# Patient Record
Sex: FEMALE | Race: WHITE | Employment: OTHER
[De-identification: names, ages, dates, MRNs, and addresses within clinical notes are randomized per-mention and may not be internally consistent; named-entity substitution may affect disease eponyms.]

---

## 2017-01-16 ENCOUNTER — OFFICE VISIT (OUTPATIENT)
Dept: FAMILY MEDICINE CLINIC | Facility: CLINIC | Age: 72
End: 2017-01-16

## 2017-01-16 VITALS
WEIGHT: 140 LBS | SYSTOLIC BLOOD PRESSURE: 102 MMHG | HEART RATE: 60 BPM | DIASTOLIC BLOOD PRESSURE: 68 MMHG | BODY MASS INDEX: 24.8 KG/M2 | HEIGHT: 63 IN | RESPIRATION RATE: 20 BRPM

## 2017-01-16 DIAGNOSIS — E78.2 MIXED HYPERLIPIDEMIA: Primary | ICD-10-CM

## 2017-01-16 DIAGNOSIS — E55.9 VITAMIN D DEFICIENCY: ICD-10-CM

## 2017-01-16 DIAGNOSIS — D64.9 ANEMIA, UNSPECIFIED TYPE: ICD-10-CM

## 2017-01-16 DIAGNOSIS — K21.9 GASTROESOPHAGEAL REFLUX DISEASE, ESOPHAGITIS PRESENCE NOT SPECIFIED: ICD-10-CM

## 2017-01-16 DIAGNOSIS — M10.9 GOUT, UNSPECIFIED CAUSE, UNSPECIFIED CHRONICITY, UNSPECIFIED SITE: ICD-10-CM

## 2017-01-16 PROCEDURE — 99214 OFFICE O/P EST MOD 30 MIN: CPT | Performed by: FAMILY MEDICINE

## 2017-01-16 RX ORDER — FAMOTIDINE 20 MG/1
20 TABLET, FILM COATED ORAL 2 TIMES DAILY
Qty: 60 TABLET | Refills: 3 | Status: SHIPPED | OUTPATIENT
Start: 2017-01-16 | End: 2017-06-23 | Stop reason: SDUPTHER

## 2017-01-16 ASSESSMENT — ENCOUNTER SYMPTOMS
ABDOMINAL PAIN: 0
BLOOD IN STOOL: 0
CHEST TIGHTNESS: 0
SHORTNESS OF BREATH: 0

## 2017-01-30 DIAGNOSIS — Z12.39 BREAST CANCER SCREENING: Primary | ICD-10-CM

## 2017-02-06 ENCOUNTER — HOSPITAL ENCOUNTER (OUTPATIENT)
Dept: WOMENS IMAGING | Age: 72
Discharge: HOME | End: 2017-02-06
Payer: MEDICARE

## 2017-02-06 DIAGNOSIS — Z12.39 BREAST CANCER SCREENING: ICD-10-CM

## 2017-02-06 PROCEDURE — G0202 SCR MAMMO BI INCL CAD: HCPCS | Performed by: RADIOLOGY

## 2017-02-06 PROCEDURE — 77063 BREAST TOMOSYNTHESIS BI: CPT | Performed by: RADIOLOGY

## 2017-02-06 PROCEDURE — 7025F PT INFOSYS ALARM 4 NXT MAMMO: CPT | Performed by: RADIOLOGY

## 2017-02-06 PROCEDURE — G0202 SCR MAMMO BI INCL CAD: HCPCS

## 2017-04-26 ENCOUNTER — HOSPITAL ENCOUNTER (OUTPATIENT)
Age: 72
Discharge: HOME | End: 2017-04-26
Payer: MEDICARE

## 2017-04-26 DIAGNOSIS — E55.9 VITAMIN D DEFICIENCY: ICD-10-CM

## 2017-04-26 DIAGNOSIS — D64.9 ANEMIA, UNSPECIFIED TYPE: ICD-10-CM

## 2017-04-26 DIAGNOSIS — M10.9 GOUT, UNSPECIFIED CAUSE, UNSPECIFIED CHRONICITY, UNSPECIFIED SITE: ICD-10-CM

## 2017-04-26 DIAGNOSIS — E78.2 MIXED HYPERLIPIDEMIA: ICD-10-CM

## 2017-04-26 LAB
ABSOLUTE EOS #: 0.3 K/UL (ref 0–0.4)
ABSOLUTE LYMPH #: 1.4 K/UL (ref 1–4.8)
ABSOLUTE MONO #: 0.6 K/UL (ref 0.1–1.3)
ALT SERPL-CCNC: 18 U/L (ref 5–33)
ANION GAP SERPL CALCULATED.3IONS-SCNC: 10 MMOL/L (ref 9–17)
AST SERPL-CCNC: 24 U/L
BASOPHILS # BLD: 1 %
BASOPHILS ABSOLUTE: 0.1 K/UL (ref 0–0.2)
BUN BLDV-MCNC: 22 MG/DL (ref 8–23)
BUN/CREAT BLD: NORMAL (ref 9–20)
CALCIUM SERPL-MCNC: 9.7 MG/DL (ref 8.6–10.4)
CHLORIDE BLD-SCNC: 103 MMOL/L (ref 98–107)
CHOLESTEROL/HDL RATIO: 3.3
CHOLESTEROL: 155 MG/DL
CO2: 28 MMOL/L (ref 20–31)
CREAT SERPL-MCNC: 0.82 MG/DL (ref 0.5–0.9)
DIFFERENTIAL TYPE: ABNORMAL
EOSINOPHILS RELATIVE PERCENT: 8 %
GFR AFRICAN AMERICAN: >60 ML/MIN
GFR NON-AFRICAN AMERICAN: >60 ML/MIN
GFR SERPL CREATININE-BSD FRML MDRD: NORMAL ML/MIN/{1.73_M2}
GFR SERPL CREATININE-BSD FRML MDRD: NORMAL ML/MIN/{1.73_M2}
GLUCOSE BLD-MCNC: 89 MG/DL (ref 70–99)
HCT VFR BLD CALC: 37.9 % (ref 36–46)
HDLC SERPL-MCNC: 47 MG/DL
HEMOGLOBIN: 12.1 G/DL (ref 12–16)
LDL CHOLESTEROL: 88 MG/DL (ref 0–130)
LYMPHOCYTES # BLD: 32 %
MCH RBC QN AUTO: 28.6 PG (ref 26–34)
MCHC RBC AUTO-ENTMCNC: 31.8 G/DL (ref 31–37)
MCV RBC AUTO: 89.9 FL (ref 80–100)
MONOCYTES # BLD: 14 %
PDW BLD-RTO: 15.4 % (ref 11.5–14.9)
PLATELET # BLD: 200 K/UL (ref 150–450)
PLATELET ESTIMATE: ABNORMAL
PMV BLD AUTO: 8.7 FL (ref 6–12)
POTASSIUM SERPL-SCNC: 4.3 MMOL/L (ref 3.7–5.3)
RBC # BLD: 4.22 M/UL (ref 4–5.2)
RBC # BLD: ABNORMAL 10*6/UL
SEG NEUTROPHILS: 45 %
SEGMENTED NEUTROPHILS ABSOLUTE COUNT: 2 K/UL (ref 1.3–9.1)
SODIUM BLD-SCNC: 141 MMOL/L (ref 135–144)
TRIGL SERPL-MCNC: 98 MG/DL
URIC ACID: 3.4 MG/DL (ref 2.4–5.7)
VITAMIN D 25-HYDROXY: 26.6 NG/ML (ref 30–100)
VLDLC SERPL CALC-MCNC: NORMAL MG/DL (ref 1–30)
WBC # BLD: 4.4 K/UL (ref 3.5–11)
WBC # BLD: ABNORMAL 10*3/UL

## 2017-04-26 PROCEDURE — 80061 LIPID PANEL: CPT

## 2017-04-26 PROCEDURE — 85025 COMPLETE CBC W/AUTO DIFF WBC: CPT

## 2017-04-26 PROCEDURE — 36415 COLL VENOUS BLD VENIPUNCTURE: CPT

## 2017-04-26 PROCEDURE — 82306 VITAMIN D 25 HYDROXY: CPT

## 2017-04-26 PROCEDURE — 84550 ASSAY OF BLOOD/URIC ACID: CPT

## 2017-04-26 PROCEDURE — 80048 BASIC METABOLIC PNL TOTAL CA: CPT

## 2017-04-26 PROCEDURE — 84460 ALANINE AMINO (ALT) (SGPT): CPT

## 2017-04-26 PROCEDURE — 84450 TRANSFERASE (AST) (SGOT): CPT

## 2017-04-27 RX ORDER — ERGOCALCIFEROL 1.25 MG/1
50000 CAPSULE ORAL WEEKLY
Qty: 4 CAPSULE | Refills: 5 | Status: SHIPPED | OUTPATIENT
Start: 2017-04-27 | End: 2018-01-15 | Stop reason: SDUPTHER

## 2017-06-13 ENCOUNTER — OFFICE VISIT (OUTPATIENT)
Dept: FAMILY MEDICINE CLINIC | Age: 72
End: 2017-06-13
Payer: MEDICARE

## 2017-06-13 VITALS
BODY MASS INDEX: 24.62 KG/M2 | RESPIRATION RATE: 16 BRPM | SYSTOLIC BLOOD PRESSURE: 110 MMHG | WEIGHT: 139 LBS | DIASTOLIC BLOOD PRESSURE: 62 MMHG | HEART RATE: 60 BPM

## 2017-06-13 DIAGNOSIS — M10.9 GOUT, UNSPECIFIED CAUSE, UNSPECIFIED CHRONICITY, UNSPECIFIED SITE: ICD-10-CM

## 2017-06-13 DIAGNOSIS — E55.9 VITAMIN D DEFICIENCY: ICD-10-CM

## 2017-06-13 DIAGNOSIS — E78.2 MIXED HYPERLIPIDEMIA: Primary | ICD-10-CM

## 2017-06-13 DIAGNOSIS — K21.9 GASTROESOPHAGEAL REFLUX DISEASE, ESOPHAGITIS PRESENCE NOT SPECIFIED: ICD-10-CM

## 2017-06-13 PROCEDURE — 99214 OFFICE O/P EST MOD 30 MIN: CPT | Performed by: FAMILY MEDICINE

## 2017-06-13 ASSESSMENT — ENCOUNTER SYMPTOMS
CHEST TIGHTNESS: 0
SHORTNESS OF BREATH: 0
ABDOMINAL PAIN: 0
BLOOD IN STOOL: 0

## 2017-06-13 ASSESSMENT — PATIENT HEALTH QUESTIONNAIRE - PHQ9
SUM OF ALL RESPONSES TO PHQ9 QUESTIONS 1 & 2: 0
SUM OF ALL RESPONSES TO PHQ QUESTIONS 1-9: 0
1. LITTLE INTEREST OR PLEASURE IN DOING THINGS: 0
2. FEELING DOWN, DEPRESSED OR HOPELESS: 0

## 2017-10-12 ENCOUNTER — IMMUNIZATION (OUTPATIENT)
Dept: FAMILY MEDICINE CLINIC | Age: 72
End: 2017-10-12
Payer: MEDICARE

## 2017-10-12 VITALS — TEMPERATURE: 97.8 F | BODY MASS INDEX: 25.01 KG/M2 | WEIGHT: 141.2 LBS

## 2017-10-12 DIAGNOSIS — Z23 FLU VACCINE NEED: Primary | ICD-10-CM

## 2017-10-12 PROCEDURE — G0008 ADMIN INFLUENZA VIRUS VAC: HCPCS | Performed by: FAMILY MEDICINE

## 2017-10-12 PROCEDURE — 90686 IIV4 VACC NO PRSV 0.5 ML IM: CPT | Performed by: FAMILY MEDICINE

## 2017-11-20 ENCOUNTER — OFFICE VISIT (OUTPATIENT)
Dept: PODIATRY | Age: 72
End: 2017-11-20
Payer: MEDICARE

## 2017-11-20 VITALS
SYSTOLIC BLOOD PRESSURE: 113 MMHG | HEIGHT: 63 IN | BODY MASS INDEX: 24.8 KG/M2 | HEART RATE: 87 BPM | WEIGHT: 140 LBS | DIASTOLIC BLOOD PRESSURE: 69 MMHG

## 2017-11-20 DIAGNOSIS — M25.571 SINUS TARSI SYNDROME, RIGHT: ICD-10-CM

## 2017-11-20 DIAGNOSIS — M76.829 POSTERIOR TIBIAL TENDON DYSFUNCTION: ICD-10-CM

## 2017-11-20 DIAGNOSIS — M21.071 ACQUIRED VALGUS DEFORMITY OF RIGHT ANKLE: Primary | ICD-10-CM

## 2017-11-20 PROCEDURE — 99213 OFFICE O/P EST LOW 20 MIN: CPT | Performed by: PODIATRIST

## 2017-11-20 RX ORDER — IBUPROFEN AND FAMOTIDINE 26.6; 8 MG/1; MG/1
1 TABLET, FILM COATED ORAL 3 TIMES DAILY PRN
Qty: 27 TABLET | Refills: 0 | COMMUNITY
Start: 2017-11-20 | End: 2017-12-12

## 2017-11-20 RX ORDER — IBUPROFEN AND FAMOTIDINE 26.6; 8 MG/1; MG/1
1 TABLET, FILM COATED ORAL 3 TIMES DAILY PRN
Qty: 90 TABLET | Refills: 1 | Status: SHIPPED | OUTPATIENT
Start: 2017-11-20 | End: 2017-12-12

## 2017-11-20 ASSESSMENT — ENCOUNTER SYMPTOMS
DIARRHEA: 0
COLOR CHANGE: 0
NAUSEA: 0
VOMITING: 0
CONSTIPATION: 0

## 2017-11-20 NOTE — PROGRESS NOTES
WITH FOOD AS NEEDED FOR GOUT FLARE UP UNTIL ATTACK SUBSIDES 6/19/17  Yes Cal Johnson MD   vitamin D (ERGOCALCIFEROL) 99374 UNITS CAPS capsule Take 1 capsule by mouth once a week 4/27/17  Yes Cal Johnson MD   Calcium Carbonate-Vitamin D (OYSTER SHELL CALCIUM/D) 500-200 MG-UNIT TABS TAKE ONE TABLET BY MOUTH TWICE A DAY 3/1/16  Yes Naomi Melgar CNP   aspirin 81 MG tablet Take 81 mg by mouth daily   Yes Historical Provider, MD   Aspirin-Acetaminophen-Caffeine (EXCEDRIN MIGRAINE PO) Take 2 tablets by mouth as needed. Yes Historical Provider, MD   estradiol (ESTRACE VAGINAL) 0.1 MG/GM vaginal cream Place 1 g vaginally once a week for 30 days. 5/31/12 6/30/12  Arianna Hernandez MD       Review of Systems   Constitutional: Negative for chills, diaphoresis, fatigue, fever and unexpected weight change. Cardiovascular: Negative for leg swelling. Gastrointestinal: Negative for constipation, diarrhea, nausea and vomiting. Musculoskeletal: Negative for arthralgias, gait problem and joint swelling. Skin: Negative for color change, pallor, rash and wound. Neurological: Negative for weakness and numbness. Lower Extremity Physical Examination:     Vitals:   Vitals:    11/20/17 1503   BP: 113/69   Pulse: 87     General: AAO x 3 in NAD. Integument:   Warm, dry, supple, Bilateral.  Open lesion absent, Bilateral.      Vascular: DP and PT pulses palpable 2/4, Bilateral.  CFT <3 seconds, Bilateral.  Hair growth present to the level of the digits, Bilateral.  Edema absent, Bilateral.  Varicosities present, Bilateral. Erythema absent, Bilateral    Neurological: Sensation intact to light touch to level of digits, Bilateral.      Musculoskeletal: No Pain to palpation sinus tarsi right foot. Muscle strength is +5/5 to all four muscle groups of the right lower extremity and +5/5 to all four muscle groups of the left lower extremity.  There are no areas of subluxation, dislocation, or laxity noted to either lower extremity. Range of motion to the right ankle is painful, but no grinding is noted. Range of motion to the left ankle is free of pain or grinding. Range of motion to the right subtalar joint is free of pain or grinding. Range of motion to the left subtalar joint is free of pain or grinding. Weightbearing evaluation does reveal extreme rearfoot eversion, medial prominence of the talar head, loss of the medial longitudinal arch height, ankle valgus and too many toes sign bilaterally. This deformity is reducible bilaterally. The digits of the right foot are contracted. The digits of the left foot are contracted. There is no prominence noted to the first metatarsal head of the right foot. There is no prominence noted to the first metatarsal head of the left foot. There is pain with palpation to the right posterior tibial tendon posterior to the medial malleolus. There is no pain with palpation to this tendon as it traverses distally and no pain at it's insertion on the navicular. There is mild pain with palpation to the sinus tarsi of the right foot. Asessment: Patient is a 67 y.o. female with:   1. Acquired valgus deformity of right ankle    2. Sinus tarsi syndrome, right    3. Posterior tibial tendon dysfunction        Plan: Patient examined and evaluated. Current condition and treatment options discussed in detail. Verbal and written instructions given to patient. Contact office with any questions/problems/concerns. Continue AFO Bilateral,  Alleve as needed. Discussed Duexis, topical  Recommended Move Free or OsteoBiFlex  Will save injection for when pain is severe, She declined injection today. We spent time discussing all option, discussed exercise. No orders of the defined types were placed in this encounter. No orders of the defined types were placed in this encounter.        RTC as needed   11/20/2017

## 2017-12-11 RX ORDER — PRAVASTATIN SODIUM 20 MG
TABLET ORAL
Qty: 30 TABLET | Refills: 3 | Status: SHIPPED | OUTPATIENT
Start: 2017-12-11 | End: 2018-03-13 | Stop reason: CLARIF

## 2017-12-12 ENCOUNTER — OFFICE VISIT (OUTPATIENT)
Dept: FAMILY MEDICINE CLINIC | Age: 72
End: 2017-12-12
Payer: MEDICARE

## 2017-12-12 VITALS
HEART RATE: 62 BPM | SYSTOLIC BLOOD PRESSURE: 108 MMHG | RESPIRATION RATE: 14 BRPM | WEIGHT: 140 LBS | BODY MASS INDEX: 24.8 KG/M2 | DIASTOLIC BLOOD PRESSURE: 70 MMHG

## 2017-12-12 DIAGNOSIS — E55.9 VITAMIN D DEFICIENCY: ICD-10-CM

## 2017-12-12 DIAGNOSIS — M10.9 GOUT, UNSPECIFIED CAUSE, UNSPECIFIED CHRONICITY, UNSPECIFIED SITE: ICD-10-CM

## 2017-12-12 DIAGNOSIS — E78.2 MIXED HYPERLIPIDEMIA: Primary | ICD-10-CM

## 2017-12-12 PROCEDURE — 99214 OFFICE O/P EST MOD 30 MIN: CPT | Performed by: FAMILY MEDICINE

## 2017-12-12 ASSESSMENT — ENCOUNTER SYMPTOMS
BLOOD IN STOOL: 0
CHEST TIGHTNESS: 0
ABDOMINAL PAIN: 0
SHORTNESS OF BREATH: 0

## 2017-12-12 NOTE — PROGRESS NOTES
Subjective:      Patient ID: Yusra Daniel is a 67 y.o. female. Visit Information    Have you changed or started any medications since your last visit including any over-the-counter medicines, vitamins, or herbal medicines? no   Are you having any side effects from any of your medications? -  no  Have you stopped taking any of your medications? Is so, why? -  no    Have you seen any other physician or provider since your last visit? No  Have you had any other diagnostic tests since your last visit? No  Have you been seen in the emergency room and/or had an admission to a hospital since we last saw you? No  Have you had your routine dental cleaning in the past 6 months? no    Have you activated your [employer] account? If not, what are your barriers? No     Patient Care Team:  Primitivo Martin MD as PCP - General (Family Medicine)    Medical History Review  Past Medical, Family, and Social History reviewed and does contribute to the patient presenting condition    Health Maintenance   Topic Date Due    DTaP/Tdap/Td vaccine (1 - Tdap) 03/20/1963    Breast cancer screen  02/06/2019    Lipid screen  04/26/2022    Colon cancer screen colonoscopy  12/22/2024    Zostavax vaccine  Addressed    DEXA (modify frequency per FRAX score)  Completed    Flu vaccine  Completed    Pneumococcal low/med risk  Completed     HPI  Patient is a 69-year-old white female who presents for hyperlipidemia, gout, vitamin D deficiency. Patient states that she is taking and tolerating her medication. She denies any chest pain, abdominal pain, shortness of breath, fever or chills. She states she has a good appetite and remains active. Review of Systems   Constitutional: Negative for chills and fever. HENT: Negative for congestion. Respiratory: Negative for chest tightness and shortness of breath. Cardiovascular: Negative for chest pain. Gastrointestinal: Negative for abdominal pain and blood in stool.    Genitourinary: Negative

## 2018-01-19 RX ORDER — FAMOTIDINE 20 MG/1
TABLET, FILM COATED ORAL
Qty: 60 TABLET | Refills: 1 | Status: SHIPPED | OUTPATIENT
Start: 2018-01-19 | End: 2018-03-17 | Stop reason: SDUPTHER

## 2018-01-25 RX ORDER — LORATADINE 10 MG/1
TABLET ORAL
Qty: 90 TABLET | Refills: 1 | Status: SHIPPED | OUTPATIENT
Start: 2018-01-25 | End: 2018-07-24 | Stop reason: SDUPTHER

## 2018-01-30 ENCOUNTER — TELEPHONE (OUTPATIENT)
Dept: FAMILY MEDICINE CLINIC | Age: 73
End: 2018-01-30

## 2018-01-30 RX ORDER — BENZONATATE 100 MG/1
100 CAPSULE ORAL 3 TIMES DAILY PRN
Qty: 30 CAPSULE | Refills: 0 | Status: SHIPPED | OUTPATIENT
Start: 2018-01-30 | End: 2018-02-09

## 2018-01-30 RX ORDER — AMOXICILLIN 875 MG/1
875 TABLET, COATED ORAL 2 TIMES DAILY
Qty: 20 TABLET | Refills: 0 | Status: SHIPPED | OUTPATIENT
Start: 2018-01-30 | End: 2018-02-09

## 2018-01-30 NOTE — TELEPHONE ENCOUNTER
Sore throat at night from sinus dripping, dry cough, blowing yellow and headache, can you send something to [facility] for her?

## 2018-02-07 ENCOUNTER — HOSPITAL ENCOUNTER (OUTPATIENT)
Dept: WOMENS IMAGING | Age: 73
Discharge: HOME | End: 2018-02-09
Payer: MEDICARE

## 2018-02-07 DIAGNOSIS — Z13.9 ENCOUNTER FOR SCREENING: ICD-10-CM

## 2018-02-07 PROCEDURE — 77067 SCR MAMMO BI INCL CAD: CPT

## 2018-02-07 PROCEDURE — 77063 BREAST TOMOSYNTHESIS BI: CPT

## 2018-02-19 RX ORDER — ALLOPURINOL 300 MG/1
TABLET ORAL
Qty: 30 TABLET | Refills: 2 | Status: SHIPPED | OUTPATIENT
Start: 2018-02-19 | End: 2018-05-22 | Stop reason: SDUPTHER

## 2018-03-07 ENCOUNTER — HOSPITAL ENCOUNTER (OUTPATIENT)
Age: 73
Discharge: HOME | End: 2018-03-07
Payer: MEDICARE

## 2018-03-07 DIAGNOSIS — E55.9 VITAMIN D DEFICIENCY: ICD-10-CM

## 2018-03-07 DIAGNOSIS — M10.9 GOUT, UNSPECIFIED CAUSE, UNSPECIFIED CHRONICITY, UNSPECIFIED SITE: ICD-10-CM

## 2018-03-07 DIAGNOSIS — E78.2 MIXED HYPERLIPIDEMIA: ICD-10-CM

## 2018-03-07 LAB
ALT SERPL-CCNC: 16 U/L (ref 5–33)
ANION GAP SERPL CALCULATED.3IONS-SCNC: 11 MMOL/L (ref 9–17)
AST SERPL-CCNC: 24 U/L
BUN BLDV-MCNC: 22 MG/DL (ref 8–23)
BUN/CREAT BLD: NORMAL (ref 9–20)
CALCIUM SERPL-MCNC: 9.6 MG/DL (ref 8.6–10.4)
CHLORIDE BLD-SCNC: 101 MMOL/L (ref 98–107)
CHOLESTEROL/HDL RATIO: 3.1
CHOLESTEROL: 166 MG/DL
CO2: 27 MMOL/L (ref 20–31)
CREAT SERPL-MCNC: 0.85 MG/DL (ref 0.5–0.9)
GFR AFRICAN AMERICAN: >60 ML/MIN
GFR NON-AFRICAN AMERICAN: >60 ML/MIN
GFR SERPL CREATININE-BSD FRML MDRD: NORMAL ML/MIN/{1.73_M2}
GFR SERPL CREATININE-BSD FRML MDRD: NORMAL ML/MIN/{1.73_M2}
GLUCOSE BLD-MCNC: 79 MG/DL (ref 70–99)
HDLC SERPL-MCNC: 53 MG/DL
LDL CHOLESTEROL: 93 MG/DL (ref 0–130)
MAGNESIUM: 1.9 MG/DL (ref 1.6–2.6)
POTASSIUM SERPL-SCNC: 3.9 MMOL/L (ref 3.7–5.3)
SODIUM BLD-SCNC: 139 MMOL/L (ref 135–144)
TRIGL SERPL-MCNC: 101 MG/DL
URIC ACID: 4 MG/DL (ref 2.4–5.7)
VITAMIN D 25-HYDROXY: 31.2 NG/ML (ref 30–100)
VLDLC SERPL CALC-MCNC: NORMAL MG/DL (ref 1–30)

## 2018-03-07 PROCEDURE — 84550 ASSAY OF BLOOD/URIC ACID: CPT

## 2018-03-07 PROCEDURE — 84450 TRANSFERASE (AST) (SGOT): CPT

## 2018-03-07 PROCEDURE — 36415 COLL VENOUS BLD VENIPUNCTURE: CPT

## 2018-03-07 PROCEDURE — 83735 ASSAY OF MAGNESIUM: CPT

## 2018-03-07 PROCEDURE — 84460 ALANINE AMINO (ALT) (SGPT): CPT

## 2018-03-07 PROCEDURE — 80048 BASIC METABOLIC PNL TOTAL CA: CPT

## 2018-03-07 PROCEDURE — 80061 LIPID PANEL: CPT

## 2018-03-07 PROCEDURE — 82306 VITAMIN D 25 HYDROXY: CPT

## 2018-03-13 ENCOUNTER — TELEPHONE (OUTPATIENT)
Dept: FAMILY MEDICINE CLINIC | Age: 73
End: 2018-03-13

## 2018-03-13 RX ORDER — SIMVASTATIN 20 MG
20 TABLET ORAL DAILY
Qty: 30 TABLET | Refills: 3 | Status: SHIPPED | OUTPATIENT
Start: 2018-03-13 | End: 2018-07-13 | Stop reason: SDUPTHER

## 2018-03-13 NOTE — TELEPHONE ENCOUNTER
Patient called to say that her PRAVACHOL IS NO LONGER COVERED ON HER INSURANCE BUT ZOCOR OR LOVASTATIN WOULD BE. Her pharmacy is [email or web address] on [address]. Please advise.       Next Visit Date:  Future Appointments  Date Time Provider Anabel Emerson   6/12/2018 1:30 PM Vivi Monroe MD [address] [facility] SETiT Maintenance   Topic Date Due    DTaP/Tdap/Td vaccine (1 - Tdap) 03/20/1963    Shingles Vaccine (1 of 2 - 2 Dose Series) 03/20/1994    Breast cancer screen  02/07/2020    Lipid screen  03/07/2023    Colon cancer screen colonoscopy  12/22/2024    DEXA (modify frequency per FRAX score)  Completed    Flu vaccine  Completed    Pneumococcal low/med risk  Completed       No results found for: LABA1C          ( goal A1C is < 7)   No results found for: LABMICR  LDL Cholesterol (mg/dL)   Date Value   03/07/2018 93       (goal LDL is <100)   AST (U/L)   Date Value   03/07/2018 24     ALT (U/L)   Date Value   03/07/2018 16     BUN (mg/dL)   Date Value   03/07/2018 22     BP Readings from Last 3 Encounters:   12/12/17 108/70   11/20/17 113/69   06/13/17 110/62          (goal 120/80)    All Future Testing planned in [facility]  Lab Frequency Next Occurrence               Patient Active Problem List:     Allergic rhinitis     Osteoarthritis     Osteoporosis     GERD (gastroesophageal reflux disease)     Headache     Hyperlipidemia     Gout     Vitamin D deficiency     Migraine     Chronic headache     Anemia     Lymphadenopathy

## 2018-03-19 RX ORDER — FAMOTIDINE 20 MG/1
TABLET, FILM COATED ORAL
Qty: 60 TABLET | Refills: 2 | Status: SHIPPED | OUTPATIENT
Start: 2018-03-19 | End: 2018-06-23 | Stop reason: SDUPTHER

## 2018-03-28 ENCOUNTER — OFFICE VISIT (OUTPATIENT)
Dept: ORTHOPEDIC SURGERY | Age: 73
End: 2018-03-28
Payer: MEDICARE

## 2018-03-28 VITALS — HEIGHT: 63 IN | RESPIRATION RATE: 18 BRPM | BODY MASS INDEX: 24.8 KG/M2 | HEART RATE: 69 BPM | WEIGHT: 140 LBS

## 2018-03-28 DIAGNOSIS — M25.562 CHRONIC PAIN OF BOTH KNEES: Primary | ICD-10-CM

## 2018-03-28 DIAGNOSIS — M25.561 CHRONIC PAIN OF BOTH KNEES: Primary | ICD-10-CM

## 2018-03-28 DIAGNOSIS — G89.29 CHRONIC PAIN OF BOTH KNEES: Primary | ICD-10-CM

## 2018-03-28 PROCEDURE — 99203 OFFICE O/P NEW LOW 30 MIN: CPT | Performed by: ORTHOPAEDIC SURGERY

## 2018-03-28 NOTE — PROGRESS NOTES
Loretta Mcneill M.D.            [address] [facility],[address], [address] [facility]             Dept Phone: [phone]             Dept Fax:  [phone]  Reuben Gar returns today haven't seen her for about 6 years. She is status post trauma that ORIF of tibial plateau fracture of her left knee. She does has been followed by me in the past for this. Her right knees beginning to bother her as well. She's Rene not on anti-inflammatories. She's not had any injections recently. She's at the point where her activities are starting to become pretty restricted    Examination of the patient's left knee notes a pretty significant varus. She has a large scar laterally. Her motion is 5-110 degrees. Crepitus noted especially medially. She has a moderate varus deformity. Ligamentously however she is grossly intact    Examination right knee also notes a varus knee about a 5° flexion contracture. Flexion about 130 crepitus and grinding throughout ligaments a grossly intact. She has no pain or rotation of either of her hips. X-rays taken today reviewed by me shows she has approaching bone-on-bone disease medial compartment of her right knee. The left knee is however has bone-on-bone disease. She has a lateral tibial plateau plate with significant tricompartmental disease. Impression  Posttraumatic tricompartmental disease/DJD left knee  Significant DJD right knee    Plan  Patient is aware that she is likely to require arthroplasty in future but she like to put off. We will put in for prior authorization for Monovisc. I told her given the severity of her arthritis I can't guarantee that she'll get that significant of relief. We'll see her back here once this is authorized. Review of Systems   Constitutional: Negative. HENT: Negative. Respiratory: Negative. Cardiovascular: Negative. Musculoskeletal: Negative. Neurological: Negative. Past Medical History:   Diagnosis Date    Allergic rhinitis     Closed tibia fracture     GERD (gastroesophageal reflux disease)     Gout     Headache(784.0)     h/o migraines    Hyperlipidemia     Osteoarthritis     DJD Lt knee    Osteoporosis     Posterior tibial tendon dysfunction     right, wears braces on both legs    Vitamin D deficient rickets      Past Surgical History:   Procedure Laterality Date    CARPAL TUNNEL RELEASE      bilateral    HYSTERECTOMY  4/12    KNEE ARTHROSCOPY Left 2002?  TIBIA FRACTURE SURGERY Left      Family History   Problem Relation Age of Onset    Arthritis Other     Thyroid Disease Other     COPD Other     Stroke Father            Orders Placed This Encounter   Procedures    XR Knee Bilateral Standing     Standing Status:   Future     Number of Occurrences:   1     Standing Expiration Date:   3/28/2019       1. Chronic pain of both knees        Cole Kelly MD    Please note that this chart was generated using voice recognition Dragon dictation software. Although every effort was made to ensure the accuracy of this automated transcription, some errors in transcription may have occurred.

## 2018-03-29 ENCOUNTER — TELEPHONE (OUTPATIENT)
Dept: ORTHOPEDIC SURGERY | Age: 73
End: 2018-03-29

## 2018-03-29 PROBLEM — M17.0 DEGENERATIVE ARTHRITIS OF KNEE, BILATERAL: Status: ACTIVE | Noted: 2018-03-29

## 2018-04-02 RX ORDER — ALENDRONATE SODIUM 70 MG/1
TABLET ORAL
Qty: 4 TABLET | Refills: 3 | Status: SHIPPED | OUTPATIENT
Start: 2018-04-02 | End: 2018-09-23 | Stop reason: SDUPTHER

## 2018-04-04 ENCOUNTER — OFFICE VISIT (OUTPATIENT)
Dept: ORTHOPEDIC SURGERY | Age: 73
End: 2018-04-04
Payer: MEDICARE

## 2018-04-04 DIAGNOSIS — M17.0 PRIMARY OSTEOARTHRITIS OF BOTH KNEES: Primary | ICD-10-CM

## 2018-04-04 PROCEDURE — 99213 OFFICE O/P EST LOW 20 MIN: CPT | Performed by: ORTHOPAEDIC SURGERY

## 2018-04-04 PROCEDURE — 20610 DRAIN/INJ JOINT/BURSA W/O US: CPT | Performed by: ORTHOPAEDIC SURGERY

## 2018-04-04 RX ORDER — LIDOCAINE HYDROCHLORIDE 10 MG/ML
2 INJECTION, SOLUTION EPIDURAL; INFILTRATION; INTRACAUDAL; PERINEURAL ONCE
Status: COMPLETED | OUTPATIENT
Start: 2018-04-04 | End: 2018-04-04

## 2018-04-04 RX ADMIN — LIDOCAINE HYDROCHLORIDE 2 ML: 10 INJECTION, SOLUTION EPIDURAL; INFILTRATION; INTRACAUDAL; PERINEURAL at 11:55

## 2018-04-09 RX ORDER — VERAPAMIL HYDROCHLORIDE 240 MG/1
TABLET, FILM COATED, EXTENDED RELEASE ORAL
Qty: 30 TABLET | Refills: 2 | Status: SHIPPED | OUTPATIENT
Start: 2018-04-09 | End: 2018-07-11 | Stop reason: SDUPTHER

## 2018-04-09 RX ORDER — FLUTICASONE PROPIONATE 50 MCG
SPRAY, SUSPENSION (ML) NASAL
Qty: 1 BOTTLE | Refills: 2 | Status: SHIPPED | OUTPATIENT
Start: 2018-04-09 | End: 2018-10-23 | Stop reason: SDUPTHER

## 2018-04-16 RX ORDER — NAPROXEN 250 MG/1
TABLET ORAL
Qty: 60 TABLET | Refills: 0 | Status: ON HOLD | OUTPATIENT
Start: 2018-04-16 | End: 2018-11-07 | Stop reason: HOSPADM

## 2018-05-14 RX ORDER — ERGOCALCIFEROL 1.25 MG/1
CAPSULE ORAL
Qty: 2 CAPSULE | Refills: 1 | Status: SHIPPED | OUTPATIENT
Start: 2018-05-14 | End: 2018-09-12 | Stop reason: SDUPTHER

## 2018-05-22 RX ORDER — ALLOPURINOL 300 MG/1
TABLET ORAL
Qty: 30 TABLET | Refills: 5 | Status: SHIPPED | OUTPATIENT
Start: 2018-05-22 | End: 2018-11-23 | Stop reason: SDUPTHER

## 2018-06-12 ENCOUNTER — OFFICE VISIT (OUTPATIENT)
Dept: FAMILY MEDICINE CLINIC | Age: 73
End: 2018-06-12
Payer: MEDICARE

## 2018-06-12 VITALS
WEIGHT: 138 LBS | BODY MASS INDEX: 24.45 KG/M2 | DIASTOLIC BLOOD PRESSURE: 70 MMHG | SYSTOLIC BLOOD PRESSURE: 102 MMHG | RESPIRATION RATE: 16 BRPM | HEART RATE: 68 BPM

## 2018-06-12 DIAGNOSIS — E55.9 VITAMIN D DEFICIENCY: ICD-10-CM

## 2018-06-12 DIAGNOSIS — M10.9 GOUT, UNSPECIFIED CAUSE, UNSPECIFIED CHRONICITY, UNSPECIFIED SITE: ICD-10-CM

## 2018-06-12 DIAGNOSIS — L23.7 ALLERGIC CONTACT DERMATITIS DUE TO PLANTS, EXCEPT FOOD: ICD-10-CM

## 2018-06-12 DIAGNOSIS — E78.2 MIXED HYPERLIPIDEMIA: Primary | ICD-10-CM

## 2018-06-12 PROCEDURE — 99214 OFFICE O/P EST MOD 30 MIN: CPT | Performed by: FAMILY MEDICINE

## 2018-06-12 RX ORDER — CLOBETASOL PROPIONATE 0.5 MG/G
CREAM TOPICAL
Qty: 15 G | Refills: 0 | Status: SHIPPED | OUTPATIENT
Start: 2018-06-12 | End: 2018-10-23 | Stop reason: ALTCHOICE

## 2018-06-12 RX ORDER — HYDROXYZINE HYDROCHLORIDE 25 MG/1
25 TABLET, FILM COATED ORAL 3 TIMES DAILY PRN
Qty: 20 TABLET | Refills: 0 | Status: SHIPPED | OUTPATIENT
Start: 2018-06-12 | End: 2018-06-22

## 2018-06-12 ASSESSMENT — ENCOUNTER SYMPTOMS
CHEST TIGHTNESS: 0
ABDOMINAL PAIN: 0
SHORTNESS OF BREATH: 0
BLOOD IN STOOL: 0

## 2018-06-25 RX ORDER — FAMOTIDINE 20 MG/1
TABLET, FILM COATED ORAL
Qty: 60 TABLET | Refills: 1 | Status: SHIPPED | OUTPATIENT
Start: 2018-06-25 | End: 2018-08-25 | Stop reason: SDUPTHER

## 2018-07-13 RX ORDER — SIMVASTATIN 20 MG
TABLET ORAL
Qty: 30 TABLET | Refills: 2 | Status: SHIPPED | OUTPATIENT
Start: 2018-07-13 | End: 2018-10-16 | Stop reason: SDUPTHER

## 2018-07-24 RX ORDER — LORATADINE 10 MG/1
TABLET ORAL
Qty: 90 TABLET | Refills: 0 | Status: SHIPPED | OUTPATIENT
Start: 2018-07-24 | End: 2018-10-26 | Stop reason: SDUPTHER

## 2018-07-30 ENCOUNTER — OFFICE VISIT (OUTPATIENT)
Dept: FAMILY MEDICINE CLINIC | Age: 73
End: 2018-07-30
Payer: MEDICARE

## 2018-07-30 VITALS
DIASTOLIC BLOOD PRESSURE: 70 MMHG | TEMPERATURE: 97.3 F | BODY MASS INDEX: 24.62 KG/M2 | RESPIRATION RATE: 14 BRPM | SYSTOLIC BLOOD PRESSURE: 100 MMHG | HEART RATE: 60 BPM | WEIGHT: 139 LBS

## 2018-07-30 DIAGNOSIS — R05.3 PERSISTENT COUGH: ICD-10-CM

## 2018-07-30 DIAGNOSIS — J20.9 ACUTE BRONCHITIS, UNSPECIFIED ORGANISM: Primary | ICD-10-CM

## 2018-07-30 PROCEDURE — 99214 OFFICE O/P EST MOD 30 MIN: CPT | Performed by: NURSE PRACTITIONER

## 2018-07-30 RX ORDER — AZITHROMYCIN 250 MG/1
TABLET, FILM COATED ORAL
Qty: 1 PACKET | Refills: 0 | Status: SHIPPED | OUTPATIENT
Start: 2018-07-30 | End: 2018-08-09

## 2018-07-30 RX ORDER — BENZONATATE 100 MG/1
100 CAPSULE ORAL 3 TIMES DAILY PRN
Qty: 21 CAPSULE | Refills: 0 | Status: SHIPPED | OUTPATIENT
Start: 2018-07-30 | End: 2018-08-03

## 2018-07-30 ASSESSMENT — PATIENT HEALTH QUESTIONNAIRE - PHQ9
2. FEELING DOWN, DEPRESSED OR HOPELESS: 0
1. LITTLE INTEREST OR PLEASURE IN DOING THINGS: 0
SUM OF ALL RESPONSES TO PHQ9 QUESTIONS 1 & 2: 0
SUM OF ALL RESPONSES TO PHQ QUESTIONS 1-9: 0

## 2018-07-30 ASSESSMENT — ENCOUNTER SYMPTOMS
SHORTNESS OF BREATH: 0
COUGH: 1
NAUSEA: 0
SINUS PRESSURE: 1
ABDOMINAL PAIN: 0
RHINORRHEA: 1
SORE THROAT: 1

## 2018-07-30 NOTE — PROGRESS NOTES
Subjective:      Patient ID: James Arroyo is a 75 y.o. female. Visit Information    Have you changed or started any medications since your last visit including any over-the-counter medicines, vitamins, or herbal medicines? no   Are you having any side effects from any of your medications? -  no  Have you stopped taking any of your medications? Is so, why? -  no    Have you seen any other physician or provider since your last visit? No  Have you had any other diagnostic tests since your last visit? No  Have you been seen in the emergency room and/or had an admission to a hospital since we last saw you? No  Have you had your routine dental cleaning in the past 6 months? no    Have you activated your [employer] account? If not, what are your barriers? No:      Patient Care Team:  Fe Garcia MD as PCP - General (Family Medicine)    Medical History Review  Past Medical, Family, and Social History reviewed and does not contribute to the patient presenting condition    Health Maintenance   Topic Date Due    DTaP/Tdap/Td vaccine (1 - Tdap) 03/20/1963    Shingles Vaccine (1 of 2 - 2 Dose Series) 03/20/1994    Flu vaccine (1) 09/01/2018    Breast cancer screen  02/07/2020    Lipid screen  03/07/2023    Colon cancer screen colonoscopy  12/22/2024    DEXA (modify frequency per FRAX score)  Completed    Pneumococcal low/med risk  Completed     HPI     75year old female presents with chills sore throat nasal congestion/pressure post nasal drainage cough for 4 days. She says cough is nonproductive. She coughs so hard that she couldn't sleep last night. She took robitussin with mild relief. She denies fever sob body ache malaise or nv abd pain. States her  is sick with pneumonia. Pt is a remote smoker and quit it years ago. Review of Systems   Constitutional: Positive for chills. Negative for fever. HENT: Positive for congestion, postnasal drip, rhinorrhea, sinus pressure and sore throat.

## 2018-08-03 ENCOUNTER — TELEPHONE (OUTPATIENT)
Dept: FAMILY MEDICINE CLINIC | Age: 73
End: 2018-08-03

## 2018-08-03 RX ORDER — GUAIFENESIN AND DEXTROMETHORPHAN HYDROBROMIDE 600; 30 MG/1; MG/1
TABLET, EXTENDED RELEASE ORAL
Qty: 28 TABLET | Refills: 1 | Status: SHIPPED | OUTPATIENT
Start: 2018-08-03 | End: 2019-10-23 | Stop reason: ALTCHOICE

## 2018-08-03 RX ORDER — BENZONATATE 100 MG/1
CAPSULE ORAL
Qty: 30 CAPSULE | Refills: 1 | Status: SHIPPED | OUTPATIENT
Start: 2018-08-03 | End: 2019-06-13

## 2018-08-27 RX ORDER — FAMOTIDINE 20 MG/1
TABLET, FILM COATED ORAL
Qty: 60 TABLET | Refills: 2 | Status: SHIPPED | OUTPATIENT
Start: 2018-08-27 | End: 2018-11-30 | Stop reason: SDUPTHER

## 2018-09-05 ENCOUNTER — OFFICE VISIT (OUTPATIENT)
Dept: FAMILY MEDICINE CLINIC | Age: 73
End: 2018-09-05
Payer: MEDICARE

## 2018-09-05 VITALS
RESPIRATION RATE: 14 BRPM | DIASTOLIC BLOOD PRESSURE: 60 MMHG | WEIGHT: 138 LBS | BODY MASS INDEX: 24.45 KG/M2 | HEART RATE: 64 BPM | SYSTOLIC BLOOD PRESSURE: 118 MMHG | TEMPERATURE: 97.1 F

## 2018-09-05 DIAGNOSIS — H11.31 SUBCONJUNCTIVAL HEMORRHAGE OF RIGHT EYE: ICD-10-CM

## 2018-09-05 DIAGNOSIS — H10.9 CONJUNCTIVITIS OF RIGHT EYE, UNSPECIFIED CONJUNCTIVITIS TYPE: Primary | ICD-10-CM

## 2018-09-05 PROCEDURE — 99213 OFFICE O/P EST LOW 20 MIN: CPT | Performed by: NURSE PRACTITIONER

## 2018-09-05 RX ORDER — MOXIFLOXACIN 5 MG/ML
1 SOLUTION/ DROPS OPHTHALMIC 3 TIMES DAILY
Qty: 3 ML | Refills: 0 | Status: SHIPPED | OUTPATIENT
Start: 2018-09-05 | End: 2018-09-12

## 2018-09-05 ASSESSMENT — ENCOUNTER SYMPTOMS
EYE DISCHARGE: 1
COUGH: 1
RHINORRHEA: 0
EYE REDNESS: 1

## 2018-09-05 NOTE — PROGRESS NOTES
Subjective:      Patient ID: Christelle Curry is a 75 y.o. female. Visit Information    Have you changed or started any medications since your last visit including any over-the-counter medicines, vitamins, or herbal medicines? no   Are you having any side effects from any of your medications? -  no  Have you stopped taking any of your medications? Is so, why? -  no    Have you seen any other physician or provider since your last visit? No  Have you had any other diagnostic tests since your last visit? No  Have you been seen in the emergency room and/or had an admission to a hospital since we last saw you? No  Have you had your routine dental cleaning in the past 6 months? no    Have you activated your [employer] account? If not, what are your barriers? No:      Patient Care Team:  Jonas Marte MD as PCP - General (Family Medicine)    Medical History Review  Past Medical, Family, and Social History reviewed and does not contribute to the patient presenting condition    Health Maintenance   Topic Date Due    DTaP/Tdap/Td vaccine (1 - Tdap) 03/20/1963    Shingles Vaccine (1 of 2 - 2 Dose Series) 03/20/1994    Flu vaccine (1) 09/01/2018    Breast cancer screen  02/07/2020    Lipid screen  03/07/2023    Colon cancer screen colonoscopy  12/22/2024    DEXA (modify frequency per FRAX score)  Completed    Pneumococcal low/med risk  Completed     Conjunctivitis    The current episode started 2 days ago. The onset was sudden. The problem has been gradually worsening. The problem is moderate. Associated symptoms include cough, eye discharge and eye redness. Pertinent negatives include no headaches and no rhinorrhea. Review of Systems   HENT: Negative for postnasal drip and rhinorrhea. Eyes: Positive for discharge and redness. Respiratory: Positive for cough. Cardiovascular: Negative for chest pain. Neurological: Negative for dizziness and headaches.        Objective:   Physical Exam   Constitutional:

## 2018-09-13 RX ORDER — ERGOCALCIFEROL 1.25 MG/1
CAPSULE ORAL
Qty: 2 CAPSULE | Refills: 0 | Status: SHIPPED | OUTPATIENT
Start: 2018-09-13 | End: 2018-10-12 | Stop reason: SDUPTHER

## 2018-09-24 RX ORDER — ALENDRONATE SODIUM 70 MG/1
TABLET ORAL
Qty: 4 TABLET | Refills: 2 | Status: SHIPPED | OUTPATIENT
Start: 2018-09-24 | End: 2019-01-25

## 2018-10-03 ENCOUNTER — OFFICE VISIT (OUTPATIENT)
Dept: ORTHOPEDIC SURGERY | Age: 73
End: 2018-10-03
Payer: MEDICARE

## 2018-10-03 VITALS — HEART RATE: 64 BPM | HEIGHT: 63 IN | BODY MASS INDEX: 24.8 KG/M2 | OXYGEN SATURATION: 99 % | WEIGHT: 140 LBS

## 2018-10-03 DIAGNOSIS — M17.0 PRIMARY OSTEOARTHRITIS OF BOTH KNEES: Primary | ICD-10-CM

## 2018-10-03 PROCEDURE — 99213 OFFICE O/P EST LOW 20 MIN: CPT | Performed by: ORTHOPAEDIC SURGERY

## 2018-10-03 ASSESSMENT — KOOS JR
BENDING TO THE FLOOR TO PICK UP OBJECT: 2
HOW SEVERE IS YOUR KNEE STIFFNESS AFTER FIRST WAKING IN MORNING: 2
TWISING OR PIVOTING ON KNEE: 2
STRAIGHTENING KNEE FULLY: 2
RISING FROM SITTING: 2
GOING UP OR DOWN STAIRS: 2
STANDING UPRIGHT: 1

## 2018-10-03 ASSESSMENT — PROMIS GLOBAL HEALTH SCALE
SUM OF RESPONSES TO QUESTIONS 2, 4, 5, & 10: 17
IN THE PAST 7 DAYS, HOW OFTEN HAVE YOU BEEN BOTHERED BY EMOTIONAL PROBLEMS, SUCH AS FEELING ANXIOUS, DEPRESSED, OR IRRITABLE [ON A SCALE FROM 1 (NEVER) TO 5 (ALWAYS)]?: 2
SUM OF RESPONSES TO QUESTIONS 3, 6, 7, & 8: 16
WHO IS THE PERSON COMPLETING THE PROMIS V1.1 SURVEY?: 0
IN GENERAL, HOW WOULD YOU RATE YOUR SATISFACTION WITH YOUR SOCIAL ACTIVITIES AND RELATIONSHIPS [ON A SCALE OF 1 (POOR) TO 5 (EXCELLENT)]?: 5
IN THE PAST 7 DAYS, HOW WOULD YOU RATE YOUR FATIGUE ON AVERAGE [ON A SCALE FROM 1 (NONE) TO 5 (VERY SEVERE)]?: 4
TO WHAT EXTENT ARE YOU ABLE TO CARRY OUT YOUR EVERYDAY PHYSICAL ACTIVITIES SUCH AS WALKING, CLIMBING STAIRS, CARRYING GROCERIES, OR MOVING A CHAIR [ON A SCALE OF 1 (NOT AT ALL) TO 5 (COMPLETELY)]?: 4
IN GENERAL, WOULD YOU SAY YOUR QUALITY OF LIFE IS...[ON A SCALE OF 1 (POOR) TO 5 (EXCELLENT)]: 5
IN GENERAL, HOW WOULD YOU RATE YOUR PHYSICAL HEALTH [ON A SCALE OF 1 (POOR) TO 5 (EXCELLENT)]?: 5
IN THE PAST 7 DAYS, HOW WOULD YOU RATE YOUR PAIN ON AVERAGE [ON A SCALE FROM 0 (NO PAIN) TO 10 (WORST IMAGINABLE PAIN)]?: 3
IN GENERAL, WOULD YOU SAY YOUR HEALTH IS...[ON A SCALE OF 1 (POOR) TO 5 (EXCELLENT)]: 4
IN GENERAL, PLEASE RATE HOW WELL YOU CARRY OUT YOUR USUAL SOCIAL ACTIVITIES (INCLUDES ACTIVITIES AT HOME, AT WORK, AND IN YOUR COMMUNITY, AND RESPONSIBILITIES AS A PARENT, CHILD, SPOUSE, EMPLOYEE, FRIEND, ETC) [ON A SCALE OF 1 (POOR) TO 5 (EXCELLENT)]?: 5
HOW IS THE PROMIS V1.1 BEING ADMINISTERED?: 0
IN GENERAL, HOW WOULD YOU RATE YOUR MENTAL HEALTH, INCLUDING YOUR MOOD AND YOUR ABILITY TO THINK [ON A SCALE OF 1 (POOR) TO 5 (EXCELLENT)]?: 5

## 2018-10-12 RX ORDER — ERGOCALCIFEROL 1.25 MG/1
CAPSULE ORAL
Qty: 2 CAPSULE | Refills: 3 | Status: SHIPPED | OUTPATIENT
Start: 2018-10-12 | End: 2019-04-08 | Stop reason: SDUPTHER

## 2018-10-16 RX ORDER — SIMVASTATIN 20 MG
TABLET ORAL
Qty: 30 TABLET | Refills: 1 | Status: SHIPPED | OUTPATIENT
Start: 2018-10-16 | End: 2018-12-26 | Stop reason: SDUPTHER

## 2018-10-23 ENCOUNTER — HOSPITAL ENCOUNTER (OUTPATIENT)
Dept: PREADMISSION TESTING | Age: 73
Discharge: HOME | End: 2018-10-27
Payer: MEDICARE

## 2018-10-23 VITALS
TEMPERATURE: 97.7 F | SYSTOLIC BLOOD PRESSURE: 128 MMHG | RESPIRATION RATE: 12 BRPM | OXYGEN SATURATION: 100 % | HEART RATE: 67 BPM | HEIGHT: 63 IN | BODY MASS INDEX: 24.8 KG/M2 | DIASTOLIC BLOOD PRESSURE: 68 MMHG | WEIGHT: 140 LBS

## 2018-10-23 LAB
ABSOLUTE EOS #: 0.22 K/UL (ref 0–0.4)
ABSOLUTE IMMATURE GRANULOCYTE: ABNORMAL K/UL (ref 0–0.3)
ABSOLUTE LYMPH #: 2.01 K/UL (ref 1–4.8)
ABSOLUTE MONO #: 0.26 K/UL (ref 0.1–1.3)
ANION GAP SERPL CALCULATED.3IONS-SCNC: 10 MMOL/L (ref 9–17)
BASOPHILS # BLD: 0 % (ref 0–2)
BASOPHILS ABSOLUTE: 0 K/UL (ref 0–0.2)
BILIRUBIN URINE: NEGATIVE
BUN BLDV-MCNC: 15 MG/DL (ref 8–23)
BUN/CREAT BLD: NORMAL (ref 9–20)
CALCIUM SERPL-MCNC: 9.6 MG/DL (ref 8.6–10.4)
CHLORIDE BLD-SCNC: 101 MMOL/L (ref 98–107)
CO2: 27 MMOL/L (ref 20–31)
COLOR: YELLOW
COMMENT UA: NORMAL
CREAT SERPL-MCNC: 0.77 MG/DL (ref 0.5–0.9)
DIFFERENTIAL TYPE: ABNORMAL
EKG ATRIAL RATE: 65 BPM
EKG P AXIS: 61 DEGREES
EKG P-R INTERVAL: 162 MS
EKG Q-T INTERVAL: 430 MS
EKG QRS DURATION: 82 MS
EKG QTC CALCULATION (BAZETT): 447 MS
EKG R AXIS: -29 DEGREES
EKG T AXIS: 26 DEGREES
EKG VENTRICULAR RATE: 65 BPM
EOSINOPHILS RELATIVE PERCENT: 5 % (ref 0–4)
GFR AFRICAN AMERICAN: >60 ML/MIN
GFR NON-AFRICAN AMERICAN: >60 ML/MIN
GFR SERPL CREATININE-BSD FRML MDRD: NORMAL ML/MIN/{1.73_M2}
GFR SERPL CREATININE-BSD FRML MDRD: NORMAL ML/MIN/{1.73_M2}
GLUCOSE BLD-MCNC: 88 MG/DL (ref 70–99)
GLUCOSE URINE: NEGATIVE
HCT VFR BLD CALC: 36.9 % (ref 36–46)
HEMOGLOBIN: 12.1 G/DL (ref 12–16)
IMMATURE GRANULOCYTES: ABNORMAL %
KETONES, URINE: NEGATIVE
LEUKOCYTE ESTERASE, URINE: NEGATIVE
LYMPHOCYTES # BLD: 47 % (ref 24–44)
MCH RBC QN AUTO: 29.9 PG (ref 26–34)
MCHC RBC AUTO-ENTMCNC: 32.9 G/DL (ref 31–37)
MCV RBC AUTO: 90.8 FL (ref 80–100)
MONOCYTES # BLD: 6 % (ref 1–7)
MORPHOLOGY: ABNORMAL
NITRITE, URINE: NEGATIVE
NRBC AUTOMATED: ABNORMAL PER 100 WBC
PDW BLD-RTO: 15.2 % (ref 11.5–14.9)
PH UA: 5 (ref 5–8)
PLATELET # BLD: 218 K/UL (ref 150–450)
PLATELET ESTIMATE: ABNORMAL
PMV BLD AUTO: 8.7 FL (ref 6–12)
POTASSIUM SERPL-SCNC: 4.1 MMOL/L (ref 3.7–5.3)
PROTEIN UA: NEGATIVE
RBC # BLD: 4.07 M/UL (ref 4–5.2)
RBC # BLD: ABNORMAL 10*6/UL
SEG NEUTROPHILS: 42 % (ref 36–66)
SEGMENTED NEUTROPHILS ABSOLUTE COUNT: 1.81 K/UL (ref 1.3–9.1)
SODIUM BLD-SCNC: 138 MMOL/L (ref 135–144)
SPECIFIC GRAVITY UA: 1.01 (ref 1–1.03)
TURBIDITY: CLEAR
URINE HGB: NEGATIVE
UROBILINOGEN, URINE: NORMAL
WBC # BLD: 4.3 K/UL (ref 3.5–11)
WBC # BLD: ABNORMAL 10*3/UL

## 2018-10-23 PROCEDURE — 80048 BASIC METABOLIC PNL TOTAL CA: CPT

## 2018-10-23 PROCEDURE — 93005 ELECTROCARDIOGRAM TRACING: CPT

## 2018-10-23 PROCEDURE — 36415 COLL VENOUS BLD VENIPUNCTURE: CPT

## 2018-10-23 PROCEDURE — 81003 URINALYSIS AUTO W/O SCOPE: CPT

## 2018-10-23 PROCEDURE — 85025 COMPLETE CBC W/AUTO DIFF WBC: CPT

## 2018-10-23 PROCEDURE — 87641 MR-STAPH DNA AMP PROBE: CPT

## 2018-10-23 NOTE — H&P
Types: Cigarettes     Quit date: 2/14/1987    Smokeless tobacco: Never Used    Alcohol use No    Drug use: No    Sexual activity: Not Currently     Other Topics Concern    None     Social History Narrative    None        REVIEW OF SYSTEMS      No Known Allergies    Current Outpatient Prescriptions on File Prior to Encounter   Medication Sig Dispense Refill    simvastatin (ZOCOR) 20 MG tablet TAKE ONE TABLET BY MOUTH DAILY 30 tablet 1    vitamin D (ERGOCALCIFEROL) 17048 units CAPS capsule TAKE ONE CAPSULE BY MOUTH EVERY 2 WEEKS 2 capsule 3    alendronate (FOSAMAX) 70 MG tablet TAKE 1 TABLET BY MOUTH ONCE WEEKLY BEFORE BREAKFAST, ON AN EMPTY STOMACH: REMAIN UPRIGHT FOR 30 MINUTES:TAKE WITH 8 OUNCES OF WATER 4 tablet 2    famotidine (PEPCID) 20 MG tablet TAKE ONE TABLET BY MOUTH TWICE A DAY 60 tablet 2    Dextromethorphan-Guaifenesin (MUCINEX DM)  MG TB12 Take 1-2 tablets every 12 hours as needed for cough 28 tablet 1    benzonatate (TESSALON PERLES) 100 MG capsule Take 1-2 capsules 3 times daily as needed for cough 30 capsule 1    loratadine (CLARITIN) 10 MG tablet TAKE ONE TABLET BY MOUTH DAILY AS NEEDED 90 tablet 0    verapamil (CALAN SR) 240 MG extended release tablet TAKE ONE TABLET BY MOUTH ONCE NIGHTLY FOR MIGRAINE HEADACHE 30 tablet 3    Probiotic Product (PROBIOTIC-10 ULTIMATE PO) Take 1 capsule by mouth daily       Glucosamine-Chondroitin (GLUCOSAMINE CHONDR COMPLEX PO) Take by mouth      allopurinol (ZYLOPRIM) 300 MG tablet TAKE ONE TABLET BY MOUTH DAILY 30 tablet 5    naproxen (NAPROSYN) 250 MG tablet TAKE ONE TABLET BY MOUTH THREE TIMES A DAY WITH FOOD AS NEEDED FOR GOUT FLARE UP UNTIL ATTACK SUBSIDES 60 tablet 0    fluticasone (FLONASE) 50 MCG/ACT nasal spray 2 sprays into each nostril once daily 1 Bottle 2    Misc Natural Products (OSTEO BI-FLEX JOINT SHIELD PO) Take by mouth      Aspirin-Acetaminophen-Caffeine (EXCEDRIN MIGRAINE PO) Take 2 tablets by mouth as needed. No current facility-administered medications on file prior to encounter. General health:  Fairly good. No fever or chills. Skin:  No itching, redness or rash. HEENT:  No headache, epistaxis or sore throat. Neck:  No pain, stiffness or masses. Cardiovascular/Respiratory system:  No chest pain, palpitation or shortness of breath. Gastrointestinal tract: No abdominal pain, nausea, vomiting, diarrhea or constipation. Genitourinary:  No burning on micturition. No hesitancy, urgency, frequency or discoloration of urine. Locomotor:  See HPI. Neuropsychiatric:  No referable complaints. GENERAL PHYSICAL EXAM:     Vitals: /68   Pulse 67   Temp 97.7 °F (36.5 °C) (Oral)   Resp 12   Ht 5' 3\" (1.6 m)   Wt 140 lb (63.5 kg)   LMP 01/01/2004   SpO2 100%   BMI 24.80 kg/m²  Body mass index is 24.8 kg/m². GENERAL APPEARANCE:   Kvng Renteria is 75 y.o.,  female, not obese, nourished, conscious, alert. Does not appear to be distress or pain at this time. SKIN:  Warm, dry, no cyanosis or jaundice. HEAD:  Normocephalic, atraumatic, no swelling or tenderness. EYES:  Pupils equal, reactive to light. EARS:  No discharge, no marked hearing loss. NOSE:  No rhinorrhea, epistaxis or septal deformity. THROAT:  Not congested. No ulceration bleeding or discharge. NECK:  No stiffness, trachea central.  No palpable masses or L.N.                 CHEST:  Symmetrical and equal on expansion. HEART:  RRR S1 > S2. No audible murmurs or gallops. LUNGS:  Equal on expansion, normal breath sounds. No adventitious sounds. ABDOMEN: Soft on palpation. No localized tenderness. No guarding or rigidity.  No palpable

## 2018-10-24 ENCOUNTER — ANESTHESIA EVENT (OUTPATIENT)
Dept: OPERATING ROOM | Age: 73
DRG: 470 | End: 2018-10-24
Payer: MEDICARE

## 2018-10-24 LAB
MRSA, DNA, NASAL: NORMAL
SPECIMEN DESCRIPTION: NORMAL

## 2018-10-24 RX ORDER — SODIUM CHLORIDE, SODIUM LACTATE, POTASSIUM CHLORIDE, CALCIUM CHLORIDE 600; 310; 30; 20 MG/100ML; MG/100ML; MG/100ML; MG/100ML
INJECTION, SOLUTION INTRAVENOUS CONTINUOUS
Status: CANCELLED | OUTPATIENT
Start: 2018-10-24

## 2018-10-24 RX ORDER — SODIUM CHLORIDE 0.9 % (FLUSH) 0.9 %
10 SYRINGE (ML) INJECTION PRN
Status: CANCELLED | OUTPATIENT
Start: 2018-10-24

## 2018-10-24 RX ORDER — LIDOCAINE HYDROCHLORIDE 10 MG/ML
1 INJECTION, SOLUTION EPIDURAL; INFILTRATION; INTRACAUDAL; PERINEURAL
Status: CANCELLED | OUTPATIENT
Start: 2018-10-24 | End: 2018-10-24

## 2018-10-24 RX ORDER — SODIUM CHLORIDE 0.9 % (FLUSH) 0.9 %
10 SYRINGE (ML) INJECTION EVERY 12 HOURS SCHEDULED
Status: CANCELLED | OUTPATIENT
Start: 2018-10-24

## 2018-10-26 ENCOUNTER — OFFICE VISIT (OUTPATIENT)
Dept: FAMILY MEDICINE CLINIC | Age: 73
End: 2018-10-26
Payer: MEDICARE

## 2018-10-26 VITALS
WEIGHT: 139.4 LBS | TEMPERATURE: 98.3 F | RESPIRATION RATE: 14 BRPM | BODY MASS INDEX: 24.69 KG/M2 | DIASTOLIC BLOOD PRESSURE: 60 MMHG | SYSTOLIC BLOOD PRESSURE: 110 MMHG | HEART RATE: 60 BPM

## 2018-10-26 DIAGNOSIS — M10.9 GOUT, UNSPECIFIED CAUSE, UNSPECIFIED CHRONICITY, UNSPECIFIED SITE: ICD-10-CM

## 2018-10-26 DIAGNOSIS — M81.0 AGE-RELATED OSTEOPOROSIS WITHOUT CURRENT PATHOLOGICAL FRACTURE: ICD-10-CM

## 2018-10-26 DIAGNOSIS — K21.9 GASTROESOPHAGEAL REFLUX DISEASE, ESOPHAGITIS PRESENCE NOT SPECIFIED: ICD-10-CM

## 2018-10-26 DIAGNOSIS — E78.2 MIXED HYPERLIPIDEMIA: Primary | ICD-10-CM

## 2018-10-26 DIAGNOSIS — E55.9 VITAMIN D DEFICIENCY: ICD-10-CM

## 2018-10-26 PROCEDURE — 99214 OFFICE O/P EST MOD 30 MIN: CPT | Performed by: FAMILY MEDICINE

## 2018-10-26 RX ORDER — LORATADINE 10 MG/1
TABLET ORAL
Qty: 90 TABLET | Refills: 0 | Status: SHIPPED | OUTPATIENT
Start: 2018-10-26 | End: 2019-01-22 | Stop reason: SDUPTHER

## 2018-10-26 ASSESSMENT — ENCOUNTER SYMPTOMS
BLOOD IN STOOL: 0
SHORTNESS OF BREATH: 0
CHEST TIGHTNESS: 0
ABDOMINAL PAIN: 0

## 2018-10-26 NOTE — PROGRESS NOTES
Subjective:      Patient ID: Joyce Hicks is a 75 y.o. female. Chronic Disease Visit Information    BP Readings from Last 3 Encounters:   10/23/18 128/68   09/05/18 118/60   07/30/18 100/70          LDL Cholesterol (mg/dL)   Date Value   03/07/2018 93     HDL (mg/dL)   Date Value   03/07/2018 53     BUN (mg/dL)   Date Value   10/23/2018 15     CREATININE (mg/dL)   Date Value   10/23/2018 0.77     Glucose (mg/dL)   Date Value   10/23/2018 88   04/09/2012 84            Have you changed or started any medications since your last visit including any over-the-counter medicines, vitamins, or herbal medicines? no   Are you having any side effects from any of your medications? -  no  Have you stopped taking any of your medications? Is so, why? -  no    Have you seen any other physician or provider since your last visit? No  Have you had any other diagnostic tests since your last visit? Yes - Records Obtained  Have you been seen in the emergency room and/or had an admission to a hospital since we last saw you? No  Have you had your annual diabetic retinal (eye) exam? No  Have you had your routine dental cleaning in the past 6 months? no    Have you activated your [employer] account? If not, what are your barriers?  No: declined     Patient Care Team:  Sally Bhatt MD as PCP - General (Family Medicine)         Medical History Review  Past Medical, Family, and Social History reviewed and does contribute to the patient presenting condition    Health Maintenance   Topic Date Due    DTaP/Tdap/Td vaccine (1 - Tdap) 03/20/1963    Shingles Vaccine (1 of 2 - 2 Dose Series) 03/20/1994    Flu vaccine (1) 09/01/2018    Breast cancer screen  02/07/2020    Lipid screen  03/07/2023    Colon cancer screen colonoscopy  12/22/2024    DEXA (modify frequency per FRAX score)  Completed    Pneumococcal low/med risk  Completed     HPI  Patient is a 50-year-old white female who presents for hyperlipidemia, gout, GERD, vitamin D Moderate depression, 15-19 = Moderately severe depression, 20-27 = Severe depression    Orders Placed This Encounter   Procedures    DEXA Bone Density 2 Sites     Standing Status:   Future     Standing Expiration Date:   10/26/2019    ALT    AST     Standing Status:   Future     Standing Expiration Date:   10/26/2019    Lipid Panel     Standing Status:   Future     Standing Expiration Date:   10/26/2019     Order Specific Question:   Is Patient Fasting?/# of Hours     Answer:    Fast 8-10 hours    Uric Acid     Standing Status:   Future     Standing Expiration Date:   10/26/2019    Vitamin D 25 Hydroxy     Standing Status:   Future     Standing Expiration Date:   10/26/2019         Plan for left TKA on 11/6/18  Continue routine medication  Follow-up in 4-5 months

## 2018-10-29 ENCOUNTER — HOSPITAL ENCOUNTER (OUTPATIENT)
Age: 73
Discharge: HOME | End: 2018-10-29
Payer: MEDICARE

## 2018-10-29 DIAGNOSIS — M10.9 GOUT, UNSPECIFIED CAUSE, UNSPECIFIED CHRONICITY, UNSPECIFIED SITE: ICD-10-CM

## 2018-10-29 DIAGNOSIS — E78.2 MIXED HYPERLIPIDEMIA: ICD-10-CM

## 2018-10-29 DIAGNOSIS — E55.9 VITAMIN D DEFICIENCY: ICD-10-CM

## 2018-10-29 LAB
AST SERPL-CCNC: 24 U/L
CHOLESTEROL/HDL RATIO: 2.8
CHOLESTEROL: 135 MG/DL
HDLC SERPL-MCNC: 48 MG/DL
LDL CHOLESTEROL: 64 MG/DL (ref 0–130)
TRIGL SERPL-MCNC: 115 MG/DL
URIC ACID: 3.7 MG/DL (ref 2.4–5.7)
VITAMIN D 25-HYDROXY: 31.2 NG/ML (ref 30–100)
VLDLC SERPL CALC-MCNC: NORMAL MG/DL (ref 1–30)

## 2018-10-29 PROCEDURE — 82306 VITAMIN D 25 HYDROXY: CPT

## 2018-10-29 PROCEDURE — 84450 TRANSFERASE (AST) (SGOT): CPT

## 2018-10-29 PROCEDURE — 80061 LIPID PANEL: CPT

## 2018-10-29 PROCEDURE — 84550 ASSAY OF BLOOD/URIC ACID: CPT

## 2018-10-29 PROCEDURE — 36415 COLL VENOUS BLD VENIPUNCTURE: CPT

## 2018-10-31 ENCOUNTER — HOSPITAL ENCOUNTER (OUTPATIENT)
Dept: WOMENS IMAGING | Age: 73
Discharge: HOME | End: 2018-11-02
Payer: MEDICARE

## 2018-10-31 DIAGNOSIS — M81.0 AGE-RELATED OSTEOPOROSIS WITHOUT CURRENT PATHOLOGICAL FRACTURE: ICD-10-CM

## 2018-10-31 PROCEDURE — 77080 DXA BONE DENSITY AXIAL: CPT

## 2018-11-06 ENCOUNTER — APPOINTMENT (OUTPATIENT)
Dept: GENERAL RADIOLOGY | Age: 73
DRG: 470 | End: 2018-11-06
Attending: ORTHOPAEDIC SURGERY
Payer: MEDICARE

## 2018-11-06 ENCOUNTER — ANESTHESIA (OUTPATIENT)
Dept: OPERATING ROOM | Age: 73
DRG: 470 | End: 2018-11-06
Payer: MEDICARE

## 2018-11-06 ENCOUNTER — HOSPITAL ENCOUNTER (INPATIENT)
Age: 73
LOS: 1 days | Discharge: HOME HEALTH | DRG: 470 | End: 2018-11-07
Attending: ORTHOPAEDIC SURGERY | Admitting: ORTHOPAEDIC SURGERY
Payer: MEDICARE

## 2018-11-06 VITALS
SYSTOLIC BLOOD PRESSURE: 91 MMHG | OXYGEN SATURATION: 98 % | TEMPERATURE: 98.1 F | DIASTOLIC BLOOD PRESSURE: 55 MMHG | RESPIRATION RATE: 19 BRPM

## 2018-11-06 DIAGNOSIS — M17.12 PRIMARY OSTEOARTHRITIS OF LEFT KNEE: Primary | ICD-10-CM

## 2018-11-06 PROCEDURE — G8979 MOBILITY GOAL STATUS: HCPCS

## 2018-11-06 PROCEDURE — 3700000000 HC ANESTHESIA ATTENDED CARE: Performed by: ORTHOPAEDIC SURGERY

## 2018-11-06 PROCEDURE — 2500000003 HC RX 250 WO HCPCS: Performed by: ANESTHESIOLOGY

## 2018-11-06 PROCEDURE — 2709999900 HC NON-CHARGEABLE SUPPLY: Performed by: ORTHOPAEDIC SURGERY

## 2018-11-06 PROCEDURE — 6360000002 HC RX W HCPCS: Performed by: ORTHOPAEDIC SURGERY

## 2018-11-06 PROCEDURE — G8988 SELF CARE GOAL STATUS: HCPCS

## 2018-11-06 PROCEDURE — 97161 PT EVAL LOW COMPLEX 20 MIN: CPT

## 2018-11-06 PROCEDURE — 2580000003 HC RX 258: Performed by: ANESTHESIOLOGY

## 2018-11-06 PROCEDURE — 64448 NJX AA&/STRD FEM NRV NFS IMG: CPT | Performed by: ANESTHESIOLOGY

## 2018-11-06 PROCEDURE — 97530 THERAPEUTIC ACTIVITIES: CPT

## 2018-11-06 PROCEDURE — 3700000001 HC ADD 15 MINUTES (ANESTHESIA): Performed by: ORTHOPAEDIC SURGERY

## 2018-11-06 PROCEDURE — 73560 X-RAY EXAM OF KNEE 1 OR 2: CPT

## 2018-11-06 PROCEDURE — G0378 HOSPITAL OBSERVATION PER HR: HCPCS

## 2018-11-06 PROCEDURE — 6360000002 HC RX W HCPCS: Performed by: ANESTHESIOLOGY

## 2018-11-06 PROCEDURE — G8978 MOBILITY CURRENT STATUS: HCPCS

## 2018-11-06 PROCEDURE — 2500000003 HC RX 250 WO HCPCS: Performed by: ORTHOPAEDIC SURGERY

## 2018-11-06 PROCEDURE — 27447 TOTAL KNEE ARTHROPLASTY: CPT | Performed by: ORTHOPAEDIC SURGERY

## 2018-11-06 PROCEDURE — 7100000000 HC PACU RECOVERY - FIRST 15 MIN: Performed by: ORTHOPAEDIC SURGERY

## 2018-11-06 PROCEDURE — 6370000000 HC RX 637 (ALT 250 FOR IP): Performed by: ORTHOPAEDIC SURGERY

## 2018-11-06 PROCEDURE — 2720000010 HC SURG SUPPLY STERILE: Performed by: ORTHOPAEDIC SURGERY

## 2018-11-06 PROCEDURE — 7100000001 HC PACU RECOVERY - ADDTL 15 MIN: Performed by: ORTHOPAEDIC SURGERY

## 2018-11-06 PROCEDURE — 97165 OT EVAL LOW COMPLEX 30 MIN: CPT

## 2018-11-06 PROCEDURE — 2580000003 HC RX 258: Performed by: ORTHOPAEDIC SURGERY

## 2018-11-06 PROCEDURE — C1776 JOINT DEVICE (IMPLANTABLE): HCPCS | Performed by: ORTHOPAEDIC SURGERY

## 2018-11-06 PROCEDURE — 3600000014 HC SURGERY LEVEL 4 ADDTL 15MIN: Performed by: ORTHOPAEDIC SURGERY

## 2018-11-06 PROCEDURE — C1713 ANCHOR/SCREW BN/BN,TIS/BN: HCPCS | Performed by: ORTHOPAEDIC SURGERY

## 2018-11-06 PROCEDURE — 3600000004 HC SURGERY LEVEL 4 BASE: Performed by: ORTHOPAEDIC SURGERY

## 2018-11-06 PROCEDURE — 94664 DEMO&/EVAL PT USE INHALER: CPT

## 2018-11-06 PROCEDURE — G8987 SELF CARE CURRENT STATUS: HCPCS

## 2018-11-06 DEVICE — COMPONENT PAT DIA34MM THK7.8MM THN KNEE POLY 3 PEG SER A: Type: IMPLANTABLE DEVICE | Site: PATELLA | Status: FUNCTIONAL

## 2018-11-06 DEVICE — IMPLANTABLE DEVICE: Type: IMPLANTABLE DEVICE | Site: KNEE | Status: FUNCTIONAL

## 2018-11-06 DEVICE — VNGD ANT STAB BRG 13X71: Type: IMPLANTABLE DEVICE | Site: KNEE | Status: FUNCTIONAL

## 2018-11-06 DEVICE — DISCONTINUED USE 416978 CEMENT PALACOS R SING DOSE 40GR: Type: IMPLANTABLE DEVICE | Site: KNEE | Status: FUNCTIONAL

## 2018-11-06 DEVICE — TRAY TIB L71MM KNEE CO CHROM FIN MOD INTLOK VANGUARD: Type: IMPLANTABLE DEVICE | Site: KNEE | Status: FUNCTIONAL

## 2018-11-06 RX ORDER — FAMOTIDINE 20 MG/1
20 TABLET, FILM COATED ORAL 2 TIMES DAILY
Status: DISCONTINUED | OUTPATIENT
Start: 2018-11-06 | End: 2018-11-06

## 2018-11-06 RX ORDER — OXYCODONE HYDROCHLORIDE 5 MG/1
5 TABLET ORAL EVERY 4 HOURS PRN
Status: DISCONTINUED | OUTPATIENT
Start: 2018-11-06 | End: 2018-11-07 | Stop reason: HOSPADM

## 2018-11-06 RX ORDER — SIMVASTATIN 20 MG
20 TABLET ORAL NIGHTLY
Status: DISCONTINUED | OUTPATIENT
Start: 2018-11-06 | End: 2018-11-06

## 2018-11-06 RX ORDER — ONDANSETRON 2 MG/ML
4 INJECTION INTRAMUSCULAR; INTRAVENOUS EVERY 6 HOURS PRN
Status: DISCONTINUED | OUTPATIENT
Start: 2018-11-06 | End: 2018-11-07 | Stop reason: HOSPADM

## 2018-11-06 RX ORDER — SCOLOPAMINE TRANSDERMAL SYSTEM 1 MG/1
1 PATCH, EXTENDED RELEASE TRANSDERMAL ONCE
Status: DISCONTINUED | OUTPATIENT
Start: 2018-11-06 | End: 2018-11-07 | Stop reason: HOSPADM

## 2018-11-06 RX ORDER — VERAPAMIL HYDROCHLORIDE 240 MG/1
240 TABLET, FILM COATED, EXTENDED RELEASE ORAL NIGHTLY
Status: DISCONTINUED | OUTPATIENT
Start: 2018-11-07 | End: 2018-11-07 | Stop reason: HOSPADM

## 2018-11-06 RX ORDER — SIMVASTATIN 20 MG
20 TABLET ORAL NIGHTLY
Status: DISCONTINUED | OUTPATIENT
Start: 2018-11-07 | End: 2018-11-07 | Stop reason: HOSPADM

## 2018-11-06 RX ORDER — LIDOCAINE HYDROCHLORIDE 10 MG/ML
1 INJECTION, SOLUTION EPIDURAL; INFILTRATION; INTRACAUDAL; PERINEURAL
Status: DISCONTINUED | OUTPATIENT
Start: 2018-11-06 | End: 2018-11-06 | Stop reason: HOSPADM

## 2018-11-06 RX ORDER — ONDANSETRON 2 MG/ML
4 INJECTION INTRAMUSCULAR; INTRAVENOUS
Status: DISCONTINUED | OUTPATIENT
Start: 2018-11-06 | End: 2018-11-06 | Stop reason: HOSPADM

## 2018-11-06 RX ORDER — FENTANYL CITRATE 50 UG/ML
INJECTION, SOLUTION INTRAMUSCULAR; INTRAVENOUS PRN
Status: DISCONTINUED | OUTPATIENT
Start: 2018-11-06 | End: 2018-11-06 | Stop reason: SDUPTHER

## 2018-11-06 RX ORDER — SODIUM CHLORIDE 0.9 % (FLUSH) 0.9 %
10 SYRINGE (ML) INJECTION PRN
Status: DISCONTINUED | OUTPATIENT
Start: 2018-11-06 | End: 2018-11-07 | Stop reason: HOSPADM

## 2018-11-06 RX ORDER — MEPERIDINE HYDROCHLORIDE 50 MG/ML
12.5 INJECTION INTRAMUSCULAR; INTRAVENOUS; SUBCUTANEOUS EVERY 5 MIN PRN
Status: DISCONTINUED | OUTPATIENT
Start: 2018-11-06 | End: 2018-11-06 | Stop reason: HOSPADM

## 2018-11-06 RX ORDER — LORATADINE 10 MG/1
10 TABLET ORAL DAILY PRN
Status: DISCONTINUED | OUTPATIENT
Start: 2018-11-06 | End: 2018-11-07 | Stop reason: HOSPADM

## 2018-11-06 RX ORDER — ALLOPURINOL 300 MG/1
300 TABLET ORAL DAILY
Status: DISCONTINUED | OUTPATIENT
Start: 2018-11-06 | End: 2018-11-06

## 2018-11-06 RX ORDER — MORPHINE SULFATE 2 MG/ML
2 INJECTION, SOLUTION INTRAMUSCULAR; INTRAVENOUS EVERY 5 MIN PRN
Status: DISCONTINUED | OUTPATIENT
Start: 2018-11-06 | End: 2018-11-06 | Stop reason: HOSPADM

## 2018-11-06 RX ORDER — LIDOCAINE HYDROCHLORIDE 10 MG/ML
INJECTION, SOLUTION EPIDURAL; INFILTRATION; INTRACAUDAL; PERINEURAL PRN
Status: DISCONTINUED | OUTPATIENT
Start: 2018-11-06 | End: 2018-11-06 | Stop reason: SDUPTHER

## 2018-11-06 RX ORDER — PROPOFOL 10 MG/ML
INJECTION, EMULSION INTRAVENOUS PRN
Status: DISCONTINUED | OUTPATIENT
Start: 2018-11-06 | End: 2018-11-06 | Stop reason: SDUPTHER

## 2018-11-06 RX ORDER — SODIUM CHLORIDE, SODIUM LACTATE, POTASSIUM CHLORIDE, CALCIUM CHLORIDE 600; 310; 30; 20 MG/100ML; MG/100ML; MG/100ML; MG/100ML
INJECTION, SOLUTION INTRAVENOUS CONTINUOUS
Status: DISCONTINUED | OUTPATIENT
Start: 2018-11-06 | End: 2018-11-07 | Stop reason: SDUPTHER

## 2018-11-06 RX ORDER — FAMOTIDINE 20 MG/1
20 TABLET, FILM COATED ORAL 2 TIMES DAILY
Status: DISCONTINUED | OUTPATIENT
Start: 2018-11-07 | End: 2018-11-07 | Stop reason: HOSPADM

## 2018-11-06 RX ORDER — TRANEXAMIC ACID 100 MG/ML
INJECTION, SOLUTION INTRAVENOUS PRN
Status: DISCONTINUED | OUTPATIENT
Start: 2018-11-06 | End: 2018-11-06 | Stop reason: SDUPTHER

## 2018-11-06 RX ORDER — LABETALOL HYDROCHLORIDE 5 MG/ML
5 INJECTION, SOLUTION INTRAVENOUS EVERY 10 MIN PRN
Status: DISCONTINUED | OUTPATIENT
Start: 2018-11-06 | End: 2018-11-06 | Stop reason: HOSPADM

## 2018-11-06 RX ORDER — ACETAMINOPHEN 500 MG
1000 TABLET ORAL ONCE
Status: COMPLETED | OUTPATIENT
Start: 2018-11-06 | End: 2018-11-06

## 2018-11-06 RX ORDER — MIDAZOLAM HYDROCHLORIDE 1 MG/ML
INJECTION INTRAMUSCULAR; INTRAVENOUS PRN
Status: DISCONTINUED | OUTPATIENT
Start: 2018-11-06 | End: 2018-11-06 | Stop reason: SDUPTHER

## 2018-11-06 RX ORDER — ONDANSETRON 2 MG/ML
INJECTION INTRAMUSCULAR; INTRAVENOUS PRN
Status: DISCONTINUED | OUTPATIENT
Start: 2018-11-06 | End: 2018-11-06 | Stop reason: SDUPTHER

## 2018-11-06 RX ORDER — SODIUM CHLORIDE, SODIUM LACTATE, POTASSIUM CHLORIDE, CALCIUM CHLORIDE 600; 310; 30; 20 MG/100ML; MG/100ML; MG/100ML; MG/100ML
INJECTION, SOLUTION INTRAVENOUS CONTINUOUS
Status: DISCONTINUED | OUTPATIENT
Start: 2018-11-06 | End: 2018-11-07 | Stop reason: HOSPADM

## 2018-11-06 RX ORDER — DIPHENHYDRAMINE HYDROCHLORIDE 50 MG/ML
12.5 INJECTION INTRAMUSCULAR; INTRAVENOUS
Status: DISCONTINUED | OUTPATIENT
Start: 2018-11-06 | End: 2018-11-06 | Stop reason: HOSPADM

## 2018-11-06 RX ORDER — SODIUM CHLORIDE 0.9 % (FLUSH) 0.9 %
10 SYRINGE (ML) INJECTION PRN
Status: DISCONTINUED | OUTPATIENT
Start: 2018-11-06 | End: 2018-11-06 | Stop reason: HOSPADM

## 2018-11-06 RX ORDER — VERAPAMIL HYDROCHLORIDE 240 MG/1
240 TABLET, FILM COATED, EXTENDED RELEASE ORAL NIGHTLY
Status: DISCONTINUED | OUTPATIENT
Start: 2018-11-06 | End: 2018-11-06

## 2018-11-06 RX ORDER — DOCUSATE SODIUM 100 MG/1
100 CAPSULE, LIQUID FILLED ORAL 2 TIMES DAILY
Status: DISCONTINUED | OUTPATIENT
Start: 2018-11-06 | End: 2018-11-07 | Stop reason: HOSPADM

## 2018-11-06 RX ORDER — ACETAMINOPHEN 500 MG
1000 TABLET ORAL EVERY 8 HOURS SCHEDULED
Status: DISCONTINUED | OUTPATIENT
Start: 2018-11-06 | End: 2018-11-07 | Stop reason: HOSPADM

## 2018-11-06 RX ORDER — DEXAMETHASONE SODIUM PHOSPHATE 10 MG/ML
10 INJECTION INTRAMUSCULAR; INTRAVENOUS ONCE
Status: COMPLETED | OUTPATIENT
Start: 2018-11-06 | End: 2018-11-06

## 2018-11-06 RX ORDER — SODIUM CHLORIDE 0.9 % (FLUSH) 0.9 %
10 SYRINGE (ML) INJECTION EVERY 12 HOURS SCHEDULED
Status: DISCONTINUED | OUTPATIENT
Start: 2018-11-06 | End: 2018-11-07 | Stop reason: HOSPADM

## 2018-11-06 RX ORDER — KETOROLAC TROMETHAMINE 30 MG/ML
15 INJECTION, SOLUTION INTRAMUSCULAR; INTRAVENOUS EVERY 8 HOURS SCHEDULED
Status: DISCONTINUED | OUTPATIENT
Start: 2018-11-06 | End: 2018-11-07 | Stop reason: HOSPADM

## 2018-11-06 RX ORDER — SODIUM CHLORIDE 0.9 % (FLUSH) 0.9 %
10 SYRINGE (ML) INJECTION EVERY 12 HOURS SCHEDULED
Status: DISCONTINUED | OUTPATIENT
Start: 2018-11-06 | End: 2018-11-06 | Stop reason: HOSPADM

## 2018-11-06 RX ORDER — CETIRIZINE HYDROCHLORIDE 10 MG/1
10 TABLET ORAL DAILY
Status: DISCONTINUED | OUTPATIENT
Start: 2018-11-06 | End: 2018-11-06 | Stop reason: CLARIF

## 2018-11-06 RX ORDER — SODIUM CHLORIDE, SODIUM LACTATE, POTASSIUM CHLORIDE, CALCIUM CHLORIDE 600; 310; 30; 20 MG/100ML; MG/100ML; MG/100ML; MG/100ML
INJECTION, SOLUTION INTRAVENOUS CONTINUOUS PRN
Status: DISCONTINUED | OUTPATIENT
Start: 2018-11-06 | End: 2018-11-06 | Stop reason: SDUPTHER

## 2018-11-06 RX ORDER — OXYCODONE HYDROCHLORIDE 5 MG/1
10 TABLET ORAL EVERY 4 HOURS PRN
Status: DISCONTINUED | OUTPATIENT
Start: 2018-11-06 | End: 2018-11-07 | Stop reason: HOSPADM

## 2018-11-06 RX ORDER — CALCIUM CHLORIDE 100 MG/ML
INJECTION INTRAVENOUS; INTRAVENTRICULAR PRN
Status: DISCONTINUED | OUTPATIENT
Start: 2018-11-06 | End: 2018-11-06 | Stop reason: HOSPADM

## 2018-11-06 RX ORDER — GABAPENTIN 600 MG/1
600 TABLET ORAL ONCE
Status: COMPLETED | OUTPATIENT
Start: 2018-11-06 | End: 2018-11-06

## 2018-11-06 RX ORDER — ALLOPURINOL 300 MG/1
300 TABLET ORAL DAILY
Status: DISCONTINUED | OUTPATIENT
Start: 2018-11-07 | End: 2018-11-07 | Stop reason: HOSPADM

## 2018-11-06 RX ORDER — DEXAMETHASONE SODIUM PHOSPHATE 4 MG/ML
INJECTION, SOLUTION INTRA-ARTICULAR; INTRALESIONAL; INTRAMUSCULAR; INTRAVENOUS; SOFT TISSUE PRN
Status: DISCONTINUED | OUTPATIENT
Start: 2018-11-06 | End: 2018-11-06 | Stop reason: SDUPTHER

## 2018-11-06 RX ADMIN — SODIUM CHLORIDE, POTASSIUM CHLORIDE, SODIUM LACTATE AND CALCIUM CHLORIDE: 600; 310; 30; 20 INJECTION, SOLUTION INTRAVENOUS at 18:26

## 2018-11-06 RX ADMIN — LIDOCAINE HYDROCHLORIDE 5 ML: 10 INJECTION, SOLUTION EPIDURAL; INFILTRATION; INTRACAUDAL; PERINEURAL at 10:18

## 2018-11-06 RX ADMIN — FENTANYL CITRATE 100 MCG: 50 INJECTION, SOLUTION INTRAMUSCULAR; INTRAVENOUS at 10:18

## 2018-11-06 RX ADMIN — DOCUSATE SODIUM 100 MG: 100 CAPSULE, LIQUID FILLED ORAL at 21:37

## 2018-11-06 RX ADMIN — ACETAMINOPHEN 1000 MG: 500 TABLET, FILM COATED ORAL at 15:27

## 2018-11-06 RX ADMIN — KETOROLAC TROMETHAMINE 15 MG: 30 INJECTION, SOLUTION INTRAMUSCULAR; INTRAVENOUS at 21:37

## 2018-11-06 RX ADMIN — Medication 2 G: at 10:13

## 2018-11-06 RX ADMIN — SODIUM CHLORIDE, POTASSIUM CHLORIDE, SODIUM LACTATE AND CALCIUM CHLORIDE: 600; 310; 30; 20 INJECTION, SOLUTION INTRAVENOUS at 09:28

## 2018-11-06 RX ADMIN — PROPOFOL 150 MG: 10 INJECTION, EMULSION INTRAVENOUS at 10:18

## 2018-11-06 RX ADMIN — ACETAMINOPHEN 1000 MG: 500 TABLET, FILM COATED ORAL at 21:36

## 2018-11-06 RX ADMIN — KETOROLAC TROMETHAMINE 15 MG: 30 INJECTION, SOLUTION INTRAMUSCULAR; INTRAVENOUS at 15:28

## 2018-11-06 RX ADMIN — PHENYLEPHRINE HYDROCHLORIDE 100 MCG: 10 INJECTION INTRAVENOUS at 11:28

## 2018-11-06 RX ADMIN — ONDANSETRON 4 MG: 2 INJECTION INTRAMUSCULAR; INTRAVENOUS at 11:32

## 2018-11-06 RX ADMIN — DEXAMETHASONE SODIUM PHOSPHATE 10 MG: 10 INJECTION INTRAMUSCULAR; INTRAVENOUS at 09:28

## 2018-11-06 RX ADMIN — TRANEXAMIC ACID 1000 MG: 100 INJECTION, SOLUTION INTRAVENOUS at 10:41

## 2018-11-06 RX ADMIN — Medication 2 G: at 18:26

## 2018-11-06 RX ADMIN — DOCUSATE SODIUM 100 MG: 100 CAPSULE, LIQUID FILLED ORAL at 15:28

## 2018-11-06 RX ADMIN — FENTANYL CITRATE 100 MCG: 50 INJECTION, SOLUTION INTRAMUSCULAR; INTRAVENOUS at 11:00

## 2018-11-06 RX ADMIN — SODIUM CHLORIDE, POTASSIUM CHLORIDE, SODIUM LACTATE AND CALCIUM CHLORIDE: 600; 310; 30; 20 INJECTION, SOLUTION INTRAVENOUS at 13:52

## 2018-11-06 RX ADMIN — Medication 750 ML: at 12:23

## 2018-11-06 RX ADMIN — ACETAMINOPHEN 1000 MG: 500 TABLET, FILM COATED ORAL at 09:28

## 2018-11-06 RX ADMIN — MIDAZOLAM 4 MG: 1 INJECTION INTRAMUSCULAR; INTRAVENOUS at 09:35

## 2018-11-06 RX ADMIN — GABAPENTIN 600 MG: 600 TABLET, FILM COATED ORAL at 09:28

## 2018-11-06 RX ADMIN — SODIUM CHLORIDE, POTASSIUM CHLORIDE, SODIUM LACTATE AND CALCIUM CHLORIDE: 600; 310; 30; 20 INJECTION, SOLUTION INTRAVENOUS at 10:16

## 2018-11-06 RX ADMIN — ASPIRIN 325 MG: 325 TABLET, DELAYED RELEASE ORAL at 21:36

## 2018-11-06 RX ADMIN — DEXAMETHASONE SODIUM PHOSPHATE 4 MG: 4 INJECTION, SOLUTION INTRAMUSCULAR; INTRAVENOUS at 11:32

## 2018-11-06 ASSESSMENT — PULMONARY FUNCTION TESTS
PIF_VALUE: 14
PIF_VALUE: 15
PIF_VALUE: 15
PIF_VALUE: 14
PIF_VALUE: 2
PIF_VALUE: 14
PIF_VALUE: 14
PIF_VALUE: 21
PIF_VALUE: 14
PIF_VALUE: 14
PIF_VALUE: 13
PIF_VALUE: 14
PIF_VALUE: 3
PIF_VALUE: 14
PIF_VALUE: 0
PIF_VALUE: 14
PIF_VALUE: 3
PIF_VALUE: 14
PIF_VALUE: 15
PIF_VALUE: 14
PIF_VALUE: 3
PIF_VALUE: 14
PIF_VALUE: 3
PIF_VALUE: 4
PIF_VALUE: 14
PIF_VALUE: 0
PIF_VALUE: 14
PIF_VALUE: 4
PIF_VALUE: 14
PIF_VALUE: 3
PIF_VALUE: 14

## 2018-11-06 ASSESSMENT — PAIN SCALES - GENERAL
PAINLEVEL_OUTOF10: 5
PAINLEVEL_OUTOF10: 5
PAINLEVEL_OUTOF10: 4
PAINLEVEL_OUTOF10: 3
PAINLEVEL_OUTOF10: 3
PAINLEVEL_OUTOF10: 2
PAINLEVEL_OUTOF10: 3
PAINLEVEL_OUTOF10: 5

## 2018-11-06 ASSESSMENT — PAIN DESCRIPTION - ORIENTATION
ORIENTATION: LEFT
ORIENTATION: LEFT

## 2018-11-06 ASSESSMENT — LIFESTYLE VARIABLES: SMOKING_STATUS: 0

## 2018-11-06 ASSESSMENT — PAIN DESCRIPTION - DESCRIPTORS: DESCRIPTORS: ACHING;DISCOMFORT

## 2018-11-06 ASSESSMENT — PAIN DESCRIPTION - PAIN TYPE
TYPE: SURGICAL PAIN
TYPE: SURGICAL PAIN

## 2018-11-06 ASSESSMENT — ENCOUNTER SYMPTOMS
STRIDOR: 0
SHORTNESS OF BREATH: 0

## 2018-11-06 ASSESSMENT — PAIN DESCRIPTION - LOCATION
LOCATION: KNEE
LOCATION: KNEE

## 2018-11-06 ASSESSMENT — PAIN DESCRIPTION - FREQUENCY: FREQUENCY: INTERMITTENT

## 2018-11-06 ASSESSMENT — PAIN - FUNCTIONAL ASSESSMENT: PAIN_FUNCTIONAL_ASSESSMENT: 0-10

## 2018-11-06 NOTE — ANESTHESIA PRE PROCEDURE
ATTACK SUBSIDES 60 tablet 0    Misc Natural Products (OSTEO BI-FLEX JOINT SHIELD PO) Take by mouth      Aspirin-Acetaminophen-Caffeine (EXCEDRIN MIGRAINE PO) Take 2 tablets by mouth as needed. Allergies:  No Known Allergies    Problem List:    Patient Active Problem List   Diagnosis Code    Allergic rhinitis J30.9    Osteoarthritis M19.90    Osteoporosis M81.0    GERD (gastroesophageal reflux disease) K21.9    Headache R51    Hyperlipidemia E78.5    Gout M10.9    Vitamin D deficiency E55.9    Migraine G43.909    Chronic headache R51    Anemia D64.9    Lymphadenopathy R59.1    Degenerative arthritis of knee, bilateral M17.0       Past Medical History:        Diagnosis Date    Allergic rhinitis     Closed tibia fracture     GERD (gastroesophageal reflux disease)     Gout     Headache(784.0)     h/o migraines    Hyperlipidemia     Osteoarthritis     DJD Lt knee    Osteoporosis     Posterior tibial tendon dysfunction     right, wears braces on both legs    Vitamin D deficient rickets     Wears glasses     Wears partial dentures     lower       Past Surgical History:        Procedure Laterality Date    APPENDECTOMY      CARPAL TUNNEL RELEASE      bilateral    COLONOSCOPY      FRACTURE SURGERY      left tibia    HYSTERECTOMY  4/12    KNEE ARTHROSCOPY Left 2002?  TIBIA FRACTURE SURGERY Left     TONSILLECTOMY AND ADENOIDECTOMY         Social History:    Social History   Substance Use Topics    Smoking status: Former Smoker     Packs/day: 0.50     Years: 10.00     Types: Cigarettes     Quit date: 2/14/1987    Smokeless tobacco: Never Used    Alcohol use No                                Counseling given: Not Answered      Vital Signs (Current): There were no vitals filed for this visit.                                            BP Readings from Last 3 Encounters:   10/23/18 128/68   10/26/18 110/60   09/05/18 118/60       NPO Status: BMI:   Wt Readings from Last 3 Encounters:   10/23/18 140 lb (63.5 kg)   10/26/18 139 lb 6.4 oz (63.2 kg)   10/03/18 140 lb (63.5 kg)     There is no height or weight on file to calculate BMI.    CBC:   Lab Results   Component Value Date    WBC 4.3 10/23/2018    RBC 4.07 10/23/2018    RBC 2.91 04/20/2012    HGB 12.1 10/23/2018    HCT 36.9 10/23/2018    MCV 90.8 10/23/2018    RDW 15.2 10/23/2018     10/23/2018     04/20/2012     HB 12.1    CMP:   Lab Results   Component Value Date     10/23/2018    K 4.1 10/23/2018     10/23/2018    CO2 27 10/23/2018    BUN 15 10/23/2018    CREATININE 0.77 10/23/2018    GFRAA >60 10/23/2018    LABGLOM >60 10/23/2018    GLUCOSE 88 10/23/2018    GLUCOSE 84 04/09/2012    PROT 6.8 03/04/2015    CALCIUM 9.6 10/23/2018    BILITOT 0.38 03/04/2015    ALKPHOS 46 03/04/2015    AST 24 10/29/2018    ALT 16 03/07/2018       POC Tests: No results for input(s): POCGLU, POCNA, POCK, POCCL, POCBUN, POCHEMO, POCHCT in the last 72 hours. Coags: No results found for: PROTIME, INR, APTT    HCG (If Applicable): No results found for: PREGTESTUR, PREGSERUM, HCG, HCGQUANT     ABGs: No results found for: PHART, PO2ART, HVN0LND, OLI9FEN, BEART, U0CGOFSX     Type & Screen (If Applicable):  No results found for: LABABO, [address]    Anesthesia Evaluation  Patient summary reviewed no history of anesthetic complications:   Airway: Mallampati: II  TM distance: >3 FB   Neck ROM: full  Mouth opening: > = 3 FB Dental: normal exam         Pulmonary:Negative Pulmonary ROS and normal exam  breath sounds clear to auscultation      (-) pneumonia, COPD, asthma, shortness of breath, recent URI, sleep apnea, rhonchi, wheezes, rales, stridor and not a current smoker          Patient did not smoke on day of surgery.                  Cardiovascular:Negative CV ROS  Exercise tolerance: good (>4 METS),   (+) hyperlipidemia    (-) pacemaker, hypertension, valvular problems/murmurs, past MI, CAD, CABG/stent, dysrhythmias,  angina,  CHF, orthopnea, PND,  SELLERS, murmur, weak pulses,  friction rub, systolic click, carotid bruit,  JVD and peripheral edema    ECG reviewed  Rhythm: regular  Rate: normal           Beta Blocker:  Not on Beta Blocker         Neuro/Psych:   (+) headaches: migraine headaches,    (-) seizures, neuromuscular disease, TIA, CVA, psychiatric history and depression/anxiety            GI/Hepatic/Renal:   (+) GERD: no interval change,      (-) hiatal hernia, PUD, hepatitis, liver disease, no renal disease, bowel prep and no morbid obesity       Endo/Other: Negative Endo/Other ROS   (+) electrolyte abnormalities, no malignancy/cancer. (-) diabetes mellitus, hypothyroidism, hyperthyroidism, blood dyscrasia, no malignancy/cancer               Abdominal:           Vascular: negative vascular ROS. - PVD, DVT and PE. Anesthesia Plan      general and regional     ASA 2       Induction: intravenous. MIPS: Postoperative opioids intended and Prophylactic antiemetics administered. Anesthetic plan and risks discussed with patient.                       Aidan Gill MD   11/6/2018

## 2018-11-06 NOTE — INTERVAL H&P NOTE
HISTORY and [facility] [id number]       NAME:  Keyon Tolliver  MRN: [id number]   YOB: 1945   Date: 11/6/2018   Age: 75 y.o. Gender: female     H&P Update Note    H&P from 10/23/2018 reviewed and updated. Patient examined. INTERVAL HISTORY:     Patient is feeling well today, denies any fever/chills, chest pain, shortness of breath. No interval changes. GENERAL PHYSICAL EXAM:     Vitals: /83   Pulse 83   Temp 97.7 °F (36.5 °C) (Oral)   Resp 16   Ht 5' 3\" (1.6 m)   Wt 140 lb (63.5 kg)   LMP 01/01/2004   SpO2 99%   BMI 24.80 kg/m²  Body mass index is 24.8 kg/m². Patient is alert and oriented, in no distress. Normotensive. Heart rate and rhythm are regular. Lungs clear to auscultation bilaterally. No pedal edema. No interval changes. I concur with the findings.      Alan Avelar PA-C on 11/6/2018 at 9:20 AM

## 2018-11-06 NOTE — H&P (VIEW-ONLY)
hepatosplenomegaly. LYMPHATICS:  No palpable cervical lymphadenopathy. LOCOMOTOR, BACK AND SPINE:  No tenderness or deformities. EXTREMITIES:  Symmetrical, no pretibial edema. Tenderness on palpation of the Lt Knee joint space, with mild limitation in the ROM. Gilmars sign negative. No discoloration or ulcerations. NEUROLOGIC:  The patient is conscious, alert, oriented, No apparent focal sensory or motor deficits. PROVISIONAL DIAGNOSES / SURGERY:      Lt Knee O. A. Lt Total Knee Arthroplasty.     Patient Active Problem List    Diagnosis Date Noted    Degenerative arthritis of knee, bilateral 03/29/2018    Lymphadenopathy 10/12/2015    Chronic headache 03/03/2014    Anemia 03/03/2014    Migraine 06/21/2013    Gout 02/14/2012    Vitamin D deficiency 02/14/2012    Allergic rhinitis     Osteoarthritis     Osteoporosis     GERD (gastroesophageal reflux disease)     Headache     Hyperlipidemia            ANTHONY MERIDA PA-C on 10/23/2018 at 3:22 PM

## 2018-11-06 NOTE — ANESTHESIA POSTPROCEDURE EVALUATION
POST- ANESTHESIA EVALUATION       Pt Name: Lydia Alarcon  MRN: [id number]  YOB: 1945  Date of evaluation: 11/6/2018  Time:  3:05 PM      /73   Pulse 89   Temp 97.9 °F (36.6 °C) (Oral)   Resp 12   Ht 5' 3\" (1.6 m)   Wt 140 lb (63.5 kg)   LMP 01/01/2004   SpO2 97%   BMI 24.80 kg/m²      Consciousness Level  Awake  Cardiopulmonary Status  Stable  Pain Adequately Treated YES  Nausea / Vomiting  NO  Adequate Hydration  YES  Anesthesia Related Complications NONE      Electronically signed by Yesica Moeller MD on 11/6/2018 at 3:05 PM       Department of Anesthesiology  Postprocedure Note    Patient: Lydia Alarcon  MRN: [id number]  YOB: 1945  Date of evaluation: 11/6/2018  Time:  3:05 PM     Procedure Summary     Date:  11/06/18 Room / Location:  [address] [place] [address] Rae Mcmahon 03 / [facility] Rae Mcmahon    Anesthesia Start:  9303 Anesthesia Stop:  1207    Procedure:  KNEE TOTAL ARTHROPLASTY W/REMOVAL HARDWARE PLATE & SCREWS (Left Knee) Diagnosis:  (DJD LEFT KNEE )    Surgeon:  Candi Erazo MD Responsible Provider:  Yesica Moeller MD    Anesthesia Type:  general, regional ASA Status:  2          Anesthesia Type: general, regional    Karie Phase I: Karie Score: 10    Karie Phase II:      Last vitals: Reviewed and per EMR flowsheets.        Anesthesia Post Evaluation

## 2018-11-06 NOTE — PROGRESS NOTES
Doreen Bruner  DVT Prophylaxis and Vaccine Status  Work List  Mandatory for all patients      Patient must be on both Chemical prophylaxis and Mechanical prophylaxis.  If chemical/mechanical prophylaxis is not ordered, the physician must document a reason for not using prophylaxis     Chemical Prophylaxis  Is patient on chemical prophylaxis: Yes  If no chemical prophylaxis Is a order in for No Chemical VTE prophylaxis n/a  If no was the physician notified not applicable      Mechanical Prophylaxis  Is patient on mechanical prophylaxis, intermittent pneumatic compression device: Yes  If no was the physician notified not applicable        Pneumonia Vaccine  Vaccine indicated:  Not indicated  If indicated was the vaccine given: not applicable    Influenza Vaccine (applicable from October through March):  Vaccine indicated: Up to date  If indicated was the vaccine given: not applicable    Patient Education  Education completed on DVT prophylaxis: yes
Home medications sent to pharmacy for verification.
I paged Dr. Alvaro Paniagua at 2:19 pm on 11/6/18. Notified Dr. Alvaro Paniagua at 2:20 pm on 11/6/18. [facility]
Patient arrives to floor from PACU, resting comfortably in bed @ this time. Assessment was completed and charted. Vitals obtained and charted. Patient denies any pain @ this time. Home medications collected and locked in room cabinet. No distress is noted @ this time. Will continue to monitor.
Pt instructed on IS
Rails: Left  Bathroom Shower/Tub: Tub/Shower unit, Shower chair without back, Curtain  Bathroom Toilet: Handicap height  Bathroom Equipment: Grab bars in shower, Shower chair, Hand-held shower  Bathroom Accessibility: Walker accessible  Home Equipment: Rolling walker, Crutches, [employer] Keep, Quad cane, [employer], Grab bars  Receives Help From: Family ( )  ADL Assistance: Independent  Homemaking Assistance: Independent  Homemaking Responsibilities: Yes  Ambulation Assistance: Independent  Transfer Assistance: Independent  Active : Yes  Mode of Transportation: PatientKeeper  Occupation: Retired       Objective          AROM RLE (degrees)  RLE AROM: WNL  AROM LLE (degrees)  LLE AROM : WNL (hip)  L Knee Flexion 0-145: 80  L Knee Extension 0: -5  Strength RLE  Strength RLE: WNL  Comment: grossly 4/5  Strength LLE  Strength LLE: WNL (hip)  L Knee Flexion: 3+/5  L Knee Extension: 3+/5 (slight quad lag at end-range)        Bed mobility  Supine to Sit: Stand by assistance  Sit to Supine: Stand by assistance  Scooting: Stand by assistance  Transfers  Sit to Stand: Contact guard assistance  Stand to sit: Contact guard assistance  Ambulation  Ambulation?: Yes  Ambulation 1  Surface: level tile  Device: Rolling Walker  Assistance: Contact guard assistance  Quality of Gait: small steps with decreased heel strke L   Distance: 15ft  Stairs/Curb  Stairs?: No     Balance  Sitting - Static: Good  Sitting - Dynamic: Good  Standing - Static: Fair;+ (SBA without device)  Standing - Dynamic: Good (with RW)        Assessment   Body structures, Functions, Activity limitations: Decreased functional mobility ; Decreased ROM; Decreased strength  Assessment: Impaired mobility due to L TKA  Decision Making: Low Complexity  History: none  Exam: decreased ROM, strength, gait  Clinical Presentation: stable  Patient Education: use of walker  REQUIRES PT FOLLOW UP: Yes  Activity Tolerance  Activity Tolerance: Patient Tolerated treatment well

## 2018-11-06 NOTE — OP NOTE
Operative dictation  Preoperative diagnosis: Posttraumatic arthritis left knee  Postop diagnosis: Same  Procedure: Hardware removal left knee followed by left total knee arthroplasty utilizing Biomet Vanguard prosthesis size 65 cruciate retaining femur with a 71 tibia and a 13 mm injury stabilized polyethylene and a 34 thin patella  Surgeons: Bill Mast  Assistant: Paco Aragon  Anesthesia: Adductor canal block with general    Patient is a 78 for a patient who had a previous lateral tibial plateau fracture years ago which required open reduction internal fixation. Patient had a lateral tibial plateau plate and screws. She is a gone on over the years to develop progressive arthritis of the knees the point where she has severe bone-on-bone disease throughout most of her knee. Having failed conservative treatments felt that she benefit from total knee arthroplasty. Consent was obtained good comprehension of all risks and benefits    Patient was given 2 g Ancef holding where as well as the adductor canal block. She was then taken operative suite where general anesthesia was a . She returned applied left thigh left lower extremities were prepped and draped in usual fashion. The leg was extended and the tourniquet inflated to 300 mmHg. Straight midline incision centered patella taken down and incorporated the lowest portion of her previous incision from her tibial plateau fracture. Subcu was taken down in a medial parapatellar arthrotomy is carried out. Patient was noted have significantly thickened and boggy synovium which was excised resected. L was placed on that side and calibrated 22 mm thickness. Freehand resection was made with an oscillating saw also the symmetric remaining bone size 34 patellar drill guide give best overall fit a 34 thin patella reproduce the original thickness. This was replaced with a protective patellar plate    Knee was then flexed reveal severe arthrosis globally.   Patient also

## 2018-11-06 NOTE — FLOWSHEET NOTE
independence and safety [facility] self-care and mobility. Plan  Safety Devices  Safety Devices in place: Yes  Type of devices:  All fall risk precautions in place, Call light within reach, Left in bed  Restraints  Initially in place: No     Plan  Times per week: 5-7  Times per day: Twice a day  Current Treatment Recommendations: Functional Mobility Training, Self-Care / ADL, Equipment Evaluation, Education, & procurement, Patient/Caregiver Education & Training, Safety Education & Training, Balance Training    Equipment Recommendations  Equipment Needed:  (TBD)  OT Individual Minutes  Time In: 0529  Time Out: [address] [place] [address]  Minutes: 24    Electronically signed by aPrk Najera on 11/6/18 at 3:38 PM

## 2018-11-07 VITALS
OXYGEN SATURATION: 99 % | SYSTOLIC BLOOD PRESSURE: 93 MMHG | TEMPERATURE: 97.3 F | BODY MASS INDEX: 24.8 KG/M2 | HEIGHT: 63 IN | WEIGHT: 140 LBS | HEART RATE: 58 BPM | DIASTOLIC BLOOD PRESSURE: 58 MMHG | RESPIRATION RATE: 14 BRPM

## 2018-11-07 PROCEDURE — 97535 SELF CARE MNGMENT TRAINING: CPT

## 2018-11-07 PROCEDURE — 0SRD0J9 REPLACEMENT OF LEFT KNEE JOINT WITH SYNTHETIC SUBSTITUTE, CEMENTED, OPEN APPROACH: ICD-10-PCS | Performed by: ORTHOPAEDIC SURGERY

## 2018-11-07 PROCEDURE — 0QPH45Z REMOVAL OF EXTERNAL FIXATION DEVICE FROM LEFT TIBIA, PERCUTANEOUS ENDOSCOPIC APPROACH: ICD-10-PCS | Performed by: ORTHOPAEDIC SURGERY

## 2018-11-07 PROCEDURE — 2580000003 HC RX 258: Performed by: ORTHOPAEDIC SURGERY

## 2018-11-07 PROCEDURE — 3E0T3BZ INTRODUCTION OF ANESTHETIC AGENT INTO PERIPHERAL NERVES AND PLEXI, PERCUTANEOUS APPROACH: ICD-10-PCS | Performed by: ANESTHESIOLOGY

## 2018-11-07 PROCEDURE — 97116 GAIT TRAINING THERAPY: CPT

## 2018-11-07 PROCEDURE — 6370000000 HC RX 637 (ALT 250 FOR IP): Performed by: ORTHOPAEDIC SURGERY

## 2018-11-07 PROCEDURE — 6360000002 HC RX W HCPCS: Performed by: ORTHOPAEDIC SURGERY

## 2018-11-07 PROCEDURE — 99024 POSTOP FOLLOW-UP VISIT: CPT | Performed by: ORTHOPAEDIC SURGERY

## 2018-11-07 PROCEDURE — 97110 THERAPEUTIC EXERCISES: CPT

## 2018-11-07 PROCEDURE — 1200000000 HC SEMI PRIVATE

## 2018-11-07 PROCEDURE — 97530 THERAPEUTIC ACTIVITIES: CPT

## 2018-11-07 RX ORDER — MORPHINE SULFATE 2 MG/ML
2 INJECTION, SOLUTION INTRAMUSCULAR; INTRAVENOUS EVERY 4 HOURS PRN
Status: DISCONTINUED | OUTPATIENT
Start: 2018-11-07 | End: 2018-11-07 | Stop reason: HOSPADM

## 2018-11-07 RX ORDER — OXYCODONE HYDROCHLORIDE 5 MG/1
5 TABLET ORAL EVERY 4 HOURS PRN
Qty: 40 TABLET | Refills: 0 | Status: SHIPPED | OUTPATIENT
Start: 2018-11-07 | End: 2018-11-14

## 2018-11-07 RX ADMIN — DOCUSATE SODIUM 100 MG: 100 CAPSULE, LIQUID FILLED ORAL at 09:01

## 2018-11-07 RX ADMIN — ACETAMINOPHEN 1000 MG: 500 TABLET, FILM COATED ORAL at 05:49

## 2018-11-07 RX ADMIN — OXYCODONE HYDROCHLORIDE 5 MG: 5 TABLET ORAL at 04:01

## 2018-11-07 RX ADMIN — Medication 2 G: at 02:18

## 2018-11-07 RX ADMIN — ALLOPURINOL 300 MG: 300 TABLET ORAL at 09:01

## 2018-11-07 RX ADMIN — Medication 10 ML: at 09:02

## 2018-11-07 RX ADMIN — KETOROLAC TROMETHAMINE 15 MG: 30 INJECTION, SOLUTION INTRAMUSCULAR; INTRAVENOUS at 05:49

## 2018-11-07 RX ADMIN — FAMOTIDINE 20 MG: 20 TABLET, FILM COATED ORAL at 09:01

## 2018-11-07 RX ADMIN — ASPIRIN 325 MG: 325 TABLET, DELAYED RELEASE ORAL at 09:01

## 2018-11-07 ASSESSMENT — PAIN SCALES - GENERAL
PAINLEVEL_OUTOF10: 2
PAINLEVEL_OUTOF10: 4
PAINLEVEL_OUTOF10: 2
PAINLEVEL_OUTOF10: 3
PAINLEVEL_OUTOF10: 4

## 2018-11-07 ASSESSMENT — PAIN DESCRIPTION - PAIN TYPE
TYPE: SURGICAL PAIN
TYPE: SURGICAL PAIN

## 2018-11-07 ASSESSMENT — PAIN DESCRIPTION - DESCRIPTORS: DESCRIPTORS: ACHING;DISCOMFORT

## 2018-11-07 ASSESSMENT — PAIN DESCRIPTION - ORIENTATION
ORIENTATION: LEFT
ORIENTATION: LEFT

## 2018-11-07 ASSESSMENT — PAIN DESCRIPTION - LOCATION
LOCATION: KNEE
LOCATION: KNEE

## 2018-11-07 NOTE — DISCHARGE SUMMARY
Discharge Summary     Patient ID:  Keyon Tolliver  [id number]  7 y.o.  1945    Admit date: 11/6/2018    Discharge date and time: 11/7/2018  3:10 PM     Admitting Physician: Buffy Sheikh MD     Admission Diagnoses: Primary osteoarthritis of left knee [M17.12]  Primary osteoarthritis of left knee [M17.12]    Discharge Diagnoses: post traumatic osteoarthritis left knee    Admission Condition: good    Discharged Condition: good    Indication for Admission: TKA    Hospital Course: no complications    Consults: none    Treatments: surgery and PT    Disposition: home    Patient Instructions:   Discharge Medication List as of 11/7/2018  1:44 PM      START taking these medications    Details   oxyCODONE (ROXICODONE) 5 MG immediate release tablet Take 1 tablet by mouth every 4 hours as needed for Pain for up to 7 days. [place] Velasquez Date: 11/7/18, Disp-40 tablet, R-0Print      aspirin 325 MG EC tablet Take 1 tablet by mouth 2 times daily, Disp-30 tablet, R-0Normal         CONTINUE these medications which have NOT CHANGED    Details   loratadine (CLARITIN) 10 MG tablet TAKE ONE TABLET BY MOUTH DAILY AS NEEDED, Disp-90 tablet, R-0Normal      fluticasone (FLONASE) 50 MCG/ACT nasal spray SPRAY TWO SPRAYS IN EACH NOSTRIL ONCE DAILY, Disp-1 Bottle, R-1Normal      simvastatin (ZOCOR) 20 MG tablet TAKE ONE TABLET BY MOUTH DAILY, Disp-30 tablet, R-1Normal      vitamin D (ERGOCALCIFEROL) 88301 units CAPS capsule TAKE ONE CAPSULE BY MOUTH EVERY 2 WEEKS, Disp-2 capsule, R-3Normal      alendronate (FOSAMAX) 70 MG tablet TAKE 1 TABLET BY MOUTH ONCE WEEKLY BEFORE BREAKFAST, ON AN EMPTY STOMACH: REMAIN UPRIGHT FOR 30 MINUTES:TAKE WITH 8 OUNCES OF WATER, Disp-4 tablet, R-2Normal      famotidine (PEPCID) 20 MG tablet TAKE ONE TABLET BY MOUTH TWICE A DAY, Disp-60 tablet, R-2Normal      Dextromethorphan-Guaifenesin (MUCINEX DM)  MG TB12 Take 1-2 tablets every 12 hours as needed for cough, Disp-28 tablet, R-1Normal      benzonatate

## 2018-11-08 ENCOUNTER — TELEPHONE (OUTPATIENT)
Dept: FAMILY MEDICINE CLINIC | Age: 73
End: 2018-11-08

## 2018-11-12 RX ORDER — VERAPAMIL HYDROCHLORIDE 240 MG/1
TABLET, FILM COATED, EXTENDED RELEASE ORAL
Qty: 30 TABLET | Refills: 2 | Status: SHIPPED | OUTPATIENT
Start: 2018-11-12 | End: 2019-02-19 | Stop reason: SDUPTHER

## 2018-11-15 DIAGNOSIS — Z96.652 HISTORY OF TOTAL KNEE ARTHROPLASTY, LEFT: Primary | ICD-10-CM

## 2018-11-15 RX ORDER — OXYCODONE HYDROCHLORIDE AND ACETAMINOPHEN 5; 325 MG/1; MG/1
1-2 TABLET ORAL EVERY 4 HOURS PRN
Qty: 28 TABLET | Refills: 0 | Status: SHIPPED | OUTPATIENT
Start: 2018-11-15 | End: 2018-11-22

## 2018-11-19 ENCOUNTER — TELEPHONE (OUTPATIENT)
Dept: FAMILY MEDICINE CLINIC | Age: 73
End: 2018-11-19

## 2018-11-21 ENCOUNTER — OFFICE VISIT (OUTPATIENT)
Dept: ORTHOPEDIC SURGERY | Age: 73
End: 2018-11-21

## 2018-11-21 VITALS — WEIGHT: 139.99 LBS | HEIGHT: 63 IN | BODY MASS INDEX: 24.8 KG/M2

## 2018-11-21 DIAGNOSIS — Z96.652 STATUS POST TOTAL LEFT KNEE REPLACEMENT: Primary | ICD-10-CM

## 2018-11-21 PROCEDURE — 99024 POSTOP FOLLOW-UP VISIT: CPT | Performed by: ORTHOPAEDIC SURGERY

## 2018-11-21 RX ORDER — OXYCODONE HYDROCHLORIDE AND ACETAMINOPHEN 5; 325 MG/1; MG/1
1-2 TABLET ORAL EVERY 4 HOURS PRN
Qty: 40 TABLET | Refills: 0 | Status: SHIPPED | OUTPATIENT
Start: 2018-11-21 | End: 2018-12-12

## 2018-11-21 NOTE — PROGRESS NOTES
fluticasone (FLONASE) 50 MCG/ACT nasal spray, SPRAY TWO SPRAYS IN EACH NOSTRIL ONCE DAILY, Disp: 1 Bottle, Rfl: 1    simvastatin (ZOCOR) 20 MG tablet, TAKE ONE TABLET BY MOUTH DAILY, Disp: 30 tablet, Rfl: 1    vitamin D (ERGOCALCIFEROL) 91326 units CAPS capsule, TAKE ONE CAPSULE BY MOUTH EVERY 2 WEEKS, Disp: 2 capsule, Rfl: 3    alendronate (FOSAMAX) 70 MG tablet, TAKE 1 TABLET BY MOUTH ONCE WEEKLY BEFORE BREAKFAST, ON AN EMPTY STOMACH: REMAIN UPRIGHT FOR 30 MINUTES:TAKE WITH 8 OUNCES OF WATER, Disp: 4 tablet, Rfl: 2    famotidine (PEPCID) 20 MG tablet, TAKE ONE TABLET BY MOUTH TWICE A DAY, Disp: 60 tablet, Rfl: 2    Dextromethorphan-Guaifenesin (MUCINEX DM)  MG TB12, Take 1-2 tablets every 12 hours as needed for cough, Disp: 28 tablet, Rfl: 1    benzonatate (TESSALON PERLES) 100 MG capsule, Take 1-2 capsules 3 times daily as needed for cough, Disp: 30 capsule, Rfl: 1    Probiotic Product (PROBIOTIC-10 ULTIMATE PO), Take 1 capsule by mouth daily , Disp: , Rfl:     Glucosamine-Chondroitin (GLUCOSAMINE CHONDR COMPLEX PO), Take by mouth, Disp: , Rfl:     allopurinol (ZYLOPRIM) 300 MG tablet, TAKE ONE TABLET BY MOUTH DAILY, Disp: 30 tablet, Rfl: 5    Misc Natural Products (OSTEO BI-FLEX JOINT SHIELD PO), Take by mouth, Disp: , Rfl:     No Known Allergies    Social History     Social History    Marital status:      Spouse name: N/A    Number of children: N/A    Years of education: N/A     Occupational History    Not on file.      Social History Main Topics    Smoking status: Former Smoker     Packs/day: 0.50     Years: 10.00     Types: Cigarettes     Quit date: 2/14/1987    Smokeless tobacco: Never Used    Alcohol use No    Drug use: No    Sexual activity: Not Currently     Other Topics Concern    Not on file     Social History Narrative    No narrative on file       Past Medical History:   Diagnosis Date    Allergic rhinitis     Closed tibia fracture     GERD (gastroesophageal reflux disease)     Gout     Headache(784.0)     h/o migraines    Hyperlipidemia     Osteoarthritis     DJD Lt knee    Osteoporosis     Posterior tibial tendon dysfunction     right, wears braces on both legs    Vitamin D deficient rickets     Wears glasses     Wears partial dentures     lower     Past Surgical History:   Procedure Laterality Date    APPENDECTOMY      CARPAL TUNNEL RELEASE      bilateral    COLONOSCOPY      FRACTURE SURGERY      left tibia    HYSTERECTOMY  4/12    KNEE ARTHROSCOPY Left 2002?  [place] TOTAL KNEE ARTHROPLASTY Left 11/6/2018    KNEE TOTAL ARTHROPLASTY W/REMOVAL HARDWARE PLATE & SCREWS performed by Heath Ross MD at [address] [place] [address] Nw Left     TONSILLECTOMY AND ADENOIDECTOMY       Family History   Problem Relation Age of Onset    Arthritis Other     Thyroid Disease Other     COPD Other     Stroke Father            Orders Placed This Encounter   Procedures    XR KNEE LEFT (1-2 VIEWS)     Standing Status:   Future     Number of Occurrences:   1     Standing Expiration Date:   11/21/2019   [facility] - [facility]     Referral Priority:   Routine     Referral Type:   Eval and Treat     Referral Reason:   Specialty Services Required     Requested Specialty:   Physical Therapy     Number of Visits Requested:   1       1. Status post total left knee replacement            Heath Ross MD    Please note that this chart was generated using voice recognition Dragon dictation software. Although every effort was made to ensure the accuracy of this automated transcription, some errors in transcription may have occurred.

## 2018-11-29 ENCOUNTER — HOSPITAL ENCOUNTER (OUTPATIENT)
Dept: PHYSICAL THERAPY | Age: 73
Setting detail: THERAPIES SERIES
Discharge: HOME | End: 2018-11-29
Payer: MEDICARE

## 2018-11-29 PROCEDURE — 97016 VASOPNEUMATIC DEVICE THERAPY: CPT

## 2018-11-29 PROCEDURE — G8978 MOBILITY CURRENT STATUS: HCPCS

## 2018-11-29 PROCEDURE — 97110 THERAPEUTIC EXERCISES: CPT

## 2018-11-29 PROCEDURE — G8979 MOBILITY GOAL STATUS: HCPCS

## 2018-11-29 PROCEDURE — 97161 PT EVAL LOW COMPLEX 20 MIN: CPT

## 2018-11-29 NOTE — PROGRESS NOTES
[address] [facility]  [address] [facility]         Phone: [phone]       Fax: [phone]    Physical Therapy Lower Extremity Evaluation    Date:  2018  Patient: Yoselin Andrade  : 1945  MRN: [id number]  Physician: Dr. Ayla Lyon MD  Insurance: Medicare  Medical Diagnosis: M76.364 - Status post total left knee replacement    Rehab Codes: M25.562, M62.81, R26.2  Onset date: L TKA 18  Next 's appt.: 2018    Subjective:   CC: Maico Damon is a 75year old female seen s/p L TKA on 2018. She reports bilateral knee pain beginning in , L>R. Patient will history of L foot drop following ankle surgery in  and history of R posterior tibialis dysfunction. Patient with increased difficulty with knee flexion, prolonged standing, and prolonged ambulation. Currently using SPC as needed. Patient with 3-4 steps to get into her house, reporting little difficulty. Patient received home health physical therapy for past 2 weeks.    HPI: L TKA 2018    PMHx: [] Unremarkable [] Diabetes [] HTN  [] Pacemaker   [] MI/Heart Problems [] Cancer [] Arthritis [] Other:              [x] Refer to full medical chart  In EPIC   Tests: [x] X-Ray: [] MRI:  [] Other:     Medications: [x] Refer to full medical record [] None [] Other:  Allergies:      [] Refer to full medical record [x] None [] Other:    Function:  Hand Dominance  [x] Right  [] Left  Working:  [] Normal Duty  [] Light Duty  [] Off D/T Condition  [x] Retired    [] Not Employed    []  Disability  [] Other:           Return to work:   Job/ADL Description:      Pain:  [x] Yes  [] No Location: L knee Pain Rating: (0-10 scale) 2-6/10  Pain altered Tx:  [] Yes  [x] No  Action:  Symptoms:  [x] Improving [] Worsening [] Same  Better:  [] AM    [] PM    [] Sit    [] Rise/Sit    []Stand    [] Walk    [] Lying    [x] Other: ice  Worse: [] AM    [] PM    [] Sit    [] Rise/Sit    []Stand    [x] Walk    [] Lying    [x] Bend                             [] Valsalva    [] Other:  Sleep: [x] OK    [] Disturbed    Objective:    ROM  ° A/P STRENGTH TESTS (+/-) Left Right Not Tested    Left Right Left Right Ant. Drawer   []   Hip Flex   4 4+ Post. Drawer   []   Ext   3+ 4 Lachmans   []   ER     Valgus Stress   []   IR     Varus Stress   []   ABD   4 4+ Meghans   []   ADD   4 4+ Apleys Comp.   []   Knee Flex 86 A, 92 P  4 5 Apleys Dist.   []   Ext -4 A  4- 5 Hip Scouring   []   Ankle DF   4- 4 WILLIANs   []   PF   4 5 Piriformis   []   INV     Glenns   []   EVER     Talor Tilt   []        Pat-Fem Grind   []       OBSERVATION No Deficit Deficit Not Tested Comments   Posture       Forward Head [x] [] []    Rounded Shoulders [x] [] []    Kyphosis [x] [] []    Lordosis [x] [] []    Lateral Shift [] [] []    Scoliosis [] [] []    Iliac Crest [] [] []    PSIS [] [] []    ASIS [] [] []    Genu Valgus [x] [] []    Genu Varus [x] [] []    Genu Recurvatum [] [] []    Pronation [] [] []    Supination [] [] []    Leg Length Discrp [] [] []    Slumped Sitting [] [] []    Palpation [] [x] [] Mild tenderness around L knee incision, incision intact and clean. Sensation [x] [] []    Edema [] [x] [] Moderate L knee edema   Neurological [] [] []    Patellar Mobility [] [x] []    Patellar Orientation [x] [] []    Gait [] [x] [] Analysis: L foot drop, decreased afrhana, decreased L stance phase and knee flexion/extension.          FUNCTION Normal Difficult Unable   Sitting [x] [] []   Standing [] [x] []   Ambulation [] [x] []   Groom/Dress [] [x] []   Lift/Carry [] [x] []   Stairs [] [x] []   Bending [] [x] []   Squat [] [x] []   Kneel [] [] [x]                                        Johnson Fall Risk Assessment    Risk Factor Scale  Score   History of Falls [] Yes  [x] No 25  0 0   Secondary Diagnosis [] Yes  [x] No 15  0 0   Ambulatory Aid [] Furniture  [x] Crutches/cane/walker  [] None/bedrest/wheelchair/nurse 30  15  0 15   IV/Heparin Aristides Kenny allergies reviewed for use of    Dexamethasone Sodium Phosphate 4mg/ml     with iontophoresis treatments. Pt is not allergic. Frequency:  2-3 x/week for 12 visits        Todays Treatment:  Modalities: vasocompression to L knee on mod pressure and 34 degrees  Precautions:  Exercises:  Exercise Reps/ Time Weight/ Level Comments   Quad sets 10 x 5s  Given as part of HEP   SLR 10x  Given as part of HEP   Hip ADD 20x ball Given as part of HEP   Hip ABD 20x purple Given as part of HEP   SAQ 20x 3# Given as part of HEP   Heel slides 10 x 10s  Given as part of HEP   LAQ 10x 3#          Standing      Marches 20x     Heel raises 20x     3 Way Hip 10x ea     Step ups 10x ea 2\" Step up with L challenging secondary to foot drop   Standing knee flex/ext stretch 3 x 30s ea           Other:    Specific Instructions for next treatment: progress LE strengthening and ROM exercises      Treatment Charges: Mins Units   [x] Evaluation       [x]  Low       []  Moderate       []  High 15 1   []  Modalities     [x]  Ther Exercise 30 2   []  Manual Therapy     []  Ther Activities     []  Aquatics     []  Neuromuscular     [x]  Other: vasocompression 15 1     TOTAL TREATMENT TIME: 60    Time in: 1330   Time Out: 1440    Electronically signed by: Kelsey Kenney PT        Physician Signature:________________________________Date:__________________  By signing above or cosigning this note, I have reviewed this plan of care and certify a need for medically necessary rehabilitation services.      *PLEASE SIGN ABOVE AND FAX BACK ALL PAGES*

## 2018-11-30 RX ORDER — FAMOTIDINE 20 MG/1
TABLET, FILM COATED ORAL
Qty: 60 TABLET | Refills: 1 | Status: SHIPPED | OUTPATIENT
Start: 2018-11-30 | End: 2019-02-01 | Stop reason: SDUPTHER

## 2018-12-03 ENCOUNTER — HOSPITAL ENCOUNTER (OUTPATIENT)
Dept: PHYSICAL THERAPY | Age: 73
Setting detail: THERAPIES SERIES
Discharge: HOME | End: 2018-12-03
Payer: MEDICARE

## 2018-12-03 PROCEDURE — 97016 VASOPNEUMATIC DEVICE THERAPY: CPT

## 2018-12-03 PROCEDURE — 97110 THERAPEUTIC EXERCISES: CPT

## 2018-12-05 ENCOUNTER — HOSPITAL ENCOUNTER (OUTPATIENT)
Dept: PHYSICAL THERAPY | Age: 73
Setting detail: THERAPIES SERIES
Discharge: HOME | End: 2018-12-05
Payer: MEDICARE

## 2018-12-05 PROCEDURE — 97016 VASOPNEUMATIC DEVICE THERAPY: CPT

## 2018-12-05 PROCEDURE — 97110 THERAPEUTIC EXERCISES: CPT

## 2018-12-05 NOTE — PROGRESS NOTES
Treatment time 50 3       Assessment: [x] Progressing toward goals. Tolerated all exercises well today. Added step down and increased reps    [] No change. [] Other:       STG: (to be met in 6 treatments)  1. ? Pain: Patient will decrease L knee pain to 2/10 following prolonged walking to increase ambulation tolerance;.  2. ? ROM: Patient will increase L AROM to 0-110 to normalize gait pattern. 3. Patient will decrease L knee edema to WNL when compared to the R in order to increase knee ROM. 4. Independent with Home Exercise Programs  LTG: (to be met in 12 treatments)  1.   ? Strength: Patient will increase L hip and knee strength to 5/5 in order to climb stairs. 2.   ? Function: Patient will demonstrate increased activity tolerance as evident by decreased score on Optimal from 50% deficit to <10% deficit. 3.   Patient will ambulate x500 ft without assistive device or loss of balance to return to community ambulation without assistance.                    Patient goals: \"better flexing backwards\"    Pt. Education:  [x] Yes  [] No  [] Reviewed Prior HEP/Ed  Method of Education: [x] Verbal  [] Demo  [] Written  Comprehension of Education:  [x] Verbalizes understanding. [x] Demonstrates understanding. [] Needs review. [] Demonstrates/verbalizes HEP/Ed previously given. Plan: [x] Continue per plan of care.    [] Other:      Time In:1300            Time Out: 1350    Electronically signed by:  Mayo Wang PTA

## 2018-12-06 ENCOUNTER — HOSPITAL ENCOUNTER (OUTPATIENT)
Dept: PHYSICAL THERAPY | Age: 73
Setting detail: THERAPIES SERIES
Discharge: HOME | End: 2018-12-06
Payer: MEDICARE

## 2018-12-06 PROCEDURE — 97016 VASOPNEUMATIC DEVICE THERAPY: CPT

## 2018-12-06 PROCEDURE — 97110 THERAPEUTIC EXERCISES: CPT

## 2018-12-06 NOTE — PROGRESS NOTES
[]  Ther Activities       []  Aquatics       []  Neuromuscular       [x]  Other Vasocompression 15 1   Total Treatment time 45 3         Assessment:          [x] Progressing toward goals. Patient tolerated progression of exercises without increased knee pain. Increased repetition on step-ups and 3 way hip. Had patient with R LE on 2\" step during 3 way hip to decrease hip hike that occurs due to foot drop, specifically with extension. She stated that she felt decreased stiffness following today's treatment session. L knee AROM 0-102 degrees. [] No change. [] Other:        STG: (to be met in 6 treatments)  1. ? Pain: Patient will decrease L knee pain to 2/10 following prolonged walking to increase ambulation tolerance;.  2. ? ROM: Patient will increase L AROM to 0-110 to normalize gait pattern. 3. Patient will decrease L knee edema to WNL when compared to the R in order to increase knee ROM. 4. Independent with Home Exercise Programs  LTG: (to be met in 12 treatments)  1.   ? Strength: Patient will increase L hip and knee strength to 5/5 in order to climb stairs. 2.   ? Function: Patient will demonstrate increased activity tolerance as evident by decreased score on Optimal from 50% deficit to <10% deficit. 3.   Patient will ambulate x500 ft without assistive device or loss of balance to return to community ambulation without assistance.        Patient goals: \"better flexing backwards\"     Pt. Education:  [x] Yes  [] No  [] Reviewed Prior HEP/Ed  Method of Education: [x] Verbal  [] Demo  [] Written  Comprehension of Education:  [x] Verbalizes understanding. [x] Demonstrates understanding. [] Needs review. [] Demonstrates/verbalizes HEP/Ed previously given.          Plan:   [x] Continue per plan of care.              [] Other:                          Time In:1325            Time Out: 6098     Electronically signed by:  Lauri Berman, PT

## 2018-12-10 ENCOUNTER — HOSPITAL ENCOUNTER (OUTPATIENT)
Dept: PHYSICAL THERAPY | Age: 73
Setting detail: THERAPIES SERIES
Discharge: HOME | End: 2018-12-10
Payer: MEDICARE

## 2018-12-10 PROCEDURE — 97016 VASOPNEUMATIC DEVICE THERAPY: CPT

## 2018-12-10 PROCEDURE — 97110 THERAPEUTIC EXERCISES: CPT

## 2018-12-10 NOTE — PROGRESS NOTES
Physical Therapy      [address] [facility]             [address] [facility] [address]             Phone: [phone]             Fax: [phone]     Physical Therapy Daily Treatment Note     Date:  12/10/2018  Patient Name: Errol Bass  Physician: Dr. Jennifer Serrano MD              Insurance: Medicare  Medical Diagnosis: Z96.652 - Status post total left knee replacement                     Rehab Codes: M25.562, M62.81, R26.2  Onset date: L TKA 11/6/18               Next Dr's appt.: 12/27/2018  Visit# / total visits: 5/12               Cancels/No Shows: 0/0     Subjective:  Patient reports she is doing well, knee feels \"tight\". Notes a little swelling and is icing at home. Reports compliance with HEP with some difficulty with SLR. Notes her walking is better.     Pain:  [] Yes  [x] No       Location: Left knee    Pain Rating: (0-10 scale) 0/10  Pain altered Tx:  [x] No  [] Yes  Action:  Comments:     Objective:  Modalities: vasocompression to L knee on mod pressure and 34 degrees  Precautions:  Exercises:  Exercise Reps/ Time Weight/ Level Comments   Quad sets 10 x 5s    HEP   SLR 10x2   Cues for quad set to decrease quad lag   Hip ADD 20x ball     Hip ABD 20x purple     SAQ 20x 3#     Heel slides 10 x 10s   WITH ASSIST FOR STRETCH X5 REPS   LAQ 2X10 3#               NuStep 6' L2               Seated HS strech 3x30\"                                     Standing         Marches 20x       Heel raises 20x       3 Way Hip 15x ea  Lime  No ADD; R LE on 2\" step to decrease hip hiking due to foot drop   Step ups 15x ea 8\" Step up with L challenging secondary to foot drop   Standing knee flex stretch 3 x 30s ea       TKE 15x10\" Blue     calf stretch 3x30\"       Step down 10x 6\"     Total Gym 2X10  L10               Patellar mobs 5'       Other:     Specific Instructions for next treatment: progress LE strengthening and

## 2018-12-12 ENCOUNTER — HOSPITAL ENCOUNTER (OUTPATIENT)
Dept: PHYSICAL THERAPY | Age: 73
Setting detail: THERAPIES SERIES
Discharge: HOME | End: 2018-12-12
Payer: MEDICARE

## 2018-12-12 PROCEDURE — 97016 VASOPNEUMATIC DEVICE THERAPY: CPT

## 2018-12-12 PROCEDURE — 97110 THERAPEUTIC EXERCISES: CPT

## 2018-12-13 ENCOUNTER — HOSPITAL ENCOUNTER (OUTPATIENT)
Dept: PHYSICAL THERAPY | Age: 73
Setting detail: THERAPIES SERIES
Discharge: HOME | End: 2018-12-13
Payer: MEDICARE

## 2018-12-13 PROCEDURE — G0283 ELEC STIM OTHER THAN WOUND: HCPCS

## 2018-12-13 PROCEDURE — 97110 THERAPEUTIC EXERCISES: CPT

## 2018-12-13 PROCEDURE — 97016 VASOPNEUMATIC DEVICE THERAPY: CPT

## 2018-12-18 ENCOUNTER — OFFICE VISIT (OUTPATIENT)
Dept: FAMILY MEDICINE CLINIC | Age: 73
End: 2018-12-18
Payer: MEDICARE

## 2018-12-18 VITALS
BODY MASS INDEX: 23.21 KG/M2 | TEMPERATURE: 97.6 F | DIASTOLIC BLOOD PRESSURE: 62 MMHG | HEART RATE: 64 BPM | RESPIRATION RATE: 14 BRPM | SYSTOLIC BLOOD PRESSURE: 110 MMHG | WEIGHT: 131 LBS

## 2018-12-18 DIAGNOSIS — K59.02 CONSTIPATION DUE TO OUTLET DYSFUNCTION: Primary | ICD-10-CM

## 2018-12-18 DIAGNOSIS — R19.7 DIARRHEA, UNSPECIFIED TYPE: ICD-10-CM

## 2018-12-18 PROCEDURE — 99214 OFFICE O/P EST MOD 30 MIN: CPT | Performed by: NURSE PRACTITIONER

## 2018-12-18 RX ORDER — DOCUSATE SODIUM 100 MG/1
100 CAPSULE, LIQUID FILLED ORAL 2 TIMES DAILY
Qty: 60 CAPSULE | Refills: 2 | Status: SHIPPED | OUTPATIENT
Start: 2018-12-18 | End: 2020-11-19 | Stop reason: ALTCHOICE

## 2018-12-18 ASSESSMENT — ENCOUNTER SYMPTOMS
VOMITING: 0
NAUSEA: 0
WHEEZING: 0
SHORTNESS OF BREATH: 0
BLOOD IN STOOL: 0
DIARRHEA: 1
ABDOMINAL PAIN: 0
CONSTIPATION: 1

## 2018-12-19 ENCOUNTER — HOSPITAL ENCOUNTER (OUTPATIENT)
Dept: PHYSICAL THERAPY | Age: 73
Setting detail: THERAPIES SERIES
Discharge: HOME | End: 2018-12-19
Payer: MEDICARE

## 2018-12-19 PROCEDURE — 97016 VASOPNEUMATIC DEVICE THERAPY: CPT

## 2018-12-19 PROCEDURE — 97110 THERAPEUTIC EXERCISES: CPT

## 2018-12-21 ENCOUNTER — HOSPITAL ENCOUNTER (OUTPATIENT)
Dept: PHYSICAL THERAPY | Age: 73
Setting detail: THERAPIES SERIES
Discharge: HOME | End: 2018-12-21
Payer: MEDICARE

## 2018-12-21 PROCEDURE — 97110 THERAPEUTIC EXERCISES: CPT

## 2018-12-21 PROCEDURE — 97016 VASOPNEUMATIC DEVICE THERAPY: CPT

## 2018-12-27 ENCOUNTER — OFFICE VISIT (OUTPATIENT)
Dept: ORTHOPEDIC SURGERY | Age: 73
End: 2018-12-27

## 2018-12-27 DIAGNOSIS — Z96.652 HISTORY OF TOTAL KNEE ARTHROPLASTY, LEFT: ICD-10-CM

## 2018-12-27 PROCEDURE — 99024 POSTOP FOLLOW-UP VISIT: CPT | Performed by: ORTHOPAEDIC SURGERY

## 2018-12-27 RX ORDER — SIMVASTATIN 20 MG
TABLET ORAL
Qty: 30 TABLET | Refills: 0 | Status: SHIPPED | OUTPATIENT
Start: 2018-12-27 | End: 2019-01-22 | Stop reason: SDUPTHER

## 2018-12-27 RX ORDER — OXYCODONE HYDROCHLORIDE AND ACETAMINOPHEN 5; 325 MG/1; MG/1
1-2 TABLET ORAL
Qty: 28 TABLET | Refills: 0 | Status: SHIPPED | OUTPATIENT
Start: 2018-12-27 | End: 2019-01-17

## 2019-01-02 ENCOUNTER — HOSPITAL ENCOUNTER (OUTPATIENT)
Dept: PHYSICAL THERAPY | Age: 74
Setting detail: THERAPIES SERIES
Discharge: HOME | End: 2019-01-02
Payer: MEDICARE

## 2019-01-10 ENCOUNTER — HOSPITAL ENCOUNTER (OUTPATIENT)
Dept: PHYSICAL THERAPY | Age: 74
Setting detail: THERAPIES SERIES
Discharge: HOME | End: 2019-01-10
Payer: MEDICARE

## 2019-01-22 RX ORDER — SIMVASTATIN 20 MG
TABLET ORAL
Qty: 90 TABLET | Refills: 1 | Status: SHIPPED | OUTPATIENT
Start: 2019-01-22 | End: 2019-05-28 | Stop reason: SDUPTHER

## 2019-01-22 RX ORDER — LORATADINE 10 MG/1
TABLET ORAL
Qty: 90 TABLET | Refills: 1 | Status: SHIPPED | OUTPATIENT
Start: 2019-01-22 | End: 2019-10-23 | Stop reason: ALTCHOICE

## 2019-01-25 RX ORDER — ALENDRONATE SODIUM 70 MG/1
TABLET ORAL
Qty: 4 TABLET | Refills: 2 | Status: SHIPPED | OUTPATIENT
Start: 2019-01-25 | End: 2019-05-19 | Stop reason: SDUPTHER

## 2019-02-01 RX ORDER — FAMOTIDINE 20 MG/1
TABLET, FILM COATED ORAL
Qty: 60 TABLET | Refills: 0 | Status: SHIPPED | OUTPATIENT
Start: 2019-02-01 | End: 2019-03-04 | Stop reason: SDUPTHER

## 2019-02-19 RX ORDER — VERAPAMIL HYDROCHLORIDE 240 MG/1
TABLET, FILM COATED, EXTENDED RELEASE ORAL
Qty: 30 TABLET | Refills: 1 | Status: SHIPPED | OUTPATIENT
Start: 2019-02-19 | End: 2019-04-26 | Stop reason: SDUPTHER

## 2019-02-21 ENCOUNTER — OFFICE VISIT (OUTPATIENT)
Dept: FAMILY MEDICINE CLINIC | Age: 74
End: 2019-02-21
Payer: MEDICARE

## 2019-02-21 VITALS
HEART RATE: 60 BPM | RESPIRATION RATE: 13 BRPM | DIASTOLIC BLOOD PRESSURE: 80 MMHG | WEIGHT: 135 LBS | BODY MASS INDEX: 23.92 KG/M2 | SYSTOLIC BLOOD PRESSURE: 110 MMHG

## 2019-02-21 DIAGNOSIS — E55.9 VITAMIN D DEFICIENCY: ICD-10-CM

## 2019-02-21 DIAGNOSIS — K21.9 GASTROESOPHAGEAL REFLUX DISEASE, ESOPHAGITIS PRESENCE NOT SPECIFIED: ICD-10-CM

## 2019-02-21 DIAGNOSIS — E78.2 MIXED HYPERLIPIDEMIA: Primary | ICD-10-CM

## 2019-02-21 DIAGNOSIS — M10.9 GOUT, UNSPECIFIED CAUSE, UNSPECIFIED CHRONICITY, UNSPECIFIED SITE: ICD-10-CM

## 2019-02-21 PROCEDURE — 99214 OFFICE O/P EST MOD 30 MIN: CPT | Performed by: FAMILY MEDICINE

## 2019-02-21 ASSESSMENT — PATIENT HEALTH QUESTIONNAIRE - PHQ9
SUM OF ALL RESPONSES TO PHQ QUESTIONS 1-9: 0
SUM OF ALL RESPONSES TO PHQ9 QUESTIONS 1 & 2: 0
2. FEELING DOWN, DEPRESSED OR HOPELESS: 0
1. LITTLE INTEREST OR PLEASURE IN DOING THINGS: 0
SUM OF ALL RESPONSES TO PHQ QUESTIONS 1-9: 0

## 2019-02-21 ASSESSMENT — ENCOUNTER SYMPTOMS
BLOOD IN STOOL: 0
SHORTNESS OF BREATH: 0
CHEST TIGHTNESS: 0
ABDOMINAL PAIN: 0

## 2019-03-06 ENCOUNTER — HOSPITAL ENCOUNTER (OUTPATIENT)
Age: 74
Discharge: HOME | End: 2019-03-06
Payer: MEDICARE

## 2019-03-06 DIAGNOSIS — E55.9 VITAMIN D DEFICIENCY: ICD-10-CM

## 2019-03-06 DIAGNOSIS — M10.9 GOUT, UNSPECIFIED CAUSE, UNSPECIFIED CHRONICITY, UNSPECIFIED SITE: ICD-10-CM

## 2019-03-06 DIAGNOSIS — E78.2 MIXED HYPERLIPIDEMIA: ICD-10-CM

## 2019-03-06 LAB
ALT SERPL-CCNC: 14 U/L (ref 5–33)
ANION GAP SERPL CALCULATED.3IONS-SCNC: 10 MMOL/L (ref 9–17)
AST SERPL-CCNC: 20 U/L
BUN BLDV-MCNC: 18 MG/DL (ref 8–23)
BUN/CREAT BLD: NORMAL (ref 9–20)
CALCIUM SERPL-MCNC: 9.5 MG/DL (ref 8.6–10.4)
CHLORIDE BLD-SCNC: 103 MMOL/L (ref 98–107)
CHOLESTEROL/HDL RATIO: 2.7
CHOLESTEROL: 133 MG/DL
CO2: 28 MMOL/L (ref 20–31)
CREAT SERPL-MCNC: 0.86 MG/DL (ref 0.5–0.9)
GFR AFRICAN AMERICAN: >60 ML/MIN
GFR NON-AFRICAN AMERICAN: >60 ML/MIN
GFR SERPL CREATININE-BSD FRML MDRD: NORMAL ML/MIN/{1.73_M2}
GFR SERPL CREATININE-BSD FRML MDRD: NORMAL ML/MIN/{1.73_M2}
GLUCOSE BLD-MCNC: 81 MG/DL (ref 70–99)
HDLC SERPL-MCNC: 49 MG/DL
LDL CHOLESTEROL: 63 MG/DL (ref 0–130)
MAGNESIUM: 2 MG/DL (ref 1.6–2.6)
POTASSIUM SERPL-SCNC: 4.1 MMOL/L (ref 3.7–5.3)
SODIUM BLD-SCNC: 141 MMOL/L (ref 135–144)
TRIGL SERPL-MCNC: 105 MG/DL
URIC ACID: 3.2 MG/DL (ref 2.4–5.7)
VITAMIN D 25-HYDROXY: 37.6 NG/ML (ref 30–100)
VLDLC SERPL CALC-MCNC: NORMAL MG/DL (ref 1–30)

## 2019-03-06 PROCEDURE — 84550 ASSAY OF BLOOD/URIC ACID: CPT

## 2019-03-06 PROCEDURE — 84450 TRANSFERASE (AST) (SGOT): CPT

## 2019-03-06 PROCEDURE — 83735 ASSAY OF MAGNESIUM: CPT

## 2019-03-06 PROCEDURE — 84460 ALANINE AMINO (ALT) (SGPT): CPT

## 2019-03-06 PROCEDURE — 36415 COLL VENOUS BLD VENIPUNCTURE: CPT

## 2019-03-06 PROCEDURE — 82306 VITAMIN D 25 HYDROXY: CPT

## 2019-03-06 PROCEDURE — 80061 LIPID PANEL: CPT

## 2019-03-06 PROCEDURE — 80048 BASIC METABOLIC PNL TOTAL CA: CPT

## 2019-03-07 ENCOUNTER — HOSPITAL ENCOUNTER (OUTPATIENT)
Age: 74
Setting detail: OUTPATIENT SURGERY
Discharge: HOME | End: 2019-03-07
Attending: OPHTHALMOLOGY | Admitting: OPHTHALMOLOGY
Payer: MEDICARE

## 2019-03-07 VITALS
WEIGHT: 135 LBS | HEART RATE: 55 BPM | HEIGHT: 63 IN | RESPIRATION RATE: 16 BRPM | TEMPERATURE: 98.4 F | SYSTOLIC BLOOD PRESSURE: 114 MMHG | BODY MASS INDEX: 23.92 KG/M2 | OXYGEN SATURATION: 99 % | DIASTOLIC BLOOD PRESSURE: 69 MMHG

## 2019-03-07 PROBLEM — H25.12 AGE-RELATED NUCLEAR CATARACT, LEFT: Status: RESOLVED | Noted: 2019-03-07 | Resolved: 2019-03-07

## 2019-03-07 PROCEDURE — 2580000003 HC RX 258: Performed by: OPHTHALMOLOGY

## 2019-03-07 PROCEDURE — V2632 POST CHMBR INTRAOCULAR LENS: HCPCS | Performed by: OPHTHALMOLOGY

## 2019-03-07 PROCEDURE — 99152 MOD SED SAME PHYS/QHP 5/>YRS: CPT | Performed by: OPHTHALMOLOGY

## 2019-03-07 PROCEDURE — 2500000003 HC RX 250 WO HCPCS: Performed by: OPHTHALMOLOGY

## 2019-03-07 PROCEDURE — 6370000000 HC RX 637 (ALT 250 FOR IP): Performed by: OPHTHALMOLOGY

## 2019-03-07 PROCEDURE — 99153 MOD SED SAME PHYS/QHP EA: CPT | Performed by: OPHTHALMOLOGY

## 2019-03-07 PROCEDURE — 3600000002 HC SURGERY LEVEL 2 BASE: Performed by: OPHTHALMOLOGY

## 2019-03-07 PROCEDURE — 7100000010 HC PHASE II RECOVERY - FIRST 15 MIN: Performed by: OPHTHALMOLOGY

## 2019-03-07 PROCEDURE — 7100000011 HC PHASE II RECOVERY - ADDTL 15 MIN: Performed by: OPHTHALMOLOGY

## 2019-03-07 PROCEDURE — 2709999900 HC NON-CHARGEABLE SUPPLY: Performed by: OPHTHALMOLOGY

## 2019-03-07 PROCEDURE — 3600000012 HC SURGERY LEVEL 2 ADDTL 15MIN: Performed by: OPHTHALMOLOGY

## 2019-03-07 PROCEDURE — 6360000002 HC RX W HCPCS: Performed by: OPHTHALMOLOGY

## 2019-03-07 DEVICE — LENS INTOCU +20.0 DIOPT L13MM DIA6MM 0DEG HAPTIC ANG A: Type: IMPLANTABLE DEVICE | Site: LENS | Status: FUNCTIONAL

## 2019-03-07 RX ORDER — BALANCED SALT SOLUTION ENRICHED WITH BICARBONATE, DEXTROSE, AND GLUTATHIONE
KIT INTRAOCULAR PRN
Status: DISCONTINUED | OUTPATIENT
Start: 2019-03-07 | End: 2019-03-07 | Stop reason: ALTCHOICE

## 2019-03-07 RX ORDER — BUPIVACAINE HYDROCHLORIDE 5 MG/ML
INJECTION, SOLUTION EPIDURAL; INTRACAUDAL PRN
Status: DISCONTINUED | OUTPATIENT
Start: 2019-03-07 | End: 2019-03-07 | Stop reason: ALTCHOICE

## 2019-03-07 RX ORDER — DIFLUPREDNATE 0.5 MG/ML
EMULSION OPHTHALMIC PRN
Status: DISCONTINUED | OUTPATIENT
Start: 2019-03-07 | End: 2019-03-07 | Stop reason: ALTCHOICE

## 2019-03-07 RX ORDER — BUPIVACAINE HYDROCHLORIDE 5 MG/ML
1 INJECTION, SOLUTION EPIDURAL; INTRACAUDAL SEE ADMIN INSTRUCTIONS
Status: DISCONTINUED | OUTPATIENT
Start: 2019-03-07 | End: 2019-03-07 | Stop reason: HOSPADM

## 2019-03-07 RX ORDER — LORAZEPAM 1 MG/1
1 TABLET ORAL ONCE
Status: COMPLETED | OUTPATIENT
Start: 2019-03-07 | End: 2019-03-07

## 2019-03-07 RX ORDER — SODIUM CHLORIDE, SODIUM LACTATE, POTASSIUM CHLORIDE, CALCIUM CHLORIDE 600; 310; 30; 20 MG/100ML; MG/100ML; MG/100ML; MG/100ML
INJECTION, SOLUTION INTRAVENOUS CONTINUOUS
Status: DISCONTINUED | OUTPATIENT
Start: 2019-03-07 | End: 2019-03-07 | Stop reason: HOSPADM

## 2019-03-07 RX ORDER — LIDOCAINE HYDROCHLORIDE 10 MG/ML
INJECTION, SOLUTION INFILTRATION; PERINEURAL PRN
Status: DISCONTINUED | OUTPATIENT
Start: 2019-03-07 | End: 2019-03-07 | Stop reason: ALTCHOICE

## 2019-03-07 RX ORDER — KETOROLAC TROMETHAMINE 5 MG/ML
1 SOLUTION OPHTHALMIC EVERY 5 MIN PRN
Status: COMPLETED | OUTPATIENT
Start: 2019-03-07 | End: 2019-03-07

## 2019-03-07 RX ORDER — CYCLOPENTOLATE HYDROCHLORIDE 10 MG/ML
1 SOLUTION/ DROPS OPHTHALMIC EVERY 5 MIN PRN
Status: COMPLETED | OUTPATIENT
Start: 2019-03-07 | End: 2019-03-07

## 2019-03-07 RX ORDER — NEOMYCIN SULFATE, POLYMYXIN B SULFATE, AND DEXAMETHASONE 3.5; 10000; 1 MG/G; [USP'U]/G; MG/G
OINTMENT OPHTHALMIC PRN
Status: DISCONTINUED | OUTPATIENT
Start: 2019-03-07 | End: 2019-03-07 | Stop reason: ALTCHOICE

## 2019-03-07 RX ORDER — TOBRAMYCIN 3 MG/ML
SOLUTION/ DROPS OPHTHALMIC PRN
Status: DISCONTINUED | OUTPATIENT
Start: 2019-03-07 | End: 2019-03-07 | Stop reason: ALTCHOICE

## 2019-03-07 RX ORDER — PHENYLEPHRINE HCL 2.5 %
1 DROPS OPHTHALMIC (EYE) EVERY 5 MIN PRN
Status: COMPLETED | OUTPATIENT
Start: 2019-03-07 | End: 2019-03-07

## 2019-03-07 RX ORDER — TIMOLOL MALEATE 5 MG/ML
SOLUTION/ DROPS OPHTHALMIC PRN
Status: DISCONTINUED | OUTPATIENT
Start: 2019-03-07 | End: 2019-03-07 | Stop reason: ALTCHOICE

## 2019-03-07 RX ORDER — TOBRAMYCIN 3 MG/ML
1 SOLUTION/ DROPS OPHTHALMIC EVERY 5 MIN PRN
Status: DISCONTINUED | OUTPATIENT
Start: 2019-03-07 | End: 2019-03-07 | Stop reason: HOSPADM

## 2019-03-07 RX ORDER — MIDAZOLAM HYDROCHLORIDE 1 MG/ML
INJECTION INTRAMUSCULAR; INTRAVENOUS PRN
Status: DISCONTINUED | OUTPATIENT
Start: 2019-03-07 | End: 2019-03-07 | Stop reason: ALTCHOICE

## 2019-03-07 RX ADMIN — BUPIVACAINE HYDROCHLORIDE 5 MG: 5 INJECTION, SOLUTION EPIDURAL; INTRACAUDAL; PERINEURAL at 09:00

## 2019-03-07 RX ADMIN — SODIUM CHLORIDE, POTASSIUM CHLORIDE, SODIUM LACTATE AND CALCIUM CHLORIDE: 600; 310; 30; 20 INJECTION, SOLUTION INTRAVENOUS at 09:18

## 2019-03-07 RX ADMIN — KETOROLAC TROMETHAMINE 1 DROP: 5 SOLUTION/ DROPS OPHTHALMIC at 09:17

## 2019-03-07 RX ADMIN — TOBRAMYCIN 1 DROP: 3 SOLUTION OPHTHALMIC at 09:06

## 2019-03-07 RX ADMIN — CYCLOPENTOLATE HYDROCHLORIDE 1 DROP: 10 SOLUTION/ DROPS OPHTHALMIC at 09:17

## 2019-03-07 RX ADMIN — KETOROLAC TROMETHAMINE 1 DROP: 5 SOLUTION/ DROPS OPHTHALMIC at 09:06

## 2019-03-07 RX ADMIN — TOBRAMYCIN 1 DROP: 3 SOLUTION OPHTHALMIC at 09:18

## 2019-03-07 RX ADMIN — PHENYLEPHRINE HYDROCHLORIDE 1 DROP: 25 SOLUTION/ DROPS OPHTHALMIC at 09:17

## 2019-03-07 RX ADMIN — CYCLOPENTOLATE HYDROCHLORIDE 1 DROP: 10 SOLUTION/ DROPS OPHTHALMIC at 09:06

## 2019-03-07 RX ADMIN — LORAZEPAM 1 MG: 1 TABLET ORAL at 08:59

## 2019-03-07 RX ADMIN — PHENYLEPHRINE HYDROCHLORIDE 1 DROP: 25 SOLUTION/ DROPS OPHTHALMIC at 09:06

## 2019-03-07 RX ADMIN — BUPIVACAINE HYDROCHLORIDE 5 MG: 5 INJECTION, SOLUTION EPIDURAL; INTRACAUDAL; PERINEURAL at 09:17

## 2019-03-07 ASSESSMENT — PAIN SCALES - GENERAL
PAINLEVEL_OUTOF10: 0

## 2019-03-07 ASSESSMENT — PAIN - FUNCTIONAL ASSESSMENT: PAIN_FUNCTIONAL_ASSESSMENT: 0-10

## 2019-03-14 RX ORDER — FLUTICASONE PROPIONATE 50 MCG
SPRAY, SUSPENSION (ML) NASAL
Qty: 1 BOTTLE | Refills: 1 | Status: SHIPPED | OUTPATIENT
Start: 2019-03-14 | End: 2019-06-10 | Stop reason: SDUPTHER

## 2019-03-27 ENCOUNTER — OFFICE VISIT (OUTPATIENT)
Dept: ORTHOPEDIC SURGERY | Age: 74
End: 2019-03-27
Payer: MEDICARE

## 2019-03-27 DIAGNOSIS — Z96.652 HISTORY OF TOTAL KNEE ARTHROPLASTY, LEFT: Primary | ICD-10-CM

## 2019-03-27 PROCEDURE — 99213 OFFICE O/P EST LOW 20 MIN: CPT | Performed by: ORTHOPAEDIC SURGERY

## 2019-04-04 ENCOUNTER — HOSPITAL ENCOUNTER (OUTPATIENT)
Age: 74
Setting detail: OUTPATIENT SURGERY
Discharge: HOME | End: 2019-04-04
Attending: OPHTHALMOLOGY | Admitting: OPHTHALMOLOGY
Payer: MEDICARE

## 2019-04-04 VITALS
DIASTOLIC BLOOD PRESSURE: 63 MMHG | OXYGEN SATURATION: 98 % | WEIGHT: 135 LBS | RESPIRATION RATE: 12 BRPM | HEART RATE: 58 BPM | HEIGHT: 63 IN | SYSTOLIC BLOOD PRESSURE: 113 MMHG | BODY MASS INDEX: 23.92 KG/M2 | TEMPERATURE: 97.7 F

## 2019-04-04 PROBLEM — H25.11 AGE-RELATED NUCLEAR CATARACT, RIGHT: Status: RESOLVED | Noted: 2019-04-04 | Resolved: 2019-04-04

## 2019-04-04 PROCEDURE — 2500000003 HC RX 250 WO HCPCS: Performed by: OPHTHALMOLOGY

## 2019-04-04 PROCEDURE — 99153 MOD SED SAME PHYS/QHP EA: CPT | Performed by: OPHTHALMOLOGY

## 2019-04-04 PROCEDURE — 2709999900 HC NON-CHARGEABLE SUPPLY: Performed by: OPHTHALMOLOGY

## 2019-04-04 PROCEDURE — V2632 POST CHMBR INTRAOCULAR LENS: HCPCS | Performed by: OPHTHALMOLOGY

## 2019-04-04 PROCEDURE — 3600000012 HC SURGERY LEVEL 2 ADDTL 15MIN: Performed by: OPHTHALMOLOGY

## 2019-04-04 PROCEDURE — 7100000010 HC PHASE II RECOVERY - FIRST 15 MIN: Performed by: OPHTHALMOLOGY

## 2019-04-04 PROCEDURE — 99152 MOD SED SAME PHYS/QHP 5/>YRS: CPT | Performed by: OPHTHALMOLOGY

## 2019-04-04 PROCEDURE — 6360000002 HC RX W HCPCS: Performed by: OPHTHALMOLOGY

## 2019-04-04 PROCEDURE — 6370000000 HC RX 637 (ALT 250 FOR IP): Performed by: OPHTHALMOLOGY

## 2019-04-04 PROCEDURE — 3600000002 HC SURGERY LEVEL 2 BASE: Performed by: OPHTHALMOLOGY

## 2019-04-04 PROCEDURE — 2580000003 HC RX 258: Performed by: OPHTHALMOLOGY

## 2019-04-04 PROCEDURE — 7100000011 HC PHASE II RECOVERY - ADDTL 15 MIN: Performed by: OPHTHALMOLOGY

## 2019-04-04 DEVICE — LENS IOL SN60WF 20.5D: Type: IMPLANTABLE DEVICE | Site: EYE | Status: FUNCTIONAL

## 2019-04-04 RX ORDER — CYCLOPENTOLATE HYDROCHLORIDE 10 MG/ML
1 SOLUTION/ DROPS OPHTHALMIC EVERY 5 MIN PRN
Status: COMPLETED | OUTPATIENT
Start: 2019-04-04 | End: 2019-04-04

## 2019-04-04 RX ORDER — TOBRAMYCIN 3 MG/ML
1 SOLUTION/ DROPS OPHTHALMIC EVERY 5 MIN PRN
Status: DISCONTINUED | OUTPATIENT
Start: 2019-04-04 | End: 2019-04-04 | Stop reason: HOSPADM

## 2019-04-04 RX ORDER — NEOMYCIN SULFATE, POLYMYXIN B SULFATE, AND DEXAMETHASONE 3.5; 10000; 1 MG/G; [USP'U]/G; MG/G
OINTMENT OPHTHALMIC PRN
Status: DISCONTINUED | OUTPATIENT
Start: 2019-04-04 | End: 2019-04-04 | Stop reason: ALTCHOICE

## 2019-04-04 RX ORDER — MIDAZOLAM HYDROCHLORIDE 1 MG/ML
INJECTION INTRAMUSCULAR; INTRAVENOUS PRN
Status: DISCONTINUED | OUTPATIENT
Start: 2019-04-04 | End: 2019-04-04 | Stop reason: ALTCHOICE

## 2019-04-04 RX ORDER — TOBRAMYCIN 3 MG/ML
SOLUTION/ DROPS OPHTHALMIC PRN
Status: DISCONTINUED | OUTPATIENT
Start: 2019-04-04 | End: 2019-04-04 | Stop reason: ALTCHOICE

## 2019-04-04 RX ORDER — BUPIVACAINE HYDROCHLORIDE 5 MG/ML
INJECTION, SOLUTION EPIDURAL; INTRACAUDAL PRN
Status: DISCONTINUED | OUTPATIENT
Start: 2019-04-04 | End: 2019-04-04 | Stop reason: ALTCHOICE

## 2019-04-04 RX ORDER — TIMOLOL MALEATE 5 MG/ML
SOLUTION/ DROPS OPHTHALMIC PRN
Status: DISCONTINUED | OUTPATIENT
Start: 2019-04-04 | End: 2019-04-04 | Stop reason: ALTCHOICE

## 2019-04-04 RX ORDER — BALANCED SALT SOLUTION ENRICHED WITH BICARBONATE, DEXTROSE, AND GLUTATHIONE
KIT INTRAOCULAR PRN
Status: DISCONTINUED | OUTPATIENT
Start: 2019-04-04 | End: 2019-04-04 | Stop reason: ALTCHOICE

## 2019-04-04 RX ORDER — LIDOCAINE HYDROCHLORIDE 10 MG/ML
INJECTION, SOLUTION INFILTRATION; PERINEURAL PRN
Status: DISCONTINUED | OUTPATIENT
Start: 2019-04-04 | End: 2019-04-04 | Stop reason: ALTCHOICE

## 2019-04-04 RX ORDER — KETOROLAC TROMETHAMINE 5 MG/ML
1 SOLUTION OPHTHALMIC EVERY 5 MIN PRN
Status: COMPLETED | OUTPATIENT
Start: 2019-04-04 | End: 2019-04-04

## 2019-04-04 RX ORDER — PHENYLEPHRINE HCL 2.5 %
1 DROPS OPHTHALMIC (EYE) EVERY 5 MIN PRN
Status: COMPLETED | OUTPATIENT
Start: 2019-04-04 | End: 2019-04-04

## 2019-04-04 RX ORDER — SODIUM CHLORIDE, SODIUM LACTATE, POTASSIUM CHLORIDE, CALCIUM CHLORIDE 600; 310; 30; 20 MG/100ML; MG/100ML; MG/100ML; MG/100ML
INJECTION, SOLUTION INTRAVENOUS CONTINUOUS
Status: DISCONTINUED | OUTPATIENT
Start: 2019-04-04 | End: 2019-04-04 | Stop reason: HOSPADM

## 2019-04-04 RX ORDER — BUPIVACAINE HYDROCHLORIDE 5 MG/ML
1 INJECTION, SOLUTION EPIDURAL; INTRACAUDAL SEE ADMIN INSTRUCTIONS
Status: DISCONTINUED | OUTPATIENT
Start: 2019-04-04 | End: 2019-04-04 | Stop reason: HOSPADM

## 2019-04-04 RX ORDER — LORAZEPAM 1 MG/1
1 TABLET ORAL ONCE
Status: COMPLETED | OUTPATIENT
Start: 2019-04-04 | End: 2019-04-04

## 2019-04-04 RX ADMIN — KETOROLAC TROMETHAMINE 1 DROP: 5 SOLUTION/ DROPS OPHTHALMIC at 09:32

## 2019-04-04 RX ADMIN — TOBRAMYCIN 1 DROP: 3 SOLUTION/ DROPS OPHTHALMIC at 09:42

## 2019-04-04 RX ADMIN — SODIUM CHLORIDE, POTASSIUM CHLORIDE, SODIUM LACTATE AND CALCIUM CHLORIDE: 600; 310; 30; 20 INJECTION, SOLUTION INTRAVENOUS at 09:42

## 2019-04-04 RX ADMIN — KETOROLAC TROMETHAMINE 1 DROP: 5 SOLUTION/ DROPS OPHTHALMIC at 09:43

## 2019-04-04 RX ADMIN — BUPIVACAINE HYDROCHLORIDE 5 MG: 5 INJECTION, SOLUTION EPIDURAL; INTRACAUDAL; PERINEURAL at 09:42

## 2019-04-04 RX ADMIN — LORAZEPAM 1 MG: 1 TABLET ORAL at 09:30

## 2019-04-04 RX ADMIN — BUPIVACAINE HYDROCHLORIDE 5 MG: 5 INJECTION, SOLUTION EPIDURAL; INTRACAUDAL; PERINEURAL at 09:31

## 2019-04-04 RX ADMIN — CYCLOPENTOLATE HYDROCHLORIDE 1 DROP: 10 SOLUTION/ DROPS OPHTHALMIC at 09:43

## 2019-04-04 RX ADMIN — CYCLOPENTOLATE HYDROCHLORIDE 1 DROP: 10 SOLUTION/ DROPS OPHTHALMIC at 09:32

## 2019-04-04 RX ADMIN — PHENYLEPHRINE HYDROCHLORIDE 1 DROP: 25 SOLUTION/ DROPS OPHTHALMIC at 09:42

## 2019-04-04 RX ADMIN — PHENYLEPHRINE HYDROCHLORIDE 1 DROP: 25 SOLUTION/ DROPS OPHTHALMIC at 09:32

## 2019-04-04 RX ADMIN — TOBRAMYCIN 1 DROP: 3 SOLUTION/ DROPS OPHTHALMIC at 09:32

## 2019-04-04 ASSESSMENT — PAIN - FUNCTIONAL ASSESSMENT: PAIN_FUNCTIONAL_ASSESSMENT: 0-10

## 2019-04-04 ASSESSMENT — PAIN SCALES - GENERAL
PAINLEVEL_OUTOF10: 0

## 2019-04-04 NOTE — OP NOTE
Flako Seal [id number] 75 y.o. OPERATIVE NOTE    Preoperative Diagnosis: Cataract the right eye    Postoperative Diagnosis: Cataract the right eye     Procedure: Phacoemulsification with intraocular lens implantation, the right eye    Surgeon: Chuy Reid MD    Anesthesia: Topical  With monitored sedation      Complications: none    Specimens: none    Indications for procedure: The patient is a 75y.o. year old with decreased vision, glare and halos around lights, and trouble with activities of daily living. Examination revealed a visually significant cataract in the the right eye. Risks, benefits, and alternatives to surgery were discussed with the patient and the patient elected to proceed with phacoemulsification with lens implantation. Details of the procedure: Following informed consent, the patient was taken to the operating room and placed in the supine position. The eye was prepped and draped in the usual sterile fashion using aseptic technique for cataract surgery and a lid speculum was placed between the lids. Several drops of topical .5% Marcaine were place on the eye. A crescent blade was use to make a 2 mm tunnel at the limbus into clear cornea and a 2.75 mm keratome blade was used to enter the eye at the 11 o'clock postion. A side port incision was made in the    2 o'clock position. A small amount of 1% non-preserved lidocaine was placed in the eye. The eye was filled with viscoelastic and a continuous tear capsulorhexis was performed. The lens was hydrodissected and hydrodilineated with balanced salt solution. Phacoemulsification was performed in a divide and conquer technique. Irrigation/aspiration was used to remove all cortical material from the capsular bag. The eye was filled with viscoelastic and a foldable posterior chamber intraocular lens was injected into the capsular bag and rotated into position model SN60WF 20.5 diopter power.   Irrigation/aspiration was used to remove all excess viscoelastic. The eye was pressurized and the wounds were checked for leaks and none were found. The patient had antibiotic, steroid, timoptic and antibiotic/steroid ointment placed in the eye. The lid speculum was removed. The eye was covered with a shield. The patient went to the PACU in excellent condition, having tolerated the procedure extremely well and will follow up with me tomorrow for postop day 1 care. Post-op instructions were discussed with patient and family.      Paulo Ramos MD

## 2019-04-04 NOTE — H&P
Rachana Jones is a 75y.o. year old who presents for elective outpatient ophthalmic surgery with Joey Rodriguez. The patient complains of decreased vision, glare and halos around lights, and trouble with vision for activities of daily living. Past Medical History:   Diagnosis Date    Allergic rhinitis     Closed tibia fracture     GERD (gastroesophageal reflux disease)     Gout     Headache(784.0)     h/o migraines    Hyperlipidemia     Osteoarthritis     DJD Lt knee    Osteoporosis     Posterior tibial tendon dysfunction     right, wears braces on both legs    Vitamin D deficient rickets     Wears glasses     Wears partial dentures     lower       Past Surgical History:   Procedure Laterality Date    APPENDECTOMY      CARPAL TUNNEL RELEASE      bilateral    CATARACT REMOVAL WITH IMPLANT Left 03/07/2019    [facility]    COLONOSCOPY      EYE SURGERY      FRACTURE SURGERY      left tibia    HYSTERECTOMY  4/12    INTRACAPSULAR CATARACT EXTRACTION Left 3/7/2019    EYE CATARACT EMULSIFICATION IOL IMPLANT - TOPICAL performed by Yaa Alex MD at [place] [address] Left 11/06/2018    hardware removal and then TKA    KNEE ARTHROSCOPY Left 2002?     NV TOTAL KNEE ARTHROPLASTY Left 11/6/2018    KNEE TOTAL ARTHROPLASTY W/REMOVAL HARDWARE PLATE & SCREWS performed by Surinder Feliciano MD at [address] [place] [address] Nw Left     TONSILLECTOMY AND ADENOIDECTOMY      WISDOM TOOTH EXTRACTION           Current Facility-Administered Medications:     lactated ringers infusion, , Intravenous, Continuous, Yaa Alex MD, Last Rate: 50 mL/hr at 04/04/19 0942    tobramycin (TOBREX) 0.3 % ophthalmic solution 1 drop, 1 drop, Right Eye, Q5 Min PRN, Yaa Alex MD, 1 drop at 04/04/19 0942    bupivacaine (PF) (MARCAINE) 0.5 % injection 5 mg, 1 mL, Ophthalmic, See Admin Instructions, Yaa Alex MD, 5 mg at 04/04/19 0942      No Known Allergies      PHYSICAL EXAMINATION    Vitals:    04/04/19 0913   BP: 120/85   Pulse: 56   Resp: 18   Temp: 97.7 °F (36.5 °C)   SpO2: 100%       Gen: NAD  HEENT: Visually significant cataract   Pulm: CTA Bilaterally  Heart: RRR, no murmurs  Abd: soft, non-tender  Ext: no edema  Neuro: no focal deficits    Assessment:   1. Visually significant cataract   2. See preoperative ophthalmology notes    Plan:   1. Risks, benefits, alternatives to surgery discussed with the patient and family. 2. All questions were answered to their satisfaction. 3. Ok to proceed with surgery as planned.     Kassie Rodriguez

## 2019-04-08 RX ORDER — ERGOCALCIFEROL 1.25 MG/1
CAPSULE ORAL
Qty: 2 CAPSULE | Refills: 2 | Status: SHIPPED | OUTPATIENT
Start: 2019-04-08 | End: 2019-08-13 | Stop reason: SDUPTHER

## 2019-04-26 RX ORDER — VERAPAMIL HYDROCHLORIDE 240 MG/1
TABLET, FILM COATED, EXTENDED RELEASE ORAL
Qty: 30 TABLET | Refills: 0 | Status: SHIPPED | OUTPATIENT
Start: 2019-04-26 | End: 2019-05-28 | Stop reason: SDUPTHER

## 2019-05-03 RX ORDER — NAPROXEN 250 MG/1
TABLET ORAL
Qty: 60 TABLET | Refills: 0 | Status: SHIPPED | OUTPATIENT
Start: 2019-05-03 | End: 2019-11-25 | Stop reason: SDUPTHER

## 2019-05-03 RX ORDER — FAMOTIDINE 20 MG/1
TABLET, FILM COATED ORAL
Qty: 60 TABLET | Refills: 0 | Status: SHIPPED | OUTPATIENT
Start: 2019-05-03 | End: 2019-06-04 | Stop reason: SDUPTHER

## 2019-05-20 RX ORDER — ALENDRONATE SODIUM 70 MG/1
TABLET ORAL
Qty: 4 TABLET | Refills: 3 | Status: SHIPPED | OUTPATIENT
Start: 2019-05-20 | End: 2019-10-21 | Stop reason: SDUPTHER

## 2019-05-21 RX ORDER — ALLOPURINOL 300 MG/1
TABLET ORAL
Qty: 90 TABLET | Refills: 0 | Status: SHIPPED | OUTPATIENT
Start: 2019-05-21 | End: 2019-08-19 | Stop reason: SDUPTHER

## 2019-05-28 RX ORDER — VERAPAMIL HYDROCHLORIDE 240 MG/1
TABLET, FILM COATED, EXTENDED RELEASE ORAL
Qty: 90 TABLET | Refills: 1 | Status: SHIPPED | OUTPATIENT
Start: 2019-05-28 | End: 2019-12-06 | Stop reason: SDUPTHER

## 2019-05-28 RX ORDER — SIMVASTATIN 20 MG
TABLET ORAL
Qty: 90 TABLET | Refills: 1 | Status: SHIPPED | OUTPATIENT
Start: 2019-05-28 | End: 2019-12-06

## 2019-06-04 RX ORDER — FAMOTIDINE 20 MG/1
TABLET, FILM COATED ORAL
Qty: 60 TABLET | Refills: 3 | Status: SHIPPED | OUTPATIENT
Start: 2019-06-04 | End: 2019-10-05 | Stop reason: SDUPTHER

## 2019-06-10 RX ORDER — FLUTICASONE PROPIONATE 50 MCG
SPRAY, SUSPENSION (ML) NASAL
Qty: 1 BOTTLE | Refills: 1 | Status: SHIPPED | OUTPATIENT
Start: 2019-06-10 | End: 2019-09-19 | Stop reason: SDUPTHER

## 2019-06-13 ENCOUNTER — TELEPHONE (OUTPATIENT)
Dept: FAMILY MEDICINE CLINIC | Age: 74
End: 2019-06-13

## 2019-06-13 RX ORDER — AZITHROMYCIN 250 MG/1
TABLET, FILM COATED ORAL
Qty: 6 TABLET | Refills: 0 | Status: SHIPPED | OUTPATIENT
Start: 2019-06-13 | End: 2019-10-23 | Stop reason: ALTCHOICE

## 2019-06-13 RX ORDER — BENZONATATE 100 MG/1
CAPSULE ORAL
Qty: 30 CAPSULE | Refills: 1 | Status: SHIPPED | OUTPATIENT
Start: 2019-06-13 | End: 2019-10-23 | Stop reason: ALTCHOICE

## 2019-07-23 ENCOUNTER — OFFICE VISIT (OUTPATIENT)
Dept: FAMILY MEDICINE CLINIC | Age: 74
End: 2019-07-23
Payer: MEDICARE

## 2019-07-23 VITALS
SYSTOLIC BLOOD PRESSURE: 126 MMHG | RESPIRATION RATE: 16 BRPM | HEART RATE: 60 BPM | BODY MASS INDEX: 24.09 KG/M2 | TEMPERATURE: 97.5 F | WEIGHT: 136 LBS | DIASTOLIC BLOOD PRESSURE: 62 MMHG

## 2019-07-23 DIAGNOSIS — J30.2 SEASONAL ALLERGIC RHINITIS, UNSPECIFIED TRIGGER: Primary | ICD-10-CM

## 2019-07-23 PROCEDURE — 99213 OFFICE O/P EST LOW 20 MIN: CPT | Performed by: NURSE PRACTITIONER

## 2019-07-23 RX ORDER — CETIRIZINE HYDROCHLORIDE 10 MG/1
10 TABLET ORAL DAILY
Qty: 60 TABLET | Refills: 0 | Status: SHIPPED | OUTPATIENT
Start: 2019-07-23 | End: 2019-09-07 | Stop reason: SDUPTHER

## 2019-07-23 ASSESSMENT — ENCOUNTER SYMPTOMS
COUGH: 1
SORE THROAT: 0
NAUSEA: 0
ABDOMINAL PAIN: 0
RHINORRHEA: 1
SHORTNESS OF BREATH: 0
SINUS PRESSURE: 0

## 2019-07-23 NOTE — PROGRESS NOTES
female presents with nasal congestion rhinorrhea sneezing post nasal drainage cough on and off a month. Cough is productive at time with yellowish sputum. States she has to clear her throat constantly. She denies fever chills sob body ache malaise or nv abd pain. Was treated with zpak in June and got better but symptoms came back. Review of Systems   Constitutional: Negative for chills and fever. HENT: Positive for congestion, postnasal drip, rhinorrhea and sneezing. Negative for sinus pressure and sore throat. Respiratory: Positive for cough. Negative for shortness of breath. Cardiovascular: Negative for chest pain. Gastrointestinal: Negative for abdominal pain and nausea. Objective:   Physical Exam   Constitutional: She appears well-developed and well-nourished. No distress. HENT:   Head: Normocephalic. Right Ear: External ear normal.   Left Ear: External ear normal.   Nose: Nose normal.   Mouth/Throat: Oropharynx is clear and moist.   Eyes: Conjunctivae and EOM are normal. No scleral icterus. Neck: Neck supple. No thyromegaly present. Cardiovascular: Normal rate, regular rhythm and normal heart sounds. Pulmonary/Chest: Effort normal and breath sounds normal. No respiratory distress. Lymphadenopathy:     She has no cervical adenopathy. Nursing note and vitals reviewed. Assessment:      1.  Seasonal allergic rhinitis, unspecified trigger            Plan:       BP Readings from Last 3 Encounters:   07/23/19 126/62   04/04/19 113/63   03/07/19 114/69     /62 (Site: Left Upper Arm, Position: Sitting, Cuff Size: Medium Adult)   Pulse 60   Temp 97.5 °F (36.4 °C) (Oral)   Resp 16   Wt 136 lb (61.7 kg)   LMP 01/01/2004   BMI 24.09 kg/m²   Lab Results   Component Value Date    WBC 4.3 10/23/2018    HGB 12.1 10/23/2018    HCT 36.9 10/23/2018     10/23/2018    CHOL 133 03/06/2019    TRIG 105 03/06/2019    HDL 49 03/06/2019    ALT 14 03/06/2019    AST 20 03/06/2019  03/06/2019    K 4.1 03/06/2019     03/06/2019    CREATININE 0.86 03/06/2019    BUN 18 03/06/2019    CO2 28 03/06/2019     Lab Results   Component Value Date    CALCIUM 9.5 03/06/2019     Lab Results   Component Value Date    LDLCHOLESTEROL 63 03/06/2019         1. Seasonal allergic rhinitis, unspecified trigger  - d/c claritin . Start zyrtec   - cetirizine (ZYRTEC) 10 MG tablet; Take 1 tablet by mouth daily  Dispense: 60 tablet; Refill: 0  - cont flonase at home   - cont Robituss DM at home   - increase fluid and rest       Requested Prescriptions     Signed Prescriptions Disp Refills    cetirizine (ZYRTEC) 10 MG tablet 60 tablet 0     Sig: Take 1 tablet by mouth daily       There are no discontinued medications. Kisha received counseling on the following healthy behaviors: nutrition and exercise  Reviewed prior labs and health maintenance  Continue current medications, diet and exercise. Discussed use, benefit, and side effects of prescribed medications. Barriers to medication compliance addressed. Patient given educational materials - see patient instructions  Was a self-tracking handout given in paper form or via [employer]? Yes    Requested Prescriptions      No prescriptions requested or ordered in this encounter       All patient questions answered. Patient voiced understanding. Quality Measures    There is no height or weight on file to calculate BMI. Normal. Weight control planned discussed medically supervised diet with primary care physician and Healthy diet and regular exercise. . Blood pressure is normal. Treatment plan consists of Dietary Sodium Restriction, Increased Physical Activity and No treatment change needed. Fall Risk 7/30/2018 6/13/2017 4/14/2016 12/22/2014   2 or more falls in past year? no no no no   Fall with injury in past year? no no no no     The patient does not have a history of falls. I did , complete a risk assessment for falls.  A plan of care for

## 2019-09-07 DIAGNOSIS — J30.2 SEASONAL ALLERGIC RHINITIS, UNSPECIFIED TRIGGER: ICD-10-CM

## 2019-09-09 RX ORDER — CETIRIZINE HYDROCHLORIDE 10 MG/1
TABLET ORAL
Qty: 90 TABLET | Refills: 1 | Status: SHIPPED | OUTPATIENT
Start: 2019-09-09 | End: 2019-11-26 | Stop reason: ALTCHOICE

## 2019-09-19 RX ORDER — FLUTICASONE PROPIONATE 50 MCG
SPRAY, SUSPENSION (ML) NASAL
Qty: 1 BOTTLE | Refills: 1 | Status: SHIPPED | OUTPATIENT
Start: 2019-09-19 | End: 2019-12-09 | Stop reason: SDUPTHER

## 2019-10-07 RX ORDER — FAMOTIDINE 20 MG/1
TABLET, FILM COATED ORAL
Qty: 60 TABLET | Refills: 3 | Status: SHIPPED | OUTPATIENT
Start: 2019-10-07 | End: 2020-02-19

## 2019-10-10 ENCOUNTER — HOSPITAL ENCOUNTER (OUTPATIENT)
Dept: WOMENS IMAGING | Age: 74
Discharge: HOME | End: 2019-10-12
Payer: MEDICARE

## 2019-10-10 DIAGNOSIS — Z12.31 BREAST CANCER SCREENING BY MAMMOGRAM: ICD-10-CM

## 2019-10-10 PROCEDURE — 77063 BREAST TOMOSYNTHESIS BI: CPT

## 2019-10-21 RX ORDER — ALENDRONATE SODIUM 70 MG/1
TABLET ORAL
Qty: 4 TABLET | Refills: 3 | Status: SHIPPED | OUTPATIENT
Start: 2019-10-21 | End: 2020-04-23

## 2019-10-23 ENCOUNTER — OFFICE VISIT (OUTPATIENT)
Dept: FAMILY MEDICINE CLINIC | Age: 74
End: 2019-10-23
Payer: MEDICARE

## 2019-10-23 VITALS
RESPIRATION RATE: 16 BRPM | DIASTOLIC BLOOD PRESSURE: 60 MMHG | TEMPERATURE: 98.2 F | BODY MASS INDEX: 25.05 KG/M2 | HEART RATE: 56 BPM | WEIGHT: 141.4 LBS | SYSTOLIC BLOOD PRESSURE: 106 MMHG

## 2019-10-23 DIAGNOSIS — Z23 NEEDS FLU SHOT: ICD-10-CM

## 2019-10-23 DIAGNOSIS — E55.9 VITAMIN D DEFICIENCY: ICD-10-CM

## 2019-10-23 DIAGNOSIS — E78.2 MIXED HYPERLIPIDEMIA: ICD-10-CM

## 2019-10-23 DIAGNOSIS — M10.9 GOUT, UNSPECIFIED CAUSE, UNSPECIFIED CHRONICITY, UNSPECIFIED SITE: ICD-10-CM

## 2019-10-23 DIAGNOSIS — I10 ESSENTIAL HYPERTENSION: Primary | ICD-10-CM

## 2019-10-23 PROCEDURE — 99214 OFFICE O/P EST MOD 30 MIN: CPT | Performed by: NURSE PRACTITIONER

## 2019-10-23 PROCEDURE — 90653 IIV ADJUVANT VACCINE IM: CPT | Performed by: NURSE PRACTITIONER

## 2019-10-23 PROCEDURE — G0008 ADMIN INFLUENZA VIRUS VAC: HCPCS | Performed by: NURSE PRACTITIONER

## 2019-10-23 RX ORDER — ALLOPURINOL 100 MG/1
TABLET ORAL
Qty: 90 TABLET | Refills: 1 | Status: SHIPPED | OUTPATIENT
Start: 2019-10-23 | End: 2020-06-04 | Stop reason: SDUPTHER

## 2019-10-23 ASSESSMENT — ENCOUNTER SYMPTOMS
NAUSEA: 0
WHEEZING: 0
ABDOMINAL PAIN: 0
VOMITING: 0
SHORTNESS OF BREATH: 0

## 2019-11-05 DIAGNOSIS — Z96.652 HISTORY OF TOTAL KNEE ARTHROPLASTY, LEFT: Primary | ICD-10-CM

## 2019-11-06 ENCOUNTER — OFFICE VISIT (OUTPATIENT)
Dept: ORTHOPEDIC SURGERY | Age: 74
End: 2019-11-06
Payer: MEDICARE

## 2019-11-06 DIAGNOSIS — Z96.652 HISTORY OF TOTAL KNEE ARTHROPLASTY, LEFT: Primary | ICD-10-CM

## 2019-11-06 PROCEDURE — 99213 OFFICE O/P EST LOW 20 MIN: CPT | Performed by: ORTHOPAEDIC SURGERY

## 2019-11-25 RX ORDER — NAPROXEN 250 MG/1
TABLET ORAL
Qty: 60 TABLET | Refills: 0 | Status: ON HOLD | OUTPATIENT
Start: 2019-11-25 | End: 2021-01-07 | Stop reason: HOSPADM

## 2019-11-26 ENCOUNTER — ANESTHESIA EVENT (OUTPATIENT)
Dept: OPERATING ROOM | Age: 74
End: 2019-11-26
Payer: MEDICARE

## 2019-11-26 ENCOUNTER — HOSPITAL ENCOUNTER (OUTPATIENT)
Dept: PREADMISSION TESTING | Age: 74
Discharge: HOME | End: 2019-11-30
Payer: MEDICARE

## 2019-11-26 VITALS
TEMPERATURE: 98 F | WEIGHT: 135 LBS | RESPIRATION RATE: 16 BRPM | HEART RATE: 66 BPM | DIASTOLIC BLOOD PRESSURE: 61 MMHG | HEIGHT: 63 IN | BODY MASS INDEX: 23.92 KG/M2 | SYSTOLIC BLOOD PRESSURE: 107 MMHG | OXYGEN SATURATION: 99 %

## 2019-11-26 LAB
-: ABNORMAL
ABSOLUTE BANDS #: 0.04 K/UL (ref 0–1)
ABSOLUTE EOS #: 0.27 K/UL (ref 0–0.4)
ABSOLUTE IMMATURE GRANULOCYTE: ABNORMAL K/UL (ref 0–0.3)
ABSOLUTE LYMPH #: 1.37 K/UL (ref 1–4.8)
ABSOLUTE MONO #: 0.68 K/UL (ref 0.1–1.3)
AMORPHOUS: ABNORMAL
ANION GAP SERPL CALCULATED.3IONS-SCNC: 11 MMOL/L (ref 9–17)
BACTERIA: ABNORMAL
BANDS: 1 % (ref 0–10)
BASOPHILS # BLD: 0 % (ref 0–2)
BASOPHILS ABSOLUTE: 0 K/UL (ref 0–0.2)
BILIRUBIN URINE: NEGATIVE
BUN BLDV-MCNC: 18 MG/DL (ref 8–23)
BUN/CREAT BLD: ABNORMAL (ref 9–20)
CALCIUM SERPL-MCNC: 9.7 MG/DL (ref 8.6–10.4)
CASTS UA: ABNORMAL /LPF
CHLORIDE BLD-SCNC: 103 MMOL/L (ref 98–107)
CO2: 26 MMOL/L (ref 20–31)
COLOR: YELLOW
COMMENT UA: ABNORMAL
CREAT SERPL-MCNC: 0.93 MG/DL (ref 0.5–0.9)
CRYSTALS, UA: ABNORMAL /HPF
DIFFERENTIAL TYPE: ABNORMAL
EOSINOPHILS RELATIVE PERCENT: 7 % (ref 0–4)
EPITHELIAL CELLS UA: ABNORMAL /HPF
GFR AFRICAN AMERICAN: >60 ML/MIN
GFR NON-AFRICAN AMERICAN: 59 ML/MIN
GFR SERPL CREATININE-BSD FRML MDRD: ABNORMAL ML/MIN/{1.73_M2}
GFR SERPL CREATININE-BSD FRML MDRD: ABNORMAL ML/MIN/{1.73_M2}
GLUCOSE BLD-MCNC: 80 MG/DL (ref 70–99)
GLUCOSE URINE: NEGATIVE
HCT VFR BLD CALC: 36.3 % (ref 36–46)
HEMOGLOBIN: 12.1 G/DL (ref 12–16)
IMMATURE GRANULOCYTES: ABNORMAL %
KETONES, URINE: NEGATIVE
LEUKOCYTE ESTERASE, URINE: ABNORMAL
LYMPHOCYTES # BLD: 36 % (ref 24–44)
MCH RBC QN AUTO: 29.6 PG (ref 26–34)
MCHC RBC AUTO-ENTMCNC: 33.3 G/DL (ref 31–37)
MCV RBC AUTO: 88.9 FL (ref 80–100)
MONOCYTES # BLD: 18 % (ref 1–7)
MORPHOLOGY: NORMAL
MRSA, DNA, NASAL: NORMAL
MUCUS: ABNORMAL
NITRITE, URINE: NEGATIVE
NRBC AUTOMATED: ABNORMAL PER 100 WBC
OTHER OBSERVATIONS UA: ABNORMAL
PDW BLD-RTO: 14.6 % (ref 11.5–14.9)
PH UA: 6 (ref 5–8)
PLATELET # BLD: 214 K/UL (ref 150–450)
PLATELET ESTIMATE: ABNORMAL
PMV BLD AUTO: 8.4 FL (ref 6–12)
POTASSIUM SERPL-SCNC: 4.5 MMOL/L (ref 3.7–5.3)
PROTEIN UA: NEGATIVE
RBC # BLD: 4.09 M/UL (ref 4–5.2)
RBC # BLD: ABNORMAL 10*6/UL
RBC UA: ABNORMAL /HPF
RENAL EPITHELIAL, UA: ABNORMAL /HPF
SEG NEUTROPHILS: 38 % (ref 36–66)
SEGMENTED NEUTROPHILS ABSOLUTE COUNT: 1.44 K/UL (ref 1.3–9.1)
SODIUM BLD-SCNC: 140 MMOL/L (ref 135–144)
SPECIFIC GRAVITY UA: 1.01 (ref 1–1.03)
SPECIMEN DESCRIPTION: NORMAL
TRICHOMONAS: ABNORMAL
TURBIDITY: CLEAR
URINE HGB: NEGATIVE
UROBILINOGEN, URINE: NORMAL
WBC # BLD: 3.8 K/UL (ref 3.5–11)
WBC # BLD: ABNORMAL 10*3/UL
WBC UA: ABNORMAL /HPF
YEAST: ABNORMAL

## 2019-11-26 PROCEDURE — 93005 ELECTROCARDIOGRAM TRACING: CPT | Performed by: ANESTHESIOLOGY

## 2019-11-26 PROCEDURE — 87086 URINE CULTURE/COLONY COUNT: CPT

## 2019-11-26 PROCEDURE — 80048 BASIC METABOLIC PNL TOTAL CA: CPT

## 2019-11-26 PROCEDURE — 36415 COLL VENOUS BLD VENIPUNCTURE: CPT

## 2019-11-26 PROCEDURE — 85025 COMPLETE CBC W/AUTO DIFF WBC: CPT

## 2019-11-26 PROCEDURE — 87641 MR-STAPH DNA AMP PROBE: CPT

## 2019-11-26 PROCEDURE — 81001 URINALYSIS AUTO W/SCOPE: CPT

## 2019-11-26 RX ORDER — LORATADINE 10 MG/1
10 CAPSULE, LIQUID FILLED ORAL DAILY
COMMUNITY
End: 2020-01-02

## 2019-11-27 LAB
CULTURE: NORMAL
EKG ATRIAL RATE: 59 BPM
EKG P AXIS: 54 DEGREES
EKG P-R INTERVAL: 166 MS
EKG Q-T INTERVAL: 442 MS
EKG QRS DURATION: 78 MS
EKG QTC CALCULATION (BAZETT): 437 MS
EKG R AXIS: -24 DEGREES
EKG T AXIS: 19 DEGREES
EKG VENTRICULAR RATE: 59 BPM
Lab: NORMAL
SPECIMEN DESCRIPTION: NORMAL

## 2019-11-27 PROCEDURE — 93010 ELECTROCARDIOGRAM REPORT: CPT | Performed by: INTERNAL MEDICINE

## 2019-11-27 RX ORDER — SODIUM CHLORIDE, SODIUM LACTATE, POTASSIUM CHLORIDE, CALCIUM CHLORIDE 600; 310; 30; 20 MG/100ML; MG/100ML; MG/100ML; MG/100ML
INJECTION, SOLUTION INTRAVENOUS CONTINUOUS
Status: CANCELLED | OUTPATIENT
Start: 2019-11-27

## 2019-11-27 RX ORDER — SODIUM CHLORIDE 0.9 % (FLUSH) 0.9 %
10 SYRINGE (ML) INJECTION PRN
Status: CANCELLED | OUTPATIENT
Start: 2019-11-27

## 2019-11-27 RX ORDER — SODIUM CHLORIDE 0.9 % (FLUSH) 0.9 %
10 SYRINGE (ML) INJECTION EVERY 12 HOURS SCHEDULED
Status: CANCELLED | OUTPATIENT
Start: 2019-11-27

## 2019-11-27 RX ORDER — LIDOCAINE HYDROCHLORIDE 10 MG/ML
1 INJECTION, SOLUTION EPIDURAL; INFILTRATION; INTRACAUDAL; PERINEURAL
Status: CANCELLED | OUTPATIENT
Start: 2019-11-27 | End: 2019-11-27

## 2019-12-06 RX ORDER — SIMVASTATIN 20 MG
TABLET ORAL
Qty: 90 TABLET | Refills: 0 | Status: SHIPPED | OUTPATIENT
Start: 2019-12-06 | End: 2020-03-20

## 2019-12-06 RX ORDER — VERAPAMIL HYDROCHLORIDE 240 MG/1
TABLET, FILM COATED, EXTENDED RELEASE ORAL
Qty: 90 TABLET | Refills: 0 | Status: SHIPPED | OUTPATIENT
Start: 2019-12-06 | End: 2020-03-11

## 2019-12-09 RX ORDER — FLUTICASONE PROPIONATE 50 MCG
SPRAY, SUSPENSION (ML) NASAL
Qty: 1 BOTTLE | Refills: 2 | Status: SHIPPED | OUTPATIENT
Start: 2019-12-09 | End: 2020-05-01

## 2019-12-10 ENCOUNTER — HOSPITAL ENCOUNTER (OUTPATIENT)
Age: 74
Setting detail: OBSERVATION
Discharge: HOME HEALTH | End: 2019-12-11
Attending: ORTHOPAEDIC SURGERY | Admitting: ORTHOPAEDIC SURGERY
Payer: MEDICARE

## 2019-12-10 ENCOUNTER — ANESTHESIA (OUTPATIENT)
Dept: OPERATING ROOM | Age: 74
End: 2019-12-10
Payer: MEDICARE

## 2019-12-10 ENCOUNTER — APPOINTMENT (OUTPATIENT)
Dept: GENERAL RADIOLOGY | Age: 74
End: 2019-12-10
Attending: ORTHOPAEDIC SURGERY
Payer: MEDICARE

## 2019-12-10 VITALS — DIASTOLIC BLOOD PRESSURE: 55 MMHG | TEMPERATURE: 95.9 F | SYSTOLIC BLOOD PRESSURE: 96 MMHG | OXYGEN SATURATION: 97 %

## 2019-12-10 DIAGNOSIS — M17.11 PRIMARY OSTEOARTHRITIS OF RIGHT KNEE: Primary | ICD-10-CM

## 2019-12-10 PROCEDURE — C9290 INJ, BUPIVACAINE LIPOSOME: HCPCS | Performed by: ANESTHESIOLOGY

## 2019-12-10 PROCEDURE — 6370000000 HC RX 637 (ALT 250 FOR IP): Performed by: ORTHOPAEDIC SURGERY

## 2019-12-10 PROCEDURE — C1776 JOINT DEVICE (IMPLANTABLE): HCPCS | Performed by: ORTHOPAEDIC SURGERY

## 2019-12-10 PROCEDURE — 2500000003 HC RX 250 WO HCPCS: Performed by: ANESTHESIOLOGY

## 2019-12-10 PROCEDURE — 7100000001 HC PACU RECOVERY - ADDTL 15 MIN: Performed by: ORTHOPAEDIC SURGERY

## 2019-12-10 PROCEDURE — 3700000001 HC ADD 15 MINUTES (ANESTHESIA): Performed by: ORTHOPAEDIC SURGERY

## 2019-12-10 PROCEDURE — 73560 X-RAY EXAM OF KNEE 1 OR 2: CPT

## 2019-12-10 PROCEDURE — 64447 NJX AA&/STRD FEMORAL NRV IMG: CPT | Performed by: ANESTHESIOLOGY

## 2019-12-10 PROCEDURE — 2580000003 HC RX 258: Performed by: ORTHOPAEDIC SURGERY

## 2019-12-10 PROCEDURE — 27447 TOTAL KNEE ARTHROPLASTY: CPT | Performed by: ORTHOPAEDIC SURGERY

## 2019-12-10 PROCEDURE — G0378 HOSPITAL OBSERVATION PER HR: HCPCS

## 2019-12-10 PROCEDURE — 6360000002 HC RX W HCPCS

## 2019-12-10 PROCEDURE — 6360000002 HC RX W HCPCS: Performed by: NURSE ANESTHETIST, CERTIFIED REGISTERED

## 2019-12-10 PROCEDURE — 27447 TOTAL KNEE ARTHROPLASTY: CPT | Performed by: PHYSICIAN ASSISTANT

## 2019-12-10 PROCEDURE — C1713 ANCHOR/SCREW BN/BN,TIS/BN: HCPCS | Performed by: ORTHOPAEDIC SURGERY

## 2019-12-10 PROCEDURE — 6360000002 HC RX W HCPCS: Performed by: ORTHOPAEDIC SURGERY

## 2019-12-10 PROCEDURE — 3600000013 HC SURGERY LEVEL 3 ADDTL 15MIN: Performed by: ORTHOPAEDIC SURGERY

## 2019-12-10 PROCEDURE — 1200000000 HC SEMI PRIVATE

## 2019-12-10 PROCEDURE — 6360000002 HC RX W HCPCS: Performed by: ANESTHESIOLOGY

## 2019-12-10 PROCEDURE — 2709999900 HC NON-CHARGEABLE SUPPLY: Performed by: ORTHOPAEDIC SURGERY

## 2019-12-10 PROCEDURE — 2500000003 HC RX 250 WO HCPCS: Performed by: ORTHOPAEDIC SURGERY

## 2019-12-10 PROCEDURE — 7100000000 HC PACU RECOVERY - FIRST 15 MIN: Performed by: ORTHOPAEDIC SURGERY

## 2019-12-10 PROCEDURE — 3700000000 HC ANESTHESIA ATTENDED CARE: Performed by: ORTHOPAEDIC SURGERY

## 2019-12-10 PROCEDURE — 2500000003 HC RX 250 WO HCPCS

## 2019-12-10 PROCEDURE — C9290 INJ, BUPIVACAINE LIPOSOME: HCPCS

## 2019-12-10 PROCEDURE — 2720000010 HC SURG SUPPLY STERILE: Performed by: ORTHOPAEDIC SURGERY

## 2019-12-10 PROCEDURE — 3600000003 HC SURGERY LEVEL 3 BASE: Performed by: ORTHOPAEDIC SURGERY

## 2019-12-10 PROCEDURE — 2580000003 HC RX 258: Performed by: ANESTHESIOLOGY

## 2019-12-10 PROCEDURE — 2580000003 HC RX 258

## 2019-12-10 DEVICE — COMPONENT PAT DIA31MM THK6.2MM THN KNEE TI ALLY S STL: Type: IMPLANTABLE DEVICE | Site: KNEE | Status: FUNCTIONAL

## 2019-12-10 DEVICE — CEMENT BNE 40GM HI VISC RADPQ FOR REV SURG: Type: IMPLANTABLE DEVICE | Site: KNEE | Status: FUNCTIONAL

## 2019-12-10 DEVICE — TRAY TIB L71MM KNEE CO CHROM FIN MOD INTLOK VANGUARD: Type: IMPLANTABLE DEVICE | Site: KNEE | Status: FUNCTIONAL

## 2019-12-10 DEVICE — VNGD ANT STAB BRG 13X71: Type: IMPLANTABLE DEVICE | Site: KNEE | Status: FUNCTIONAL

## 2019-12-10 DEVICE — IMPLANTABLE DEVICE: Type: IMPLANTABLE DEVICE | Site: KNEE | Status: FUNCTIONAL

## 2019-12-10 RX ORDER — SENNA AND DOCUSATE SODIUM 50; 8.6 MG/1; MG/1
1 TABLET, FILM COATED ORAL 2 TIMES DAILY
Status: DISCONTINUED | OUTPATIENT
Start: 2019-12-10 | End: 2019-12-11 | Stop reason: HOSPADM

## 2019-12-10 RX ORDER — FAMOTIDINE 20 MG/1
20 TABLET, FILM COATED ORAL 2 TIMES DAILY
Status: DISCONTINUED | OUTPATIENT
Start: 2019-12-10 | End: 2019-12-11 | Stop reason: HOSPADM

## 2019-12-10 RX ORDER — SODIUM CHLORIDE, SODIUM LACTATE, POTASSIUM CHLORIDE, CALCIUM CHLORIDE 600; 310; 30; 20 MG/100ML; MG/100ML; MG/100ML; MG/100ML
INJECTION, SOLUTION INTRAVENOUS CONTINUOUS
Status: DISCONTINUED | OUTPATIENT
Start: 2019-12-10 | End: 2019-12-11 | Stop reason: HOSPADM

## 2019-12-10 RX ORDER — FLUTICASONE PROPIONATE 50 MCG
2 SPRAY, SUSPENSION (ML) NASAL DAILY
Status: DISCONTINUED | OUTPATIENT
Start: 2019-12-11 | End: 2019-12-11 | Stop reason: HOSPADM

## 2019-12-10 RX ORDER — ACETAMINOPHEN 500 MG
1000 TABLET ORAL ONCE
Status: COMPLETED | OUTPATIENT
Start: 2019-12-10 | End: 2019-12-10

## 2019-12-10 RX ORDER — ONDANSETRON 2 MG/ML
4 INJECTION INTRAMUSCULAR; INTRAVENOUS EVERY 6 HOURS PRN
Status: DISCONTINUED | OUTPATIENT
Start: 2019-12-10 | End: 2019-12-11

## 2019-12-10 RX ORDER — SODIUM CHLORIDE 0.9 % (FLUSH) 0.9 %
10 SYRINGE (ML) INJECTION PRN
Status: DISCONTINUED | OUTPATIENT
Start: 2019-12-10 | End: 2019-12-11 | Stop reason: HOSPADM

## 2019-12-10 RX ORDER — FENTANYL CITRATE 50 UG/ML
INJECTION, SOLUTION INTRAMUSCULAR; INTRAVENOUS PRN
Status: DISCONTINUED | OUTPATIENT
Start: 2019-12-10 | End: 2019-12-10 | Stop reason: SDUPTHER

## 2019-12-10 RX ORDER — GLYCOPYRROLATE 1 MG/5 ML
SYRINGE (ML) INTRAVENOUS PRN
Status: DISCONTINUED | OUTPATIENT
Start: 2019-12-10 | End: 2019-12-10 | Stop reason: SDUPTHER

## 2019-12-10 RX ORDER — SODIUM CHLORIDE, SODIUM LACTATE, POTASSIUM CHLORIDE, CALCIUM CHLORIDE 600; 310; 30; 20 MG/100ML; MG/100ML; MG/100ML; MG/100ML
INJECTION, SOLUTION INTRAVENOUS CONTINUOUS
Status: DISCONTINUED | OUTPATIENT
Start: 2019-12-10 | End: 2019-12-10

## 2019-12-10 RX ORDER — NALOXONE HYDROCHLORIDE 0.4 MG/ML
0.4 INJECTION, SOLUTION INTRAMUSCULAR; INTRAVENOUS; SUBCUTANEOUS PRN
Status: DISCONTINUED | OUTPATIENT
Start: 2019-12-10 | End: 2019-12-11 | Stop reason: HOSPADM

## 2019-12-10 RX ORDER — OXYCODONE HYDROCHLORIDE 10 MG/1
10 TABLET ORAL EVERY 4 HOURS PRN
Status: DISCONTINUED | OUTPATIENT
Start: 2019-12-10 | End: 2019-12-11 | Stop reason: HOSPADM

## 2019-12-10 RX ORDER — GABAPENTIN 600 MG/1
600 TABLET ORAL ONCE
Status: COMPLETED | OUTPATIENT
Start: 2019-12-10 | End: 2019-12-10

## 2019-12-10 RX ORDER — SODIUM CHLORIDE 0.9 % (FLUSH) 0.9 %
10 SYRINGE (ML) INJECTION PRN
Status: DISCONTINUED | OUTPATIENT
Start: 2019-12-10 | End: 2019-12-10 | Stop reason: HOSPADM

## 2019-12-10 RX ORDER — LIDOCAINE HYDROCHLORIDE 10 MG/ML
1 INJECTION, SOLUTION EPIDURAL; INFILTRATION; INTRACAUDAL; PERINEURAL
Status: DISCONTINUED | OUTPATIENT
Start: 2019-12-10 | End: 2019-12-10 | Stop reason: HOSPADM

## 2019-12-10 RX ORDER — TRANEXAMIC ACID 100 MG/ML
INJECTION, SOLUTION INTRAVENOUS PRN
Status: DISCONTINUED | OUTPATIENT
Start: 2019-12-10 | End: 2019-12-10 | Stop reason: SDUPTHER

## 2019-12-10 RX ORDER — OXYCODONE HYDROCHLORIDE 5 MG/1
5 TABLET ORAL EVERY 4 HOURS PRN
Status: DISCONTINUED | OUTPATIENT
Start: 2019-12-10 | End: 2019-12-11 | Stop reason: HOSPADM

## 2019-12-10 RX ORDER — DOCUSATE SODIUM 100 MG/1
100 CAPSULE, LIQUID FILLED ORAL 2 TIMES DAILY
Status: DISCONTINUED | OUTPATIENT
Start: 2019-12-10 | End: 2019-12-11 | Stop reason: HOSPADM

## 2019-12-10 RX ORDER — PROPOFOL 10 MG/ML
INJECTION, EMULSION INTRAVENOUS CONTINUOUS PRN
Status: DISCONTINUED | OUTPATIENT
Start: 2019-12-10 | End: 2019-12-10 | Stop reason: SDUPTHER

## 2019-12-10 RX ORDER — ALLOPURINOL 300 MG/1
300 TABLET ORAL DAILY
Status: DISCONTINUED | OUTPATIENT
Start: 2019-12-11 | End: 2019-12-11 | Stop reason: HOSPADM

## 2019-12-10 RX ORDER — SODIUM CHLORIDE 0.9 % (FLUSH) 0.9 %
10 SYRINGE (ML) INJECTION EVERY 12 HOURS SCHEDULED
Status: DISCONTINUED | OUTPATIENT
Start: 2019-12-10 | End: 2019-12-10 | Stop reason: HOSPADM

## 2019-12-10 RX ORDER — SODIUM CHLORIDE 0.9 % (FLUSH) 0.9 %
10 SYRINGE (ML) INJECTION EVERY 12 HOURS SCHEDULED
Status: DISCONTINUED | OUTPATIENT
Start: 2019-12-10 | End: 2019-12-11 | Stop reason: HOSPADM

## 2019-12-10 RX ORDER — OXYCODONE HYDROCHLORIDE AND ACETAMINOPHEN 5; 325 MG/1; MG/1
1-2 TABLET ORAL EVERY 4 HOURS PRN
Qty: 40 TABLET | Refills: 0 | Status: SHIPPED | OUTPATIENT
Start: 2019-12-10 | End: 2019-12-17

## 2019-12-10 RX ORDER — ONDANSETRON 2 MG/ML
4 INJECTION INTRAMUSCULAR; INTRAVENOUS EVERY 6 HOURS PRN
Status: DISCONTINUED | OUTPATIENT
Start: 2019-12-10 | End: 2019-12-11 | Stop reason: HOSPADM

## 2019-12-10 RX ORDER — ASPIRIN 81 MG/1
81 TABLET ORAL 2 TIMES DAILY
Status: DISCONTINUED | OUTPATIENT
Start: 2019-12-11 | End: 2019-12-11 | Stop reason: HOSPADM

## 2019-12-10 RX ORDER — SIMVASTATIN 20 MG
20 TABLET ORAL DAILY
Status: DISCONTINUED | OUTPATIENT
Start: 2019-12-11 | End: 2019-12-11 | Stop reason: HOSPADM

## 2019-12-10 RX ORDER — MIDAZOLAM HYDROCHLORIDE 1 MG/ML
INJECTION INTRAMUSCULAR; INTRAVENOUS PRN
Status: DISCONTINUED | OUTPATIENT
Start: 2019-12-10 | End: 2019-12-10 | Stop reason: SDUPTHER

## 2019-12-10 RX ORDER — SODIUM CHLORIDE, SODIUM LACTATE, POTASSIUM CHLORIDE, CALCIUM CHLORIDE 600; 310; 30; 20 MG/100ML; MG/100ML; MG/100ML; MG/100ML
INJECTION, SOLUTION INTRAVENOUS CONTINUOUS PRN
Status: DISCONTINUED | OUTPATIENT
Start: 2019-12-10 | End: 2019-12-10 | Stop reason: SDUPTHER

## 2019-12-10 RX ORDER — CALCIUM CHLORIDE 100 MG/ML
INJECTION INTRAVENOUS; INTRAVENTRICULAR PRN
Status: DISCONTINUED | OUTPATIENT
Start: 2019-12-10 | End: 2019-12-10 | Stop reason: ALTCHOICE

## 2019-12-10 RX ORDER — ONDANSETRON 2 MG/ML
INJECTION INTRAMUSCULAR; INTRAVENOUS PRN
Status: DISCONTINUED | OUTPATIENT
Start: 2019-12-10 | End: 2019-12-10 | Stop reason: SDUPTHER

## 2019-12-10 RX ORDER — CETIRIZINE HYDROCHLORIDE 10 MG/1
5 TABLET ORAL DAILY
Status: DISCONTINUED | OUTPATIENT
Start: 2019-12-11 | End: 2019-12-11 | Stop reason: HOSPADM

## 2019-12-10 RX ORDER — DEXAMETHASONE SODIUM PHOSPHATE 4 MG/ML
INJECTION, SOLUTION INTRA-ARTICULAR; INTRALESIONAL; INTRAMUSCULAR; INTRAVENOUS; SOFT TISSUE PRN
Status: DISCONTINUED | OUTPATIENT
Start: 2019-12-10 | End: 2019-12-10 | Stop reason: SDUPTHER

## 2019-12-10 RX ORDER — DEXAMETHASONE SODIUM PHOSPHATE 10 MG/ML
10 INJECTION, SOLUTION INTRAMUSCULAR; INTRAVENOUS ONCE
Status: COMPLETED | OUTPATIENT
Start: 2019-12-10 | End: 2019-12-10

## 2019-12-10 RX ORDER — SCOLOPAMINE TRANSDERMAL SYSTEM 1 MG/1
1 PATCH, EXTENDED RELEASE TRANSDERMAL ONCE
Status: DISCONTINUED | OUTPATIENT
Start: 2019-12-10 | End: 2019-12-10

## 2019-12-10 RX ORDER — CEFAZOLIN SODIUM 1 G/3ML
INJECTION, POWDER, FOR SOLUTION INTRAMUSCULAR; INTRAVENOUS PRN
Status: DISCONTINUED | OUTPATIENT
Start: 2019-12-10 | End: 2019-12-10 | Stop reason: SDUPTHER

## 2019-12-10 RX ORDER — BUPIVACAINE HYDROCHLORIDE 5 MG/ML
INJECTION, SOLUTION EPIDURAL; INTRACAUDAL
Status: COMPLETED | OUTPATIENT
Start: 2019-12-10 | End: 2019-12-10

## 2019-12-10 RX ORDER — PROPOFOL 10 MG/ML
INJECTION, EMULSION INTRAVENOUS PRN
Status: DISCONTINUED | OUTPATIENT
Start: 2019-12-10 | End: 2019-12-10 | Stop reason: SDUPTHER

## 2019-12-10 RX ORDER — ACETAMINOPHEN 325 MG/1
650 TABLET ORAL EVERY 6 HOURS
Status: DISCONTINUED | OUTPATIENT
Start: 2019-12-10 | End: 2019-12-11 | Stop reason: HOSPADM

## 2019-12-10 RX ADMIN — ACETAMINOPHEN 650 MG: 325 TABLET, FILM COATED ORAL at 17:28

## 2019-12-10 RX ADMIN — MIDAZOLAM 1 MG: 1 INJECTION INTRAMUSCULAR; INTRAVENOUS at 12:06

## 2019-12-10 RX ADMIN — SENNOSIDES AND DOCUSATE SODIUM 1 TABLET: 8.6; 5 TABLET ORAL at 20:42

## 2019-12-10 RX ADMIN — BUPIVACAINE 10 ML: 13.3 INJECTION, SUSPENSION, LIPOSOMAL INFILTRATION at 12:13

## 2019-12-10 RX ADMIN — DOCUSATE SODIUM 100 MG: 100 CAPSULE, LIQUID FILLED ORAL at 20:43

## 2019-12-10 RX ADMIN — FENTANYL CITRATE 25 MCG: 50 INJECTION, SOLUTION INTRAMUSCULAR; INTRAVENOUS at 15:05

## 2019-12-10 RX ADMIN — PROPOFOL 75 MCG/KG/MIN: 10 INJECTION, EMULSION INTRAVENOUS at 13:57

## 2019-12-10 RX ADMIN — PROPOFOL 30 MG: 10 INJECTION, EMULSION INTRAVENOUS at 13:57

## 2019-12-10 RX ADMIN — ONDANSETRON 4 MG: 2 INJECTION INTRAMUSCULAR; INTRAVENOUS at 15:04

## 2019-12-10 RX ADMIN — BUPIVACAINE: 13.3 INJECTION, SUSPENSION, LIPOSOMAL INFILTRATION at 12:15

## 2019-12-10 RX ADMIN — PROPOFOL 20 MG: 10 INJECTION, EMULSION INTRAVENOUS at 15:05

## 2019-12-10 RX ADMIN — CEFAZOLIN 2000 MG: 1 INJECTION, POWDER, FOR SOLUTION INTRAMUSCULAR; INTRAVENOUS at 13:57

## 2019-12-10 RX ADMIN — SODIUM CHLORIDE, POTASSIUM CHLORIDE, SODIUM LACTATE AND CALCIUM CHLORIDE: 600; 310; 30; 20 INJECTION, SOLUTION INTRAVENOUS at 17:28

## 2019-12-10 RX ADMIN — TRANEXAMIC ACID 1000 MG: 100 INJECTION, SOLUTION INTRAVENOUS at 13:57

## 2019-12-10 RX ADMIN — TRANEXAMIC ACID 1000 MG: 100 INJECTION, SOLUTION INTRAVENOUS at 14:51

## 2019-12-10 RX ADMIN — MIDAZOLAM 1 MG: 1 INJECTION INTRAMUSCULAR; INTRAVENOUS at 13:49

## 2019-12-10 RX ADMIN — SODIUM CHLORIDE, POTASSIUM CHLORIDE, SODIUM LACTATE AND CALCIUM CHLORIDE: 600; 310; 30; 20 INJECTION, SOLUTION INTRAVENOUS at 11:14

## 2019-12-10 RX ADMIN — Medication 0.2 MG: at 14:12

## 2019-12-10 RX ADMIN — DEXAMETHASONE SODIUM PHOSPHATE 10 MG: 10 INJECTION, SOLUTION INTRAMUSCULAR; INTRAVENOUS at 11:17

## 2019-12-10 RX ADMIN — Medication 2 G: at 23:01

## 2019-12-10 RX ADMIN — SODIUM CHLORIDE, POTASSIUM CHLORIDE, SODIUM LACTATE AND CALCIUM CHLORIDE: 600; 310; 30; 20 INJECTION, SOLUTION INTRAVENOUS at 13:43

## 2019-12-10 RX ADMIN — SODIUM CHLORIDE, POTASSIUM CHLORIDE, SODIUM LACTATE AND CALCIUM CHLORIDE: 600; 310; 30; 20 INJECTION, SOLUTION INTRAVENOUS at 14:42

## 2019-12-10 RX ADMIN — BUPIVACAINE HYDROCHLORIDE 15 ML: 5 INJECTION, SOLUTION EPIDURAL; INTRACAUDAL at 12:13

## 2019-12-10 RX ADMIN — GABAPENTIN 600 MG: 600 TABLET, FILM COATED ORAL at 11:17

## 2019-12-10 RX ADMIN — ACETAMINOPHEN 650 MG: 325 TABLET, FILM COATED ORAL at 23:00

## 2019-12-10 RX ADMIN — DEXAMETHASONE SODIUM PHOSPHATE 4 MG: 4 INJECTION, SOLUTION INTRA-ARTICULAR; INTRALESIONAL; INTRAMUSCULAR; INTRAVENOUS; SOFT TISSUE at 14:13

## 2019-12-10 RX ADMIN — FENTANYL CITRATE 25 MCG: 50 INJECTION, SOLUTION INTRAMUSCULAR; INTRAVENOUS at 15:14

## 2019-12-10 RX ADMIN — FENTANYL CITRATE 50 MCG: 50 INJECTION, SOLUTION INTRAMUSCULAR; INTRAVENOUS at 12:06

## 2019-12-10 RX ADMIN — PHENYLEPHRINE HYDROCHLORIDE 25 MCG/MIN: 10 INJECTION INTRAVENOUS at 13:57

## 2019-12-10 RX ADMIN — ACETAMINOPHEN 1000 MG: 500 TABLET, FILM COATED ORAL at 11:16

## 2019-12-10 ASSESSMENT — PAIN SCALES - GENERAL
PAINLEVEL_OUTOF10: 0

## 2019-12-10 ASSESSMENT — PULMONARY FUNCTION TESTS
PIF_VALUE: 1
PIF_VALUE: 1
PIF_VALUE: 0
PIF_VALUE: 1
PIF_VALUE: 0
PIF_VALUE: 0
PIF_VALUE: 1

## 2019-12-10 ASSESSMENT — PAIN - FUNCTIONAL ASSESSMENT: PAIN_FUNCTIONAL_ASSESSMENT: 0-10

## 2019-12-10 ASSESSMENT — ENCOUNTER SYMPTOMS: STRIDOR: 0

## 2019-12-11 VITALS
SYSTOLIC BLOOD PRESSURE: 96 MMHG | DIASTOLIC BLOOD PRESSURE: 60 MMHG | RESPIRATION RATE: 17 BRPM | TEMPERATURE: 98 F | HEIGHT: 63 IN | WEIGHT: 135 LBS | HEART RATE: 65 BPM | OXYGEN SATURATION: 97 % | BODY MASS INDEX: 23.92 KG/M2

## 2019-12-11 PROCEDURE — 6370000000 HC RX 637 (ALT 250 FOR IP): Performed by: ORTHOPAEDIC SURGERY

## 2019-12-11 PROCEDURE — G0378 HOSPITAL OBSERVATION PER HR: HCPCS

## 2019-12-11 PROCEDURE — 99024 POSTOP FOLLOW-UP VISIT: CPT | Performed by: ORTHOPAEDIC SURGERY

## 2019-12-11 PROCEDURE — 99223 1ST HOSP IP/OBS HIGH 75: CPT | Performed by: INTERNAL MEDICINE

## 2019-12-11 PROCEDURE — 97530 THERAPEUTIC ACTIVITIES: CPT

## 2019-12-11 PROCEDURE — 97165 OT EVAL LOW COMPLEX 30 MIN: CPT

## 2019-12-11 PROCEDURE — 6370000000 HC RX 637 (ALT 250 FOR IP): Performed by: NURSE PRACTITIONER

## 2019-12-11 PROCEDURE — 97161 PT EVAL LOW COMPLEX 20 MIN: CPT

## 2019-12-11 PROCEDURE — 6360000002 HC RX W HCPCS: Performed by: ORTHOPAEDIC SURGERY

## 2019-12-11 PROCEDURE — 2580000003 HC RX 258: Performed by: ORTHOPAEDIC SURGERY

## 2019-12-11 RX ADMIN — Medication 10 ML: at 11:19

## 2019-12-11 RX ADMIN — CETIRIZINE HYDROCHLORIDE 5 MG: 10 TABLET, FILM COATED ORAL at 08:58

## 2019-12-11 RX ADMIN — ACETAMINOPHEN 650 MG: 325 TABLET, FILM COATED ORAL at 05:35

## 2019-12-11 RX ADMIN — ALLOPURINOL 300 MG: 300 TABLET ORAL at 08:58

## 2019-12-11 RX ADMIN — FLUTICASONE PROPIONATE 2 SPRAY: 50 SPRAY, METERED NASAL at 11:18

## 2019-12-11 RX ADMIN — ASPIRIN 81 MG: 81 TABLET, COATED ORAL at 08:59

## 2019-12-11 RX ADMIN — SENNOSIDES AND DOCUSATE SODIUM 1 TABLET: 8.6; 5 TABLET ORAL at 08:59

## 2019-12-11 RX ADMIN — ACETAMINOPHEN 650 MG: 325 TABLET, FILM COATED ORAL at 11:18

## 2019-12-11 RX ADMIN — DOCUSATE SODIUM 100 MG: 100 CAPSULE, LIQUID FILLED ORAL at 08:59

## 2019-12-11 RX ADMIN — Medication 2 G: at 05:35

## 2019-12-11 RX ADMIN — SIMVASTATIN 20 MG: 20 TABLET, FILM COATED ORAL at 08:59

## 2019-12-11 ASSESSMENT — PAIN DESCRIPTION - ORIENTATION: ORIENTATION: RIGHT

## 2019-12-11 ASSESSMENT — PAIN SCALES - GENERAL
PAINLEVEL_OUTOF10: 1
PAINLEVEL_OUTOF10: 1
PAINLEVEL_OUTOF10: 0
PAINLEVEL_OUTOF10: 1

## 2019-12-11 ASSESSMENT — PAIN DESCRIPTION - LOCATION: LOCATION: KNEE

## 2019-12-12 ENCOUNTER — TELEPHONE (OUTPATIENT)
Dept: FAMILY MEDICINE CLINIC | Age: 74
End: 2019-12-12

## 2019-12-13 RX ORDER — ERGOCALCIFEROL 1.25 MG/1
CAPSULE ORAL
Qty: 2 CAPSULE | Refills: 1 | Status: SHIPPED | OUTPATIENT
Start: 2019-12-13 | End: 2020-04-07

## 2019-12-20 DIAGNOSIS — Z96.651 STATUS POST TOTAL RIGHT KNEE REPLACEMENT: Primary | ICD-10-CM

## 2019-12-23 ENCOUNTER — OFFICE VISIT (OUTPATIENT)
Dept: ORTHOPEDIC SURGERY | Age: 74
End: 2019-12-23

## 2019-12-23 DIAGNOSIS — I82.411 DVT OF DEEP FEMORAL VEIN, RIGHT (HCC): ICD-10-CM

## 2019-12-23 DIAGNOSIS — Z96.651 STATUS POST TOTAL RIGHT KNEE REPLACEMENT: Primary | ICD-10-CM

## 2019-12-23 PROCEDURE — 99024 POSTOP FOLLOW-UP VISIT: CPT | Performed by: PHYSICIAN ASSISTANT

## 2019-12-23 RX ORDER — OXYCODONE HYDROCHLORIDE AND ACETAMINOPHEN 5; 325 MG/1; MG/1
1-2 TABLET ORAL EVERY 4 HOURS PRN
Qty: 40 TABLET | Refills: 0 | Status: SHIPPED | OUTPATIENT
Start: 2019-12-23 | End: 2020-06-01 | Stop reason: ALTCHOICE

## 2019-12-23 ASSESSMENT — ENCOUNTER SYMPTOMS
EYES NEGATIVE: 1
RHINORRHEA: 0
COUGH: 0
VOMITING: 0
COLOR CHANGE: 0
NAUSEA: 0

## 2019-12-24 ENCOUNTER — HOSPITAL ENCOUNTER (OUTPATIENT)
Dept: VASCULAR LAB | Age: 74
Discharge: HOME | End: 2019-12-24
Payer: MEDICARE

## 2019-12-24 DIAGNOSIS — I82.411 DVT OF DEEP FEMORAL VEIN, RIGHT (HCC): ICD-10-CM

## 2019-12-24 DIAGNOSIS — Z96.651 STATUS POST TOTAL RIGHT KNEE REPLACEMENT: ICD-10-CM

## 2019-12-24 PROCEDURE — 93971 EXTREMITY STUDY: CPT

## 2019-12-31 ENCOUNTER — TELEPHONE (OUTPATIENT)
Dept: ORTHOPEDIC SURGERY | Age: 74
End: 2019-12-31

## 2020-01-02 ENCOUNTER — OFFICE VISIT (OUTPATIENT)
Dept: FAMILY MEDICINE CLINIC | Age: 75
End: 2020-01-02
Payer: MEDICARE

## 2020-01-02 VITALS
TEMPERATURE: 97 F | SYSTOLIC BLOOD PRESSURE: 110 MMHG | RESPIRATION RATE: 14 BRPM | BODY MASS INDEX: 22.14 KG/M2 | DIASTOLIC BLOOD PRESSURE: 62 MMHG | WEIGHT: 125 LBS | HEART RATE: 64 BPM

## 2020-01-02 PROCEDURE — 99214 OFFICE O/P EST MOD 30 MIN: CPT | Performed by: NURSE PRACTITIONER

## 2020-01-02 RX ORDER — CETIRIZINE HYDROCHLORIDE 10 MG/1
TABLET ORAL
COMMUNITY
Start: 2019-12-13 | End: 2020-03-24

## 2020-01-02 RX ORDER — ASPIRIN 81 MG/1
81 TABLET ORAL DAILY
COMMUNITY

## 2020-01-02 ASSESSMENT — ENCOUNTER SYMPTOMS
DIARRHEA: 1
WHEEZING: 0
NAUSEA: 0
SHORTNESS OF BREATH: 0
VOMITING: 0
ABDOMINAL PAIN: 1

## 2020-01-08 ENCOUNTER — HOSPITAL ENCOUNTER (OUTPATIENT)
Age: 75
Discharge: HOME | End: 2020-01-08
Payer: MEDICARE

## 2020-01-08 LAB
ABSOLUTE EOS #: 0.4 K/UL (ref 0–0.4)
ABSOLUTE IMMATURE GRANULOCYTE: ABNORMAL K/UL (ref 0–0.3)
ABSOLUTE LYMPH #: 1.4 K/UL (ref 1–4.8)
ABSOLUTE MONO #: 0.7 K/UL (ref 0.1–1.3)
ANION GAP SERPL CALCULATED.3IONS-SCNC: 14 MMOL/L (ref 9–17)
BASOPHILS # BLD: 1 % (ref 0–2)
BASOPHILS ABSOLUTE: 0.1 K/UL (ref 0–0.2)
BUN BLDV-MCNC: 10 MG/DL (ref 8–23)
BUN/CREAT BLD: NORMAL (ref 9–20)
CALCIUM SERPL-MCNC: 9.3 MG/DL (ref 8.6–10.4)
CHLORIDE BLD-SCNC: 98 MMOL/L (ref 98–107)
CO2: 25 MMOL/L (ref 20–31)
CREAT SERPL-MCNC: 0.65 MG/DL (ref 0.5–0.9)
DIFFERENTIAL TYPE: ABNORMAL
EOSINOPHILS RELATIVE PERCENT: 7 % (ref 0–4)
GFR AFRICAN AMERICAN: >60 ML/MIN
GFR NON-AFRICAN AMERICAN: >60 ML/MIN
GFR SERPL CREATININE-BSD FRML MDRD: NORMAL ML/MIN/{1.73_M2}
GFR SERPL CREATININE-BSD FRML MDRD: NORMAL ML/MIN/{1.73_M2}
GLUCOSE BLD-MCNC: 99 MG/DL (ref 70–99)
HCT VFR BLD CALC: 34.9 % (ref 36–46)
HEMOGLOBIN: 11.3 G/DL (ref 12–16)
IMMATURE GRANULOCYTES: ABNORMAL %
LYMPHOCYTES # BLD: 27 % (ref 24–44)
MCH RBC QN AUTO: 28.9 PG (ref 26–34)
MCHC RBC AUTO-ENTMCNC: 32.3 G/DL (ref 31–37)
MCV RBC AUTO: 89.3 FL (ref 80–100)
MONOCYTES # BLD: 14 % (ref 1–7)
NRBC AUTOMATED: ABNORMAL PER 100 WBC
PDW BLD-RTO: 14.4 % (ref 11.5–14.9)
PLATELET # BLD: 322 K/UL (ref 150–450)
PLATELET ESTIMATE: ABNORMAL
PMV BLD AUTO: 7.7 FL (ref 6–12)
POTASSIUM SERPL-SCNC: 3.7 MMOL/L (ref 3.7–5.3)
RBC # BLD: 3.91 M/UL (ref 4–5.2)
RBC # BLD: ABNORMAL 10*6/UL
SEG NEUTROPHILS: 51 % (ref 36–66)
SEGMENTED NEUTROPHILS ABSOLUTE COUNT: 2.6 K/UL (ref 1.3–9.1)
SODIUM BLD-SCNC: 137 MMOL/L (ref 135–144)
TSH SERPL DL<=0.05 MIU/L-ACNC: 1.19 MIU/L (ref 0.3–5)
WBC # BLD: 5.1 K/UL (ref 3.5–11)
WBC # BLD: ABNORMAL 10*3/UL

## 2020-01-08 PROCEDURE — 80048 BASIC METABOLIC PNL TOTAL CA: CPT

## 2020-01-08 PROCEDURE — 84443 ASSAY THYROID STIM HORMONE: CPT

## 2020-01-08 PROCEDURE — 36415 COLL VENOUS BLD VENIPUNCTURE: CPT

## 2020-01-08 PROCEDURE — 85025 COMPLETE CBC W/AUTO DIFF WBC: CPT

## 2020-01-16 ENCOUNTER — HOSPITAL ENCOUNTER (OUTPATIENT)
Dept: PHYSICAL THERAPY | Age: 75
Setting detail: THERAPIES SERIES
Discharge: HOME | End: 2020-01-16
Payer: MEDICARE

## 2020-01-16 PROCEDURE — 97110 THERAPEUTIC EXERCISES: CPT

## 2020-01-16 PROCEDURE — 97161 PT EVAL LOW COMPLEX 20 MIN: CPT

## 2020-01-16 ASSESSMENT — PAIN SCALES - GENERAL: PAINLEVEL_OUTOF10: 3

## 2020-01-16 ASSESSMENT — PAIN DESCRIPTION - LOCATION: LOCATION: KNEE

## 2020-01-16 ASSESSMENT — PAIN DESCRIPTION - FREQUENCY: FREQUENCY: INTERMITTENT

## 2020-01-16 ASSESSMENT — PAIN DESCRIPTION - ORIENTATION: ORIENTATION: RIGHT

## 2020-01-16 NOTE — PROGRESS NOTES
Physical Therapy  Initial Assessment  Date: 2020  Patient Name: Jamse Simental  MRN: [id number]  : 1945     Treatment Diagnosis: stiffness R kneeM25. 661 weakness R knee M62.561difficulty walking R26.2 NEC    Restrictions  Restrictions/Precautions  Implants present? : Metal implants  Position Activity Restriction  Other position/activity restrictions: BTKR    Subjective   General  Chart Reviewed: Yes  Patient assessed for rehabilitation services?: Yes  Additional Pertinent Hx: RTKR 12/10/2019  Family / Caregiver Present: No  Referring Practitioner: Dr Merlinda Gross  Referral Date : 20  Diagnosis: S/P RTKR N14.721  Follows Commands: Within Functional Limits  PT Visit Information  Onset Date: 12/10/19  PT Insurance Information: [employer]  Total # of Visits Approved: 12  Total # of Visits to Date: 1  Subjective  Subjective: pain and stiffness R knee  Pain Screening  Patient Currently in Pain: Yes  Pain Assessment  Pain Assessment: 0-10  Pain Level: 3  Pain Location: Knee  Pain Orientation: Right  Pain Frequency: Intermittent  Vital Signs  Patient Currently in Pain: Yes    Vision/Hearing  Vision  Vision: Impaired(wear glasses)  Hearing  Hearing: Within functional limits    Orientation  Orientation  Overall Orientation Status: Within Normal Limits    Social/Functional History  Social/Functional History  Lives With: Spouse  Type of Home: House  Home Layout: One level  Home Access: Stairs to enter with rails  Entrance Stairs - Number of Steps: 3  Entrance Stairs - Rails: Left  ADL Assistance: Independent  Homemaking Assistance: Independent  Ambulation Assistance: Independent  Transfer Assistance: Independent  Active : Yes  Mode of Transportation: Car  Occupation: Retired    Objective  AROM RLE (degrees)  RLE AROM: Exceptions  R Knee Flexion 0-145: 15-95  R Knee Extension 0: 15  AROM LLE (degrees)  LLE AROM : Exceptions  L Knee Flexion 0-145: 0-123  L Knee Extension 0: 0  Strength RLE  Strength RLE: WNL  R Knee Flexion: 4-/5  R Knee Extension: 4-/5  Strength LLE  Strength LLE: WNL  Comment: except L ankle DF 2-/5  Bed mobility  Rolling to Left: Independent  Rolling to Right: Independent  Supine to Sit: Independent  Sit to Supine: Independent  Transfers  Sit to Stand: Independent  Stand to sit: Independent  Bed to Chair: Independent  Stand Pivot Transfers: Independent  Ambulation  Ambulation?: Yes  Ambulation 1  Surface: level tile  Device: No Device  Assistance: Independent  Quality of Gait: drop foot L  Stairs/Curb  Stairs?: No     Exercises  Exercise 1: slant board stretch 3x30\"  Exercise 2: step up 6\" F/L 10x  Exercise 3: lunge stretch R knee 3x30\" on 6\" step  Exercise 4: Lime Tband BLE 4 way hip 10x  Exercise 5: R TKE Lime Tband 3x10  Exercise 6: airdyne bike hole5 5'  Exercise 7: B tandem stance 3x30\"  Exercise 8: B SLS 3x10\"  Exercise 9: seated  R knee flex stretch 5x30\"  Exercise 10: seated  R knee ext stretch 5x30\"    Assessment   Conditions Requiring Skilled Therapeutic Intervention  Body structures, Functions, Activity limitations: Decreased functional mobility ; Decreased ADL status; Decreased ROM; Decreased strength; Increased pain  Assessment: R knee stiffness limiting function  Treatment Diagnosis: stiffness R kneeM25. 661 weakness R knee M62.561difficulty walking R26.2 NEC  Prognosis: Good  Decision Making: Low Complexity  History: RTKR 12/10/19  Exam: limited ROM and strength R knee  Clinical Presentation: LEFS 60  Barriers to Learning: none  REQUIRES PT FOLLOW UP: Yes  Treatment Initiated : therapeutic ex R knee  Discharge Recommendations: Home independently  Activity Tolerance  Activity Tolerance: Patient Tolerated treatment well     Plan   Plan  Times per week: 2x/week  Plan weeks: 6 weeks  Specific instructions for Next Treatment: progress with ex as tolerated  Current Treatment Recommendations: Strengthening, ROM, Home Exercise Program  Plan Comment: instructed in HEP    OutComes Score  LEFS Total Score: 60 (01/16/20 1343)    Goals  Short term goals  Time Frame for Short term goals: 6 visits  Short term goal 1: increase AROM R knee 0-110  Short term goal 2: decrease pain R knee 0-1/10   Short term goal 3: increase strength R knee 5/5  Short term goal 4: indep with HEP  Long term goals  Time Frame for Long term goals : 12 visits  Long term goal 1: improve LEFS from 60 to 59  Patient Goals   Patient goals : more ROM R knee     Treatment Charges: Minutes Units   []  Ultrasound     []  Electrical-Stim     []  Iontophoresis     []  Traction     []  Massage       [x]  Eval 20 1   []  Gait     [x]  Ther Exercise 30  2    []  Manual Therapy       []  Ther Activities       []  Aquatics     []  Vasopneumatic Device     []  Neuro Re-Ed       []  Other       Total Treatment Time: 50 3        Therapy Time   Individual Concurrent Group Co-treatment   Time In 1343         Time Out 1433         Minutes 50         Timed Code Treatment Minutes: 30 Minutes    Patient Goals:more ROM R knee    Comments/Assessment:    Rehab Potential:  [x] Good  [] Fair  [] Poor   Suggested Professional Referral:  [x] No  [] Yes:  Barriers to Goal Achievement:  [x] No  [] Yes:  Domestic Concerns:  [x] No  [] Yes:    Treatment Plan:  [x] Therapeutic Exercise    [] Modalities:  [] Therapeutic Activity    [] Ultrasound  [] Electrical Stimulation  [] Gait Training     [] Massage       [] Lumbar/Cervical Traction  [] Neuromuscular Re-education [] Cold/hot pack [] Other:  [x] Instruction in HEP              [] Work Conditioning                                  [] Manual Therapy                     [] Aquatic Therapy     [] Vasocompression  [] Iontophoresis: 4 mg/mL                      Dexamethasone Sodium      Phosphate 40-80mAmin (Allergies Reviewed)     Frequency:         2  X/wk x        6  wk's      [x] Plans/Goals, Risk/Benefits discussed with pt/family  Comprehension of Education [x] yes  [] Needs Review  Pt/Family Education:

## 2020-01-20 ENCOUNTER — OFFICE VISIT (OUTPATIENT)
Dept: ORTHOPEDIC SURGERY | Age: 75
End: 2020-01-20

## 2020-01-20 PROCEDURE — 99024 POSTOP FOLLOW-UP VISIT: CPT | Performed by: ORTHOPAEDIC SURGERY

## 2020-01-20 NOTE — PROGRESS NOTES
Lois Cho M.D.            [address] [facility] [facility], [facility] [address]           Dept Phone: [phone]           Dept Fax:  [address]          Dept Phone: [phone]           Dept Fax:  [phone]      Chief Compliant:  Chief Complaint   Patient presents with    Knee Pain     Right         History of Present Illness:  Patient returns today status post right TKA on 12/10/19. She notes that she has some residual pain over the pes region on the right otherwise doing very well. She notes physical therapy is going well. She will be finishing up PT this week and will continue home exercises. Review of Systems   Constitutional: Negative for fever, chills, sweats, recent injury, recent illness  Neurological: Negative for Headaches, numbness, or weakness. Integumentary: Negative for rash, itching, ecchymosis, or wounds. Musculoskeletal: Positive for Knee Pain (Right )       Physical Exam:  Constitutional: Patient is oriented to person, place, and time. Patient appears well-developed and well nourished. Musculoskeletal:  2-125 degrees of motion on the right knee. Good stability. Some inflammation to the pes area. Neurological: Patient is alert and oriented to person, place, and time. Normal strenght. No sensory deficit. Skin: Skin is warm and dry. Incision site is healing well. Nursing note and vitals reviewed. Labs and Imaging:     None taken today. No orders of the defined types were placed in this encounter. Assessment and Plan:  1. Status post total right knee replacement          This is a 75 y.o. female who presents to the clinic today status post right TKA. She is doing well and will continue home exercises next week. Advised to continue to work on her motion and follow up in 2 months.

## 2020-01-22 ENCOUNTER — HOSPITAL ENCOUNTER (OUTPATIENT)
Dept: PHYSICAL THERAPY | Age: 75
Setting detail: THERAPIES SERIES
Discharge: HOME | End: 2020-01-22
Payer: MEDICARE

## 2020-01-22 PROCEDURE — 97110 THERAPEUTIC EXERCISES: CPT

## 2020-01-22 ASSESSMENT — PAIN DESCRIPTION - ORIENTATION: ORIENTATION: RIGHT

## 2020-01-22 ASSESSMENT — PAIN DESCRIPTION - FREQUENCY: FREQUENCY: CONTINUOUS

## 2020-01-22 ASSESSMENT — PAIN DESCRIPTION - DESCRIPTORS: DESCRIPTORS: CONSTANT

## 2020-01-22 ASSESSMENT — PAIN SCALES - GENERAL: PAINLEVEL_OUTOF10: 3

## 2020-01-22 ASSESSMENT — PAIN DESCRIPTION - LOCATION: LOCATION: KNEE

## 2020-01-22 NOTE — PROGRESS NOTES
Physical Therapy  Daily Treatment Note  Date: 2020  Patient Name: Sara Arriaza  MRN: [id number]     :   1945    Subjective:      PT Visit Information  Onset Date: 12/10/19  [facility]  Total # of Visits Approved: 12  Total # of Visits to Date: 2  Subjective  Subjective: complains of pain on medial aspect R knee today  Pain Screening  Patient Currently in Pain: Yes  Pain Assessment  Pain Assessment: 0-10  Pain Level: 3  Pain Location: Knee  Pain Orientation: Right  Pain Descriptors: Constant  Pain Frequency: Continuous  Vital Signs  Patient Currently in Pain: Yes       Treatment Activities:   Exercises  Exercise 1: slant board stretch 3x30\"  Exercise 2: step up 6\" F/L 10x  Exercise 3: lunge stretch R knee 3x30\" on 6\" step  Exercise 4: Orange Tband BLE 4 way hip 10x  Exercise 5: R TKE Orange Tband 3x10  Exercise 6: airdyne bike hole5 10'  Exercise 7: B tandem stance 3x30\"  Exercise 8: B SLS 3x10\"  Exercise 9: supine  R knee flex stretch 10x30\"  Exercise 10: seated  R knee ext stretch 10x30\"  Exercise 11: leg press BLE 25# 3x10  Exercise 12: leg curls BLE 25# 3x10  Exercise 13: leg ext BLE 5# 3x10     Assessment:   Conditions Requiring Skilled Therapeutic Intervention  Assessment: progress with ex  REQUIRES PT FOLLOW UP: Yes  Discharge Recommendations: Home independently     Plan:    Plan  Times per week: 2x/week  Current Treatment Recommendations: Strengthening, ROM, Home Exercise Program  Plan Comment: to continue PT per POC  Timed Code Treatment Minutes: 45 Minutes   Treatment Charges: Minutes Units   []  Ultrasound     []  Electrical-Stim     []  Iontophoresis     []  Traction     []  Massage       []  Eval     []  Gait     [x]  Ther Exercise 45  3    []  Manual Therapy       []  Ther Activities       []  Aquatics     []  Vasopneumatic Device     []  Neuro Re-Ed       []  Other       Total Treatment Time: 45 3        Therapy Time   Individual Concurrent Group

## 2020-01-24 ENCOUNTER — HOSPITAL ENCOUNTER (OUTPATIENT)
Dept: PHYSICAL THERAPY | Age: 75
Setting detail: THERAPIES SERIES
Discharge: HOME | End: 2020-01-24
Payer: MEDICARE

## 2020-01-24 PROCEDURE — 97110 THERAPEUTIC EXERCISES: CPT

## 2020-01-24 ASSESSMENT — PAIN DESCRIPTION - LOCATION: LOCATION: KNEE

## 2020-01-24 ASSESSMENT — PAIN DESCRIPTION - DESCRIPTORS: DESCRIPTORS: CONSTANT

## 2020-01-24 ASSESSMENT — PAIN DESCRIPTION - FREQUENCY: FREQUENCY: CONTINUOUS

## 2020-01-24 ASSESSMENT — PAIN DESCRIPTION - ORIENTATION: ORIENTATION: RIGHT

## 2020-01-24 ASSESSMENT — PAIN SCALES - GENERAL: PAINLEVEL_OUTOF10: 3

## 2020-01-28 NOTE — PROGRESS NOTES
Physical Therapy  Daily Treatment Note  Date: 2020  Patient Name: Rosebud Fleischer  MRN: [id number]     :   1945    Subjective:      PT Visit Information  Onset Date: 12/10/19  [facility]  Total # of Visits Approved: 12  Total # of Visits to Date: 3  Subjective  Subjective: complains of pain on medial aspect R knee today  Pain Screening  Patient Currently in Pain: Yes  Pain Assessment  Pain Assessment: 0-10  Pain Level: 3  Pain Location: Knee  Pain Orientation: Right  Pain Descriptors: Constant  Pain Frequency: Continuous  Vital Signs  Patient Currently in Pain: Yes       Treatment Activities:   Exercises  Exercise 1: slant board stretch 3x30\"  Exercise 2: step up 6\" F/L 10x  Exercise 3: lunge stretch R knee 3x30\" on 6\" step  Exercise 4: Orange Tband BLE 4 way hip 10x  Exercise 5: R TKE Orange Tband 3x10  Exercise 6: airdyne bike hole5 5'  Exercise 7: B tandem stance 3x30\"  Exercise 8: B SLS 3x10\"  Exercise 9: supine  R knee flex stretch 10x30\"  Exercise 10: seated  R knee ext stretch 10x30\"  Exercise 11: leg press BLE 25# 3x10  Exercise 12: leg curls BLE 25# 3x10  Exercise 13: leg ext BLE 5# 3x10    Assessment:   Conditions Requiring Skilled Therapeutic Intervention  Assessment: PROM R knee 0-110   REQUIRES PT FOLLOW UP: Yes  Discharge Recommendations: Home independently      Plan:    Plan  Times per week: requested 1x/week  Current Treatment Recommendations: Strengthening, ROM, Home Exercise Program  Plan Comment: to continue PT per POC  Timed Code Treatment Minutes: 35 Minutes   Treatment Charges: Minutes Units   []  Ultrasound     []  Electrical-Stim     []  Iontophoresis     []  Traction     []  Massage       []  Eval     []  Gait     [x]  Ther Exercise 35  2    []  Manual Therapy       []  Ther Activities       []  Aquatics     []  Vasopneumatic Device     []  Neuro Re-Ed       []  Other       Total Treatment Time: 35 2        Therapy Time   Individual Concurrent Group
Therapy             Phosphate 40-80 mAmin  [] Aquatic Therapy                                            [] Other:  [] Vasocompression                 If you have any questions or concerns regarding this patient's care, please contact us.    Thank you for your referral.      Electronically signed by: Lupe Juarez PT

## 2020-01-30 ENCOUNTER — OFFICE VISIT (OUTPATIENT)
Dept: FAMILY MEDICINE CLINIC | Age: 75
End: 2020-01-30
Payer: MEDICARE

## 2020-01-30 VITALS
DIASTOLIC BLOOD PRESSURE: 70 MMHG | WEIGHT: 128 LBS | SYSTOLIC BLOOD PRESSURE: 110 MMHG | RESPIRATION RATE: 16 BRPM | TEMPERATURE: 97.1 F | BODY MASS INDEX: 22.67 KG/M2 | HEART RATE: 72 BPM

## 2020-01-30 LAB
INFLUENZA A ANTIBODY: NEGATIVE
INFLUENZA B ANTIBODY: NEGATIVE

## 2020-01-30 PROCEDURE — 87804 INFLUENZA ASSAY W/OPTIC: CPT | Performed by: NURSE PRACTITIONER

## 2020-01-30 PROCEDURE — 99213 OFFICE O/P EST LOW 20 MIN: CPT | Performed by: NURSE PRACTITIONER

## 2020-01-30 RX ORDER — BENZONATATE 100 MG/1
100 CAPSULE ORAL 3 TIMES DAILY PRN
Qty: 30 CAPSULE | Refills: 0 | Status: SHIPPED | OUTPATIENT
Start: 2020-01-30 | End: 2020-02-06

## 2020-01-30 ASSESSMENT — ENCOUNTER SYMPTOMS
SINUS PRESSURE: 0
COUGH: 1
SHORTNESS OF BREATH: 0
SORE THROAT: 1
NAUSEA: 0
RHINORRHEA: 1
ABDOMINAL PAIN: 0

## 2020-01-30 ASSESSMENT — PATIENT HEALTH QUESTIONNAIRE - PHQ9
1. LITTLE INTEREST OR PLEASURE IN DOING THINGS: 0
SUM OF ALL RESPONSES TO PHQ QUESTIONS 1-9: 0
SUM OF ALL RESPONSES TO PHQ9 QUESTIONS 1 & 2: 0
2. FEELING DOWN, DEPRESSED OR HOPELESS: 0
SUM OF ALL RESPONSES TO PHQ QUESTIONS 1-9: 0

## 2020-01-31 ENCOUNTER — APPOINTMENT (OUTPATIENT)
Dept: PHYSICAL THERAPY | Age: 75
End: 2020-01-31
Payer: MEDICARE

## 2020-02-14 NOTE — PROGRESS NOTES
Subjective:      Patient ID: Any Weir is a 75 y.o. female. Visit Information    Have you changed or started any medications since your last visit including any over-the-counter medicines, vitamins, or herbal medicines? no   Are you having any side effects from any of your medications? -  no  Have you stopped taking any of your medications? Is so, why? -  no    Have you seen any other physician or provider since your last visit? Yes - Records Obtained  Have you had any other diagnostic tests since your last visit? Yes - Records Obtained  Have you been seen in the emergency room and/or had an admission to a hospital since we last saw you? Yes - Records Obtained  Have you had your routine dental cleaning in the past 6 months? no    Have you activated your [employer] account? If not, what are your barriers? No:      Patient Care Team:  Gonzalo Leyva MD as PCP - General (Family Medicine)  Gonzalo Leyva MD as PCP - [facility] Provider    Medical History Review  Past Medical, Family, and Social History reviewed and does not contribute to the patient presenting condition    Health Maintenance   Topic Date Due    DTaP/Tdap/Td vaccine (1 - Tdap) 03/20/1955    Shingles Vaccine (1 of 2) 03/20/1994    Annual Wellness Visit (AWV)  05/29/2019    Lipid screen  03/06/2020    Colon cancer screen colonoscopy  12/22/2024    DEXA (modify frequency per FRAX score)  Completed    Flu vaccine  Completed    Pneumococcal 65+ years Vaccine  Completed     HPI    75year old female presents with chills sweats headache malaise abd pain diarrhea for 6 days. States her symptoms are improving but she still sweats, has chills and headache intermittent abd cramps and poor appetite. Her diarrhea has resolved. States she has lost 10 ibs since 12/10. Denies nausea vomiting or blood in stool. She just had right knee surgery 3 weeks ago. Review of Systems   Constitutional: Positive for fatigue and unexpected weight change. SUBJECTIVE: Patient is aphasic and cannot contribute to current history. Patient appeared less agitated and responded appropriately to questions and commands. He attempted to communicate with hand gestures, but they were not clear.       PAST MEDICAL & SURGICAL HISTORY:  CVA (cerebral vascular accident)  No significant past surgical history    FAMILY HISTORY:  No pertinent family history in first degree relatives    SOCIAL HISTORY:   T/E/D:   Occupation:   Lives with:     MEDICATIONS  (STANDING):  aspirin Suppository 300 milliGRAM(s) Rectal daily  atorvastatin 80 milliGRAM(s) Oral at bedtime  clopidogrel Tablet 75 milliGRAM(s) Oral daily  cyanocobalamin 1000 MICROGram(s) Oral daily  enoxaparin Injectable 40 milliGRAM(s) SubCutaneous daily  ergocalciferol 82644 Unit(s) Oral every week  multivitamin/minerals 1 Tablet(s) Oral daily  Nephro-deysi 1 Tablet(s) Oral daily  sodium chloride 0.45%. 1000 milliLiter(s) (70 mL/Hr) IV Continuous <Continuous>    ALLERGIES/INTOLERANCES:  Allergies  No Known Allergies    Intolerances    VITALS & EXAMINATION:  Vital Signs Last 24 Hrs  T(C): 36.7 (14 Feb 2020 04:02), Max: 36.9 (13 Feb 2020 13:02)  T(F): 98 (14 Feb 2020 04:02), Max: 98.4 (13 Feb 2020 13:02)  HR: 58 (14 Feb 2020 04:02) (55 - 59)  BP: 138/86 (14 Feb 2020 04:02) (121/70 - 138/86)  BP(mean): --  RR: 18 (14 Feb 2020 04:02) (16 - 18)  SpO2: 98% (14 Feb 2020 04:02) (96% - 98%)      General: Male, appears stated age, in no apparent distress including pain    Neurological (>12):   MS: Awake, alert. Unable to assess orientation. Follows commands appropriately  Language: no verbal output  CNs: EOMI, spontaneously tracks around the room. no nystagmus. Left CN VII palsy - asymmetric smile. Unable to assess hearing. Symmetric palate elevation in midline. Gag reflex deferred. Head turning intact b/l. Tongue midline, normal movements, no atrophy.  Motor: Normal muscle bulk & tone. No noticeable tremor or seizure. No pronator drift. No focal deficits. 5/5 strength in all extremities.   Sensation: Exam limited. Does not withdraw to noxious stimuli in extremities and in face.   Reflexes: Patient deferred  Coordination: intact rapid-alt movements. No dysmetria to FTN  Gait: Unable to assess.     Imaging:    MR Head w/wo IV Cont (02.11.20 @ 15:07)  IMPRESSION:  BRAIN MRI:  Acute right posterior frontal infarct in the region of the right precentral gyrus. Subacute infarct in the right frontoparietal and posterior temporal region. Minimal petechial hemorrhagic transformation and gyral enhancement is noted in both regions.  Multifocal chronic bilateral MCA territory infarcts. Chronic hemorrhagic products in the regionof the left basal ganglia.    BRAIN MRA:  Persistent mild narrowing of the cavernous and supraclinoid left ICA. Persistent lack of flow related signal within the left M1 segment, with poor visualization of the more distal left MCA branches.  Redemonstration of multifocal mild to moderate stenoses of the right M1 segment. Normal flow-related enhancement of the more distal right MCA.    NECK MRA:  No flow-limiting stenosis or evidence for arterial dissection. SUBJECTIVE: Patient is aphasic and cannot contribute to current history. Patient appeared less agitated and responded appropriately to questions and commands. He attempted to communicate with hand gestures, but they were not clear.       PAST MEDICAL & SURGICAL HISTORY:  CVA (cerebral vascular accident)  No significant past surgical history    FAMILY HISTORY:  No pertinent family history in first degree relatives    SOCIAL HISTORY:   T/E/D:   Occupation:   Lives with:     MEDICATIONS  (STANDING):  aspirin Suppository 300 milliGRAM(s) Rectal daily  atorvastatin 80 milliGRAM(s) Oral at bedtime  clopidogrel Tablet 75 milliGRAM(s) Oral daily  cyanocobalamin 1000 MICROGram(s) Oral daily  enoxaparin Injectable 40 milliGRAM(s) SubCutaneous daily  ergocalciferol 37564 Unit(s) Oral every week  multivitamin/minerals 1 Tablet(s) Oral daily  Nephro-deysi 1 Tablet(s) Oral daily  sodium chloride 0.45%. 1000 milliLiter(s) (70 mL/Hr) IV Continuous <Continuous>    ALLERGIES/INTOLERANCES:  Allergies  No Known Allergies    Intolerances    VITALS & EXAMINATION:  Vital Signs Last 24 Hrs  T(C): 36.7 (14 Feb 2020 04:02), Max: 36.9 (13 Feb 2020 13:02)  T(F): 98 (14 Feb 2020 04:02), Max: 98.4 (13 Feb 2020 13:02)  HR: 58 (14 Feb 2020 04:02) (55 - 59)  BP: 138/86 (14 Feb 2020 04:02) (121/70 - 138/86)  BP(mean): --  RR: 18 (14 Feb 2020 04:02) (16 - 18)  SpO2: 98% (14 Feb 2020 04:02) (96% - 98%)      General: Male, appears stated age, in no apparent distress including pain    Neurological (>12):   MS: Awake, alert. Unable to assess orientation. Follows commands appropriately  Language: no verbal output  CNs: EOMI, spontaneously tracks around the room. no nystagmus. Unable to smile, open mouth or lift eyebrows w/o assistance with his hands. Unable to assess hearing. Head turning intact b/l.   Motor: Normal muscle bulk & tone. No noticeable tremor or seizure. No pronator drift. No focal deficits. 5/5 strength in all extremities.   Sensation: Exam limited. Does not withdraw to noxious stimuli in extremities and in face.   Reflexes: Patient deferred  Coordination: intact rapid-alt movements. No dysmetria to FTN  Gait: Unable to assess.     Labs                          14.1   11.87 )-----------( 351      ( 14 Feb 2020 06:23 )             41.9       02-14    137  |  100  |  10  ----------------------------<  79  3.7   |  20<L>  |  0.70    Ca    9.1      14 Feb 2020 06:23    TPro  6.6  /  Alb  3.8  /  TBili  1.3<H>  /  DBili  x   /  AST  20  /  ALT  28  /  AlkPhos  126<H>  02-14      LIVER FUNCTIONS - ( 14 Feb 2020 06:23 )  Alb: 3.8 g/dL / Pro: 6.6 g/dL / ALK PHOS: 126 U/L / ALT: 28 U/L / AST: 20 U/L / GGT: x               Imaging:  < from: Transesophageal Echocardiogram w/o TTE (02.13.20 @ 15:15) >  Conclusions:  Normal left ventricular systolic function. No segmental  wall motion abnormalities.  Agitated saline contrast injection demonstrates no evidence  of a patent foramen ovale.    < end of copied text >      MR Head w/wo IV Cont (02.11.20 @ 15:07)  IMPRESSION:  BRAIN MRI:  Acute right posterior frontal infarct in the region of the right precentral gyrus. Subacute infarct in the right frontoparietal and posterior temporal region. Minimal petechial hemorrhagic transformation and gyral enhancement is noted in both regions.  Multifocal chronic bilateral MCA territory infarcts. Chronic hemorrhagic products in the regionof the left basal ganglia.    BRAIN MRA:  Persistent mild narrowing of the cavernous and supraclinoid left ICA. Persistent lack of flow related signal within the left M1 segment, with poor visualization of the more distal left MCA branches.  Redemonstration of multifocal mild to moderate stenoses of the right M1 segment. Normal flow-related enhancement of the more distal right MCA.    NECK MRA:  No flow-limiting stenosis or evidence for arterial dissection.

## 2020-02-19 RX ORDER — FAMOTIDINE 20 MG/1
TABLET, FILM COATED ORAL
Qty: 60 TABLET | Refills: 2 | Status: SHIPPED | OUTPATIENT
Start: 2020-02-19 | End: 2020-02-24

## 2020-02-24 RX ORDER — FAMOTIDINE 20 MG/1
TABLET, FILM COATED ORAL
Qty: 60 TABLET | Refills: 2 | Status: SHIPPED | OUTPATIENT
Start: 2020-02-24 | End: 2020-05-26

## 2020-03-11 RX ORDER — VERAPAMIL HYDROCHLORIDE 240 MG/1
TABLET, FILM COATED, EXTENDED RELEASE ORAL
Qty: 90 TABLET | Refills: 0 | Status: SHIPPED | OUTPATIENT
Start: 2020-03-11 | End: 2020-06-08

## 2020-03-20 RX ORDER — SIMVASTATIN 20 MG
TABLET ORAL
Qty: 90 TABLET | Refills: 0 | Status: SHIPPED | OUTPATIENT
Start: 2020-03-20 | End: 2020-06-18

## 2020-04-07 RX ORDER — ERGOCALCIFEROL 1.25 MG/1
CAPSULE ORAL
Qty: 2 CAPSULE | Refills: 0 | Status: SHIPPED | OUTPATIENT
Start: 2020-04-07 | End: 2020-05-05

## 2020-04-23 RX ORDER — ALENDRONATE SODIUM 70 MG/1
TABLET ORAL
Qty: 4 TABLET | Refills: 2 | Status: SHIPPED | OUTPATIENT
Start: 2020-04-23 | End: 2020-09-02

## 2020-05-26 RX ORDER — FAMOTIDINE 20 MG/1
TABLET, FILM COATED ORAL
Qty: 180 TABLET | Refills: 1 | Status: SHIPPED | OUTPATIENT
Start: 2020-05-26 | End: 2020-11-20

## 2020-06-01 ENCOUNTER — OFFICE VISIT (OUTPATIENT)
Dept: FAMILY MEDICINE CLINIC | Age: 75
End: 2020-06-01
Payer: MEDICARE

## 2020-06-01 VITALS
HEIGHT: 64 IN | SYSTOLIC BLOOD PRESSURE: 108 MMHG | OXYGEN SATURATION: 98 % | WEIGHT: 126.8 LBS | BODY MASS INDEX: 21.65 KG/M2 | HEART RATE: 62 BPM | TEMPERATURE: 98.5 F | DIASTOLIC BLOOD PRESSURE: 68 MMHG

## 2020-06-01 PROCEDURE — 99214 OFFICE O/P EST MOD 30 MIN: CPT | Performed by: NURSE PRACTITIONER

## 2020-06-01 SDOH — ECONOMIC STABILITY: FOOD INSECURITY: WITHIN THE PAST 12 MONTHS, THE FOOD YOU BOUGHT JUST DIDN'T LAST AND YOU DIDN'T HAVE MONEY TO GET MORE.: NEVER TRUE

## 2020-06-01 SDOH — ECONOMIC STABILITY: FOOD INSECURITY: WITHIN THE PAST 12 MONTHS, YOU WORRIED THAT YOUR FOOD WOULD RUN OUT BEFORE YOU GOT MONEY TO BUY MORE.: NEVER TRUE

## 2020-06-01 SDOH — ECONOMIC STABILITY: INCOME INSECURITY: HOW HARD IS IT FOR YOU TO PAY FOR THE VERY BASICS LIKE FOOD, HOUSING, MEDICAL CARE, AND HEATING?: NOT HARD AT ALL

## 2020-06-01 ASSESSMENT — ENCOUNTER SYMPTOMS
NAUSEA: 0
VOMITING: 0
SHORTNESS OF BREATH: 0
WHEEZING: 0
ABDOMINAL PAIN: 0

## 2020-06-01 NOTE — PROGRESS NOTES
Constitutional: Negative for chills and fever. Eyes: Negative for visual disturbance. Respiratory: Negative for shortness of breath and wheezing. Cardiovascular: Negative for chest pain and leg swelling. Gastrointestinal: Negative for abdominal pain, nausea and vomiting. Neurological: Negative for dizziness, weakness and numbness. Psychiatric/Behavioral: Negative for agitation and behavioral problems. Objective:   Physical Exam  Vitals signs and nursing note reviewed. Constitutional:       General: She is not in acute distress. Appearance: Normal appearance. HENT:      Nose: Nose normal. No congestion. Eyes:      General: No scleral icterus. Conjunctiva/sclera: Conjunctivae normal.   Neck:      Musculoskeletal: Normal range of motion and neck supple. Cardiovascular:      Rate and Rhythm: Normal rate and regular rhythm. Pulses: Normal pulses. Heart sounds: Normal heart sounds. Pulmonary:      Effort: Pulmonary effort is normal. No respiratory distress. Breath sounds: Normal breath sounds. Abdominal:      Palpations: Abdomen is soft. Tenderness: There is no abdominal tenderness. Musculoskeletal: Normal range of motion. General: No tenderness. Skin:     General: Skin is warm and dry. Neurological:      Mental Status: She is alert and oriented to person, place, and time. Cranial Nerves: No cranial nerve deficit. Psychiatric:         Mood and Affect: Mood normal.         Behavior: Behavior normal.         Assessment:      1. Essential hypertension    2. Mixed hyperlipidemia    3. Vitamin D deficiency    4.  Gout, unspecified cause, unspecified chronicity, unspecified site            Plan:      BP Readings from Last 3 Encounters:   06/01/20 108/68   01/30/20 110/70   01/02/20 110/62     /68 (Site: Left Upper Arm, Position: Sitting, Cuff Size: Medium Adult)   Pulse 62   Temp 98.5 °F (36.9 °C) (Oral)   Ht 5' 3.6\" (1.615 m)   Wt 126 lb 12.8 oz (57.5 kg)   LMP 01/01/2004   SpO2 98%   BMI 22.04 kg/m²   Lab Results   Component Value Date    WBC 5.1 01/08/2020    HGB 11.3 (L) 01/08/2020    HCT 34.9 (L) 01/08/2020     01/08/2020    CHOL 133 03/06/2019    TRIG 105 03/06/2019    HDL 49 03/06/2019    ALT 14 03/06/2019    AST 20 03/06/2019     01/08/2020    K 3.7 01/08/2020    CL 98 01/08/2020    CREATININE 0.65 01/08/2020    BUN 10 01/08/2020    CO2 25 01/08/2020    TSH 1.19 01/08/2020     Lab Results   Component Value Date    CALCIUM 9.3 01/08/2020     Lab Results   Component Value Date    LDLCHOLESTEROL 63 03/06/2019         1. Essential hypertension  - stable. Cont current bp therapy   - Basic Metabolic Panel; Future    2. Mixed hyperlipidemia  - stable. Cont statin therapy   - Lipid Panel; Future  - ALT; Future    3. Vitamin D deficiency  - cont weekly supplements   - Vitamin D 25 Hydroxy; Future    4. Gout, unspecified cause, unspecified chronicity, unspecified site  - stable. Cont uric acid agent. - Uric Acid; Future        Requested Prescriptions      No prescriptions requested or ordered in this encounter       Medications Discontinued During This Encounter   Medication Reason    oxyCODONE-acetaminophen (PERCOCET) 5-325 MG per tablet Therapy completed     Discussed use, benefit, and side effects of prescribed medications. Barriers to medication compliance addressed. All patient questions answered. Pt voiced understanding. Return in about 28 weeks (around 12/14/2020) for HTN, HLD, vit d deficiency.

## 2020-06-04 RX ORDER — ALLOPURINOL 100 MG/1
TABLET ORAL
Qty: 90 TABLET | Refills: 1 | Status: SHIPPED | OUTPATIENT
Start: 2020-06-04 | End: 2020-11-20

## 2020-06-08 RX ORDER — VERAPAMIL HYDROCHLORIDE 240 MG/1
TABLET, FILM COATED, EXTENDED RELEASE ORAL
Qty: 90 TABLET | Refills: 1 | Status: SHIPPED | OUTPATIENT
Start: 2020-06-08 | End: 2020-06-12

## 2020-06-08 RX ORDER — ALLOPURINOL 100 MG/1
TABLET ORAL
Qty: 90 TABLET | Refills: 0 | OUTPATIENT
Start: 2020-06-08

## 2020-06-12 RX ORDER — VERAPAMIL HYDROCHLORIDE 240 MG/1
TABLET, FILM COATED, EXTENDED RELEASE ORAL
Qty: 90 TABLET | Refills: 1 | Status: SHIPPED | OUTPATIENT
Start: 2020-06-12 | End: 2021-03-01

## 2020-06-18 RX ORDER — SIMVASTATIN 20 MG
TABLET ORAL
Qty: 90 TABLET | Refills: 0 | Status: SHIPPED | OUTPATIENT
Start: 2020-06-18 | End: 2020-09-18

## 2020-06-24 ENCOUNTER — OFFICE VISIT (OUTPATIENT)
Dept: ORTHOPEDIC SURGERY | Age: 75
End: 2020-06-24
Payer: MEDICARE

## 2020-06-24 PROCEDURE — 99213 OFFICE O/P EST LOW 20 MIN: CPT | Performed by: ORTHOPAEDIC SURGERY

## 2020-06-24 NOTE — PROGRESS NOTES
67 y.o. female who presents to the clinic today for follow up right TKA.  Patient is doing well over the interval    Follow up as needed    Past History:    Current Outpatient Medications:     simvastatin (ZOCOR) 20 MG tablet, TAKE ONE TABLET BY MOUTH DAILY, Disp: 90 tablet, Rfl: 0    verapamil (CALAN SR) 240 MG extended release tablet, TAKE ONE TABLET BY MOUTH ONCE NIGHTLY FOR MIGRAINE HEADACHE, Disp: 90 tablet, Rfl: 1    allopurinol (ZYLOPRIM) 100 MG tablet, TAKE ONE TABLET BY MOUTH DAILY, Disp: 90 tablet, Rfl: 1    famotidine (PEPCID) 20 MG tablet, TAKE ONE TABLET BY MOUTH TWICE A DAY, Disp: 180 tablet, Rfl: 1    vitamin D (ERGOCALCIFEROL) 1.25 MG (71738 UT) CAPS capsule, TAKE ONE CAPSULE BY MOUTH EVERY 2 WEEKS, Disp: 6 capsule, Rfl: 0    fluticasone (FLONASE) 50 MCG/ACT nasal spray, SPRAY TWO SPRAYS IN EACH NOSTRIL ONCE DAILY, Disp: 2 Bottle, Rfl: 1    alendronate (FOSAMAX) 70 MG tablet, TAKE 1 TABLET BY MOUTH ONCE WEEKLY BEFORE BREAKFAST, ON AN EMPTY STOMACH: REMAIN UPRIGHT FOR 30 MINUTES:TAKE WITH 8 OUNCES OF WATER, Disp: 4 tablet, Rfl: 2    cetirizine (ZYRTEC) 10 MG tablet, TAKE ONE TABLET BY MOUTH DAILY, Disp: 90 tablet, Rfl: 1    aspirin 81 MG tablet, Take 81 mg by mouth daily, Disp: , Rfl:     naproxen (NAPROSYN) 250 MG tablet, TAKE ONE TABLET BY MOUTH THREE TIMES A DAY WITH FOOD AS NEEDED FOR GOUT FLARE UP UNTIL ATTACK SUBSIDES, Disp: 60 tablet, Rfl: 0    Aspirin-Acetaminophen-Caffeine (EXCEDRIN MIGRAINE PO), Take by mouth as needed , Disp: , Rfl:     docusate sodium (COLACE) 100 MG capsule, Take 1 capsule by mouth 2 times daily, Disp: 60 capsule, Rfl: 2  No Known Allergies  Social History     Socioeconomic History    Marital status:      Spouse name: Not on file    Number of children: Not on file    Years of education: Not on file    Highest education level: Not on file   Occupational History    Not on file   Social Needs    Financial resource strain: Not hard at all   [place] insecurity     Worry: Never true     Inability: Never true    Transportation needs     Medical: Not on file     Non-medical: Not on file   Tobacco Use    Smoking status: Former Smoker     Packs/day: 0.50     Years: 10.00     Pack years: 5.00     Types: Cigarettes     Last attempt to quit: 1987     Years since quittin.3    Smokeless tobacco: Never Used   Substance and Sexual Activity    Alcohol use: No     Alcohol/week: 0.0 standard drinks    Drug use: No    Sexual activity: Not Currently   Lifestyle    Physical activity     Days per week: Not on file     Minutes per session: Not on file    Stress: Not on file   Relationships    Social connections     Talks on phone: Not on file     Gets together: Not on file     Attends [employer] service: Not on file     Active member of club or organization: Not on file     Attends meetings of clubs or organizations: Not on file     Relationship status: Not on file    Intimate partner violence     Fear of current or ex partner: Not on file     Emotionally abused: Not on file     Physically abused: Not on file     Forced sexual activity: Not on file   Other Topics Concern    Not on file   Social History Narrative    Not on file     Past Medical History:   Diagnosis Date    Allergic rhinitis     Closed tibia fracture     GERD (gastroesophageal reflux disease)     Gout     Headache(784.0)     h/o migraines    Hyperlipidemia     Osteoarthritis     DJD Lt knee    Osteoporosis     Posterior tibial tendon dysfunction     right, wears braces on both legs    Vitamin D deficient rickets     Wears glasses     Wears partial dentures     lower     Past Surgical History:   Procedure Laterality Date    APPENDECTOMY      CARPAL TUNNEL RELEASE      bilateral    CATARACT REMOVAL WITH IMPLANT Left 2019    [facility]    CATARACT REMOVAL WITH IMPLANT Right 2019    [facility]    COLONOSCOPY      EYE SURGERY      FRACTURE

## 2020-09-01 ENCOUNTER — HOSPITAL ENCOUNTER (OUTPATIENT)
Age: 75
Discharge: HOME | End: 2020-09-01
Payer: MEDICARE

## 2020-09-01 LAB
ALT SERPL-CCNC: 16 U/L (ref 5–33)
ANION GAP SERPL CALCULATED.3IONS-SCNC: 7 MMOL/L (ref 9–17)
BUN BLDV-MCNC: 17 MG/DL (ref 8–23)
BUN/CREAT BLD: ABNORMAL (ref 9–20)
CALCIUM SERPL-MCNC: 9.2 MG/DL (ref 8.6–10.4)
CHLORIDE BLD-SCNC: 106 MMOL/L (ref 98–107)
CHOLESTEROL/HDL RATIO: 2.7
CHOLESTEROL: 138 MG/DL
CO2: 27 MMOL/L (ref 20–31)
CREAT SERPL-MCNC: 0.73 MG/DL (ref 0.5–0.9)
GFR AFRICAN AMERICAN: >60 ML/MIN
GFR NON-AFRICAN AMERICAN: >60 ML/MIN
GFR SERPL CREATININE-BSD FRML MDRD: ABNORMAL ML/MIN/{1.73_M2}
GFR SERPL CREATININE-BSD FRML MDRD: ABNORMAL ML/MIN/{1.73_M2}
GLUCOSE BLD-MCNC: 86 MG/DL (ref 70–99)
HDLC SERPL-MCNC: 52 MG/DL
LDL CHOLESTEROL: 68 MG/DL (ref 0–130)
POTASSIUM SERPL-SCNC: 4.2 MMOL/L (ref 3.7–5.3)
SODIUM BLD-SCNC: 140 MMOL/L (ref 135–144)
TRIGL SERPL-MCNC: 88 MG/DL
URIC ACID: 5 MG/DL (ref 2.4–5.7)
VITAMIN D 25-HYDROXY: 27.6 NG/ML (ref 30–100)
VLDLC SERPL CALC-MCNC: NORMAL MG/DL (ref 1–30)

## 2020-09-01 PROCEDURE — 36415 COLL VENOUS BLD VENIPUNCTURE: CPT

## 2020-09-01 PROCEDURE — 80048 BASIC METABOLIC PNL TOTAL CA: CPT

## 2020-09-01 PROCEDURE — 84460 ALANINE AMINO (ALT) (SGPT): CPT

## 2020-09-01 PROCEDURE — 84550 ASSAY OF BLOOD/URIC ACID: CPT

## 2020-09-01 PROCEDURE — 82306 VITAMIN D 25 HYDROXY: CPT

## 2020-09-01 PROCEDURE — 80061 LIPID PANEL: CPT

## 2020-09-02 RX ORDER — ALENDRONATE SODIUM 70 MG/1
TABLET ORAL
Qty: 4 TABLET | Refills: 3 | Status: SHIPPED | OUTPATIENT
Start: 2020-09-02 | End: 2020-12-14

## 2020-09-18 RX ORDER — CETIRIZINE HYDROCHLORIDE 10 MG/1
TABLET ORAL
Qty: 90 TABLET | Refills: 0 | Status: SHIPPED | OUTPATIENT
Start: 2020-09-18 | End: 2020-12-21

## 2020-09-18 RX ORDER — SIMVASTATIN 20 MG
TABLET ORAL
Qty: 90 TABLET | Refills: 0 | Status: SHIPPED | OUTPATIENT
Start: 2020-09-18 | End: 2020-12-21

## 2020-10-12 ENCOUNTER — APPOINTMENT (OUTPATIENT)
Dept: ULTRASOUND IMAGING | Age: 75
DRG: 446 | End: 2020-10-12
Payer: MEDICARE

## 2020-10-12 ENCOUNTER — ANESTHESIA EVENT (OUTPATIENT)
Dept: OPERATING ROOM | Age: 75
End: 2020-10-12

## 2020-10-12 ENCOUNTER — APPOINTMENT (OUTPATIENT)
Dept: GENERAL RADIOLOGY | Age: 75
DRG: 446 | End: 2020-10-12
Payer: MEDICARE

## 2020-10-12 ENCOUNTER — HOSPITAL ENCOUNTER (INPATIENT)
Age: 75
LOS: 3 days | Discharge: HOME | DRG: 446 | End: 2020-10-15
Attending: EMERGENCY MEDICINE | Admitting: FAMILY MEDICINE
Payer: MEDICARE

## 2020-10-12 ENCOUNTER — APPOINTMENT (OUTPATIENT)
Dept: MRI IMAGING | Age: 75
DRG: 446 | End: 2020-10-12
Payer: MEDICARE

## 2020-10-12 ENCOUNTER — APPOINTMENT (OUTPATIENT)
Dept: CT IMAGING | Age: 75
DRG: 446 | End: 2020-10-12
Payer: MEDICARE

## 2020-10-12 PROBLEM — K81.9 CHOLECYSTITIS: Status: ACTIVE | Noted: 2020-10-12

## 2020-10-12 LAB
-: NORMAL
ABSOLUTE EOS #: 0.34 K/UL (ref 0–0.4)
ABSOLUTE IMMATURE GRANULOCYTE: ABNORMAL K/UL (ref 0–0.3)
ABSOLUTE LYMPH #: 1.14 K/UL (ref 1–4.8)
ABSOLUTE MONO #: 0.86 K/UL (ref 0.1–1.3)
ALBUMIN SERPL-MCNC: 3.9 G/DL (ref 3.5–5.2)
ALBUMIN/GLOBULIN RATIO: ABNORMAL (ref 1–2.5)
ALP BLD-CCNC: 184 U/L (ref 35–104)
ALT SERPL-CCNC: 507 U/L (ref 5–33)
AMORPHOUS: NORMAL
ANION GAP SERPL CALCULATED.3IONS-SCNC: 12 MMOL/L (ref 9–17)
AST SERPL-CCNC: 419 U/L
BACTERIA: NORMAL
BASOPHILS # BLD: 1 % (ref 0–2)
BASOPHILS ABSOLUTE: 0.06 K/UL (ref 0–0.2)
BILIRUB SERPL-MCNC: 3.47 MG/DL (ref 0.3–1.2)
BILIRUBIN DIRECT: 3.04 MG/DL
BILIRUBIN URINE: ABNORMAL
BILIRUBIN, INDIRECT: 0.43 MG/DL (ref 0–1)
BUN BLDV-MCNC: 8 MG/DL (ref 8–23)
BUN/CREAT BLD: ABNORMAL (ref 9–20)
CALCIUM SERPL-MCNC: 9 MG/DL (ref 8.6–10.4)
CASTS UA: NORMAL /LPF
CHLORIDE BLD-SCNC: 99 MMOL/L (ref 98–107)
CO2: 22 MMOL/L (ref 20–31)
COLOR: ABNORMAL
COMMENT UA: ABNORMAL
CREAT SERPL-MCNC: 0.52 MG/DL (ref 0.5–0.9)
CRYSTALS, UA: NORMAL /HPF
DIFFERENTIAL TYPE: ABNORMAL
EKG ATRIAL RATE: 53 BPM
EKG P AXIS: 54 DEGREES
EKG P-R INTERVAL: 158 MS
EKG Q-T INTERVAL: 470 MS
EKG QRS DURATION: 80 MS
EKG QTC CALCULATION (BAZETT): 441 MS
EKG R AXIS: -22 DEGREES
EKG T AXIS: 17 DEGREES
EKG VENTRICULAR RATE: 53 BPM
EOSINOPHILS RELATIVE PERCENT: 6 % (ref 0–4)
EPITHELIAL CELLS UA: NORMAL /HPF
GFR AFRICAN AMERICAN: >60 ML/MIN
GFR NON-AFRICAN AMERICAN: >60 ML/MIN
GFR SERPL CREATININE-BSD FRML MDRD: ABNORMAL ML/MIN/{1.73_M2}
GFR SERPL CREATININE-BSD FRML MDRD: ABNORMAL ML/MIN/{1.73_M2}
GLOBULIN: ABNORMAL G/DL (ref 1.5–3.8)
GLUCOSE BLD-MCNC: 111 MG/DL (ref 70–99)
GLUCOSE URINE: NEGATIVE
HCT VFR BLD CALC: 37.7 % (ref 36–46)
HEMOGLOBIN: 12.5 G/DL (ref 12–16)
IMMATURE GRANULOCYTES: ABNORMAL %
INR BLD: 1
KETONES, URINE: ABNORMAL
LEUKOCYTE ESTERASE, URINE: NEGATIVE
LIPASE: 24 U/L (ref 13–60)
LIPASE: 33 U/L (ref 13–60)
LYMPHOCYTES # BLD: 20 % (ref 24–44)
MAGNESIUM: 2 MG/DL (ref 1.6–2.6)
MCH RBC QN AUTO: 30 PG (ref 26–34)
MCHC RBC AUTO-ENTMCNC: 33.1 G/DL (ref 31–37)
MCV RBC AUTO: 90.6 FL (ref 80–100)
MONOCYTES # BLD: 15 % (ref 1–7)
MORPHOLOGY: ABNORMAL
MUCUS: NORMAL
NITRITE, URINE: NEGATIVE
NRBC AUTOMATED: ABNORMAL PER 100 WBC
OTHER OBSERVATIONS UA: NORMAL
PARTIAL THROMBOPLASTIN TIME: 26 SEC (ref 24–36)
PDW BLD-RTO: 14.3 % (ref 11.5–14.9)
PH UA: 5.5 (ref 5–8)
PLATELET # BLD: 215 K/UL (ref 150–450)
PLATELET ESTIMATE: ABNORMAL
PMV BLD AUTO: 8.3 FL (ref 6–12)
POTASSIUM SERPL-SCNC: 3.8 MMOL/L (ref 3.7–5.3)
PROTEIN UA: NEGATIVE
PROTHROMBIN TIME: 12.8 SEC (ref 11.8–14.6)
RBC # BLD: 4.16 M/UL (ref 4–5.2)
RBC # BLD: ABNORMAL 10*6/UL
RBC UA: NORMAL /HPF
RENAL EPITHELIAL, UA: NORMAL /HPF
SARS-COV-2, RAPID: NOT DETECTED
SARS-COV-2: NORMAL
SARS-COV-2: NORMAL
SEG NEUTROPHILS: 58 % (ref 36–66)
SEGMENTED NEUTROPHILS ABSOLUTE COUNT: 3.3 K/UL (ref 1.3–9.1)
SODIUM BLD-SCNC: 133 MMOL/L (ref 135–144)
SOURCE: NORMAL
SPECIFIC GRAVITY UA: 1.02 (ref 1–1.03)
TOTAL PROTEIN: 7.1 G/DL (ref 6.4–8.3)
TRICHOMONAS: NORMAL
TURBIDITY: CLEAR
URINE HGB: ABNORMAL
UROBILINOGEN, URINE: NORMAL
WBC # BLD: 5.7 K/UL (ref 3.5–11)
WBC # BLD: ABNORMAL 10*3/UL
WBC UA: NORMAL /HPF
YEAST: NORMAL

## 2020-10-12 PROCEDURE — 1200000000 HC SEMI PRIVATE

## 2020-10-12 PROCEDURE — 71046 X-RAY EXAM CHEST 2 VIEWS: CPT

## 2020-10-12 PROCEDURE — 96361 HYDRATE IV INFUSION ADD-ON: CPT

## 2020-10-12 PROCEDURE — 93005 ELECTROCARDIOGRAM TRACING: CPT | Performed by: EMERGENCY MEDICINE

## 2020-10-12 PROCEDURE — 99284 EMERGENCY DEPT VISIT MOD MDM: CPT

## 2020-10-12 PROCEDURE — 36415 COLL VENOUS BLD VENIPUNCTURE: CPT

## 2020-10-12 PROCEDURE — 81001 URINALYSIS AUTO W/SCOPE: CPT

## 2020-10-12 PROCEDURE — 76705 ECHO EXAM OF ABDOMEN: CPT

## 2020-10-12 PROCEDURE — 2500000003 HC RX 250 WO HCPCS: Performed by: SURGERY

## 2020-10-12 PROCEDURE — 74177 CT ABD & PELVIS W/CONTRAST: CPT

## 2020-10-12 PROCEDURE — 6360000002 HC RX W HCPCS: Performed by: SURGERY

## 2020-10-12 PROCEDURE — 80048 BASIC METABOLIC PNL TOTAL CA: CPT

## 2020-10-12 PROCEDURE — 83735 ASSAY OF MAGNESIUM: CPT

## 2020-10-12 PROCEDURE — 6360000002 HC RX W HCPCS: Performed by: EMERGENCY MEDICINE

## 2020-10-12 PROCEDURE — 80076 HEPATIC FUNCTION PANEL: CPT

## 2020-10-12 PROCEDURE — U0002 COVID-19 LAB TEST NON-CDC: HCPCS

## 2020-10-12 PROCEDURE — 74183 MRI ABD W/O CNTR FLWD CNTR: CPT

## 2020-10-12 PROCEDURE — 96375 TX/PRO/DX INJ NEW DRUG ADDON: CPT

## 2020-10-12 PROCEDURE — 2580000003 HC RX 258: Performed by: EMERGENCY MEDICINE

## 2020-10-12 PROCEDURE — 85025 COMPLETE CBC W/AUTO DIFF WBC: CPT

## 2020-10-12 PROCEDURE — 6360000004 HC RX CONTRAST MEDICATION: Performed by: EMERGENCY MEDICINE

## 2020-10-12 PROCEDURE — 83690 ASSAY OF LIPASE: CPT

## 2020-10-12 PROCEDURE — 85610 PROTHROMBIN TIME: CPT

## 2020-10-12 PROCEDURE — 2580000003 HC RX 258: Performed by: SURGERY

## 2020-10-12 PROCEDURE — A9579 GAD-BASE MR CONTRAST NOS,1ML: HCPCS | Performed by: SURGERY

## 2020-10-12 PROCEDURE — 6360000004 HC RX CONTRAST MEDICATION: Performed by: SURGERY

## 2020-10-12 PROCEDURE — 96365 THER/PROPH/DIAG IV INF INIT: CPT

## 2020-10-12 PROCEDURE — 85730 THROMBOPLASTIN TIME PARTIAL: CPT

## 2020-10-12 RX ORDER — ONDANSETRON 2 MG/ML
4 INJECTION INTRAMUSCULAR; INTRAVENOUS EVERY 6 HOURS PRN
Status: DISCONTINUED | OUTPATIENT
Start: 2020-10-12 | End: 2020-10-15 | Stop reason: HOSPADM

## 2020-10-12 RX ORDER — 0.9 % SODIUM CHLORIDE 0.9 %
50 INTRAVENOUS SOLUTION INTRAVENOUS ONCE
Status: COMPLETED | OUTPATIENT
Start: 2020-10-12 | End: 2020-10-12

## 2020-10-12 RX ORDER — 0.9 % SODIUM CHLORIDE 0.9 %
1000 INTRAVENOUS SOLUTION INTRAVENOUS ONCE
Status: COMPLETED | OUTPATIENT
Start: 2020-10-12 | End: 2020-10-12

## 2020-10-12 RX ORDER — LIDOCAINE HYDROCHLORIDE 10 MG/ML
1 INJECTION, SOLUTION EPIDURAL; INFILTRATION; INTRACAUDAL; PERINEURAL
Status: CANCELLED | OUTPATIENT
Start: 2020-10-12 | End: 2020-10-12

## 2020-10-12 RX ORDER — SODIUM CHLORIDE, SODIUM LACTATE, POTASSIUM CHLORIDE, CALCIUM CHLORIDE 600; 310; 30; 20 MG/100ML; MG/100ML; MG/100ML; MG/100ML
INJECTION, SOLUTION INTRAVENOUS CONTINUOUS
Status: DISCONTINUED | OUTPATIENT
Start: 2020-10-12 | End: 2020-10-14

## 2020-10-12 RX ORDER — SODIUM CHLORIDE 0.9 % (FLUSH) 0.9 %
10 SYRINGE (ML) INJECTION PRN
Status: DISCONTINUED | OUTPATIENT
Start: 2020-10-12 | End: 2020-10-14

## 2020-10-12 RX ORDER — SODIUM CHLORIDE 0.9 % (FLUSH) 0.9 %
10 SYRINGE (ML) INJECTION EVERY 12 HOURS SCHEDULED
Status: CANCELLED | OUTPATIENT
Start: 2020-10-12

## 2020-10-12 RX ORDER — FENTANYL CITRATE 50 UG/ML
50 INJECTION, SOLUTION INTRAMUSCULAR; INTRAVENOUS
Status: DISCONTINUED | OUTPATIENT
Start: 2020-10-12 | End: 2020-10-12

## 2020-10-12 RX ORDER — FENTANYL CITRATE 50 UG/ML
50 INJECTION, SOLUTION INTRAMUSCULAR; INTRAVENOUS
Status: DISCONTINUED | OUTPATIENT
Start: 2020-10-12 | End: 2020-10-15 | Stop reason: HOSPADM

## 2020-10-12 RX ORDER — SODIUM CHLORIDE 0.9 % (FLUSH) 0.9 %
10 SYRINGE (ML) INJECTION PRN
Status: DISCONTINUED | OUTPATIENT
Start: 2020-10-12 | End: 2020-10-15 | Stop reason: HOSPADM

## 2020-10-12 RX ORDER — SODIUM CHLORIDE 9 MG/ML
INJECTION, SOLUTION INTRAVENOUS CONTINUOUS
Status: DISCONTINUED | OUTPATIENT
Start: 2020-10-12 | End: 2020-10-15 | Stop reason: HOSPADM

## 2020-10-12 RX ORDER — FENTANYL CITRATE 50 UG/ML
25 INJECTION, SOLUTION INTRAMUSCULAR; INTRAVENOUS
Status: DISCONTINUED | OUTPATIENT
Start: 2020-10-12 | End: 2020-10-15 | Stop reason: HOSPADM

## 2020-10-12 RX ORDER — SODIUM CHLORIDE 0.9 % (FLUSH) 0.9 %
10 SYRINGE (ML) INJECTION EVERY 12 HOURS SCHEDULED
Status: DISCONTINUED | OUTPATIENT
Start: 2020-10-12 | End: 2020-10-15 | Stop reason: HOSPADM

## 2020-10-12 RX ORDER — SODIUM CHLORIDE 0.9 % (FLUSH) 0.9 %
10 SYRINGE (ML) INJECTION PRN
Status: CANCELLED | OUTPATIENT
Start: 2020-10-12

## 2020-10-12 RX ORDER — MORPHINE SULFATE 4 MG/ML
4 INJECTION, SOLUTION INTRAMUSCULAR; INTRAVENOUS ONCE
Status: COMPLETED | OUTPATIENT
Start: 2020-10-12 | End: 2020-10-12

## 2020-10-12 RX ORDER — 0.9 % SODIUM CHLORIDE 0.9 %
80 INTRAVENOUS SOLUTION INTRAVENOUS ONCE
Status: COMPLETED | OUTPATIENT
Start: 2020-10-12 | End: 2020-10-12

## 2020-10-12 RX ORDER — ONDANSETRON 2 MG/ML
4 INJECTION INTRAMUSCULAR; INTRAVENOUS ONCE
Status: COMPLETED | OUTPATIENT
Start: 2020-10-12 | End: 2020-10-12

## 2020-10-12 RX ORDER — SODIUM CHLORIDE, SODIUM LACTATE, POTASSIUM CHLORIDE, CALCIUM CHLORIDE 600; 310; 30; 20 MG/100ML; MG/100ML; MG/100ML; MG/100ML
INJECTION, SOLUTION INTRAVENOUS CONTINUOUS
Status: CANCELLED | OUTPATIENT
Start: 2020-10-12

## 2020-10-12 RX ORDER — FENTANYL CITRATE 50 UG/ML
25 INJECTION, SOLUTION INTRAMUSCULAR; INTRAVENOUS
Status: DISCONTINUED | OUTPATIENT
Start: 2020-10-12 | End: 2020-10-12

## 2020-10-12 RX ADMIN — ONDANSETRON 4 MG: 2 INJECTION INTRAMUSCULAR; INTRAVENOUS at 02:51

## 2020-10-12 RX ADMIN — SODIUM CHLORIDE, POTASSIUM CHLORIDE, SODIUM LACTATE AND CALCIUM CHLORIDE: 600; 310; 30; 20 INJECTION, SOLUTION INTRAVENOUS at 04:32

## 2020-10-12 RX ADMIN — Medication 10 ML: at 03:44

## 2020-10-12 RX ADMIN — MORPHINE SULFATE 4 MG: 4 INJECTION, SOLUTION INTRAMUSCULAR; INTRAVENOUS at 03:04

## 2020-10-12 RX ADMIN — PIPERACILLIN AND TAZOBACTAM 4.5 G: 4; .5 INJECTION, POWDER, LYOPHILIZED, FOR SOLUTION INTRAVENOUS; PARENTERAL at 04:26

## 2020-10-12 RX ADMIN — FAMOTIDINE 20 MG: 10 INJECTION INTRAVENOUS at 08:25

## 2020-10-12 RX ADMIN — SODIUM CHLORIDE 50 ML: 9 INJECTION, SOLUTION INTRAVENOUS at 19:19

## 2020-10-12 RX ADMIN — FAMOTIDINE 20 MG: 10 INJECTION INTRAVENOUS at 19:38

## 2020-10-12 RX ADMIN — FENTANYL CITRATE 50 MCG: 50 INJECTION INTRAMUSCULAR; INTRAVENOUS at 08:25

## 2020-10-12 RX ADMIN — VANCOMYCIN HYDROCHLORIDE 1500 MG: 1.5 INJECTION, POWDER, LYOPHILIZED, FOR SOLUTION INTRAVENOUS at 05:23

## 2020-10-12 RX ADMIN — SODIUM CHLORIDE 80 ML: 9 INJECTION, SOLUTION INTRAVENOUS at 03:44

## 2020-10-12 RX ADMIN — IOPAMIDOL 75 ML: 755 INJECTION, SOLUTION INTRAVENOUS at 03:44

## 2020-10-12 RX ADMIN — ONDANSETRON 4 MG: 2 INJECTION INTRAMUSCULAR; INTRAVENOUS at 10:52

## 2020-10-12 RX ADMIN — FENTANYL CITRATE 50 MCG: 50 INJECTION INTRAMUSCULAR; INTRAVENOUS at 21:55

## 2020-10-12 RX ADMIN — PIPERACILLIN SODIUM AND TAZOBACTAM SODIUM 3.38 G: 3; .375 INJECTION, POWDER, LYOPHILIZED, FOR SOLUTION INTRAVENOUS at 17:12

## 2020-10-12 RX ADMIN — ONDANSETRON 4 MG: 2 INJECTION INTRAMUSCULAR; INTRAVENOUS at 19:38

## 2020-10-12 RX ADMIN — VANCOMYCIN HYDROCHLORIDE 1000 MG: 1 INJECTION, POWDER, LYOPHILIZED, FOR SOLUTION INTRAVENOUS at 22:37

## 2020-10-12 RX ADMIN — GADOTERIDOL 11 ML: 279.3 INJECTION, SOLUTION INTRAVENOUS at 19:19

## 2020-10-12 RX ADMIN — SODIUM CHLORIDE 1000 ML: 9 INJECTION, SOLUTION INTRAVENOUS at 02:51

## 2020-10-12 RX ADMIN — SODIUM CHLORIDE: 9 INJECTION, SOLUTION INTRAVENOUS at 08:25

## 2020-10-12 ASSESSMENT — PAIN DESCRIPTION - DESCRIPTORS
DESCRIPTORS: DULL;ACHING
DESCRIPTORS: DULL
DESCRIPTORS: DISCOMFORT;BURNING

## 2020-10-12 ASSESSMENT — PAIN DESCRIPTION - ORIENTATION
ORIENTATION: RIGHT;UPPER

## 2020-10-12 ASSESSMENT — PAIN SCALES - GENERAL
PAINLEVEL_OUTOF10: 6
PAINLEVEL_OUTOF10: 6
PAINLEVEL_OUTOF10: 7
PAINLEVEL_OUTOF10: 10
PAINLEVEL_OUTOF10: 8

## 2020-10-12 ASSESSMENT — PAIN DESCRIPTION - LOCATION
LOCATION: ABDOMEN

## 2020-10-12 ASSESSMENT — PAIN DESCRIPTION - PAIN TYPE
TYPE: ACUTE PAIN

## 2020-10-12 ASSESSMENT — ENCOUNTER SYMPTOMS
VOMITING: 1
DIARRHEA: 0
COUGH: 0
SHORTNESS OF BREATH: 0
STRIDOR: 0
ABDOMINAL PAIN: 1
NAUSEA: 1
BACK PAIN: 0

## 2020-10-12 ASSESSMENT — PAIN DESCRIPTION - FREQUENCY
FREQUENCY: INTERMITTENT
FREQUENCY: CONTINUOUS

## 2020-10-12 NOTE — CONSULTS
General Surgery Consult      Pt Name: Sarah Galaviz  MRN: [id number]  YOB: 1945  Date of evaluation: 10/12/2020  Primary Care Physician: Marijane Canavan, MD   Patient evaluated at the request of  Dr. José Miguel Gomez  Reason for evaluation: Abdominal pain right upper quadrant    SUBJECTIVE:   History of Chief Complaint:    Sarah Galaviz is a 67 y.o. female who presents with significant right upper quadrant abdominal pain radiating to the back nausea bloating sensation. Sudden onset. No distention no fever chills no jaundice. Previous hysterectomy and tubal ligation. Does not take any blood thinners. Patient has had the symptoms off and on for quite some time but was worse yesterday. Emergency room work-up reviewed. Symptom onset has been recurrent for a time period of few month(s). Severity is described as moderate. Course of her symptoms over time is recurrent. Past Medical History   has a past medical history of Allergic rhinitis, Closed tibia fracture, GERD (gastroesophageal reflux disease), Gout, Headache(784.0), Hyperlipidemia, Osteoarthritis, Osteoporosis, Posterior tibial tendon dysfunction, Vitamin D deficient rickets, Wears glasses, and Wears partial dentures. Past Surgical History   has a past surgical history that includes Carpal tunnel release; Hysterectomy (4/12); Knee arthroscopy (Left, 2002?); Tibia fracture surgery (Left); Appendectomy; Tonsillectomy and adenoidectomy; Colonoscopy; fracture surgery; pr total knee arthroplasty (Left, 11/6/2018); [place] tooth extraction; Cataract removal with implant (Left, 03/07/2019); Intracapsular cataract extraction (Left, 3/7/2019); eye surgery; Cataract removal with implant (Right, 04/04/2019); Intracapsular cataract extraction (Right, 4/4/2019); joint replacement (Left, 11/06/2018); and Total knee arthroplasty (Right, 12/10/2019). Medications  Prior to Admission medications    Medication Sig Start Date End Date Taking?  Authorizing Provider cetirizine (ZYRTEC) 10 MG tablet TAKE ONE TABLET BY MOUTH DAILY 9/18/20  Yes PASCUAL Bautista CNP   simvastatin (ZOCOR) 20 MG tablet TAKE ONE TABLET BY MOUTH DAILY 9/18/20  Yes PASCUAL Bautista CNP   alendronate (FOSAMAX) 70 MG tablet TAKE 1 TABLET BY MOUTH ONCE WEEKLY BEFORE BREAKFAST, ON AN EMPTY STOMACH: REMAIN UPRIGHT FOR 30 MINUTES:TAKE WITH 8 OUNCES OF WATER 9/2/20  Yes PASCUAL Bautista CNP   vitamin D (ERGOCALCIFEROL) 1.25 MG (77743 UT) CAPS capsule TAKE ONE CAPSULE BY MOUTH EVERY 2 WEEKS 7/20/20  Yes PASCUAL Bautista CNP   verapamil (CALAN SR) 240 MG extended release tablet TAKE ONE TABLET BY MOUTH ONCE NIGHTLY FOR MIGRAINE HEADACHE 6/12/20  Yes PASCUAL Bautista CNP   allopurinol (ZYLOPRIM) 100 MG tablet TAKE ONE TABLET BY MOUTH DAILY 6/4/20  Yes PASCUAL Bautista CNP   famotidine (PEPCID) 20 MG tablet TAKE ONE TABLET BY MOUTH TWICE A DAY 5/26/20  Yes PASCUAL Bautista CNP   fluticasone (FLONASE) 50 MCG/ACT nasal spray SPRAY TWO SPRAYS IN EACH NOSTRIL ONCE DAILY 5/1/20  Yes PASCUAL Bautista CNP   aspirin 81 MG tablet Take 81 mg by mouth daily   Yes Historical Provider, MD   naproxen (NAPROSYN) 250 MG tablet TAKE ONE TABLET BY MOUTH THREE TIMES A DAY WITH FOOD AS NEEDED FOR GOUT FLARE UP UNTIL ATTACK SUBSIDES 11/25/19  Yes Sera Pena MD   Aspirin-Acetaminophen-Caffeine (EXCEDRIN MIGRAINE PO) Take by mouth as needed    Yes Historical Provider, MD   docusate sodium (COLACE) 100 MG capsule Take 1 capsule by mouth 2 times daily 12/18/18  Yes PASCUAL Bautista CNP     Allergies  has No Known Allergies. Family History  family history includes Arthritis in an other family member; COPD in an other family member; Stroke in her father; Thyroid Disease in an other family member. Social History   reports that she quit smoking about 33 years ago. Her smoking use included cigarettes. She has a 5.00 pack-year smoking history.  She has never used smokeless tobacco. She reports that she does not drink alcohol or Component Value Date    WBC 5.7 10/12/2020    RBC 4.16 10/12/2020    RBC 2.91 04/20/2012    HGB 12.5 10/12/2020    HCT 37.7 10/12/2020     10/12/2020     04/20/2012    MCV 90.6 10/12/2020    MCH 30.0 10/12/2020    MCHC 33.1 10/12/2020    RDW 14.3 10/12/2020    LYMPHOPCT 20 10/12/2020    MONOPCT 15 10/12/2020    BASOPCT 1 10/12/2020    MONOSABS 0.86 10/12/2020    LYMPHSABS 1.14 10/12/2020    EOSABS 0.34 10/12/2020    BASOSABS 0.06 10/12/2020    DIFFTYPE NOT REPORTED 10/12/2020     BMP:    Lab Results   Component Value Date     10/12/2020    K 3.8 10/12/2020    CL 99 10/12/2020    CO2 22 10/12/2020    BUN 8 10/12/2020    LABALBU 3.9 10/12/2020    LABALBU 4.2 04/05/2012    CREATININE 0.52 10/12/2020    CALCIUM 9.0 10/12/2020    GFRAA >60 10/12/2020    LABGLOM >60 10/12/2020    GLUCOSE 111 10/12/2020    GLUCOSE 84 04/09/2012     Hepatic Function Panel:    Lab Results   Component Value Date    ALKPHOS 184 10/12/2020     10/12/2020     10/12/2020    PROT 7.1 10/12/2020    BILITOT 3.47 10/12/2020    BILIDIR 3.04 10/12/2020    IBILI 0.43 10/12/2020    LABALBU 3.9 10/12/2020    LABALBU 4.2 04/05/2012     Calcium:    Lab Results   Component Value Date    CALCIUM 9.0 10/12/2020     Magnesium:    Lab Results   Component Value Date    MG 2.0 10/12/2020     Phosphorus:  No results found for: PHOS  PT/INR:    Lab Results   Component Value Date    PROTIME 12.8 10/12/2020    INR 1.0 10/12/2020     ABG:  No results found for: PHART, PH, YMN4IAX, PCO2, PO2ART, PO2, LLC3KIQ, HCO3, BEART, BE, THGBART, THB, BQM7AQB, Y4ZIBRTK, O2SAT  Urine Culture:  No components found for: CURINE  Blood Culture:  No components found for: CBLOOD, CFUNGUSBL  Stool Culture:  No components found for: CSTOOL    RADIOLOGY:   I have personally reviewed the following films:  Ct Abdomen Pelvis W Iv Contrast Additional Contrast? None    Result Date: 10/12/2020  EXAMINATION: CT OF THE ABDOMEN AND PELVIS WITH CONTRAST 10/12/2020 3:34 am TECHNIQUE: CT of the abdomen and pelvis was performed with the administration of intravenous contrast. Multiplanar reformatted images are provided for review. Dose modulation, iterative reconstruction, and/or weight based adjustment of the mA/kV was utilized to reduce the radiation dose to as low as reasonably achievable. COMPARISON: None. HISTORY: ORDERING SYSTEM PROVIDED HISTORY: RUQ abd pain N/V, positive Mckeon's sign TECHNOLOGIST PROVIDED HISTORY: RUQ abd pain N/V, positive Mckeon's sign Reason for Exam: Patient c/o RUQ abdominal pain with nausea and vomiting x 1 day Acuity: Acute Type of Exam: Initial FINDINGS: Lower Chest: Minimal dependent changes are seen within the lungs bilaterally. Organs: There is suggestion of a gallstone in the region of the gallbladder neck. There appears to be pericholecystic fluid. Mild prominence of the intrahepatic bile ducts is seen. Otherwise, the liver, spleen, pancreas and adrenal glands demonstrate no acute abnormality. The kidneys demonstrate symmetric enhancement. No convincing focal renal mass. There is no hydronephrosis. GI/Bowel: There is no evidence of a bowel obstruction. Stool is seen throughout the colon. No evidence of acute appendicitis. Pelvis: The urinary bladder demonstrates no acute abnormality. The patient is status post hysterectomy. Peritoneum/Retroperitoneum: The abdominal aorta is normal in caliber with scattered atherosclerosis. No bulky retroperitoneal or mesenteric adenopathy. No free fluid or free air seen in the abdomen or pelvis. Bones/Soft Tissues: There is a presumably chronic compression deformity of the T12 vertebral body. No acute osseous abnormality seen. 1. There appears to be a gallstone in the region the gallbladder neck with pericholecystic fluid as well as mild prominence of the intrahepatic bile ducts. Findings could represent acute cholecystitis.  2. Otherwise, no acute abnormality identified within the abdomen or pelvis. Us Abdomen Limited Specify Organ? Liver, Gallbladder    Result Date: 10/12/2020  EXAMINATION: RIGHT UPPER QUADRANT ULTRASOUND 10/12/2020 9:12 am COMPARISON: CT 10/12/2020 HISTORY: ORDERING SYSTEM PROVIDED HISTORY: acute cholecystitis TECHNOLOGIST PROVIDED HISTORY: acute cholecystitis Specify organ?->LIVER Specify organ?->GALLBLADDER Acuity: Acute Type of Exam: Subsequent/Follow-up FINDINGS: LIVER:  The liver has a fairly homogeneous echotexture with no focal masses identified by ultrasound. Liver length is 16.3 cm. Flow in the portal vein is hepatopetal. BILIARY SYSTEM:  The gallbladder appears very irregular with apparent diffuse wall thickening. The lumen is poorly defined. There is an echogenic focus with shadowing in the region of the neck compatible with a gallstone as shown on the recent CT. The patient reportedly has a positive sonographic Mckeon's sign. Mildly dilated common bile duct measuring up to 7.8 mm. RIGHT KIDNEY: No obstruction of the somewhat atrophic right kidney as visualized. PANCREAS:  Visualized portions of the pancreas are unremarkable. Overall the pancreas is not well seen due to overlying bowel gas. Pancreatic duct is mildly prominent measuring up to 3.4 mm. OTHER: No evidence of right upper quadrant ascites. Suspect a 7 mm gallstone with a diffusely thickened irregular gallbladder wall and collapsed lumen. Patient reportedly had a positive sonographic Mckeon's sign. Findings are suggestive of acute cholecystitis. Prominent common bile duct measuring up to 7.8 mm. Correlate with clinical findings and laboratory values to determine need for MRCP. The findings were sent to the Radiology Results [address] at 10:19 am on 10/12/2020to be communicated to a licensed caregiver. IMPRESSION:   1. Symptomatic cholelithiasis possible acute calculus cholecystitis rule out choledocholithiasis  2.  Previous tubal ligation and hysterectomy    does not have any pertinent problems on file. PLAN:   1. Orders put in the computer. Bowel rest.  IV hydration. IV antibiotics. Ultrasound of the gallbladder today and MRI MRCP. Repeat blood work in the morning. Patient is tentatively scheduled for robotic cholecystectomy on Wednesday pending medical clearance and other investigations. Thank you for this interesting consult and for allowing us to participate in the care of this patient. If you have any questions please don't hesitate to call.           Electronically signed by Zee Castillo MD  on 10/12/2020 at 12:08 PM

## 2020-10-12 NOTE — ED PROVIDER NOTES
EMERGENCY DEPARTMENT ENCOUNTER    Pt Name: Be Fall  MRN: [id number]  [place] 1945  Date of evaluation: 10/12/20  CHIEF COMPLAINT       Chief Complaint   Patient presents with    Abdominal Pain     right upper quadrant X4 days     HISTORY OF PRESENT ILLNESS   HPI     This is a 54-year-old female who comes in today. Patient states that 3 days ago she developed right upper quadrant abdominal pain it has waxed and waned patient states that she felt a little better she went to her son's and had hobo dinner and had sausage and she had worsening right upper quadrant abdominal pain with nausea and one episode of emesis. She still has nausea as well as decrease in appetite states that she is only eaten a piece of toast all day long. She has had a decrease in drinking her water intake secondary to the pain in her right upper quadrant. She states that her urine is now dark but she has no burning with urination. The patient denies any chest pain shortness of breath fever chills cough congestion no history of similar symptoms. The patient is status post remote hysterectomy tubal ligation as well as a appendectomy    REVIEW OF SYSTEMS     Review of Systems   Constitutional: Negative for fever. HENT: Negative for congestion. Respiratory: Negative for cough and shortness of breath. Cardiovascular: Negative for chest pain. Gastrointestinal: Positive for abdominal pain, nausea and vomiting. Negative for diarrhea. Genitourinary: Negative for dysuria. Musculoskeletal: Negative for back pain. Skin: Negative for rash. Neurological: Negative for headaches. All other systems reviewed and are negative.     PASTMEDICAL HISTORY     Past Medical History:   Diagnosis Date    Allergic rhinitis     Closed tibia fracture     GERD (gastroesophageal reflux disease)     Gout     Headache(784.0)     h/o migraines    Hyperlipidemia     Osteoarthritis     DJD Lt knee    Osteoporosis     Posterior tibial tendon dysfunction     right, wears braces on both legs    Vitamin D deficient rickets     Wears glasses     Wears partial dentures     lower     SURGICAL HISTORY       Past Surgical History:   Procedure Laterality Date    APPENDECTOMY      CARPAL TUNNEL RELEASE      bilateral    CATARACT REMOVAL [facility] IMPLANT [facility]/07[facility]2019    [facility]    CATARACT REMOVAL WITH IMPLANT Right 04/04/2019    [facility]    COLONOSCOPY      EYE SURGERY      FRACTURE SURGERY      left tibia    HYSTERECTOMY  4/12    INTRACAPSULAR CATARACT EXTRACTION Left 3/7/2019    EYE CATARACT EMULSIFICATION IOL IMPLANT - TOPICAL performed by Ish Zambrano MD at [address] [place] [address] Right 4/4/2019    EYE CATARACT EMULSIFICATION IOL IMPLANT performed by Ish Zambrano MD at [facility] Left 11/06/2018    hardware removal and then TKA    KNEE ARTHROSCOPY Left 2002?     [place] TOTAL KNEE ARTHROPLASTY Left 11/6/2018    KNEE TOTAL ARTHROPLASTY W/REMOVAL HARDWARE PLATE & SCREWS performed by Nasrin Maciel MD at [address] [facility] [address] Left     TONSILLECTOMY AND ADENOIDECTOMY      TOTAL KNEE ARTHROPLASTY Right 12/10/2019    KNEE TOTAL ARTHROPLASTY performed by Nasrin Maciel MD at [facility]       Previous Medications    ALENDRONATE (FOSAMAX) 70 MG TABLET    TAKE 1 TABLET BY MOUTH ONCE WEEKLY BEFORE BREAKFAST, ON AN EMPTY STOMACH: REMAIN UPRIGHT FOR 30 MINUTES:TAKE WITH 8 OUNCES OF WATER    ALLOPURINOL (ZYLOPRIM) 100 MG TABLET    TAKE ONE TABLET BY MOUTH DAILY    ASPIRIN 81 MG TABLET    Take 81 mg by mouth daily    ASPIRIN-ACETAMINOPHEN-CAFFEINE (EXCEDRIN MIGRAINE PO)    Take by mouth as needed     CETIRIZINE (ZYRTEC) 10 MG TABLET    TAKE ONE TABLET BY MOUTH DAILY    DOCUSATE SODIUM (COLACE) 100 MG CAPSULE    Take 1 capsule by mouth 2 times daily    FAMOTIDINE (PEPCID) 20 MG TABLET    TAKE ONE TABLET BY MOUTH TWICE A DAY    FLUTICASONE (FLONASE) 50 MCG/ACT NASAL SPRAY    SPRAY TWO SPRAYS IN EACH NOSTRIL ONCE DAILY    NAPROXEN (NAPROSYN) 250 MG TABLET    TAKE ONE TABLET BY MOUTH THREE TIMES A DAY WITH FOOD AS NEEDED FOR GOUT FLARE UP UNTIL ATTACK SUBSIDES    SIMVASTATIN (ZOCOR) 20 MG TABLET    TAKE ONE TABLET BY MOUTH DAILY    VERAPAMIL (CALAN SR) 240 MG EXTENDED RELEASE TABLET    TAKE ONE TABLET BY MOUTH ONCE NIGHTLY FOR MIGRAINE HEADACHE    VITAMIN D (ERGOCALCIFEROL) 1.25 MG (39841 UT) CAPS CAPSULE    TAKE ONE CAPSULE BY MOUTH EVERY 2 WEEKS     ALLERGIES     has No Known Allergies. FAMILY HISTORY     She indicated that her mother is . She indicated that her father is . She indicated that the status of her other is unknown. SOCIAL HISTORY       Social History     Tobacco Use    Smoking status: Former Smoker     Packs/day: 0.50     Years: 10.00     Pack years: 5.00     Types: Cigarettes     Last attempt to quit: 1987     Years since quittin.6    Smokeless tobacco: Never Used   Substance Use Topics    Alcohol use: No     Alcohol/week: 0.0 standard drinks    Drug use: No     PHYSICAL EXAM     INITIAL VITALS: /62   Pulse 55   Temp 97.8 °F (36.6 °C) (Oral)   Resp 11   Ht 5' 3\" (1.6 m)   Wt 129 lb (58.5 kg)   LMP 2004   SpO2 96%   BMI 22.85 kg/m²    Physical Exam  Vitals signs and nursing note reviewed. Constitutional:       General: She is not in acute distress. Appearance: She is well-developed. HENT:      Head: Normocephalic and atraumatic. Eyes:      Conjunctiva/sclera: Conjunctivae normal.   Neck:      Musculoskeletal: Neck supple. Cardiovascular:      Rate and Rhythm: Normal rate and regular rhythm. Pulses: Normal pulses. Heart sounds: No murmur. No friction rub. Pulmonary:      Effort: Pulmonary effort is normal. No respiratory distress. Breath sounds: Normal breath sounds. No wheezing or rhonchi.    Abdominal:      Comments: Right upper quadrant abdominal tenderness to palpation with voluntary guarding positive Mckeon sign   Skin:     General: Skin is warm and dry. Capillary Refill: Capillary refill takes less than 2 seconds. Neurological:      Mental Status: She is alert. DIAGNOSTIC RESULTS   RADIOLOGY:All plain film, CT, MRI, and formal ultrasound images (except ED bedside ultrasound) are read by the radiologist, see reports below, unless otherwisenoted in MDM or here. CT ABDOMEN PELVIS W IV CONTRAST Additional Contrast? None   Final Result   1. There appears to be a gallstone in the region the gallbladder neck with   pericholecystic fluid as well as mild prominence of the intrahepatic bile   ducts. Findings could represent acute cholecystitis. 2. Otherwise, no acute abnormality identified within the abdomen or pelvis. LABS: All lab results were reviewed by myself, and all abnormals are listed below. Labs Reviewed   CBC WITH AUTO DIFFERENTIAL - Abnormal; Notable for the following components:       Result Value    Lymphocytes 20 (*)     Monocytes 15 (*)     Eosinophils % 6 (*)     All other components within normal limits   BASIC METABOLIC PANEL - Abnormal; Notable for the following components:    Glucose 111 (*)     Sodium 133 (*)     All other components within normal limits   HEPATIC FUNCTION PANEL - Abnormal; Notable for the following components:    Alkaline Phosphatase 184 (*)      (*)      (*)     Total Bilirubin 3.47 (*)     Bilirubin, Direct 3.04 (*)     All other components within normal limits   URINALYSIS - Abnormal; Notable for the following components:    Color, UA DARK YELLOW (*)     Bilirubin Urine   (*)     Value: Presumptive positive. Unable to confirm due to unavailability of reagent.     Ketones, Urine TRACE (*)     Urine Hgb TRACE (*)     All other components within normal limits   MAGNESIUM   LIPASE   APTT   PROTIME-INR   MICROSCOPIC URINALYSIS       EMERGENCY DEPARTMENTCOURSE: Differential diagnosis includes cholecystitis, ruptured hollow viscus, ACS pancreatitis volume depletion. 3:40 AM EDT  Patient has ALT elevation of 507 AST elevation of 419 elevated bilirubin at 3.47. Normal lipase. She has no leukocytosis no anemia. No elevation in creatinine mild hyponatremia with a sodium of 133 without any other electrolyte abnormality no anion gap elevation. 4:12 AM EDT  CT scan is concerning for cholecystitis will consult surgery. I added vancomycin and zosyn. I did speak to Dr. Errol Schulz who recommends admitting the patient under medicine and he will see the patient in the morning. He is also recommended a COVID test which is ordered. 5:32 AM EDT  I spoke to Dr. Sadia Lopez who will admit the patient. EKG sinus bradycardia with a heart rate of 53 beats per minutes left axis deviation no acute ST or T wave changes          CONSULTS:  IP CONSULT TO GENERAL SURGERY  PHARMACY TO DOSE VANCOMYCIN    Vitals:    Vitals:    10/12/20 0230 10/12/20 0245 10/12/20 0300 10/12/20 0315   BP: 112/65 115/64 122/64 118/62   Pulse: 58 66 55 55   Resp: 18 15 13 11   Temp:       TempSrc:       SpO2: 96%  97% 96%   Weight:       Height:           The patient was given the following medications while in the emergency department:  Orders Placed This Encounter   Medications    0.9 % sodium chloride bolus    morphine sulfate (PF) injection 4 mg    ondansetron (ZOFRAN) injection 4 mg    iopamidol (ISOVUE-370) 76 % injection 75 mL    0.9 % sodium chloride bolus    sodium chloride flush 0.9 % injection 10 mL    piperacillin-tazobactam (ZOSYN) 4.5 g in dextrose 5 % 100 mL IVPB (mini-bag)     Order Specific Question:   Antimicrobial Indications     Answer:   Intra-Abdominal Infection    lactated ringers infusion         FINAL IMPRESSION      1.  Cholecystitis         DISPOSITION/PLAN   DISPOSITION Decision To Admit 10/12/2020 04:13:12 AM    Melody Maynard MD  Attending Emergency Physician    This charting supersedes any ED resident or staff charting and was written using speech recognition software        Adelaida Drake MD  10/12/20 0532       Adelaida Drake MD  10/12/20 5817

## 2020-10-12 NOTE — PROGRESS NOTES
Pharmacy Note  Vancomycin Consult    Danna Kenny is a 67 y.o. female started on Vancomycin for abdominal infection; consult received from Dr. German Mariscal to manage therapy. Also receiving the following antibiotics: zosyn. Patient Active Problem List   Diagnosis    Allergic rhinitis    Osteoarthritis    Osteoporosis    GERD (gastroesophageal reflux disease)    Headache    Mixed hyperlipidemia    Gout    Vitamin D deficiency    Migraine    Chronic headache    Anemia    Lymphadenopathy    Degenerative arthritis of knee, bilateral    Primary osteoarthritis of left knee    Primary osteoarthritis of right knee       Allergies:  Patient has no known allergies. Temp max: 97.8    Recent Labs     10/12/20  0250   BUN 8       Recent Labs     10/12/20  0250   CREATININE 0.52       Recent Labs     10/12/20  0250   WBC 5.7       No intake or output data in the 24 hours ending 10/12/20 0424    Culture Date      Source                       Results      Ht Readings from Last 1 Encounters:   10/12/20 5' 3\" (1.6 m)        Wt Readings from Last 1 Encounters:   10/12/20 129 lb (58.5 kg)         Body mass index is 22.85 kg/m². Estimated Creatinine Clearance: 76 mL/min (based on SCr of 0.52 mg/dL). Goal Trough Level: 15-20 mcg/mL    Assessment/Plan:  Will initiate Vancomycin with a one time loading dose of 1500 mg x1, followed by 1000 mg IV every 12 hours. Timing of trough level will be determined based on culture results, renal function, and clinical response. Thank you for the consult. Will continue to follow.     Janis Salas [facility]     -10/12/2020 at 4:29 AM

## 2020-10-12 NOTE — ANESTHESIA PRE PROCEDURE
Department of Anesthesiology  Preprocedure Note       Name:  Jonna Talamantes   Age:  67 y.o.  :  1945                                          MRN:  [id number]         Date:  10/12/2020      Surgeon: Krista Burris):  Stalin Rothman MD    Procedure: CHOLECYSTECTOMY LAPAROSCOPIC ROBOTIC XI (N/A Abdomen)    Medications prior to admission:   Prior to Admission medications    Medication Sig Start Date End Date Taking?  Authorizing Provider   cetirizine (ZYRTEC) 10 MG tablet TAKE ONE TABLET BY MOUTH DAILY 20   PASCUAL Yuen CNP   simvastatin (ZOCOR) 20 MG tablet TAKE ONE TABLET BY MOUTH DAILY 20   PASCUAL Yuen CNP   alendronate (FOSAMAX) 70 MG tablet TAKE 1 TABLET BY MOUTH ONCE WEEKLY BEFORE BREAKFAST, ON AN EMPTY STOMACH: REMAIN UPRIGHT FOR 30 MINUTES:TAKE WITH 8 OUNCES OF WATER 20   PASCUAL Yuen CNP   vitamin D (ERGOCALCIFEROL) 1.25 MG (21135 UT) CAPS capsule TAKE ONE CAPSULE BY MOUTH EVERY 2 WEEKS 20   PASCUAL Yuen CNP   verapamil (CALAN SR) 240 MG extended release tablet TAKE ONE TABLET BY MOUTH ONCE NIGHTLY FOR MIGRAINE HEADACHE 20   PASCUAL Yuen CNP   allopurinol (ZYLOPRIM) 100 MG tablet TAKE ONE TABLET BY MOUTH DAILY 20   PASCUAL Yuen CNP   famotidine (PEPCID) 20 MG tablet TAKE ONE TABLET BY MOUTH TWICE A DAY 20   PASCUAL Yuen CNP   fluticasone (FLONASE) 50 MCG/ACT nasal spray SPRAY TWO SPRAYS IN EACH NOSTRIL ONCE DAILY 20   PASCUAL Yuen CNP   aspirin 81 MG tablet Take 81 mg by mouth daily    Historical Provider, MD   naproxen (NAPROSYN) 250 MG tablet TAKE ONE TABLET BY MOUTH THREE TIMES A DAY WITH FOOD AS NEEDED FOR GOUT FLARE UP UNTIL ATTACK SUBSIDES 19   Cruz Lagunas MD   Aspirin-Acetaminophen-Caffeine (EXCEDRIN MIGRAINE PO) Take by mouth as needed     Historical Provider, MD   docusate sodium (COLACE) 100 MG capsule Take 1 capsule by mouth 2 times daily 18   PASCUAL Yuen - CNP       Current medications:    No current facility-administered medications for this visit. No current outpatient medications on file.      Facility-Administered Medications Ordered in Other Visits   Medication Dose Route Frequency Provider Last Rate Last Dose    sodium chloride flush 0.9 % injection 10 mL  10 mL Intravenous PRN Bea Esparza MD   10 mL at 10/12/20 0344    lactated ringers infusion   Intravenous Continuous Bea Esparza  mL/hr at 10/12/20 0432      vancomycin (VANCOCIN) intermittent dosing (placeholder)   Other RX Placeholder Bea Esparza MD        vancomycin 1000 mg IVPB in 250 mL D5W addavial  1,000 mg Intravenous Q12H Bea Esparza MD        sodium chloride flush 0.9 % injection 10 mL  10 mL Intravenous 2 times per day Shannon Lou MD        sodium chloride flush 0.9 % injection 10 mL  10 mL Intravenous PRN Shannon Lou MD        0.9 % sodium chloride infusion   Intravenous Continuous Jono Jewell  mL/hr at 10/12/20 0825      famotidine (PEPCID) injection 20 mg  20 mg Intravenous BID Jono Jewell MD   20 mg at 10/12/20 0825    ondansetron (ZOFRAN) injection 4 mg  4 mg Intravenous Q6H PRN Jono Jewell MD   4 mg at 10/12/20 1052    piperacillin-tazobactam (ZOSYN) 3.375 g in dextrose 5 % 50 mL IVPB extended infusion (mini-bag)  3.375 g Intravenous Q8H Antony Reid MD        fentaNYL (SUBLIMAZE) injection 25 mcg  25 mcg Intravenous Q2H PRN Jono Jewell MD        Or    fentaNYL (SUBLIMAZE) injection 50 mcg  50 mcg Intravenous Q2H PRN Jono Jewell MD   50 mcg at 10/12/20 0825       Allergies:  No Known Allergies    Problem List:    Patient Active Problem List   Diagnosis Code    Allergic rhinitis J30.9    Osteoarthritis M19.90    Osteoporosis M81.0    GERD (gastroesophageal reflux disease) K21.9    Headache R51.9    Mixed hyperlipidemia E78.2    Gout M10.9    Vitamin D deficiency E55.9    Migraine G43.909    Chronic headache R51.9, G89.29    Anemia D64.9    Lymphadenopathy R59.1    Degenerative arthritis of knee, bilateral M17.0    Primary osteoarthritis of left knee M17.12    Primary osteoarthritis of right knee M17.11    Cholecystitis K81.9       Past Medical History:        Diagnosis Date    Allergic rhinitis     Closed tibia fracture     GERD (gastroesophageal reflux disease)     Gout     Headache(784.0)     h/o migraines    Hyperlipidemia     Osteoarthritis     DJD Lt knee    Osteoporosis     Posterior tibial tendon dysfunction     right, wears braces on both legs    Vitamin D deficient rickets     Wears glasses     Wears partial dentures     lower       Past Surgical History:        Procedure Laterality Date    APPENDECTOMY      CARPAL TUNNEL RELEASE      bilateral    CATARACT REM[facility]AL WITH IMPLANT [facility]    [facility]    CATARACT REMOVAL WITH IMPLANT Right 04/04/2019    [facility]    COLONOSCOPY      EYE SURGERY      FRACTURE SURGERY      left tibia    HYSTERECTOMY  4/12    INTRACAPSULAR CATARACT EXTRACTION Left 3/7/2019    EYE CATARACT EMULSIFICATION IOL IMPLANT - TOPICAL performed by John Nicholas MD at [address] [place] [address] Right 4/4/2019    EYE CATARACT EMULSIFICATION [facility]OL IMPLANT [facility]    hardware removal and then TKA    KNEE ARTHROSCOPY Left 2002?     [place] TOTAL KNEE ARTHROPLASTY Left 11/6/2018    KNEE TOTAL ARTHROPLASTY W/REMOVAL HARDWARE PLATE & SCREWS performed by Lulu Disla MD at [address] [place] Left     TONSILLECTOMY AND ADENOIDECTOMY      TOTAL KNEE ARTHROPLASTY Right 12/10/2019    KNEE TOTAL ARTHROPLASTY performed by Lulu Disla MD at [address] [place] [address]         Social History:    Social History     Tobacco Use    Smoking status: Former Smoker     Packs/day: 0.50     Years: 10.00     Pack years: 5.00     Types: Cigarettes     Last attempt to quit: 1987     Years since quittin.6    Smokeless tobacco: Never Used   Substance Use Topics    Alcohol use: No     Alcohol/week: 0.0 standard drinks                                Counseling given: Not Answered      Vital Signs (Current): There were no vitals filed for this visit. BP Readings from Last 3 Encounters:   10/12/20 118/63   20 108/68   20 110/70       NPO Status:                                                                                 BMI:   Wt Readings from Last 3 Encounters:   10/12/20 128 lb 1.4 oz (58.1 kg)   20 126 lb 12.8 oz (57.5 kg)   20 128 lb (58.1 kg)     There is no height or weight on file to calculate BMI.    CBC:   Lab Results   Component Value Date    WBC 5.7 10/12/2020    RBC 4.16 10/12/2020    RBC 2.91 2012    HGB 12.5 10/12/2020    HCT 37.7 10/12/2020    MCV 90.6 10/12/2020    RDW 14.3 10/12/2020     10/12/2020     2012       CMP:   Lab Results   Component Value Date     10/12/2020    K 3.8 10/12/2020    CL 99 10/12/2020    CO2 22 10/12/2020    BUN 8 10/12/2020    CREATININE 0.52 10/12/2020    GFRAA >60 10/12/2020    LABGLOM >60 10/12/2020    GLUCOSE 111 10/12/2020    GLUCOSE 84 2012    PROT 7.1 10/12/2020    CALCIUM 9.0 10/12/2020    BILITOT 3.47 10/12/2020    ALKPHOS 184 10/12/2020     10/12/2020     10/12/2020       POC Tests: No results for input(s): POCGLU, POCNA, POCK, POCCL, POCBUN, POCHEMO, POCHCT in the last 72 hours.     Coags:   Lab Results   Component Value Date    PROTIME 12.8 10/12/2020    INR 1.0 10/12/2020    APTT 26.0 10/12/2020       HCG (If Applicable): No results found for: PREGTESTUR, PREGSERUM, HCG, HCGQUANT     ABGs: No results found for: PHART, PO2ART, JKM9MCK, UBP1OFC, BEART, F0QCYQLR     Type & Screen (If Applicable):  No results found for: LABABO, [address]    Anesthesia Evaluation  Patient summary reviewed and Nursing notes reviewed  Airway: Mallampati: III  TM distance: >3 FB   Neck ROM: full  Mouth opening: > = 3 FB Dental:    (+) partials  Comment: Lower partial    Pulmonary:normal exam  breath sounds clear to auscultation      (-) rhonchi, wheezes, rales and stridor                           Cardiovascular:    (+) hyperlipidemia    (-) murmur, weak pulses,  friction rub, systolic click, carotid bruit,  JVD and peripheral edema    ECG reviewed  Rhythm: regular  Rate: normal                 ROS comment: States she uses Verapamil for Migraine headaches     Neuro/Psych:   (+) headaches: migraine headaches,             GI/Hepatic/Renal:   (+) GERD: no interval change,          ROS comment: Cholecystiits  Admitted with RUQ abdominal pain . Endo/Other:    (+) : arthritis: OA and no interval change. , .                  ROS comment: Osteoporosis Abdominal:           Vascular:                                  Anesthesia Plan      general     ASA 3       Induction: intravenous. MIPS: Postoperative opioids intended and Prophylactic antiemetics administered. Anesthetic plan and risks discussed with patient. Plan discussed with CRNA.                   Jodie Luis MD   10/12/2020

## 2020-10-12 NOTE — CARE COORDINATION
CASE MANAGEMENT NOTE:    Admission Date:  10/12/2020 Opal Jones is a 67 y.o.  female    Admitted for : Cholecystitis [K81.9]    Met with:  Patient    PCP:  Dr. Helena Mendoza:  [employer]      Current Residence/ Living Arrangements:  independently at home alone and drives. States her Memory Srikanth will be staying with her for a week. Current Services PTA:  No    Is patient agreeable to VNS: No    Freedom of choice provided:  Yes    List of [address] provided: No    VNS chosen:  NA    DME:  straight cane, walker, shower chair, transport chair, grab bars    Home Oxygen: No    Nebulizer: No    CPAP/BIPAP: No    Supplier: N/A    Potential Assistance Needed: NA    SNF needed: No    Freedom of choice and list provided: NA    Pharmacy:  [facility]       Does Patient want to use MEDS to BEDS? No    Is patient currently receiving oral anticoagulation therapy? No    Is the Patient an [facility] with Readmission Risk Score greater than 14%? No  If yes, pt needs a follow up appointment made within 7 days. Family Members/Caregivers that pt would like involved in their care:    Yes    If yes, list name here:  Tomasa Lauren    Transportation Provider:  Patient                Discharge Plan:  Home without needs. GB US and MRCP today. Tentatively scheduled for robotic vazquez on Wednesday.         Electronically signed by: Chilo Land RN on 10/12/2020 at 12:20 PM

## 2020-10-12 NOTE — PROGRESS NOTES
Writer spoke with Dr. Isai Mcdowell regarding results of MRCP. Orders received for GI consult and clear liquids tonight, NPO at midnight.

## 2020-10-12 NOTE — PROGRESS NOTES
Called Dr. Sloan Records for additional admission orders.  He will be here to see the patient shortly

## 2020-10-12 NOTE — ED NOTES
Mode of arrival (squad #, walk in, police, etc) : walk in from home        Chief complaint(s): right upper quadrant pain X4 days        Arrival Note (brief scenario, treatment PTA, etc). : pt presented to the ED c/o abdominal pain for the the past 4 days. Pt states she had N/V. Pt denies shortness of breath, denies fever and chills. Pt states she thinks her gallbladder is acting up. Pt alert and oriented. C= \"Have you ever felt that you should Cut down on your drinking? \"  No  A= \"Have people Annoyed you by criticizing your drinking? \"  No  G= \"Have you ever felt bad or Guilty about your drinking? \"  No  E= \"Have you ever had a drink as an Eye-opener first thing in the morning to steady your nerves or to help a hangover? \"  No      Deferred []      Reason for deferring: N/A    *If yes to two or more: probable alcohol abuse. Helga Woodward RN  10/12/20 5493

## 2020-10-12 NOTE — PROGRESS NOTES
Patient arrives to room 2058 per cart. Oriented to room and updatedc on plan of care.  No signs of distress noted

## 2020-10-12 NOTE — ED NOTES
Admission Dx: cholesystitis    Pts Chief Complaints on Arrival: abdominal pain    ADL's - Self-care    Pending Diagnostics: N/A    Residence PTA: single story home    Special Considerations/Circumstances:  Pt NPO    Vitals: Current vital signs:  /71   Pulse 54   Temp 97.8 °F (36.6 °C) (Oral)   Resp 16   Ht 5' 3\" (1.6 m)   Wt 129 lb (58.5 kg)   LMP 01/01/2004   SpO2 95%   BMI 22.85 kg/m²                MEWS Score: [id number] [facility], RN  10/12/20 5718

## 2020-10-12 NOTE — PROGRESS NOTES
Dr. Harper Back at pt bedside to assess pt at this time. Orders received for chest x-ray this evening.

## 2020-10-12 NOTE — PLAN OF CARE
Please fill out the MRI Screening form and fax to dept @ [phone]. Any questions. .please call [place] [phone] MRI @ [phone]. MRI exam will be scheduled after receiving the completed screening form. Thank you!!    Please have pt completely NPO for the past 4-6 hrs prior to exam. Any questions, please feel free to call [facility] MRI @ [phone].  Thank You!!

## 2020-10-13 ENCOUNTER — ANESTHESIA (OUTPATIENT)
Dept: OPERATING ROOM | Age: 75
End: 2020-10-13

## 2020-10-13 ENCOUNTER — ANESTHESIA EVENT (OUTPATIENT)
Dept: ENDOSCOPY | Age: 75
DRG: 446 | End: 2020-10-13
Payer: MEDICARE

## 2020-10-13 ENCOUNTER — APPOINTMENT (OUTPATIENT)
Dept: NUCLEAR MEDICINE | Age: 75
DRG: 446 | End: 2020-10-13
Payer: MEDICARE

## 2020-10-13 LAB
ABSOLUTE BANDS #: 0.08 K/UL (ref 0–1)
ABSOLUTE EOS #: 0.3 K/UL (ref 0–0.4)
ABSOLUTE IMMATURE GRANULOCYTE: ABNORMAL K/UL (ref 0–0.3)
ABSOLUTE LYMPH #: 1.44 K/UL (ref 1–4.8)
ABSOLUTE MONO #: 0.38 K/UL (ref 0.1–1.3)
ALBUMIN SERPL-MCNC: 3.2 G/DL (ref 3.5–5.2)
ALBUMIN/GLOBULIN RATIO: ABNORMAL (ref 1–2.5)
ALP BLD-CCNC: 162 U/L (ref 35–104)
ALT SERPL-CCNC: 283 U/L (ref 5–33)
ANION GAP SERPL CALCULATED.3IONS-SCNC: 13 MMOL/L (ref 9–17)
AST SERPL-CCNC: 164 U/L
BANDS: 2 % (ref 0–10)
BASOPHILS # BLD: 0 % (ref 0–2)
BASOPHILS ABSOLUTE: 0 K/UL (ref 0–0.2)
BILIRUB SERPL-MCNC: 3.84 MG/DL (ref 0.3–1.2)
BILIRUBIN DIRECT: 3.46 MG/DL
BILIRUBIN, INDIRECT: 0.38 MG/DL (ref 0–1)
BUN BLDV-MCNC: 8 MG/DL (ref 8–23)
BUN/CREAT BLD: ABNORMAL (ref 9–20)
CALCIUM SERPL-MCNC: 8.1 MG/DL (ref 8.6–10.4)
CHLORIDE BLD-SCNC: 101 MMOL/L (ref 98–107)
CO2: 19 MMOL/L (ref 20–31)
CREAT SERPL-MCNC: 0.47 MG/DL (ref 0.5–0.9)
DIFFERENTIAL TYPE: ABNORMAL
EOSINOPHILS RELATIVE PERCENT: 8 % (ref 0–4)
GFR AFRICAN AMERICAN: >60 ML/MIN
GFR NON-AFRICAN AMERICAN: >60 ML/MIN
GFR SERPL CREATININE-BSD FRML MDRD: 133 ML/MIN/{1.73_M2}
GFR SERPL CREATININE-BSD FRML MDRD: ABNORMAL ML/MIN/{1.73_M2}
GLOBULIN: ABNORMAL G/DL (ref 1.5–3.8)
GLUCOSE BLD-MCNC: 75 MG/DL (ref 70–99)
HCT VFR BLD CALC: 31.1 % (ref 36–46)
HEMOGLOBIN: 10.3 G/DL (ref 12–16)
IMMATURE GRANULOCYTES: ABNORMAL %
LYMPHOCYTES # BLD: 38 % (ref 24–44)
MCH RBC QN AUTO: 29.7 PG (ref 26–34)
MCHC RBC AUTO-ENTMCNC: 33.1 G/DL (ref 31–37)
MCV RBC AUTO: 89.7 FL (ref 80–100)
MONOCYTES # BLD: 10 % (ref 1–7)
MORPHOLOGY: ABNORMAL
NRBC AUTOMATED: ABNORMAL PER 100 WBC
PDW BLD-RTO: 14.1 % (ref 11.5–14.9)
PLATELET # BLD: 163 K/UL (ref 150–450)
PLATELET ESTIMATE: ABNORMAL
PMV BLD AUTO: 8.3 FL (ref 6–12)
POTASSIUM SERPL-SCNC: 3.7 MMOL/L (ref 3.7–5.3)
RBC # BLD: 3.47 M/UL (ref 4–5.2)
RBC # BLD: ABNORMAL 10*6/UL
SEG NEUTROPHILS: 42 % (ref 36–66)
SEGMENTED NEUTROPHILS ABSOLUTE COUNT: 1.6 K/UL (ref 1.3–9.1)
SODIUM BLD-SCNC: 133 MMOL/L (ref 135–144)
TOTAL PROTEIN: 5.8 G/DL (ref 6.4–8.3)
WBC # BLD: 3.8 K/UL (ref 3.5–11)
WBC # BLD: ABNORMAL 10*3/UL

## 2020-10-13 PROCEDURE — A9537 TC99M MEBROFENIN: HCPCS | Performed by: SURGERY

## 2020-10-13 PROCEDURE — 78227 HEPATOBIL SYST IMAGE W/DRUG: CPT

## 2020-10-13 PROCEDURE — 99222 1ST HOSP IP/OBS MODERATE 55: CPT | Performed by: INTERNAL MEDICINE

## 2020-10-13 PROCEDURE — 6360000002 HC RX W HCPCS: Performed by: SURGERY

## 2020-10-13 PROCEDURE — 85025 COMPLETE CBC W/AUTO DIFF WBC: CPT

## 2020-10-13 PROCEDURE — 2500000003 HC RX 250 WO HCPCS: Performed by: SURGERY

## 2020-10-13 PROCEDURE — 2580000003 HC RX 258: Performed by: SURGERY

## 2020-10-13 PROCEDURE — 6360000002 HC RX W HCPCS: Performed by: EMERGENCY MEDICINE

## 2020-10-13 PROCEDURE — 3430000000 HC RX DIAGNOSTIC RADIOPHARMACEUTICAL: Performed by: SURGERY

## 2020-10-13 PROCEDURE — 80048 BASIC METABOLIC PNL TOTAL CA: CPT

## 2020-10-13 PROCEDURE — 2580000003 HC RX 258: Performed by: EMERGENCY MEDICINE

## 2020-10-13 PROCEDURE — 36415 COLL VENOUS BLD VENIPUNCTURE: CPT

## 2020-10-13 PROCEDURE — 80076 HEPATIC FUNCTION PANEL: CPT

## 2020-10-13 PROCEDURE — 1200000000 HC SEMI PRIVATE

## 2020-10-13 RX ORDER — SODIUM CHLORIDE 0.9 % (FLUSH) 0.9 %
10 SYRINGE (ML) INJECTION PRN
Status: DISCONTINUED | OUTPATIENT
Start: 2020-10-13 | End: 2020-10-14

## 2020-10-13 RX ADMIN — Medication 10 ML: at 13:15

## 2020-10-13 RX ADMIN — PIPERACILLIN SODIUM AND TAZOBACTAM SODIUM 3.38 G: 3; .375 INJECTION, POWDER, LYOPHILIZED, FOR SOLUTION INTRAVENOUS at 18:27

## 2020-10-13 RX ADMIN — FENTANYL CITRATE 50 MCG: 50 INJECTION INTRAMUSCULAR; INTRAVENOUS at 20:39

## 2020-10-13 RX ADMIN — Medication 3.4 MILLICURIE: at 13:15

## 2020-10-13 RX ADMIN — SODIUM CHLORIDE: 9 INJECTION, SOLUTION INTRAVENOUS at 10:00

## 2020-10-13 RX ADMIN — FENTANYL CITRATE 50 MCG: 50 INJECTION INTRAMUSCULAR; INTRAVENOUS at 03:39

## 2020-10-13 RX ADMIN — PIPERACILLIN SODIUM AND TAZOBACTAM SODIUM 3.38 G: 3; .375 INJECTION, POWDER, LYOPHILIZED, FOR SOLUTION INTRAVENOUS at 08:11

## 2020-10-13 RX ADMIN — VANCOMYCIN HYDROCHLORIDE 1000 MG: 1 INJECTION, POWDER, LYOPHILIZED, FOR SOLUTION INTRAVENOUS at 23:45

## 2020-10-13 RX ADMIN — FAMOTIDINE 20 MG: 10 INJECTION INTRAVENOUS at 20:39

## 2020-10-13 RX ADMIN — VANCOMYCIN HYDROCHLORIDE 1000 MG: 1 INJECTION, POWDER, LYOPHILIZED, FOR SOLUTION INTRAVENOUS at 11:10

## 2020-10-13 RX ADMIN — FAMOTIDINE 20 MG: 10 INJECTION INTRAVENOUS at 08:11

## 2020-10-13 RX ADMIN — PIPERACILLIN SODIUM AND TAZOBACTAM SODIUM 3.38 G: 3; .375 INJECTION, POWDER, LYOPHILIZED, FOR SOLUTION INTRAVENOUS at 01:35

## 2020-10-13 ASSESSMENT — ENCOUNTER SYMPTOMS: SHORTNESS OF BREATH: 0

## 2020-10-13 ASSESSMENT — PAIN DESCRIPTION - ORIENTATION: ORIENTATION: RIGHT;UPPER

## 2020-10-13 ASSESSMENT — PAIN SCALES - GENERAL
PAINLEVEL_OUTOF10: 7
PAINLEVEL_OUTOF10: 9
PAINLEVEL_OUTOF10: 5
PAINLEVEL_OUTOF10: 3
PAINLEVEL_OUTOF10: 3
PAINLEVEL_OUTOF10: 9

## 2020-10-13 ASSESSMENT — LIFESTYLE VARIABLES: SMOKING_STATUS: 0

## 2020-10-13 ASSESSMENT — PAIN DESCRIPTION - PAIN TYPE: TYPE: ACUTE PAIN

## 2020-10-13 ASSESSMENT — PAIN DESCRIPTION - LOCATION: LOCATION: ABDOMEN

## 2020-10-13 ASSESSMENT — PAIN DESCRIPTION - DESCRIPTORS: DESCRIPTORS: DULL

## 2020-10-13 NOTE — PROGRESS NOTES
Writer placed page out to Dr. Monty Ricks MD on call for Dr. Leticia Echavarria regarding new consult. Awaiting return call at this time.

## 2020-10-13 NOTE — ANESTHESIA PRE PROCEDURE
Department of Anesthesiology  Preprocedure Note       Name:  Be Fall   Age:  67 y.o.  :  1945                                          MRN:  [id number]         Date:  10/13/2020      Surgeon: Annie Rojo):  Luanna Hamman, MD    Procedure: Procedure(s):  ERCP    Medications prior to admission:   Prior to Admission medications    Medication Sig Start Date End Date Taking?  Authorizing Provider   cetirizine (ZYRTEC) 10 MG tablet TAKE ONE TABLET BY MOUTH DAILY 20  Yes PASCUAL Gudino - CNP   simvastatin (ZOCOR) 20 MG tablet TAKE ONE TABLET BY MOUTH DAILY 20  Yes PASCUAL Gudino - CNP   alendronate (FOSAMAX) 70 MG tablet TAKE 1 TABLET BY MOUTH ONCE WEEKLY BEFORE BREAKFAST, ON AN EMPTY STOMACH: REMAIN UPRIGHT FOR 30 MINUTES:TAKE WITH 8 OUNCES OF WATER 20  Yes PASCUAL Gudino - CNP   vitamin D (ERGOCALCIFEROL) 1.25 MG (49462 UT) CAPS capsule TAKE ONE CAPSULE BY MOUTH EVERY 2 WEEKS 20  Yes PASCUAL Gudino - CNP   verapamil (CALAN SR) 240 MG extended release tablet TAKE ONE TABLET BY MOUTH ONCE NIGHTLY FOR MIGRAINE HEADACHE 20  Yes PASCUAL Gudino CNP   allopurinol (ZYLOPRIM) 100 MG tablet TAKE ONE TABLET BY MOUTH DAILY 20  Yes PASCUAL Gudino - CNP   famotidine (PEPCID) 20 MG tablet TAKE ONE TABLET BY MOUTH TWICE A DAY 20  Yes PASCUAL Gudino - CNP   fluticasone (FLONASE) 50 MCG/ACT nasal spray SPRAY TWO SPRAYS IN EACH NOSTRIL ONCE DAILY 20  Yes PASCUAL Gudino CNP   aspirin 81 MG tablet Take 81 mg by mouth daily   Yes Historical Provider, MD   naproxen (NAPROSYN) 250 MG tablet TAKE ONE TABLET BY MOUTH THREE TIMES A DAY WITH FOOD AS NEEDED FOR GOUT FLARE UP UNTIL ATTACK SUBSIDES 19  Yes Patricia Hernandez MD   Aspirin-Acetaminophen-Caffeine (EXCEDRIN MIGRAINE PO) Take by mouth as needed    Yes Historical Provider, MD   docusate sodium (COLACE) 100 MG capsule Take 1 capsule by mouth 2 times daily 18  Yes PASCUAL Gudino CNP       Current medications:    Current Facility-Administered Medications   Medication Dose Route Frequency Provider Last Rate Last Dose    sodium chloride flush 0.9 % injection 10 mL  10 mL Intravenous PRN Jenna Pond MD   10 mL at 10/13/20 1315    sodium chloride flush 0.9 % injection 10 mL  10 mL Intravenous PRN Chandrika Tse MD   10 mL at 10/12/20 0344    lactated ringers infusion   Intravenous Continuous Chandrika Tse  mL/hr at 10/12/20 0432      vancomycin (VANCOCIN) intermittent dosing (placeholder)   Other RX Placeholder Chandrika Tse MD        vancomycin 1000 mg IVPB in 250 mL D5W addavial  1,000 mg Intravenous Q12H Chandrika Tse MD   Stopped at 10/13/20 1210    sodium chloride flush 0.9 % injection 10 mL  10 mL Intravenous 2 times per day Valarie Jimenez MD        sodium chloride flush 0.9 % injection 10 mL  10 mL Intravenous PRN Valarie Jimenez MD        0.9 % sodium chloride infusion   Intravenous Continuous Jenna Pond  mL/hr at 10/13/20 1000      famotidine (PEPCID) injection 20 mg  20 mg Intravenous BID Jenna Pond MD   20 mg at 10/13/20 0811    ondansetron (ZOFRAN) injection 4 mg  4 mg Intravenous Q6H PRN Jenna Pond MD   4 mg at 10/12/20 1938    piperacillin-tazobactam (ZOSYN) 3.375 g in dextrose 5 % 50 mL IVPB extended infusion (mini-bag)  3.375 g Intravenous Ryne Beltran MD   Stopped at 10/13/20 1211    fentaNYL (SUBLIMAZE) injection 25 mcg  25 mcg Intravenous Q2H PRN Jenna Pond MD        Or    fentaNYL (SUBLIMAZE) injection 50 mcg  50 mcg Intravenous Q2H PRN Jenna Pond MD   50 mcg at 10/13/20 2977       Allergies:  No Known Allergies    Problem List:    Patient Active Problem List   Diagnosis Code    Allergic rhinitis J30.9    Osteoarthritis M19.90    Osteoporosis M81.0    GERD (gastroesophageal reflux disease) K21.9    Headache R51.9    Mixed hyperlipidemia E78.2    Gout M10.9    Vitamin D deficiency E55.9    Migraine O42.533    Chronic headache R51.9, G89.29    Anemia D64.9    Lymphadenopathy R59.1    Degenerative arthritis of knee, bilateral M17.0    Primary osteoarthritis of left knee M17.12    Primary osteoarthritis of right knee M17.11    Cholecystitis K81.9       Past Medical History:        Diagnosis Date    Allergic rhinitis     Closed tibia fracture     GERD (gastroesophageal reflux disease)     Gout     Headache(784.0)     h/o migraines    Hyperlipidemia     Osteoarthritis     DJD Lt knee    Osteoporosis     Posterior tibial tendon dysfunction     right, wears braces on both legs    Vitamin D deficient rickets     Wears glasses     Wears partial dentures     lower       Past Surgical History:        Procedure Laterality Date    APPENDECTOMY      CARPAL TUNNEL RELEASE      bilateral    CATARACT REMOVAL WITH IMPLANT [facility] 03/07/2019    [facility]    CATARACT REMOVAL WITH IMPLANT Right 04/04/2019    [facility]    COLONOSCOPY      EYE SURGERY      FRACTURE SURGERY      left tibia    HYSTERECTOMY  4/12    INTRACAPSULAR CATARACT EXTRACTION Left 3/7/2019    EYE CATARACT EMULSIFICATION IOL IMPLANT - TOPICAL performed by Le Catalan MD at [address] [place] [address] Right 4/4/2019    EYE CATARACT EMULSIFICATION IOL IMPLANT performed by Le Catalan MD at [address] Left 11/06/2018    hardware removal and then TKA    KNEE ARTHROSCOPY Left 2002?     TX TOTAL KNEE ARTHROPLASTY Left 11/6/2018    KNEE TOTAL ARTHROPLASTY W/REMOVAL HARDWARE PLATE & SCREWS performed by Melquiades Toscano MD at [facility] Left     TONSILLECTOMY AND ADENOIDECTOMY      TOTAL KNEE ARTHROPLASTY Right 12/10/2019    KNEE TOTAL ARTHROPLASTY performed by Melquiades Toscano MD at [address] EXTRACTION         Social History:    Social History     Tobacco Use    Smoking status: Former Smoker     Packs/day: 0.50     Years: 10.00     Pack years: 5.00     Types: Cigarettes     Last attempt to quit: 1987     Years since quittin.6    Smokeless tobacco: Never Used   Substance Use Topics    Alcohol use: No     Alcohol/week: 0.0 standard drinks                                Counseling given: Not Answered      Vital Signs (Current):   Vitals:    10/12/20 0600 10/12/20 1402 10/12/20 1933 10/13/20 0802   BP: 100/63 118/63 118/63 100/73   Pulse: 55 67 64 60   Resp: 14 16 18 17   Temp: 97.7 °F (36.5 °C) 97.9 °F (36.6 °C) 97.5 °F (36.4 °C) 98.2 °F (36.8 °C)   TempSrc: Oral Oral  Oral   SpO2: 96% 97% 99% 99%   Weight: 128 lb 1.4 oz (58.1 kg)      Height: 5' 3\" (1.6 m)                                                 BP Readings from Last 3 Encounters:   10/13/20 100/73   20 108/68   20 110/70       NPO Status:                                                                                 BMI:   Wt Readings from Last 3 Encounters:   10/12/20 128 lb 1.4 oz (58.1 kg)   20 126 lb 12.8 oz (57.5 kg)   20 128 lb (58.1 kg)     Body mass index is 22.69 kg/m². CBC:   Lab Results   Component Value Date    WBC 3.8 10/13/2020    RBC 3.47 10/13/2020    RBC 2.91 2012    HGB 10.3 10/13/2020    HCT 31.1 10/13/2020    MCV 89.7 10/13/2020    RDW 14.1 10/13/2020     10/13/2020     2012     HB 10.3, K 3.7    CMP:   Lab Results   Component Value Date     10/13/2020    K 3.7 10/13/2020     10/13/2020    CO2 19 10/13/2020    BUN 8 10/13/2020    CREATININE 0.47 10/13/2020    GFRAA >60 10/13/2020    LABGLOM >60 10/13/2020    GLUCOSE 75 10/13/2020    GLUCOSE 84 2012    PROT 5.8 10/13/2020    CALCIUM 8.1 10/13/2020    BILITOT 3.84 10/13/2020    ALKPHOS 162 10/13/2020     10/13/2020     10/13/2020       POC Tests: No results for input(s): POCGLU, POCNA, POCK, POCCL, POCBUN, POCHEMO, POCHCT in the last 72 hours.     Coags:   Lab Results   Component Value Date    PROTIME 12.8 10/12/2020    INR 1.0 10/12/2020    APTT 26.0 10/12/2020       HCG (If Applicable): No results found for: PREGTESTUR, PREGSERUM, HCG, HCGQUANT     ABGs: No results found for: PHART, PO2ART, HUU7EKD, WUR2NXF, BEART, T8QBPQOA     Type & Screen (If Applicable):  No results found for: LABABO, LABRH    Drug/Infectious Status (If Applicable):  No results found for: HIV, HEPCAB    COVID-19 Screening (If Applicable):   Lab Results   Component Value Date    COVID19 Not Detected 10/12/2020         Anesthesia Evaluation  Patient summary reviewed and Nursing notes reviewed no history of anesthetic complications:   Airway: Mallampati: III  TM distance: <3 FB   Neck ROM: full  Mouth opening: > = 3 FB Dental:    (+) partials      Pulmonary:normal exam  breath sounds clear to auscultation      (-) pneumonia, COPD, asthma, shortness of breath, recent URI, sleep apnea, rhonchi, wheezes, rales, stridor and not a current smoker          Patient did not smoke on day of surgery. ROS comment: Allergic rhinitis    Cardiovascular:Negative CV ROS  Exercise tolerance: good (>4 METS),   (+) hyperlipidemia    (-) pacemaker, hypertension, valvular problems/murmurs, past MI, CAD, CABG/stent, dysrhythmias,  angina,  CHF, orthopnea, PND,  SELLERS, murmur, weak pulses,  friction rub, systolic click, carotid bruit,  JVD and peripheral edema    ECG reviewed  Rhythm: regular  Rate: normal           Beta Blocker:  Not on Beta Blocker         Neuro/Psych:   (+) headaches: migraine headaches,    (-) seizures, neuromuscular disease, TIA, CVA, psychiatric history and depression/anxiety            GI/Hepatic/Renal:   (+) GERD: no interval change, liver disease (Abnormal LFTs):,      (-) hiatal hernia, PUD, hepatitis, no renal disease, bowel prep and no morbid obesity      ROS comment: Choledocholithiasis   RUQ pain. Endo/Other:    (+) : arthritis: OA., no malignancy/cancer.     (-) diabetes mellitus, hypothyroidism, hyperthyroidism, blood dyscrasia, no electrolyte abnormalities, no malignancy/cancer                ROS comment: Gout  Osteoporosis Abdominal:           Vascular: negative vascular ROS. - PVD, DVT and PE. Anesthesia Plan      general     ASA 3       Induction: intravenous. MIPS: Postoperative opioids intended and Prophylactic antiemetics administered. Anesthetic plan and risks discussed with patient. Plan discussed with CRNA. The patient was counseled at length about the risks of anita Covid-19 during their perioperative period and any recovery window from their procedure. The patient was made aware that anita Covid-19  may worsen their prognosis for recovering from their procedure  and lend to a higher morbidity and/or mortality risk. All material risks, benefits, and reasonable alternatives including postponing the procedure were discussed. The patient DOES wish to proceed with the procedure at this time.           Paz Singletary MD   10/13/2020

## 2020-10-13 NOTE — PROGRESS NOTES
Writer placed second page out to Dr. Nolvia Coughlin MD on call for Dr. Phyllis Zimmer regarding new consult. Awaiting return call at this time.

## 2020-10-13 NOTE — H&P
KNEE ARTHROPLASTY Left 11/6/2018    KNEE TOTAL ARTHROPLASTY W/REMOVAL HARDWARE PLATE & SCREWS performed by Lulu Disla MD at [address] [facility] [address] Left     TONSILLECTOMY AND ADENOIDECTOMY      TOTAL KNEE ARTHROPLASTY Right 12/10/2019    KNEE TOTAL ARTHROPLASTY performed by Lulu Disla MD at [address] [facility]         Medications Prior to Admission:    Prior to Admission medications    Medication Sig Start Date End Date Taking?  Authorizing Provider   cetirizine (ZYRTEC) 10 MG tablet TAKE ONE TABLET BY MOUTH DAILY 9/18/20  Yes PASCUAL Kirby CNP   simvastatin (ZOCOR) 20 MG tablet TAKE ONE TABLET BY MOUTH DAILY 9/18/20  Yes PASCUAL Kirby CNP   alendronate (FOSAMAX) 70 MG tablet TAKE 1 TABLET BY MOUTH ONCE WEEKLY BEFORE BREAKFAST, ON AN EMPTY STOMACH: REMAIN UPRIGHT FOR 30 MINUTES:TAKE WITH 8 OUNCES OF WATER 9/2/20  Yes PASCUAL Kirby CNP   vitamin D (ERGOCALCIFEROL) 1.25 MG (32032 UT) CAPS capsule TAKE ONE CAPSULE BY MOUTH EVERY 2 WEEKS 7/20/20  Yes PASCUAL Kirby CNP   verapamil (CALAN SR) 240 MG extended release tablet TAKE ONE TABLET BY MOUTH ONCE NIGHTLY FOR MIGRAINE HEADACHE 6/12/20  Yes PASCUAL Kirby CNP   allopurinol (ZYLOPRIM) 100 MG tablet TAKE ONE TABLET BY MOUTH DAILY 6/4/20  Yes PASCUAL Kirby CNP   famotidine (PEPCID) 20 MG tablet TAKE ONE TABLET BY MOUTH TWICE A DAY 5/26/20  Yes PASCUAL Kirby CNP   fluticasone (FLONASE) 50 MCG/ACT nasal spray SPRAY TWO SPRAYS IN EACH NOSTRIL ONCE DAILY 5/1/20  Yes PASCUAL Kirby CNP   aspirin 81 MG tablet Take 81 mg by mouth daily   Yes Historical Provider, MD   naproxen (NAPROSYN) 250 MG tablet TAKE ONE TABLET BY MOUTH THREE TIMES A DAY WITH FOOD AS NEEDED FOR GOUT FLARE UP UNTIL ATTACK SUBSIDES 11/25/19  Yes Favian Inman MD   Aspirin-Acetaminophen-Caffeine (EXCEDRIN MIGRAINE PO) Take by mouth as needed    Yes Historical Provider, MD   docusate sodium (COLACE) 100 MG capsule Take 1 capsule by mouth 2 times daily 12/18/18  Yes Tahira Joselito, APRN - CNP       Current meds  Scheduled Meds:   vancomycin (VANCOCIN) intermittent dosing (placeholder)   Other RX Placeholder    vancomycin  1,000 mg Intravenous Q12H    sodium chloride flush  10 mL Intravenous 2 times per day    famotidine (PEPCID) injection  20 mg Intravenous BID    piperacillin-tazobactam (ZOSYN) 3.375 g in dextrose 5% IVPB extended infusion (mini-bag)  3.375 g Intravenous Q8H     Continuous Infusions:   lactated ringers 100 mL/hr at 10/12/20 0432    sodium chloride 100 mL/hr at 10/12/20 0825     PRN Meds:.sodium chloride flush, sodium chloride flush, ondansetron, fentanNYL **OR** fentanNYL    Allergies:  Patient has no known allergies. Social History:   TOBACCO:   reports that she quit smoking about 33 years ago. Her smoking use included cigarettes. She has a 5.00 pack-year smoking history. She has never used smokeless tobacco.  ETOH:   reports no history of alcohol use.   OCCUPATION:      Family History:       Problem Relation Age of Onset    Arthritis Other     Thyroid Disease Other     COPD Other     Stroke Father        REVIEW OF SYSTEMS:  Constitutional:  negative for  fevers, chills, sweats and weight loss  HEENT:  negative for  hearing loss, ear drainage, nasal congestion, snoring, hoarseness and voice change  Neck:  No swollen glands and No h/o goiter or thyroid disease  Cardiac:  negative for  chest pain, dyspnea, palpitations, orthopnea, PND, edema  Respiratory:  negative for  dry cough, cough with sputum, dyspnea, wheezing and hemoptysis  Gastrointestinal:  See hpi  :  negative for frequency, dysuria, urinary incontinence, hesitancy, decreased stream and hematuria  Musculoskeletal:  negative for  myalgias, arthralgias, joint swelling and stiff joints  Neuro:  negative for headaches, dizziness, seizures, memory problems, visual disturbance, weakness and syncope      Physical Exam:    Vitals: /63   Pulse 64   Temp 97.5 °F (36.4 °C)   Resp 18   Ht 5' 3\" (1.6 m)   Wt 128 lb 1.4 oz (58.1 kg)   LMP 01/01/2004   SpO2 99%   BMI 22.69 kg/m²   General appearance: alert, appears stated age and cooperative  Skin: Skin color, texture, turgor normal. No rashes or lesions  HEENT: Head: Normocephalic, no lesions, without obvious abnormality. Eye: Normal external eye, conjunctiva, lids cornea, RICHARD  Neck: no adenopathy, no carotid bruit, no JVD, supple, symmetrical, trachea midline and thyroid not enlarged, symmetric, no tenderness/mass/nodules  Lungs: clear to auscultation bilaterally  Heart: regular rate and rhythm, S1, S2 normal, no murmur, click, rub or gallop  Abdomen: soft, non-tender; bowel sounds normal; no masses,  no organomegaly  Extremities: extremities normal, atraumatic, no cyanosis or edema  Neurologic: Mental status: Alert, oriented, thought content appropriate    CBC:   Recent Labs     10/12/20  0250   WBC 5.7   HGB 12.5        BMP:    Recent Labs     10/12/20  0250   *   K 3.8   CL 99   CO2 22   BUN 8   CREATININE 0.52   GLUCOSE 111*     Hepatic:   Recent Labs     10/12/20  0250   *   *   BILITOT 3.47*   ALKPHOS 184*     Troponin: No results for input(s): TROPONINI in the last 72 hours. BNP: No results for input(s): BNP in the last 72 hours. Lipids: No results for input(s): CHOL, HDL in the last 72 hours. Invalid input(s): LDLCALCU  ABGs: No results found for: PHART, PO2ART, MCQ3UOY  INR:   Recent Labs     10/12/20  0250   INR 1.0     -----------------------------------------------------------------  PA/lat CXR: Xr Chest (2 Vw)    Result Date: 10/12/2020  EXAMINATION: TWO XRAY VIEWS OF THE CHEST 10/12/2020 9:00 pm COMPARISON: None. HISTORY: ORDERING SYSTEM PROVIDED HISTORY: clearance for surgery Reason for Exam: Pre op Acuity: Acute Type of Exam: Initial FINDINGS: The lungs are without acute focal process. No effusion or pneumothorax.  The cardiomediastinal silhouette is normal.  Multilevel hypertrophic/degenerative change of the thoracic spine. Negative chest.     Ct Abdomen Pelvis W Iv Contrast Additional Contrast? None    Result Date: 10/12/2020  EXAMINATION: CT OF THE ABDOMEN AND PELVIS WITH CONTRAST 10/12/2020 3:34 am TECHNIQUE: CT of the abdomen and pelvis was performed with the administration of intravenous contrast. Multiplanar reformatted images are provided for review. Dose modulation, iterative reconstruction, and/or weight based adjustment of the mA/kV was utilized to reduce the radiation dose to as low as reasonably achievable. COMPARISON: None. HISTORY: ORDERING SYSTEM PROVIDED HISTORY: RUQ abd pain N/V, positive Mckeon's sign TECHNOLOGIST PROVIDED HISTORY: RUQ abd pain N/V, positive Mckeon's sign Reason for Exam: Patient c/o RUQ abdominal pain with nausea and vomiting x 1 day Acuity: Acute Type of Exam: Initial FINDINGS: Lower Chest: Minimal dependent changes are seen within the lungs bilaterally. Organs: There is suggestion of a gallstone in the region of the gallbladder neck. There appears to be pericholecystic fluid. Mild prominence of the intrahepatic bile ducts is seen. Otherwise, the liver, spleen, pancreas and adrenal glands demonstrate no acute abnormality. The kidneys demonstrate symmetric enhancement. No convincing focal renal mass. There is no hydronephrosis. GI/Bowel: There is no evidence of a bowel obstruction. Stool is seen throughout the colon. No evidence of acute appendicitis. Pelvis: The urinary bladder demonstrates no acute abnormality. The patient is status post hysterectomy. Peritoneum/Retroperitoneum: The abdominal aorta is normal in caliber with scattered atherosclerosis. No bulky retroperitoneal or mesenteric adenopathy. No free fluid or free air seen in the abdomen or pelvis. Bones/Soft Tissues: There is a presumably chronic compression deformity of the T12 vertebral body. No acute osseous abnormality seen.      1. There appears to be a gallstone in the region the gallbladder neck with pericholecystic fluid as well as mild prominence of the intrahepatic bile ducts. Findings could represent acute cholecystitis. 2. Otherwise, no acute abnormality identified within the abdomen or pelvis. Mri Abdomen W Wo Contrast Mrcp    Result Date: 10/12/2020  EXAMINATION: MRI OF THE ABDOMEN WITH AND WITHOUT CONTRAST AND MRCP 10/12/2020 6:32 pm TECHNIQUE: Multiplanar multisequence MRI of the abdomen was performed with and without the administration of intravenous contrast.  After initial T2 axial and coronal images, thick slab, thin slab and 3D coronal MRCP sequences were obtained without the administration of intravenous contrast.  MIP images are provided for review. COMPARISON: CT abdomen 10/12/2020 at 3:36 a.m., ultrasound right upper quadrant 10/12/2020 at 9:12 a.m. HISTORY: ORDERING SYSTEM PROVIDED HISTORY: Prominence of the hepatic bile ducts, cholecystitis and cholelithiasis described on CT earlier today FINDINGS: Lower chest: Trace right pleural effusion is likely reactive. Minimal subsegmental atelectasis posterior both lung bases. Visualized portions of cardiac and posterior mediastinal structures appear unremarkable. Gallbladder: Mild gallbladder wall thickening with pericholecystic fluid. Possible single gallstone seen is a filling defect near the gallbladder neck coronal series 8, image 15. No other discrete gallstone evident. Bile Ducts: Mild intrahepatic bile duct dilatation. There is confluent narrowing of the most proximal portion of common hepatic duct, mildly dilated at 7 mm. Distally, the common bile duct measures 5-6 mm diameter, within normal limits. Pancreatic Duct: No stricture or stone evident. 2-3 mm diameter. Other organs: Mild intrahepatic bile duct dilatation. Prominent periportal edema throughout. No discrete hepatic lesion. Unremarkable appearance of the spleen, adrenal glands, pancreas and kidneys.   Simple renal cortical cyst measures 8 mm posterior midpole left kidney. Other: Right upper quadrant ascites is a technically degrading factor for the exam.  No gross adenopathy is seen. 1. Acute cholangitis suspected given confluent narrowing at the confluence of the hepatic ducts. 2. Prominent periportal edema, presumably reactive, and mild intrahepatic bile duct dilatation. 3. Acute cholecystitis likely given the gallbladder findings. 4. Cholelithiasis, probable small gallstone near the gallbladder neck. 5. No definite choledocholithiasis. Us Abdomen Limited Specify Organ? Liver, Gallbladder    Result Date: 10/12/2020  EXAMINATION: RIGHT UPPER QUADRANT ULTRASOUND 10/12/2020 9:12 am COMPARISON: CT 10/12/2020 HISTORY: ORDERING SYSTEM PROVIDED HISTORY: acute cholecystitis TECHNOLOGIST PROVIDED HISTORY: acute cholecystitis Specify organ?->LIVER Specify organ?->GALLBLADDER Acuity: Acute Type of Exam: Subsequent/Follow-up FINDINGS: LIVER:  The liver has a fairly homogeneous echotexture with no focal masses identified by ultrasound. Liver length is 16.3 cm. Flow in the portal vein is hepatopetal. BILIARY SYSTEM:  The gallbladder appears very irregular with apparent diffuse wall thickening. The lumen is poorly defined. There is an echogenic focus with shadowing in the region of the neck compatible with a gallstone as shown on the recent CT. The patient reportedly has a positive sonographic Mckeon's sign. Mildly dilated common bile duct measuring up to 7.8 mm. RIGHT KIDNEY: No obstruction of the somewhat atrophic right kidney as visualized. PANCREAS:  Visualized portions of the pancreas are unremarkable. Overall the pancreas is not well seen due to overlying bowel gas. Pancreatic duct is mildly prominent measuring up to 3.4 mm. OTHER: No evidence of right upper quadrant ascites. Suspect a 7 mm gallstone with a diffusely thickened irregular gallbladder wall and collapsed lumen.   Patient reportedly had a positive sonographic Mckeon's sign. Findings are suggestive of acute cholecystitis. Prominent common bile duct measuring up to 7.8 mm. Correlate with clinical findings and laboratory values to determine need for MRCP. The findings were sent to the Radiology Results [address] at 10:19 am on 10/12/2020to be communicated to a licensed caregiver. EKG: no acute changes     Prophylaxis:   DVT with  [] lovenox        [] heparin        [x] Scd        [] none:     Assessment and Plan   1. Ac cholecystitis/mra report seen/possible for robotic surgery in am/cleared for surgery    Patient Active Problem List   Diagnosis Code    Allergic rhinitis J30.9    Osteoarthritis M19.90    Osteoporosis M81.0    GERD (gastroesophageal reflux disease) K21.9    Headache R51.9    Mixed hyperlipidemia E78.2    Gout M10.9    Vitamin D deficiency E55.9    Migraine G43.909    Chronic headache R51.9, G89.29    Anemia D64.9    Lymphadenopathy R59.1    Degenerative arthritis of knee, bilateral M17.0    Primary osteoarthritis of left knee M17.12    Primary osteoarthritis of right knee M17.11    Cholecystitis K81.9       Anticipated Disposition upon discharge: [] Home                                                                         [] Home with Home Health                                                                         [] [facility]                                                                         [] [address] [facility] [address]          Patient is admitted as inpatient status because of co-morbidities listed above, severity of signs and symptoms as outlined, requirement for current medical therapies and most importantly because of direct risk to patient if care not provided in a hospital setting.     Miguel Vásquez MD  Admitting Hospitalist

## 2020-10-13 NOTE — CARE COORDINATION
ONGOING DISCHARGE PLAN:    Attempted to speak with patient regarding discharge plan, however pt is out of the room. Spoke with patient yesterday and she confirmed that plan is home without needs. Declined VNS. HIDA scan today. Active order for IV Zosyn and Vanco.    Will continue to follow for additional discharge needs.     Electronically signed by Son Schwab RN on 10/13/2020 at 1:07 PM

## 2020-10-13 NOTE — FLOWSHEET NOTE
10/13/20 1135   Encounter Summary   Services provided to: Patient not available  (patient on cell phone)

## 2020-10-13 NOTE — PROGRESS NOTES
Patient was seen and examined. Awake alert in no acute distress. Afebrile vital signs are stable. Blood work noted. Abdomen is soft. Tender right upper quadrant. No peritoneal signs. Extremity nontender. Robotic cholecystectomy canceled today based on MRCP results. Case discussed with GI physician. For ERCP tomorrow. May need endoscopic ultrasound. HIDA scan today as well. If HIDA scan is positive she may need cholecystostomy tube for the time being until GI work-up is complete. Late entry.

## 2020-10-13 NOTE — PLAN OF CARE
Problem: Pain:  Goal: Pain level will decrease  10/13/2020 0414 by Dario Jorge RN  Outcome: Ongoing  Note: Full pain assessment completed this shift. Pt pain adequately controlled with PRN medication and rest. Pt educated on nonpharmacologic interventions to help decrease pain. Will continue to monitor. 10/12/2020 1725 by Chase Howard RN  Outcome: Ongoing  Goal: Control of acute pain  10/13/2020 0414 by Dario Jorge RN  Outcome: Ongoing  10/12/2020 1725 by Chase Howard RN  Outcome: Ongoing  Goal: Control of chronic pain  10/13/2020 0414 by Dario Jorge RN  Outcome: Ongoing  10/12/2020 1725 by Chase Howard RN  Outcome: Ongoing     Problem: Falls - Risk of:  Goal: Will remain free from falls  Outcome: Ongoing  Note: Pt remains free from falls this shift. Bed in lowest position with wheels locked and 2/4 siderails up. Call light and bedside table within reach. Nonskid socks on. Will continue to monitor.    Goal: Absence of physical injury  Outcome: Ongoing     Problem: Physical Regulation:  Goal: Will remain free from infection  Outcome: Ongoing

## 2020-10-14 ENCOUNTER — APPOINTMENT (OUTPATIENT)
Dept: GENERAL RADIOLOGY | Age: 75
DRG: 446 | End: 2020-10-14
Payer: MEDICARE

## 2020-10-14 ENCOUNTER — ANESTHESIA (OUTPATIENT)
Dept: ENDOSCOPY | Age: 75
DRG: 446 | End: 2020-10-14
Payer: MEDICARE

## 2020-10-14 VITALS
DIASTOLIC BLOOD PRESSURE: 85 MMHG | RESPIRATION RATE: 3 BRPM | SYSTOLIC BLOOD PRESSURE: 135 MMHG | OXYGEN SATURATION: 99 % | TEMPERATURE: 98.6 F

## 2020-10-14 LAB
ABSOLUTE BANDS #: 0.08 K/UL (ref 0–1)
ABSOLUTE EOS #: 0.42 K/UL (ref 0–0.4)
ABSOLUTE IMMATURE GRANULOCYTE: ABNORMAL K/UL (ref 0–0.3)
ABSOLUTE LYMPH #: 0.84 K/UL (ref 1–4.8)
ABSOLUTE MONO #: 0.34 K/UL (ref 0.1–1.3)
AFP: 3.9 UG/L
ALBUMIN SERPL-MCNC: 3.1 G/DL (ref 3.5–5.2)
ALBUMIN/GLOBULIN RATIO: ABNORMAL (ref 1–2.5)
ALP BLD-CCNC: 170 U/L (ref 35–104)
ALT SERPL-CCNC: 221 U/L (ref 5–33)
ANION GAP SERPL CALCULATED.3IONS-SCNC: 9 MMOL/L (ref 9–17)
AST SERPL-CCNC: 98 U/L
BANDS: 2 % (ref 0–10)
BASOPHILS # BLD: 1 % (ref 0–2)
BASOPHILS ABSOLUTE: 0.04 K/UL (ref 0–0.2)
BILIRUB SERPL-MCNC: 2.03 MG/DL (ref 0.3–1.2)
BILIRUBIN DIRECT: 1.48 MG/DL
BILIRUBIN, INDIRECT: 0.55 MG/DL (ref 0–1)
BUN BLDV-MCNC: 10 MG/DL (ref 8–23)
BUN/CREAT BLD: ABNORMAL (ref 9–20)
CALCIUM SERPL-MCNC: 8.8 MG/DL (ref 8.6–10.4)
CHLORIDE BLD-SCNC: 103 MMOL/L (ref 98–107)
CO2: 23 MMOL/L (ref 20–31)
CREAT SERPL-MCNC: 0.44 MG/DL (ref 0.5–0.9)
DIFFERENTIAL TYPE: ABNORMAL
EOSINOPHILS RELATIVE PERCENT: 11 % (ref 0–4)
GFR AFRICAN AMERICAN: >60 ML/MIN
GFR NON-AFRICAN AMERICAN: >60 ML/MIN
GFR SERPL CREATININE-BSD FRML MDRD: ABNORMAL ML/MIN/{1.73_M2}
GFR SERPL CREATININE-BSD FRML MDRD: ABNORMAL ML/MIN/{1.73_M2}
GLOBULIN: ABNORMAL G/DL (ref 1.5–3.8)
GLUCOSE BLD-MCNC: 97 MG/DL (ref 70–99)
HCT VFR BLD CALC: 31.6 % (ref 36–46)
HEMOGLOBIN: 10.5 G/DL (ref 12–16)
IMMATURE GRANULOCYTES: ABNORMAL %
LYMPHOCYTES # BLD: 22 % (ref 24–44)
MCH RBC QN AUTO: 29.8 PG (ref 26–34)
MCHC RBC AUTO-ENTMCNC: 33.2 G/DL (ref 31–37)
MCV RBC AUTO: 89.7 FL (ref 80–100)
METAMYELOCYTES ABSOLUTE COUNT: 0.08 K/UL
METAMYELOCYTES: 2 %
MONOCYTES # BLD: 9 % (ref 1–7)
MORPHOLOGY: ABNORMAL
MORPHOLOGY: ABNORMAL
MYELOCYTES ABSOLUTE COUNT: 0.04 K/UL
MYELOCYTES: 1 %
NRBC AUTOMATED: ABNORMAL PER 100 WBC
PDW BLD-RTO: 14.4 % (ref 11.5–14.9)
PLATELET # BLD: 167 K/UL (ref 150–450)
PLATELET ESTIMATE: ABNORMAL
PMV BLD AUTO: 9.2 FL (ref 6–12)
POTASSIUM SERPL-SCNC: 4 MMOL/L (ref 3.7–5.3)
RBC # BLD: 3.53 M/UL (ref 4–5.2)
RBC # BLD: ABNORMAL 10*6/UL
SEG NEUTROPHILS: 52 % (ref 36–66)
SEGMENTED NEUTROPHILS ABSOLUTE COUNT: 1.96 K/UL (ref 1.3–9.1)
SODIUM BLD-SCNC: 135 MMOL/L (ref 135–144)
TOTAL PROTEIN: 5.8 G/DL (ref 6.4–8.3)
VANCOMYCIN TROUGH DATE LAST DOSE: NORMAL
VANCOMYCIN TROUGH DOSE AMOUNT: NORMAL
VANCOMYCIN TROUGH TIME LAST DOSE: 2245
VANCOMYCIN TROUGH: 12.4 UG/ML (ref 10–20)
WBC # BLD: 3.8 K/UL (ref 3.5–11)
WBC # BLD: ABNORMAL 10*3/UL

## 2020-10-14 PROCEDURE — 2580000003 HC RX 258: Performed by: SURGERY

## 2020-10-14 PROCEDURE — 82105 ALPHA-FETOPROTEIN SERUM: CPT

## 2020-10-14 PROCEDURE — 7100000000 HC PACU RECOVERY - FIRST 15 MIN: Performed by: INTERNAL MEDICINE

## 2020-10-14 PROCEDURE — 6360000002 HC RX W HCPCS: Performed by: SURGERY

## 2020-10-14 PROCEDURE — 2580000003 HC RX 258: Performed by: INTERNAL MEDICINE

## 2020-10-14 PROCEDURE — 88108 CYTOPATH CONCENTRATE TECH: CPT

## 2020-10-14 PROCEDURE — 6360000002 HC RX W HCPCS: Performed by: INTERNAL MEDICINE

## 2020-10-14 PROCEDURE — C1769 GUIDE WIRE: HCPCS | Performed by: INTERNAL MEDICINE

## 2020-10-14 PROCEDURE — 6360000004 HC RX CONTRAST MEDICATION: Performed by: INTERNAL MEDICINE

## 2020-10-14 PROCEDURE — 36415 COLL VENOUS BLD VENIPUNCTURE: CPT

## 2020-10-14 PROCEDURE — 3609014300 HC ERCP BALLOON DILATE BILIARY/PANC DUCT/AMPULLA EA: Performed by: INTERNAL MEDICINE

## 2020-10-14 PROCEDURE — 2709999900 HC NON-CHARGEABLE SUPPLY: Performed by: INTERNAL MEDICINE

## 2020-10-14 PROCEDURE — 80048 BASIC METABOLIC PNL TOTAL CA: CPT

## 2020-10-14 PROCEDURE — 80202 ASSAY OF VANCOMYCIN: CPT

## 2020-10-14 PROCEDURE — 0FD98ZX EXTRACTION OF COMMON BILE DUCT, VIA NATURAL OR ARTIFICIAL OPENING ENDOSCOPIC, DIAGNOSTIC: ICD-10-PCS | Performed by: INTERNAL MEDICINE

## 2020-10-14 PROCEDURE — 43261 ENDO CHOLANGIOPANCREATOGRAPH: CPT | Performed by: INTERNAL MEDICINE

## 2020-10-14 PROCEDURE — 3700000001 HC ADD 15 MINUTES (ANESTHESIA): Performed by: INTERNAL MEDICINE

## 2020-10-14 PROCEDURE — 6360000002 HC RX W HCPCS

## 2020-10-14 PROCEDURE — 2500000003 HC RX 250 WO HCPCS: Performed by: INTERNAL MEDICINE

## 2020-10-14 PROCEDURE — 2500000003 HC RX 250 WO HCPCS

## 2020-10-14 PROCEDURE — 7100000001 HC PACU RECOVERY - ADDTL 15 MIN: Performed by: INTERNAL MEDICINE

## 2020-10-14 PROCEDURE — 0F7D8ZZ DILATION OF PANCREATIC DUCT, VIA NATURAL OR ARTIFICIAL OPENING ENDOSCOPIC: ICD-10-PCS | Performed by: INTERNAL MEDICINE

## 2020-10-14 PROCEDURE — 80076 HEPATIC FUNCTION PANEL: CPT

## 2020-10-14 PROCEDURE — 43262 ENDO CHOLANGIOPANCREATOGRAPH: CPT | Performed by: INTERNAL MEDICINE

## 2020-10-14 PROCEDURE — 3700000000 HC ANESTHESIA ATTENDED CARE: Performed by: INTERNAL MEDICINE

## 2020-10-14 PROCEDURE — 2500000003 HC RX 250 WO HCPCS: Performed by: SURGERY

## 2020-10-14 PROCEDURE — 74330 X-RAY BILE/PANC ENDOSCOPY: CPT

## 2020-10-14 PROCEDURE — 85025 COMPLETE CBC W/AUTO DIFF WBC: CPT

## 2020-10-14 PROCEDURE — 2720000010 HC SURG SUPPLY STERILE: Performed by: INTERNAL MEDICINE

## 2020-10-14 PROCEDURE — 1200000000 HC SEMI PRIVATE

## 2020-10-14 RX ORDER — FENTANYL CITRATE 50 UG/ML
25 INJECTION, SOLUTION INTRAMUSCULAR; INTRAVENOUS EVERY 5 MIN PRN
Status: DISCONTINUED | OUTPATIENT
Start: 2020-10-14 | End: 2020-10-14 | Stop reason: HOSPADM

## 2020-10-14 RX ORDER — MIDAZOLAM HYDROCHLORIDE 1 MG/ML
INJECTION INTRAMUSCULAR; INTRAVENOUS PRN
Status: DISCONTINUED | OUTPATIENT
Start: 2020-10-14 | End: 2020-10-14 | Stop reason: SDUPTHER

## 2020-10-14 RX ORDER — SUCCINYLCHOLINE/SOD CL,ISO/PF 200MG/10ML
SYRINGE (ML) INTRAVENOUS PRN
Status: DISCONTINUED | OUTPATIENT
Start: 2020-10-14 | End: 2020-10-14 | Stop reason: SDUPTHER

## 2020-10-14 RX ORDER — 0.9 % SODIUM CHLORIDE 0.9 %
500 INTRAVENOUS SOLUTION INTRAVENOUS ONCE
Status: COMPLETED | OUTPATIENT
Start: 2020-10-14 | End: 2020-10-14

## 2020-10-14 RX ORDER — ROCURONIUM BROMIDE 10 MG/ML
INJECTION, SOLUTION INTRAVENOUS PRN
Status: DISCONTINUED | OUTPATIENT
Start: 2020-10-14 | End: 2020-10-14 | Stop reason: SDUPTHER

## 2020-10-14 RX ORDER — FENTANYL CITRATE 50 UG/ML
INJECTION, SOLUTION INTRAMUSCULAR; INTRAVENOUS PRN
Status: DISCONTINUED | OUTPATIENT
Start: 2020-10-14 | End: 2020-10-14 | Stop reason: SDUPTHER

## 2020-10-14 RX ORDER — PROPOFOL 10 MG/ML
INJECTION, EMULSION INTRAVENOUS PRN
Status: DISCONTINUED | OUTPATIENT
Start: 2020-10-14 | End: 2020-10-14 | Stop reason: SDUPTHER

## 2020-10-14 RX ORDER — DIPHENHYDRAMINE HYDROCHLORIDE 50 MG/ML
12.5 INJECTION INTRAMUSCULAR; INTRAVENOUS
Status: DISCONTINUED | OUTPATIENT
Start: 2020-10-14 | End: 2020-10-14 | Stop reason: HOSPADM

## 2020-10-14 RX ORDER — PROMETHAZINE HYDROCHLORIDE 25 MG/ML
6.25 INJECTION, SOLUTION INTRAMUSCULAR; INTRAVENOUS
Status: DISCONTINUED | OUTPATIENT
Start: 2020-10-14 | End: 2020-10-14 | Stop reason: CLARIF

## 2020-10-14 RX ORDER — DEXAMETHASONE SODIUM PHOSPHATE 4 MG/ML
INJECTION, SOLUTION INTRA-ARTICULAR; INTRALESIONAL; INTRAMUSCULAR; INTRAVENOUS; SOFT TISSUE PRN
Status: DISCONTINUED | OUTPATIENT
Start: 2020-10-14 | End: 2020-10-14 | Stop reason: SDUPTHER

## 2020-10-14 RX ORDER — METOCLOPRAMIDE HYDROCHLORIDE 5 MG/ML
10 INJECTION INTRAMUSCULAR; INTRAVENOUS
Status: DISCONTINUED | OUTPATIENT
Start: 2020-10-14 | End: 2020-10-14 | Stop reason: HOSPADM

## 2020-10-14 RX ORDER — LABETALOL HYDROCHLORIDE 5 MG/ML
5 INJECTION, SOLUTION INTRAVENOUS EVERY 10 MIN PRN
Status: DISCONTINUED | OUTPATIENT
Start: 2020-10-14 | End: 2020-10-14 | Stop reason: HOSPADM

## 2020-10-14 RX ORDER — LIDOCAINE HYDROCHLORIDE 10 MG/ML
INJECTION, SOLUTION EPIDURAL; INFILTRATION; INTRACAUDAL; PERINEURAL PRN
Status: DISCONTINUED | OUTPATIENT
Start: 2020-10-14 | End: 2020-10-14 | Stop reason: SDUPTHER

## 2020-10-14 RX ORDER — HYDRALAZINE HYDROCHLORIDE 20 MG/ML
5 INJECTION INTRAMUSCULAR; INTRAVENOUS EVERY 10 MIN PRN
Status: DISCONTINUED | OUTPATIENT
Start: 2020-10-14 | End: 2020-10-14 | Stop reason: HOSPADM

## 2020-10-14 RX ORDER — 0.9 % SODIUM CHLORIDE 0.9 %
500 INTRAVENOUS SOLUTION INTRAVENOUS
Status: DISCONTINUED | OUTPATIENT
Start: 2020-10-14 | End: 2020-10-14 | Stop reason: HOSPADM

## 2020-10-14 RX ORDER — CEFAZOLIN SODIUM 1 G/3ML
INJECTION, POWDER, FOR SOLUTION INTRAMUSCULAR; INTRAVENOUS PRN
Status: DISCONTINUED | OUTPATIENT
Start: 2020-10-14 | End: 2020-10-14 | Stop reason: SDUPTHER

## 2020-10-14 RX ORDER — ONDANSETRON 2 MG/ML
INJECTION INTRAMUSCULAR; INTRAVENOUS PRN
Status: DISCONTINUED | OUTPATIENT
Start: 2020-10-14 | End: 2020-10-14 | Stop reason: SDUPTHER

## 2020-10-14 RX ADMIN — Medication 10 ML: at 21:25

## 2020-10-14 RX ADMIN — SODIUM CHLORIDE 500 ML: 0.9 INJECTION, SOLUTION INTRAVENOUS at 14:29

## 2020-10-14 RX ADMIN — PROPOFOL 150 MG: 10 INJECTION, EMULSION INTRAVENOUS at 12:46

## 2020-10-14 RX ADMIN — SODIUM CHLORIDE: 9 INJECTION, SOLUTION INTRAVENOUS at 01:15

## 2020-10-14 RX ADMIN — LIDOCAINE HYDROCHLORIDE 50 MG: 10 INJECTION, SOLUTION EPIDURAL; INFILTRATION; INTRACAUDAL; PERINEURAL at 12:46

## 2020-10-14 RX ADMIN — PIPERACILLIN SODIUM AND TAZOBACTAM SODIUM 3.38 G: 3; .375 INJECTION, POWDER, LYOPHILIZED, FOR SOLUTION INTRAVENOUS at 08:36

## 2020-10-14 RX ADMIN — FENTANYL CITRATE 50 MCG: 50 INJECTION INTRAMUSCULAR; INTRAVENOUS at 06:28

## 2020-10-14 RX ADMIN — DEXAMETHASONE SODIUM PHOSPHATE 4 MG: 4 INJECTION, SOLUTION INTRA-ARTICULAR; INTRALESIONAL; INTRAMUSCULAR; INTRAVENOUS; SOFT TISSUE at 12:59

## 2020-10-14 RX ADMIN — FENTANYL CITRATE 50 MCG: 50 INJECTION INTRAMUSCULAR; INTRAVENOUS at 03:41

## 2020-10-14 RX ADMIN — FAMOTIDINE 20 MG: 10 INJECTION INTRAVENOUS at 21:27

## 2020-10-14 RX ADMIN — FENTANYL CITRATE 50 MCG: 50 INJECTION, SOLUTION INTRAMUSCULAR; INTRAVENOUS at 12:46

## 2020-10-14 RX ADMIN — ROCURONIUM BROMIDE 5 MG: 10 INJECTION INTRAVENOUS at 12:46

## 2020-10-14 RX ADMIN — GLUCAGON HYDROCHLORIDE 0.5 MG: KIT at 13:04

## 2020-10-14 RX ADMIN — PIPERACILLIN SODIUM AND TAZOBACTAM SODIUM 3.38 G: 3; .375 INJECTION, POWDER, LYOPHILIZED, FOR SOLUTION INTRAVENOUS at 17:04

## 2020-10-14 RX ADMIN — FENTANYL CITRATE 50 MCG: 50 INJECTION, SOLUTION INTRAMUSCULAR; INTRAVENOUS at 13:01

## 2020-10-14 RX ADMIN — ONDANSETRON 4 MG: 2 INJECTION INTRAMUSCULAR; INTRAVENOUS at 13:31

## 2020-10-14 RX ADMIN — SODIUM CHLORIDE: 9 INJECTION, SOLUTION INTRAVENOUS at 11:59

## 2020-10-14 RX ADMIN — ROCURONIUM BROMIDE 25 MG: 10 INJECTION INTRAVENOUS at 12:54

## 2020-10-14 RX ADMIN — FENTANYL CITRATE 50 MCG: 50 INJECTION INTRAMUSCULAR; INTRAVENOUS at 11:14

## 2020-10-14 RX ADMIN — FAMOTIDINE 20 MG: 10 INJECTION INTRAVENOUS at 08:36

## 2020-10-14 RX ADMIN — VANCOMYCIN HYDROCHLORIDE 1000 MG: 1.5 INJECTION, POWDER, LYOPHILIZED, FOR SOLUTION INTRAVENOUS at 16:59

## 2020-10-14 RX ADMIN — MIDAZOLAM 1 MG: 1 INJECTION INTRAMUSCULAR; INTRAVENOUS at 12:46

## 2020-10-14 RX ADMIN — SUGAMMADEX 116 MG: 100 INJECTION, SOLUTION INTRAVENOUS at 13:18

## 2020-10-14 RX ADMIN — Medication 80 MG: at 12:46

## 2020-10-14 RX ADMIN — CEFAZOLIN 1 MG: 1 INJECTION, POWDER, FOR SOLUTION INTRAMUSCULAR; INTRAVENOUS at 13:14

## 2020-10-14 RX ADMIN — PIPERACILLIN SODIUM AND TAZOBACTAM SODIUM 3.38 G: 3; .375 INJECTION, POWDER, LYOPHILIZED, FOR SOLUTION INTRAVENOUS at 01:15

## 2020-10-14 ASSESSMENT — PULMONARY FUNCTION TESTS
PIF_VALUE: 17
PIF_VALUE: 18
PIF_VALUE: 0
PIF_VALUE: 1
PIF_VALUE: 0
PIF_VALUE: 0
PIF_VALUE: 17
PIF_VALUE: 15
PIF_VALUE: 18
PIF_VALUE: 2
PIF_VALUE: 18
PIF_VALUE: 1
PIF_VALUE: 17
PIF_VALUE: 2
PIF_VALUE: 17
PIF_VALUE: 18
PIF_VALUE: 17
PIF_VALUE: 21
PIF_VALUE: 0
PIF_VALUE: 18
PIF_VALUE: 18
PIF_VALUE: 0
PIF_VALUE: 19
PIF_VALUE: 17
PIF_VALUE: 17
PIF_VALUE: 0
PIF_VALUE: 17
PIF_VALUE: 19
PIF_VALUE: 17
PIF_VALUE: 0
PIF_VALUE: 17
PIF_VALUE: 0
PIF_VALUE: 18
PIF_VALUE: 17
PIF_VALUE: 19
PIF_VALUE: 1
PIF_VALUE: 19
PIF_VALUE: 5
PIF_VALUE: 17
PIF_VALUE: 17
PIF_VALUE: 11
PIF_VALUE: 17
PIF_VALUE: 1
PIF_VALUE: 17
PIF_VALUE: 0
PIF_VALUE: 17
PIF_VALUE: 14
PIF_VALUE: 17
PIF_VALUE: 22
PIF_VALUE: 18

## 2020-10-14 ASSESSMENT — ENCOUNTER SYMPTOMS: STRIDOR: 0

## 2020-10-14 ASSESSMENT — PAIN - FUNCTIONAL ASSESSMENT: PAIN_FUNCTIONAL_ASSESSMENT: 0-10

## 2020-10-14 ASSESSMENT — PAIN SCALES - GENERAL
PAINLEVEL_OUTOF10: 10
PAINLEVEL_OUTOF10: 5
PAINLEVEL_OUTOF10: 10
PAINLEVEL_OUTOF10: 9
PAINLEVEL_OUTOF10: 9
PAINLEVEL_OUTOF10: 0

## 2020-10-14 ASSESSMENT — PAIN DESCRIPTION - DESCRIPTORS: DESCRIPTORS: ACHING;DISCOMFORT

## 2020-10-14 NOTE — CONSULTS
GI Consult Note:    Name: Javi Meyers  MRN: [id number]     Acct: [de-identified]  Room: 2058/2058-01    Admit Date: 10/12/2020  PCP: Asa Estrella MD    Physician Requesting Consult: Malathi Meng MD     Reason for Consult:    Right upper quadrant abdominal pain  Elevated LFTs  Abnormal MRCP  Weight loss      Chief Complaint:     Chief Complaint   Patient presents with    Abdominal Pain     right upper quadrant X4 days       History Obtained From:     Patient and EMR    History of Present Illness:      Javi Meyers is a  67 y.o.  female who presents with Abdominal Pain (right upper quadrant X4 days)    This elderly female patient has history significant for multiple medical issues  She has been admitted with nonspecific right upper quadrant abdominal pain for weeks  She has been evaluated by surgery in the past and suspected to have gallbladder surgery and subsequently was suggested to get admitted for gallbladder surgery    This time she was found to have some increasing LFTs including bilirubin    An MRCP was performed which has revealed the following findings    1. Acute cholangitis suspected given confluent narrowing at the confluence of    the hepatic ducts. 2. Prominent periportal edema, presumably reactive, and mild intrahepatic    bile duct dilatation. 3. Acute cholecystitis likely given the gallbladder findings. 4. Cholelithiasis, probable small gallstone near the gallbladder neck. 5. No definite choledocholithiasis     Patient denies any history for alcohol abuse  Denies smoking illicit drug usage  No no history for hepatitis    Patient claims that she has lost her  and has not been eating and in a year or so she has lost approximately 10 to 11 pounds?   Denies any rectal bleeding melanotic stools    Some nausea but no vomiting or any hematemesis  She has history for GERD    Symptoms:  Onset:  Location:  abdomen  Duration:  week(s)  Severity:  moderate  Quality: intermittent      Past Medical History:     Past Medical History:   Diagnosis Date    Allergic rhinitis     Closed tibia fracture     GERD (gastroesophageal reflux disease)     Gout     Headache(784.0)     h/o migraines    Hyperlipidemia     Osteoarthritis     DJD Lt knee    Osteoporosis     Posterior tibial tendon dysfunction     right, wears braces on both legs    Vitamin D deficient rickets     Wears glasses     Wears partial dentures     lower        Past Surgical History:     Past Surgical History:   Procedure Laterality Date    APPENDECTOMY      CARPAL TUNNEL RELEASE      bilateral    CATARACT REMOVAL WITH IMPLANT [facility] 03/07/2019    [facility]    CATARACT REMOVAL WITH IMPLANT Right 04/04/2019    [facility]    COLONOSCOPY      EYE SURGERY      FRACTURE SURGERY      left tibia    HYSTERECTOMY  4/12    INTRACAPSULAR CATARACT EXTRACTION Left 3/7/2019    EYE CATARACT EMULSIFICATION IOL IMPLANT - TOPICAL performed by Chelsea Hgaen MD at [address] [place] [address] Right 4/4/2019    EYE CATARACT EMULSIFICATION IOL IMPLANT performed by Chelsea Hagen MD at [address] [place] [address] Left 11/06/2018    hardware removal and then TKA    KNEE ARTHROSCOPY Left 2002?  NE TOTAL KNEE ARTHROPLASTY Left 11/6/2018    KNEE TOTAL ARTHROPLASTY W/REMOVAL HARDWARE PLATE & SCREWS performed by Annette Moser MD at [address] [place] [address] Left     TONSILLECTOMY AND ADENOIDECTOMY      TOTAL KNEE ARTHROPLASTY Right 12/10/2019    KNEE TOTAL ARTHROPLASTY performed by Annette Moser MD at [address] [place] [address]          Medications Prior to Admission:       Prior to Admission medications    Medication Sig Start Date End Date Taking?  Authorizing Provider   cetirizine (ZYRTEC) 10 MG tablet TAKE ONE TABLET BY MOUTH DAILY 9/18/20  Yes PASCUAL Ariza - CNP   simvastatin (ZOCOR) 20 MG tablet TAKE ONE TABLET BY MOUTH DAILY 9/18/20  Yes Shannon Delarosa Enrike Schmidt APRN - CNP   alendronate (FOSAMAX) 70 MG tablet TAKE 1 TABLET BY MOUTH ONCE WEEKLY BEFORE BREAKFAST, ON AN EMPTY STOMACH: REMAIN UPRIGHT FOR 30 MINUTES:TAKE WITH 8 OUNCES OF WATER 9/2/20  Yes Mehran Antis, APRN - CNP   vitamin D (ERGOCALCIFEROL) 1.25 MG (89712 UT) CAPS capsule TAKE ONE CAPSULE BY MOUTH EVERY 2 WEEKS 7/20/20  Yes Mehran Antis APRN - CNP   verapamil (CALAN SR) 240 MG extended release tablet TAKE ONE TABLET BY MOUTH ONCE NIGHTLY FOR MIGRAINE HEADACHE 6/12/20  Yes Mehran Antis APRN - CNP   allopurinol (ZYLOPRIM) 100 MG tablet TAKE ONE TABLET BY MOUTH DAILY 6/4/20  Yes Mehran Antis APRN - CNP   famotidine (PEPCID) 20 MG tablet TAKE ONE TABLET BY MOUTH TWICE A DAY 5/26/20  Yes Mehran Antis APRN - CNP   fluticasone (FLONASE) 50 MCG/ACT nasal spray SPRAY TWO SPRAYS IN EACH NOSTRIL ONCE DAILY 5/1/20  Yes Mehran Perez APRN - CNP   aspirin 81 MG tablet Take 81 mg by mouth daily   Yes Historical Provider, MD   naproxen (NAPROSYN) 250 MG tablet TAKE ONE TABLET BY MOUTH THREE TIMES A DAY WITH FOOD AS NEEDED FOR GOUT FLARE UP UNTIL ATTACK SUBSIDES 11/25/19  Yes Angelito Montanez MD   Aspirin-Acetaminophen-Caffeine (EXCEDRIN MIGRAINE PO) Take by mouth as needed    Yes Historical Provider, MD   docusate sodium (COLACE) 100 MG capsule Take 1 capsule by mouth 2 times daily 12/18/18  Yes Mehran Perez APRN - CNP        Allergies:       Patient has no known allergies. Social History:     Tobacco:    reports that she quit smoking about 33 years ago. Her smoking use included cigarettes. She has a 5.00 pack-year smoking history. She has never used smokeless tobacco.  Alcohol:      reports no history of alcohol use. Drug Use:  reports no history of drug use.     Family History:     Family History   Problem Relation Age of Onset    Arthritis Other     Thyroid Disease Other     COPD Other     Stroke Father        Review of Systems:     Positive and Negative as described in HPI    Constitutional:  negative for  fevers, chills, sweats, CBC:   Lab Results   Component Value Date    WBC 3.8 10/13/2020    RBC 3.47 10/13/2020    RBC 2.91 04/20/2012    HGB 10.3 10/13/2020    HCT 31.1 10/13/2020    MCV 89.7 10/13/2020    MCH 29.7 10/13/2020    MCHC 33.1 10/13/2020    RDW 14.1 10/13/2020     10/13/2020     04/20/2012    MPV 8.3 10/13/2020     CBC with Differential:    Lab Results   Component Value Date    WBC 3.8 10/13/2020    RBC 3.47 10/13/2020    RBC 2.91 04/20/2012    HGB 10.3 10/13/2020    HCT 31.1 10/13/2020     10/13/2020     04/20/2012    MCV 89.7 10/13/2020    MCH 29.7 10/13/2020    MCHC 33.1 10/13/2020    RDW 14.1 10/13/2020    LYMPHOPCT 38 10/13/2020    MONOPCT 10 10/13/2020    BASOPCT 0 10/13/2020    MONOSABS 0.38 10/13/2020    LYMPHSABS 1.44 10/13/2020    EOSABS 0.30 10/13/2020    BASOSABS 0.00 10/13/2020    DIFFTYPE NOT REPORTED 10/13/2020     Hemoglobin/Hematocrit:    Lab Results   Component Value Date    HGB 10.3 10/13/2020    HCT 31.1 10/13/2020     CMP:    Lab Results   Component Value Date     10/13/2020    K 3.7 10/13/2020     10/13/2020    CO2 19 10/13/2020    BUN 8 10/13/2020    CREATININE 0.47 10/13/2020    GFRAA >60 10/13/2020    LABGLOM >60 10/13/2020    GLUCOSE 75 10/13/2020    GLUCOSE 84 04/09/2012    PROT 5.8 10/13/2020    LABALBU 3.2 10/13/2020    LABALBU 4.2 04/05/2012    CALCIUM 8.1 10/13/2020    BILITOT 3.84 10/13/2020    ALKPHOS 162 10/13/2020     10/13/2020     10/13/2020     BMP:    Lab Results   Component Value Date     10/13/2020    K 3.7 10/13/2020     10/13/2020    CO2 19 10/13/2020    BUN 8 10/13/2020    LABALBU 3.2 10/13/2020    LABALBU 4.2 04/05/2012    CREATININE 0.47 10/13/2020    CALCIUM 8.1 10/13/2020    GFRAA >60 10/13/2020    LABGLOM >60 10/13/2020    GLUCOSE 75 10/13/2020    GLUCOSE 84 04/09/2012     PT/INR:    Lab Results   Component Value Date    PROTIME 12.8 10/12/2020    INR 1.0 10/12/2020     PTT:    Lab Results   Component Value Date APTT 26.0 10/12/2020   [APTT}    Assesment:     Primary Problem  <principal problem not specified>    Active Hospital Problems    Diagnosis Date Noted    Cholecystitis [K81.9] 10/12/2020     Right upper quadrant abdominal pain  Elevated LFTs  Abnormal MRCP  Weight loss    Plan:     1. Nonspecific dilation of the hepatic duct with questionable narrowing closer to the confluence could be consistent with Kl[place] tumor? 2. She would need further work-up including an ERCP and possible endoscopy ultrasound  3. We will check for hepatitis C screen  4. Follow-up LFTs parent  5. Aggressive IV hydration  6. We will plan ERCP on this patient tomorrow to evaluate  The Endoscopic procedure was explained to the patient in detail  The prep and NPO were explained  All the Risks, Benefits, and Alternatives were explained  Risk of Bleeding, Perforation and Cardio Respiratory risks were explained  her questions were answered  The patient has verbalized understanding and agreement to this plan. Case was discussed with surgeon  Explained to the patient  Discussed with nursing staff on the floor          Thank you for allowing me to participate in the care of your patient. Please feel free to contact me with any questions or concerns.      Electronically signed by Kevin Kinsey MD on 10/13/2020 at 9:41 PM     Copy sent to Dr. Jayden Tam MD

## 2020-10-14 NOTE — PROGRESS NOTES
Patient returns from ENDO. No distress noted. Fluid bolus begun per orders. RN will continue to monitor.

## 2020-10-14 NOTE — OP NOTE
Operative Note      Patient: Paras Stahl  YOB: 1945  MRN: [id number]    Date of Procedure: 10/14/2020    Pre-Op Diagnosis: EXTRA-HEPATIC DUCTAL DILATATION/R/O MASSVS. Sena Asif    Post-Op Diagnosis: Mild dilation of the proximal pancreatic duct  Mild narrowing of the confluence of the common hepatic ducts  Papillotomy was made  Brushings were taken from the questionable narrowed area  Balloon sweeps were done       Procedure(s):  ERCP  Papillotomy  Balloon sweeps  Brushings of the biliary stricture    Surgeon(s):  Krishna Camarena MD    Assistant:   * No surgical staff found *    Anesthesia: General    Estimated Blood Loss (mL): Minimal    Complications: None    Specimens:   * No specimens in log *    Implants:  * No implants in log *      Drains: * No LDAs found *    Findings:     Procedure was explained to the patient all the risk-benefit and alternatives including bleeding perforation cardiorespiratory problem pancreatitis were all explained to her informed consent was taken patient was brought to the endoscopy suite placed in appropriate ERCP position and sedated as per anesthesia    A side-viewing ERCP scope was then passed to the back of the throat esophagus intubated descending duodenum was reached ampulla was identified  No obvious mass-effect was noted at the ampullary level  Pictures were taken    Papillotome was then passed through the endoscope and positioned at the ampulla initially pancreatic duct was visualized  Proximal pancreatic duct was rather straight leg bile duct and had slight dilation no chain of lakes obvious strictures however were noted    With the help of a guidewire common bile duct was then cannulated contrast material was injected    It appears that in the proximal common bile duct area mild smooth tapering was noted which appeared to be nonspecific no shouldering effect or any mass-effect or irregularity was noted    At the confluence of the common hepatic duct also there

## 2020-10-14 NOTE — PROGRESS NOTES
Pharmacy Vancomycin Consult     Vancomycin Day: 3  Current Dosin mg every 12 hours. Temp max:  98.1 F    Recent Labs     10/13/20  0542 10/14/20  05   BUN 8 10       Recent Labs     10/13/20  0542 10/14/20  0524   CREATININE 0.47* 0.44*       Recent Labs     10/13/20  0542 10/14/20  0524   WBC 3.8 3.8         Intake/Output Summary (Last 24 hours) at 10/14/2020 1159  Last data filed at 10/14/2020 1032  Gross per 24 hour   Intake 3314 ml   Output 4500 ml   Net -1186 ml       Culture Date      Source                       Results  See micro    Ht Readings from Last 1 Encounters:   10/12/20 5' 3\" (1.6 m)        Wt Readings from Last 1 Encounters:   10/12/20 128 lb 1.4 oz (58.1 kg)         Body mass index is 22.69 kg/m². Estimated Creatinine Clearance: 90 mL/min (A) (based on SCr of 0.44 mg/dL (L)). Trough: 12.4 at 1030, last dose of 1 gm at 2345 last night. Assessment:   for an early trough, this 12.4 is probably going to elevate some in this 67year old, so will not increase dose for now, although it is slightly below target range of 15-20. Plan:  Continue with 1000 mg every 12 hours. Chelo Viera. Ph.  10/14/2020  12:03 PM

## 2020-10-14 NOTE — PLAN OF CARE
Problem: Pain:  Goal: Pain level will decrease  10/14/2020 0330 by Nicolas Coleman RN  Outcome: Ongoing  10/13/2020 1657 by Amanuel Moya RN  Outcome: Ongoing  Note: Pt medicated with pain medication prn. Assessed all pain characteristics including level, type, location, frequency, and onset. Non-pharmacologic interventions offered to pt as well. Pt states pain is tolerable at this time. Will continue to monitor. Goal: Control of acute pain  Outcome: Ongoing  Goal: Control of chronic pain  Outcome: Ongoing  Note: Full pain assessment completed this shift. Pt pain adequately controlled with PRN medication and rest. Pt educated on nonpharmacologic interventions to help decrease pain. Will continue to monitor. Problem: Falls - Risk of:  Goal: Will remain free from falls  10/14/2020 0330 by Nicolas Coleman RN  Outcome: Ongoing  Note: Pt remains free from falls this shift. Bed in lowest position with wheels locked and 2/4 siderails up. Call light and bedside table within reach. Nonskid socks on. Will continue to monitor. 10/13/2020 1657 by Amanuel Moya RN  Outcome: Met This Shift  Note: No falls noted this shift. Patient ambulates independently without difficulty. Bed kept in low position. Safe environment maintained. Bedside table & call light in reach. Uses call light appropriately when needing assistance.     Goal: Absence of physical injury  Outcome: Ongoing     Problem: Physical Regulation:  Goal: Will remain free from infection  Outcome: Ongoing

## 2020-10-14 NOTE — PROGRESS NOTES
Writer called lab to inquire about how to appropriately label specimen since Bile Duct Brushing is not an available option. Writer spoke with Lyubov Burk from the Lab who instructed writer to choose \"bronchial brushing\" as the specimen source and cross out \"bronchial\" and write in \"bile duct. Writer did as instructed. Board runner and supervisor notified.

## 2020-10-14 NOTE — FLOWSHEET NOTE
10/14/20 0402   Encounter Summary   Services provided to: Patient not available  (patient having medical procedure)

## 2020-10-14 NOTE — CARE COORDINATION
DISCHARGE PLANNING NOTE:    Attempted to see patient for discharge planning needs, however she was off the floor. Pt went for ERCP today with brushings of the biliary stricture. Per GI, may need EUS. Per surgery, further rec's regarding vazquez after procedures. Active order for IV Vanco, IV Zosyn, and cl liq diet. Will continue to follow for additional discharge needs.     Electronically signed by Moshe Jiang RN on 10/14/2020 at 2:37 PM

## 2020-10-14 NOTE — ANESTHESIA POSTPROCEDURE EVALUATION
Department of Anesthesiology  Postprocedure Note    Patient: Karuna Treviño  MRN: [id number]  YOB: 1945  Date of evaluation: 10/14/2020  Time:  3:29 PM     Procedure Summary     Date:  10/14/20 Room / Location:  [facility] ENDO 03 / [facility] ENDO    Anesthesia Start:  1690 Anesthesia Stop:  8154    Procedure:  ERCP WITH BILE DUCT BRUSHING (N/A ) Diagnosis:  (EXTRA-HEPATIC DUCTAL DILATATION/R/O MASSVS. Junior Kins)    Surgeon:  Shann Spurling, MD Responsible Provider:  Suzette Anaya MD    Anesthesia Type:  general ASA Status:  3          Anesthesia Type: general    Karie Phase I: Karie Score: 10    Karie Phase II:      Last vitals: Reviewed and per EMR flowsheets.        Anesthesia Post Evaluation    Comments: POST- ANESTHESIA EVALUATION       Pt Name: Karuna Treviño  MRN: [id number]  YOB: 1945  Date of evaluation: 10/14/2020  Time:  3:29 PM      BP (!) 130/57   Pulse 55   Temp 97.5 °F (36.4 °C) (Oral)   Resp 20   Ht 5' 3\" (1.6 m)   Wt 128 lb 1.4 oz (58.1 kg)   LMP 01/01/2004   SpO2 98%   BMI 22.69 kg/m²      Consciousness Level  Awake  Cardiopulmonary Status  Stable  Pain Adequately Treated YES  Nausea / Vomiting  NO  Adequate Hydration  YES  Anesthesia Related Complications NONE      Electronically signed by Suzette Anaya MD on 10/14/2020 at 3:29 PM

## 2020-10-14 NOTE — PLAN OF CARE
Problem: Pain:  Goal: Pain level will decrease  Description: Pain level will decrease  10/14/2020 1416 by Joanna Dolan RN  Outcome: Ongoing  Note: Pt medicated with pain medication prn. Assessed all pain characteristics including level, type, location, frequency, and onset. Non-pharmacologic interventions offered to pt as well. Pt states pain is tolerable at this time. Will continue to monitor. 10/14/2020 0330 by Parker Noel RN  Outcome: Ongoing  Goal: Control of acute pain  Description: Control of acute pain  10/14/2020 0330 by Parker Noel RN  Outcome: Ongoing  Goal: Control of chronic pain  Description: Control of chronic pain  10/14/2020 0330 by Parker Noel RN  Outcome: Ongoing  Note: Full pain assessment completed this shift. Pt pain adequately controlled with PRN medication and rest. Pt educated on nonpharmacologic interventions to help decrease pain. Will continue to monitor. Problem: Falls - Risk of:  Goal: Will remain free from falls  Description: Will remain free from falls  10/14/2020 1416 by Joanna Dolan RN  Outcome: Met This Shift  Note: No falls noted this shift. Patient ambulates independently  without difficulty. Bed kept in low position. Safe environment maintained. Bedside table & call light in reach. Uses call light appropriately when needing assistance. 10/14/2020 0330 by Parker Noel RN  Outcome: Ongoing  Note: Pt remains free from falls this shift. Bed in lowest position with wheels locked and 2/4 siderails up. Call light and bedside table within reach. Nonskid socks on. Will continue to monitor.    Goal: Absence of physical injury  Description: Absence of physical injury  10/14/2020 0330 by Parker Noel RN  Outcome: Ongoing     Problem: Physical Regulation:  Goal: Will remain free from infection  Description: Will remain free from infection  10/14/2020 1416 by Joanna Dolan RN  Outcome: Ongoing  10/14/2020 0330 by Parker Noel RN  Outcome: Ongoing

## 2020-10-14 NOTE — PROGRESS NOTES
Hospitalist Progress Note  10/13/2020 9:31 PM  Subjective:   Admit Date: 10/12/2020  PCP: Analy Rodrigez MD     Full Code      C/c:  Chief Complaint   Patient presents with    Abdominal Pain     right upper quadrant X4 days         Interval History: cholecystectomy  Cancelled due to abnormal mrcp    Diet: DIET DENTAL SOFT;  Diet NPO, After Midnight Exceptions are: Ice Chips, Sips with Meds                                ip days:1  Medications:   Scheduled Meds:   vancomycin (VANCOCIN) intermittent dosing (placeholder)   Other RX Placeholder    vancomycin  1,000 mg Intravenous Q12H    sodium chloride flush  10 mL Intravenous 2 times per day    famotidine (PEPCID) injection  20 mg Intravenous BID    piperacillin-tazobactam (ZOSYN) 3.375 g in dextrose 5% IVPB extended infusion (mini-bag)  3.375 g Intravenous Q8H     Continuous Infusions:   lactated ringers 100 mL/hr at 10/12/20 0432    sodium chloride 100 mL/hr at 10/13/20 1000     PRN Meds:.sodium chloride flush, sodium chloride flush, sodium chloride flush, ondansetron, fentanNYL **OR** fentanNYL     CBC:   Recent Labs     10/12/20  0250 10/13/20  0542   WBC 5.7 3.8   HGB 12.5 10.3*    163     BMP:    Recent Labs     10/12/20  0250 10/13/20  0542   * 133*   K 3.8 3.7   CL 99 101   CO2 22 19*   BUN 8 8   CREATININE 0.52 0.47*   GLUCOSE 111* 75     Hepatic:   Recent Labs     10/12/20  0250 10/13/20  0542   * 164*   * 283*   BILITOT 3.47* 3.84*   ALKPHOS 184* 162*     Troponin: No results for input(s): TROPONINI in the last 72 hours. BNP: No results for input(s): BNP in the last 72 hours. Lipids: No results for input(s): CHOL, HDL in the last 72 hours.     Invalid input(s): LDLCALCU  INR:   Recent Labs     10/12/20  0250   INR 1.0       Objective:   Vitals: /65   Pulse 80   Temp 98.3 °F (36.8 °C)   Resp 18   Ht 5' 3\" (1.6 m)   Wt 128 lb 1.4 oz (58.1 kg)   LMP 01/01/2004   SpO2 99%   BMI 22.69 kg/m²   General appearance: alert, appears stated age and cooperative  Skin: Skin color, texture, turgor normal. No rashes or lesions  Lungs: clear to auscultation bilaterally  Heart: regular rate and rhythm, S1, S2 normal, no murmur, click, rub or gallop  Abdomen: soft, non-tender; bowel sounds normal; no masses,  no organomegaly  Extremities: extremities normal, atraumatic, no cyanosis or edema  Neurologic: Mental status: Alert, oriented, thought content appropriate    Prophylaxis:   DVT with  [] lovenox        [] heparin        [] Scd        [] none:     Radiology:  Xr Chest (2 Vw)    Result Date: 10/12/2020  EXAMINATION: TWO XRAY VIEWS OF THE CHEST 10/12/2020 9:00 pm COMPARISON: None. HISTORY: ORDERING SYSTEM PROVIDED HISTORY: clearance for surgery Reason for Exam: Pre op Acuity: Acute Type of Exam: Initial FINDINGS: The lungs are without acute focal process. No effusion or pneumothorax. The cardiomediastinal silhouette is normal.  Multilevel hypertrophic/degenerative change of the thoracic spine. Negative chest.     Ct Abdomen Pelvis W Iv Contrast Additional Contrast? None    Result Date: 10/12/2020  EXAMINATION: CT OF THE ABDOMEN AND PELVIS WITH CONTRAST 10/12/2020 3:34 am TECHNIQUE: CT of the abdomen and pelvis was performed with the administration of intravenous contrast. Multiplanar reformatted images are provided for review. Dose modulation, iterative reconstruction, and/or weight based adjustment of the mA/kV was utilized to reduce the radiation dose to as low as reasonably achievable. COMPARISON: None. HISTORY: ORDERING SYSTEM PROVIDED HISTORY: RUQ abd pain N/V, positive Mckeon's sign TECHNOLOGIST PROVIDED HISTORY: RUQ abd pain N/V, positive Mckeon's sign Reason for Exam: Patient c/o RUQ abdominal pain with nausea and vomiting x 1 day Acuity: Acute Type of Exam: Initial FINDINGS: Lower Chest: Minimal dependent changes are seen within the lungs bilaterally. Organs:  There is suggestion of a gallstone in the region of the gallbladder neck. There appears to be pericholecystic fluid. Mild prominence of the intrahepatic bile ducts is seen. Otherwise, the liver, spleen, pancreas and adrenal glands demonstrate no acute abnormality. The kidneys demonstrate symmetric enhancement. No convincing focal renal mass. There is no hydronephrosis. GI/Bowel: There is no evidence of a bowel obstruction. Stool is seen throughout the colon. No evidence of acute appendicitis. Pelvis: The urinary bladder demonstrates no acute abnormality. The patient is status post hysterectomy. Peritoneum/Retroperitoneum: The abdominal aorta is normal in caliber with scattered atherosclerosis. No bulky retroperitoneal or mesenteric adenopathy. No free fluid or free air seen in the abdomen or pelvis. Bones/Soft Tissues: There is a presumably chronic compression deformity of the T12 vertebral body. No acute osseous abnormality seen. 1. There appears to be a gallstone in the region the gallbladder neck with pericholecystic fluid as well as mild prominence of the intrahepatic bile ducts. Findings could represent acute cholecystitis. 2. Otherwise, no acute abnormality identified within the abdomen or pelvis. Mri Abdomen W Wo Contrast Mrcp    Result Date: 10/12/2020  EXAMINATION: MRI OF THE ABDOMEN WITH AND WITHOUT CONTRAST AND MRCP 10/12/2020 6:32 pm TECHNIQUE: Multiplanar multisequence MRI of the abdomen was performed with and without the administration of intravenous contrast.  After initial T2 axial and coronal images, thick slab, thin slab and 3D coronal MRCP sequences were obtained without the administration of intravenous contrast.  MIP images are provided for review. COMPARISON: CT abdomen 10/12/2020 at 3:36 a.m., ultrasound right upper quadrant 10/12/2020 at 9:12 a.m.  HISTORY: ORDERING SYSTEM PROVIDED HISTORY: Prominence of the hepatic bile ducts, cholecystitis and cholelithiasis described on CT earlier today FINDINGS: Lower chest: Trace right by ultrasound. Liver length is 16.3 cm. Flow in the portal vein is hepatopetal. BILIARY SYSTEM:  The gallbladder appears very irregular with apparent diffuse wall thickening. The lumen is poorly defined. There is an echogenic focus with shadowing in the region of the neck compatible with a gallstone as shown on the recent CT. The patient reportedly has a positive sonographic Mckeon's sign. Mildly dilated common bile duct measuring up to 7.8 mm. RIGHT KIDNEY: No obstruction of the somewhat atrophic right kidney as visualized. PANCREAS:  Visualized portions of the pancreas are unremarkable. Overall the pancreas is not well seen due to overlying bowel gas. Pancreatic duct is mildly prominent measuring up to 3.4 mm. OTHER: No evidence of right upper quadrant ascites. Suspect a 7 mm gallstone with a diffusely thickened irregular gallbladder wall and collapsed lumen. Patient reportedly had a positive sonographic Mckeon's sign. Findings are suggestive of acute cholecystitis. Prominent common bile duct measuring up to 7.8 mm. Correlate with clinical findings and laboratory values to determine need for MRCP. The findings were sent to the Radiology Results [address] at 10:19 am on 10/12/2020to be communicated to a licensed caregiver. Nm Hepatobiliary Scan W Ejection Fraction    Result Date: 10/13/2020  EXAMINATION: NUCLEAR MEDICINE HEPATOBILIARY SCINTIGRAPHY (HIDA SCAN) WITH EJECTION FRACTION. TECHNIQUE: Approximately 3.4 millicuries ZV50B Mebrofenin (Choletec) was administered IV. Then, dynamic images of the abdomen were obtained in the anterior projection for 60 mins. A right lateral view was also obtained at 60 mins. Due to a shortage/inavailability of CCK, one can (237 ml) Ensure plus was substitued orally. Images were obtained in the AMY projection and regions of interest were drawn around the gallbladder and ejection fraction was calculated.  HISTORY: ORDERING SYSTEM PROVIDED HISTORY: cholesystitis FINDINGS: Mildly delayed radiotracer uptake by the liver and excretion of radiotracer is noted suggesting hepatocellular dysfunction, please correlate with LFTs. Gallbladder and small bowel is visualized in appropriate sequence and time. Gallbladder ejection fraction measured 61%. Normal value is >33% for Ensure protocol. Note, Ensure normal range is based on a limited study. No evidence of cholecystitis with normal ejection fraction. Assessment :   1. Cholecystitis/await input from gi  2. stable     Plan:   1. See order  . Patient Active Problem List:     Allergic rhinitis     Osteoarthritis     Osteoporosis     GERD (gastroesophageal reflux disease)     Headache     Mixed hyperlipidemia     Gout     Vitamin D deficiency     Migraine     Chronic headache     Anemia     Lymphadenopathy     Degenerative arthritis of knee, bilateral     Primary osteoarthritis of left knee     Primary osteoarthritis of right knee     Cholecystitis      Anticipated Disposition upon discharge: [] Home                                                                         [] Home with Home Health                                                                         [] [facility]                                                                         [] [address] [place] [address],[address]      Patient is admitted as inpatient status because of co-morbidities listed above, severity of signs and symptoms as outlined, requirement for current medical therapies and most importantly because of direct risk to patient if care not provided in a hospital setting.           Tacos Quinonez MD  [facility] Hospitalist

## 2020-10-15 VITALS
TEMPERATURE: 97.3 F | DIASTOLIC BLOOD PRESSURE: 57 MMHG | WEIGHT: 135.58 LBS | BODY MASS INDEX: 24.02 KG/M2 | HEART RATE: 60 BPM | RESPIRATION RATE: 16 BRPM | HEIGHT: 63 IN | OXYGEN SATURATION: 99 % | SYSTOLIC BLOOD PRESSURE: 119 MMHG

## 2020-10-15 LAB
ABSOLUTE EOS #: 0.14 K/UL (ref 0–0.4)
ABSOLUTE IMMATURE GRANULOCYTE: ABNORMAL K/UL (ref 0–0.3)
ABSOLUTE LYMPH #: 0.84 K/UL (ref 1–4.8)
ABSOLUTE MONO #: 0.56 K/UL (ref 0.1–1.3)
ALBUMIN SERPL-MCNC: 3.1 G/DL (ref 3.5–5.2)
ALBUMIN/GLOBULIN RATIO: ABNORMAL (ref 1–2.5)
ALP BLD-CCNC: 159 U/L (ref 35–104)
ALT SERPL-CCNC: 177 U/L (ref 5–33)
ANION GAP SERPL CALCULATED.3IONS-SCNC: 8 MMOL/L (ref 9–17)
AST SERPL-CCNC: 98 U/L
BASOPHILS # BLD: 0 % (ref 0–2)
BASOPHILS ABSOLUTE: 0 K/UL (ref 0–0.2)
BILIRUB SERPL-MCNC: 1.81 MG/DL (ref 0.3–1.2)
BILIRUBIN DIRECT: 1.25 MG/DL
BILIRUBIN, INDIRECT: 0.56 MG/DL (ref 0–1)
BUN BLDV-MCNC: 7 MG/DL (ref 8–23)
BUN/CREAT BLD: ABNORMAL (ref 9–20)
CALCIUM SERPL-MCNC: 8.7 MG/DL (ref 8.6–10.4)
CHLORIDE BLD-SCNC: 106 MMOL/L (ref 98–107)
CO2: 24 MMOL/L (ref 20–31)
CREAT SERPL-MCNC: 0.47 MG/DL (ref 0.5–0.9)
DIFFERENTIAL TYPE: ABNORMAL
EOSINOPHILS RELATIVE PERCENT: 4 % (ref 0–4)
GFR AFRICAN AMERICAN: >60 ML/MIN
GFR NON-AFRICAN AMERICAN: >60 ML/MIN
GFR SERPL CREATININE-BSD FRML MDRD: ABNORMAL ML/MIN/{1.73_M2}
GFR SERPL CREATININE-BSD FRML MDRD: ABNORMAL ML/MIN/{1.73_M2}
GLOBULIN: ABNORMAL G/DL (ref 1.5–3.8)
GLUCOSE BLD-MCNC: 118 MG/DL (ref 70–99)
HCT VFR BLD CALC: 30.8 % (ref 36–46)
HEMOGLOBIN: 10.1 G/DL (ref 12–16)
IMMATURE GRANULOCYTES: ABNORMAL %
LYMPHOCYTES # BLD: 24 % (ref 24–44)
MCH RBC QN AUTO: 29.4 PG (ref 26–34)
MCHC RBC AUTO-ENTMCNC: 32.7 G/DL (ref 31–37)
MCV RBC AUTO: 90.1 FL (ref 80–100)
MONOCYTES # BLD: 16 % (ref 1–7)
MORPHOLOGY: NORMAL
NRBC AUTOMATED: ABNORMAL PER 100 WBC
PDW BLD-RTO: 14.9 % (ref 11.5–14.9)
PLATELET # BLD: 144 K/UL (ref 150–450)
PLATELET ESTIMATE: ABNORMAL
PMV BLD AUTO: 8.3 FL (ref 6–12)
POTASSIUM SERPL-SCNC: 4 MMOL/L (ref 3.7–5.3)
RBC # BLD: 3.42 M/UL (ref 4–5.2)
RBC # BLD: ABNORMAL 10*6/UL
SEG NEUTROPHILS: 56 % (ref 36–66)
SEGMENTED NEUTROPHILS ABSOLUTE COUNT: 1.96 K/UL (ref 1.3–9.1)
SODIUM BLD-SCNC: 138 MMOL/L (ref 135–144)
TOTAL PROTEIN: 5.8 G/DL (ref 6.4–8.3)
WBC # BLD: 3.5 K/UL (ref 3.5–11)
WBC # BLD: ABNORMAL 10*3/UL

## 2020-10-15 PROCEDURE — 36415 COLL VENOUS BLD VENIPUNCTURE: CPT

## 2020-10-15 PROCEDURE — 2580000003 HC RX 258: Performed by: INTERNAL MEDICINE

## 2020-10-15 PROCEDURE — 6360000002 HC RX W HCPCS: Performed by: INTERNAL MEDICINE

## 2020-10-15 PROCEDURE — 99232 SBSQ HOSP IP/OBS MODERATE 35: CPT | Performed by: INTERNAL MEDICINE

## 2020-10-15 PROCEDURE — 2500000003 HC RX 250 WO HCPCS: Performed by: INTERNAL MEDICINE

## 2020-10-15 PROCEDURE — 80076 HEPATIC FUNCTION PANEL: CPT

## 2020-10-15 PROCEDURE — APPSS30 APP SPLIT SHARED TIME 16-30 MINUTES: Performed by: NURSE PRACTITIONER

## 2020-10-15 PROCEDURE — 80048 BASIC METABOLIC PNL TOTAL CA: CPT

## 2020-10-15 PROCEDURE — 85025 COMPLETE CBC W/AUTO DIFF WBC: CPT

## 2020-10-15 RX ORDER — CEFUROXIME AXETIL 250 MG/1
250 TABLET ORAL 2 TIMES DAILY
Qty: 20 TABLET | Refills: 0 | Status: SHIPPED | OUTPATIENT
Start: 2020-10-15 | End: 2020-10-25

## 2020-10-15 RX ORDER — HYDROCODONE BITARTRATE AND ACETAMINOPHEN 5; 325 MG/1; MG/1
1 TABLET ORAL EVERY 8 HOURS PRN
Qty: 21 TABLET | Refills: 0 | Status: SHIPPED | OUTPATIENT
Start: 2020-10-15 | End: 2020-10-22

## 2020-10-15 RX ADMIN — PIPERACILLIN SODIUM AND TAZOBACTAM SODIUM 3.38 G: 3; .375 INJECTION, POWDER, LYOPHILIZED, FOR SOLUTION INTRAVENOUS at 08:26

## 2020-10-15 RX ADMIN — Medication 10 ML: at 08:26

## 2020-10-15 RX ADMIN — FAMOTIDINE 20 MG: 10 INJECTION INTRAVENOUS at 08:26

## 2020-10-15 RX ADMIN — VANCOMYCIN HYDROCHLORIDE 1000 MG: 1.5 INJECTION, POWDER, LYOPHILIZED, FOR SOLUTION INTRAVENOUS at 14:52

## 2020-10-15 RX ADMIN — VANCOMYCIN HYDROCHLORIDE 1000 MG: 1.5 INJECTION, POWDER, LYOPHILIZED, FOR SOLUTION INTRAVENOUS at 03:06

## 2020-10-15 RX ADMIN — PIPERACILLIN SODIUM AND TAZOBACTAM SODIUM 3.38 G: 3; .375 INJECTION, POWDER, LYOPHILIZED, FOR SOLUTION INTRAVENOUS at 01:03

## 2020-10-15 NOTE — PROGRESS NOTES
New Medication Counseling Note    Medication counseling provided to patient  New medications reviewed: Zosyn, Vancomycin, C diff diarrhea. Discussed recently initiated medication therapy with patient/caregiver utilizing teachback method. Reviewed uses and possible side effects of medication and answered all medication-related questions. Patient/caregiver verbalized understanding. Lizet Sherwood. Ph.  10/15/2020  2:43 PM

## 2020-10-15 NOTE — PLAN OF CARE
Problem: Pain:  Goal: Pain level will decrease  Description: Pain level will decrease  10/15/2020 1607 by Harlan Howell RN  Outcome: Completed  10/15/2020 1514 by Harlan Howell RN  Outcome: Ongoing  10/15/2020 0628 by Marcus Vincent RN  Outcome: Ongoing  Goal: Control of acute pain  Description: Control of acute pain  10/15/2020 1607 by aHrlan Howell RN  Outcome: Completed  10/15/2020 1514 by Harlan Howell RN  Outcome: Ongoing  10/15/2020 0628 by Marcus Vincent RN  Outcome: Ongoing  Goal: Control of chronic pain  Description: Control of chronic pain  10/15/2020 1607 by Harlan Howell RN  Outcome: Completed  10/15/2020 1514 by Harlan Howell RN  Outcome: Ongoing  10/15/2020 0628 by Marcus Vincent RN  Outcome: Ongoing     Problem: Falls - Risk of:  Goal: Will remain free from falls  Description: Will remain free from falls  10/15/2020 1607 by Harlan Howell RN  Outcome: Completed  10/15/2020 1514 by Harlan Howell RN  Outcome: Ongoing  10/15/2020 0628 by Marcus Vincent RN  Outcome: Ongoing  Note: Patient remained free from falls this shift. Call light and bed side table are within reach, bed is in lowest position, and side rails are up x2. Will continue to monitor.    Goal: Absence of physical injury  Description: Absence of physical injury  10/15/2020 1607 by Harlan Howell RN  Outcome: Completed  10/15/2020 1514 by Harlan Howell RN  Outcome: Ongoing  10/15/2020 0628 by Marcus Vincent RN  Outcome: Ongoing     Problem: Physical Regulation:  Goal: Will remain free from infection  Description: Will remain free from infection  10/15/2020 1607 by Harlan Howell RN  Outcome: Completed  10/15/2020 1514 by Harlan Howell RN  Outcome: Ongoing  10/15/2020 0628 by Marcus Vincent RN  Outcome: Ongoing

## 2020-10-15 NOTE — FLOWSHEET NOTE
provided listening presence, words of comfort,pt said she is very pleased with her care. Chaplains remain available for spiritual or emotional support as needed. 10/15/20 1223   Encounter Summary   Services provided to: Patient   Referral/Consult From: [facility] Family members   Continue Visiting   (10/15/2020)   Complexity of Encounter Low   Length of Encounter 15 minutes   Routine   Type Initial   Assessment Approachable; Hopeful   Intervention Active listening;Nurtured hope;Lane   Outcome Expressed gratitude

## 2020-10-15 NOTE — CARE COORDINATION
DISCHARGE PLANNING NOTE:    Plan is for this patient to return to home where her step-son will be staying with her for some time after discharge. She continues to decline need for VNS services - Will continue to follow along for this. Had ERCP - possible EUS soon. Gen surgery on board and noted that possible cholecystectomy one GI work-up is completed. Will continue to follow along.      Electronically signed by Yoko Kauffman RN on 10/15/2020 at 11:21 AM

## 2020-10-15 NOTE — PLAN OF CARE
Problem: Pain:  Goal: Pain level will decrease  Description: Pain level will decrease  Outcome: Ongoing  Goal: Control of acute pain  Description: Control of acute pain  Outcome: Ongoing  Goal: Control of chronic pain  Description: Control of chronic pain  Outcome: Ongoing     Problem: Falls - Risk of:  Goal: Will remain free from falls  Description: Will remain free from falls  Outcome: Ongoing  Note: Patient remained free from falls this shift. Call light and bed side table are within reach, bed is in lowest position, and side rails are up x2. Will continue to monitor.    Goal: Absence of physical injury  Description: Absence of physical injury  Outcome: Ongoing     Problem: Physical Regulation:  Goal: Will remain free from infection  Description: Will remain free from infection  Outcome: Ongoing

## 2020-10-15 NOTE — PROGRESS NOTES
[facility] [address]                 PATIENT NAME: Lucero Darby     TODAY'S DATE: 10/15/2020, 10:51 AM    SUBJECTIVE:    Pt seen and examined. Afebrile, VSS. LFT's trending down, hemoglobin stable. S/p ERCP with brushings per GI. Patient states she is doing well. Mild RUQ abdominal pain, improved from yesterday. Passing flatus. Tolerating full liquid diet, no N/V.      OBJECTIVE:   VITALS:  BP (!) 119/54   Pulse 51   Temp 98.4 °F (36.9 °C) (Oral)   Resp 16   Ht 5' 3\" (1.6 m)   Wt 135 lb 9.3 oz (61.5 kg)   LMP 01/01/2004   SpO2 97%   BMI 24.02 kg/m²      INTAKE/OUTPUT:      Intake/Output Summary (Last 24 hours) at 10/15/2020 1051  Last data filed at 10/15/2020 0720  Gross per 24 hour   Intake 3504 ml   Output 1900 ml   Net 1604 ml                 CONSTITUTIONAL:  awake and alert.   No acute distress  HEART:   RRR  LUNGS:   CTA  ABDOMEN:   Abdomen soft, mild RUQ tender, non-distended  EXTREMITIES:   No pedal edema    Data:  CBC:   Lab Results   Component Value Date    WBC 3.5 10/15/2020    RBC 3.42 10/15/2020    RBC 2.91 04/20/2012    HGB 10.1 10/15/2020    HCT 30.8 10/15/2020    MCV 90.1 10/15/2020    MCH 29.4 10/15/2020    MCHC 32.7 10/15/2020    RDW 14.9 10/15/2020     10/15/2020     04/20/2012    MPV 8.3 10/15/2020     BMP:    Lab Results   Component Value Date     10/15/2020    K 4.0 10/15/2020     10/15/2020    CO2 24 10/15/2020    BUN 7 10/15/2020    LABALBU 3.1 10/15/2020    LABALBU 4.2 04/05/2012    CREATININE 0.47 10/15/2020    CALCIUM 8.7 10/15/2020    GFRAA >60 10/15/2020    LABGLOM >60 10/15/2020    GLUCOSE 118 10/15/2020    GLUCOSE 84 04/09/2012     Hepatic Function Panel:    Lab Results   Component Value Date    ALKPHOS 159 10/15/2020     10/15/2020    AST 98 10/15/2020    PROT 5.8 10/15/2020    BILITOT 1.81 10/15/2020    BILIDIR 1.25 10/15/2020    IBILI 0.56 10/15/2020    LABALBU 3.1 10/15/2020    LABALBU 4.2 04/05/2012 Radiology Review:  No new images to review      ASSESSMENT     Active Problems:    Cholecystitis    Elevated LFTs    Abnormal MRI of the abdomen    Weight loss    Abdominal pain, right upper quadrant  Resolved Problems:    * No resolved hospital problems. *      Plan  1. Full liquid diet, advance per GI  2. Trend LFT's  3. EUS per GI  4. Interval cholecystectomy once GI workup is complete  5. Continue medical management  6. Patient was seen and examined. Doing well. Abdomen is benign. Discharge today. Outpatient endoscopic ultrasound then follow-up in the office with me to schedule cholecystectomy. Discussed with patient.       Electronically signed by Francicso Estrada PA-C  [id number] [address]

## 2020-10-15 NOTE — PLAN OF CARE
Problem: Pain:  Goal: Pain level will decrease  Description: Pain level will decrease  10/15/2020 1514 by Anabelle Saul RN  Outcome: Ongoing  10/15/2020 0628 by Marybeth Vasquez RN  Outcome: Ongoing  Goal: Control of acute pain  Description: Control of acute pain  10/15/2020 1514 by Anabelle Saul RN  Patient used distraction techniques to minimize discomfort felt. Patient participated in word search puzzles and sudokus. Outcome: Ongoing  10/15/2020 0628 by Marybeth Vasquez RN  Outcome: Ongoing  Goal: Control of chronic pain  Description: Control of chronic pain  10/15/2020 1514 by Anabelle Saul RN  Outcome: Ongoing  10/15/2020 0628 by Marybeth Vasquez RN  Outcome: Ongoing     Problem: Falls - Risk of:  Goal: Will remain free from falls  Description: Will remain free from falls  10/15/2020 1514 by Anabelle Saul RN  Outcome: Ongoing  10/15/2020 0628 by Marybeth Vasquez RN  Outcome: Ongoing  Note: Patient remained free from falls this shift. Call light and bed side table are within reach, bed is in lowest position, and side rails are up x2. Will continue to monitor.    Goal: Absence of physical injury  Description: Absence of physical injury  10/15/2020 1514 by Anabelle Saul RN  Outcome: Ongoing  10/15/2020 0628 by Marybeth Vasquez RN  Outcome: Ongoing     Problem: Physical Regulation:  Goal: Will remain free from infection  Description: Will remain free from infection  10/15/2020 1514 by Anabelle Saul RN  Outcome: Ongoing  10/15/2020 0628 by Marybeth Vasquez RN  Outcome: Ongoing

## 2020-10-15 NOTE — PROGRESS NOTES
GI Progress notes    10/15/2020   11:16 AM    Name:  Lucero Darby  MRN:    [id number]     Acct:     [de-identified]   Room:  2058/2058-01   Day: 3     Admit Date: 10/12/2020  2:00 AM  PCP: Marciano Mittal MD    Subjective:     C/C:   Chief Complaint   Patient presents with    Abdominal Pain     right upper quadrant X4 days       Interval History: Status: improved. Patient seen and examined. No acute events overnight. S/p ERCP with mild dilation of the proximal pancreatic duct, mild narrowing of the confluence of the common hepatic ducts. Papillotomy made. Brushing taken from questionable narrowed area  Balloon sweeps performed  Started on Zosyn. At present patient is afebrile, VSS. Reports marked improvement of her abdominal pain. No nausea, vomiting. Tolerating a full liquid diet. LFTs improved. AFP 3.9    ROS:  Constitutional: negative for chills, fevers and sweats  Gastrointestinal: negative for abdominal pain, constipation, diarrhea, nausea and vomiting      Medications:      Allergies: No Known Allergies    Current Meds: vancomycin 1000 mg IVPB in 250 mL D5W addavial, Q12H  sodium chloride flush 0.9 % injection 10 mL, PRN  vancomycin (VANCOCIN) intermittent dosing (placeholder), RX Placeholder  sodium chloride flush 0.9 % injection 10 mL, 2 times per day  0.9 % sodium chloride infusion, Continuous  famotidine (PEPCID) injection 20 mg, BID  ondansetron (ZOFRAN) injection 4 mg, Q6H PRN  piperacillin-tazobactam (ZOSYN) 3.375 g in dextrose 5 % 50 mL IVPB extended infusion (mini-bag), Q8H  fentaNYL (SUBLIMAZE) injection 25 mcg, Q2H PRN    Or  fentaNYL (SUBLIMAZE) injection 50 mcg, Q2H PRN        Data:     Code Status:  Full Code    Family History   Problem Relation Age of Onset    Arthritis Other     Thyroid Disease Other     COPD Other     Stroke Father        Social History     Socioeconomic History    Marital status:      Spouse name: Not on file    Number of children: Not on file    Years of education: Not on file    Highest education level: Not on file   Occupational History    Not on file   Social Needs    Financial resource strain: Not hard at all    Food insecurity     Worry: Never true     Inability: Never true   [employer] needs     Medical: Not on file     Non-medical: Not on file   Tobacco Use    Smoking status: Former Smoker     Packs/day: 0.50     Years: 10.00     Pack years: 5.00     Types: Cigarettes     Last attempt to quit: 1987     Years since quittin.6    Smokeless tobacco: Never Used   Substance and Sexual Activity    Alcohol use: No     Alcohol/week: 0.0 standard drinks    Drug use: No    Sexual activity: Not Currently   Lifestyle    Physical activity     Days per week: Not on file     Minutes per session: Not on file    Stress: Not on file   Relationships    Social connections     Talks on phone: Not on file     Gets together: Not on file     Attends Cheondo[employer] service: Not on file     Active member of club or organization: Not on file     Attends meetings of clubs or organizations: Not on file     Relationship status: Not on file    Intimate partner violence     Fear of current or ex partner: Not on file     Emotionally abused: Not on file     Physically abused: Not on file     Forced sexual activity: Not on file   Other Topics Concern    Not on file   Social History Narrative    Not on file       Vitals:  BP (!) 119/54   Pulse 51   Temp 98.4 °F (36.9 °C) (Oral)   Resp 16   Ht 5' 3\" (1.6 m)   Wt 135 lb 9.3 oz (61.5 kg)   LMP 2004   SpO2 97%   BMI 24.02 kg/m²   Temp (24hrs), Av.2 °F (36.8 °C), Min:97.5 °F (36.4 °C), Max:98.6 °F (37 °C)    No results for input(s): POCGLU in the last 72 hours. I/O (24Hr):     Intake/Output Summary (Last 24 hours) at 10/15/2020 1116  Last data filed at 10/15/2020 0720  Gross per 24 hour   Intake 3504 ml   Output 1900 ml   Net 1604 ml       Labs:      CBC:   Lab Results   Component Value Date    WBC 3.5 10/15/2020    RBC 3.42 10/15/2020    RBC 2.91 04/20/2012    HGB 10.1 10/15/2020    HCT 30.8 10/15/2020    MCV 90.1 10/15/2020    MCH 29.4 10/15/2020    MCHC 32.7 10/15/2020    RDW 14.9 10/15/2020     10/15/2020     04/20/2012    MPV 8.3 10/15/2020     CBC with Differential:    Lab Results   Component Value Date    WBC 3.5 10/15/2020    RBC 3.42 10/15/2020    RBC 2.91 04/20/2012    HGB 10.1 10/15/2020    HCT 30.8 10/15/2020     10/15/2020     04/20/2012    MCV 90.1 10/15/2020    MCH 29.4 10/15/2020    MCHC 32.7 10/15/2020    RDW 14.9 10/15/2020    METASPCT 2 10/14/2020    LYMPHOPCT 24 10/15/2020    MONOPCT 16 10/15/2020    MYELOPCT 1 10/14/2020    BASOPCT 0 10/15/2020    MONOSABS 0.56 10/15/2020    LYMPHSABS 0.84 10/15/2020    EOSABS 0.14 10/15/2020    BASOSABS 0.00 10/15/2020    DIFFTYPE NOT REPORTED 10/15/2020     Hemoglobin/Hematocrit:    Lab Results   Component Value Date    HGB 10.1 10/15/2020    HCT 30.8 10/15/2020     CMP:    Lab Results   Component Value Date     10/15/2020    K 4.0 10/15/2020     10/15/2020    CO2 24 10/15/2020    BUN 7 10/15/2020    CREATININE 0.47 10/15/2020    GFRAA >60 10/15/2020    LABGLOM >60 10/15/2020    GLUCOSE 118 10/15/2020    GLUCOSE 84 04/09/2012    PROT 5.8 10/15/2020    LABALBU 3.1 10/15/2020    LABALBU 4.2 04/05/2012    CALCIUM 8.7 10/15/2020    BILITOT 1.81 10/15/2020    ALKPHOS 159 10/15/2020    AST 98 10/15/2020     10/15/2020     BMP:    Lab Results   Component Value Date     10/15/2020    K 4.0 10/15/2020     10/15/2020    CO2 24 10/15/2020    BUN 7 10/15/2020    LABALBU 3.1 10/15/2020    LABALBU 4.2 04/05/2012    CREATININE 0.47 10/15/2020    CALCIUM 8.7 10/15/2020    GFRAA >60 10/15/2020    LABGLOM >60 10/15/2020    GLUCOSE 118 10/15/2020    GLUCOSE 84 04/09/2012     PT/INR:    Lab Results   Component Value Date    PROTIME 12.8 10/12/2020    INR 1.0 10/12/2020     PTT:    Lab Results   Component Value Date    APTT 26.0 10/12/2020   [APTT}    Physical Examination:        General appearance: alert, cooperative and no distress  Mental Status: oriented to person, place and time and normal affect  Abdomen: soft, nontender, nondistended, bowel sounds present   Assessment:        Primary Problem  <principal problem not specified>     Active Hospital Problems    Diagnosis Date Noted    Elevated LFTs [R79.89]     Abnormal MRI of the abdomen [R93.5]     Weight loss [R63.4]     Abdominal pain, right upper quadrant [R10.11]     Cholecystitis [K81.9] 10/12/2020     Past Medical History:   Diagnosis Date    Allergic rhinitis     Closed tibia fracture     GERD (gastroesophageal reflux disease)     Gout     Headache(784.0)     h/o migraines    Hyperlipidemia     Osteoarthritis     DJD Lt knee    Osteoporosis     Posterior tibial tendon dysfunction     right, wears braces on both legs    Vitamin D deficient rickets     Wears glasses     Wears partial dentures     lower        Plan:        1. Cholecystitis, elevated LFTs, RUQ pain s/p ERCP with narrowing at confluence of common hepatic duct and pancreatic duct, brushings obtained  1. Trend LFTs  2. Soft diet  3. EUS outpatient  4. Interval cholecystectomy after GI workup  5.  Supportive care    Explained to the patient and d/W Nursing Staff  Will F/U with you  Please call or Page for any issues or change in status  Thanks    Electronically signed by PASCUAL Peters NP on 10/15/2020 at 11:16 AM

## 2020-10-15 NOTE — DISCHARGE SUMMARY
Hospitalist Discharge Summary    Haley Allen  :  1945  MRN:  [id number]    Admit date:  10/12/2020  Discharge date: 10/15/20     Admitting Physician:  Kristina Costello MD    Discharge Diagnoses:   Patient Active Problem List   Diagnosis    Allergic rhinitis    Osteoarthritis    Osteoporosis    GERD (gastroesophageal reflux disease)    Headache    Mixed hyperlipidemia    Gout    Vitamin D deficiency    Migraine    Chronic headache    Anemia    Lymphadenopathy    Degenerative arthritis of knee, bilateral    Primary osteoarthritis of left knee    Primary osteoarthritis of right knee    Cholecystitis    Elevated LFTs    Abnormal MRI of the abdomen    Weight loss    Abdominal pain, right upper quadrant        Admission Condition:  fair      Discharged Condition:  good    Hospital Course/Treatments   Admitted with h/o of  ruq pain, pt was seen by surgery and hida was done and u/s showed ?stricture in cbd, pt had ercp and planning to get EUS, surgery is being postponed  till results of EUS is known, pt will be d/c now and advised to return back if  Symptoms become worse    Discharge Medications:        Jill Manuel   Home Medication Instructions MPM:[id number]    Printed on:10/15/20 0166   Medication Information                      alendronate (FOSAMAX) 70 MG tablet  TAKE 1 TABLET BY MOUTH ONCE WEEKLY BEFORE BREAKFAST, ON AN EMPTY STOMACH: REMAIN UPRIGHT FOR 30 MINUTES:TAKE WITH 8 OUNCES OF WATER             allopurinol (ZYLOPRIM) 100 MG tablet  TAKE ONE TABLET BY MOUTH DAILY             aspirin 81 MG tablet  Take 81 mg by mouth daily             Aspirin-Acetaminophen-Caffeine (EXCEDRIN MIGRAINE PO)  Take by mouth as needed              cefUROXime (CEFTIN) 250 MG tablet  Take 1 tablet by mouth 2 times daily for 10 days             cetirizine (ZYRTEC) 10 MG tablet  TAKE ONE TABLET BY MOUTH DAILY             docusate sodium (COLACE) 100 MG capsule  Take 1 capsule by mouth 2 times daily famotidine (PEPCID) 20 MG tablet  TAKE ONE TABLET BY MOUTH TWICE A DAY             fluticasone (FLONASE) 50 MCG/ACT nasal spray  SPRAY TWO SPRAYS IN EACH NOSTRIL ONCE DAILY             HYDROcodone-acetaminophen (NORCO) 5-325 MG per tablet  Take 1 tablet by mouth every 8 hours as needed for Pain for up to 7 days. Intended supply: 7 days. Take lowest dose possible to manage pain             naproxen (NAPROSYN) 250 MG tablet  TAKE ONE TABLET BY MOUTH THREE TIMES A DAY WITH FOOD AS NEEDED FOR GOUT FLARE UP UNTIL ATTACK SUBSIDES             simvastatin (ZOCOR) 20 MG tablet  TAKE ONE TABLET BY MOUTH DAILY             verapamil (CALAN SR) 240 MG extended release tablet  TAKE ONE TABLET BY MOUTH ONCE NIGHTLY FOR MIGRAINE HEADACHE             vitamin D (ERGOCALCIFEROL) 1.25 MG (50125 UT) CAPS capsule  TAKE ONE CAPSULE BY MOUTH EVERY 2 WEEKS                 Consults:  IP CONSULT TO GENERAL SURGERY  PHARMACY TO DOSE VANCOMYCIN  IP CONSULT TO FAMILY MEDICINE  IP CONSULT TO GI    Significant Diagnostic Studies:  Xr Chest (2 Vw)    Result Date: 10/12/2020  EXAMINATION: TWO XRAY VIEWS OF THE CHEST 10/12/2020 9:00 pm COMPARISON: None. HISTORY: ORDERING SYSTEM PROVIDED HISTORY: clearance for surgery Reason for Exam: Pre op Acuity: Acute Type of Exam: Initial FINDINGS: The lungs are without acute focal process. No effusion or pneumothorax. The cardiomediastinal silhouette is normal.  Multilevel hypertrophic/degenerative change of the thoracic spine. Negative chest.     Ct Abdomen Pelvis W Iv Contrast Additional Contrast? None    Result Date: 10/12/2020  EXAMINATION: CT OF THE ABDOMEN AND PELVIS WITH CONTRAST 10/12/2020 3:34 am TECHNIQUE: CT of the abdomen and pelvis was performed with the administration of intravenous contrast. Multiplanar reformatted images are provided for review.  Dose modulation, iterative reconstruction, and/or weight based adjustment of the mA/kV was utilized to reduce the radiation dose to as low as reasonably achievable. COMPARISON: None. HISTORY: ORDERING SYSTEM PROVIDED HISTORY: RUQ abd pain N/V, positive Mckeon's sign TECHNOLOGIST PROVIDED HISTORY: RUQ abd pain N/V, positive Mckeon's sign Reason for Exam: Patient c/o RUQ abdominal pain with nausea and vomiting x 1 day Acuity: Acute Type of Exam: Initial FINDINGS: Lower Chest: Minimal dependent changes are seen within the lungs bilaterally. Organs: There is suggestion of a gallstone in the region of the gallbladder neck. There appears to be pericholecystic fluid. Mild prominence of the intrahepatic bile ducts is seen. Otherwise, the liver, spleen, pancreas and adrenal glands demonstrate no acute abnormality. The kidneys demonstrate symmetric enhancement. No convincing focal renal mass. There is no hydronephrosis. GI/Bowel: There is no evidence of a bowel obstruction. Stool is seen throughout the colon. No evidence of acute appendicitis. Pelvis: The urinary bladder demonstrates no acute abnormality. The patient is status post hysterectomy. Peritoneum/Retroperitoneum: The abdominal aorta is normal in caliber with scattered atherosclerosis. No bulky retroperitoneal or mesenteric adenopathy. No free fluid or free air seen in the abdomen or pelvis. Bones/Soft Tissues: There is a presumably chronic compression deformity of the T12 vertebral body. No acute osseous abnormality seen. 1. There appears to be a gallstone in the region the gallbladder neck with pericholecystic fluid as well as mild prominence of the intrahepatic bile ducts. Findings could represent acute cholecystitis. 2. Otherwise, no acute abnormality identified within the abdomen or pelvis.      Mri Abdomen W Wo Contrast Mrcp    Result Date: 10/12/2020  EXAMINATION: MRI OF THE ABDOMEN WITH AND WITHOUT CONTRAST AND MRCP 10/12/2020 6:32 pm TECHNIQUE: Multiplanar multisequence MRI of the abdomen was performed with and without the administration of intravenous contrast.  After initial T2 axial and coronal images, thick slab, thin slab and 3D coronal MRCP sequences were obtained without the administration of intravenous contrast.  MIP images are provided for review. COMPARISON: CT abdomen 10/12/2020 at 3:36 a.m., ultrasound right upper quadrant 10/12/2020 at 9:12 a.m. HISTORY: ORDERING SYSTEM PROVIDED HISTORY: Prominence of the hepatic bile ducts, cholecystitis and cholelithiasis described on CT earlier today FINDINGS: Lower chest: Trace right pleural effusion is likely reactive. Minimal subsegmental atelectasis posterior both lung bases. Visualized portions of cardiac and posterior mediastinal structures appear unremarkable. Gallbladder: Mild gallbladder wall thickening with pericholecystic fluid. Possible single gallstone seen is a filling defect near the gallbladder neck coronal series 8, image 15. No other discrete gallstone evident. Bile Ducts: Mild intrahepatic bile duct dilatation. There is confluent narrowing of the most proximal portion of common hepatic duct, mildly dilated at 7 mm. Distally, the common bile duct measures 5-6 mm diameter, within normal limits. Pancreatic Duct: No stricture or stone evident. 2-3 mm diameter. Other organs: Mild intrahepatic bile duct dilatation. Prominent periportal edema throughout. No discrete hepatic lesion. Unremarkable appearance of the spleen, adrenal glands, pancreas and kidneys. Simple renal cortical cyst measures 8 mm posterior midpole left kidney. Other: Right upper quadrant ascites is a technically degrading factor for the exam.  No gross adenopathy is seen. 1. Acute cholangitis suspected given confluent narrowing at the confluence of the hepatic ducts. 2. Prominent periportal edema, presumably reactive, and mild intrahepatic bile duct dilatation. 3. Acute cholecystitis likely given the gallbladder findings. 4. Cholelithiasis, probable small gallstone near the gallbladder neck. 5. No definite choledocholithiasis.      Us Abdomen Limited Specify Organ? Liver, Gallbladder    Result Date: 10/12/2020  EXAMINATION: RIGHT UPPER QUADRANT ULTRASOUND 10/12/2020 9:12 am COMPARISON: CT 10/12/2020 HISTORY: ORDERING SYSTEM PROVIDED HISTORY: acute cholecystitis TECHNOLOGIST PROVIDED HISTORY: acute cholecystitis Specify organ?->LIVER Specify organ?->GALLBLADDER Acuity: Acute Type of Exam: Subsequent/Follow-up FINDINGS: LIVER:  The liver has a fairly homogeneous echotexture with no focal masses identified by ultrasound. Liver length is 16.3 cm. Flow in the portal vein is hepatopetal. BILIARY SYSTEM:  The gallbladder appears very irregular with apparent diffuse wall thickening. The lumen is poorly defined. There is an echogenic focus with shadowing in the region of the neck compatible with a gallstone as shown on the recent CT. The patient reportedly has a positive sonographic Mckeon's sign. Mildly dilated common bile duct measuring up to 7.8 mm. RIGHT KIDNEY: No obstruction of the somewhat atrophic right kidney as visualized. PANCREAS:  Visualized portions of the pancreas are unremarkable. Overall the pancreas is not well seen due to overlying bowel gas. Pancreatic duct is mildly prominent measuring up to 3.4 mm. OTHER: No evidence of right upper quadrant ascites. Suspect a 7 mm gallstone with a diffusely thickened irregular gallbladder wall and collapsed lumen. Patient reportedly had a positive sonographic Mckeon's sign. Findings are suggestive of acute cholecystitis. Prominent common bile duct measuring up to 7.8 mm. Correlate with clinical findings and laboratory values to determine need for MRCP. The findings were sent to the Radiology Results [id number] at 10:19 am on 10/12/2020to be communicated to a licensed caregiver. Nm Hepatobiliary Scan W Ejection Fraction    Result Date: 10/13/2020  EXAMINATION: NUCLEAR MEDICINE HEPATOBILIARY SCINTIGRAPHY (HIDA SCAN) WITH EJECTION FRACTION.  TECHNIQUE: Approximately 3.4 millicuries Tc99m Mebrofenin (Choletec) was administered IV. Then, dynamic images of the abdomen were obtained in the anterior projection for 60 mins. A right lateral view was also obtained at 60 mins. Due to a shortage/inavailability of CCK, one can (237 ml) Ensure plus was substitued orally. Images were obtained in the AMY projection and regions of interest were drawn around the gallbladder and ejection fraction was calculated. HISTORY: ORDERING SYSTEM PROVIDED HISTORY: cholesystitis FINDINGS: Mildly delayed radiotracer uptake by the liver and excretion of radiotracer is noted suggesting hepatocellular dysfunction, please correlate with LFTs. Gallbladder and small bowel is visualized in appropriate sequence and time. Gallbladder ejection fraction measured 61%. Normal value is >33% for Ensure protocol. Note, Ensure normal range is based on a limited study. No evidence of cholecystitis with normal ejection fraction. Disposition:   home    Discharge Instructions: Activity: activity as tolerated  Diet:  regular diet    Follow up with Aubrey Treviño MD in 1 weeks.     Signed:  Gabrielle Templeton  10/15/2020, 4:09 PM    Time spent in discharge of this pt is more than 30 minutes in examination,evaluvation,  counseling and review of medication and discharge plan

## 2020-10-15 NOTE — PROGRESS NOTES
Hospitalist Progress Note  10/14/2020 9:15 PM  Subjective:   Admit Date: 10/12/2020  PCP: Asa Estrella MD     Full Code      C/c:  Chief Complaint   Patient presents with    Abdominal Pain     right upper quadrant X4 days         Interval History: doing better,had ercp,planning for EUS,cholecystectomy on hold    Diet: DIET FULL LIQUID;                                ip days:2  Medications:   Scheduled Meds:   vancomycin  1,000 mg Intravenous Q12H    vancomycin (VANCOCIN) intermittent dosing (placeholder)   Other RX Placeholder    sodium chloride flush  10 mL Intravenous 2 times per day    famotidine (PEPCID) injection  20 mg Intravenous BID    piperacillin-tazobactam (ZOSYN) 3.375 g in dextrose 5% IVPB extended infusion (mini-bag)  3.375 g Intravenous Q8H     Continuous Infusions:   sodium chloride 100 mL/hr at 10/14/20 1159     PRN Meds:.promethazine (PHENERGAN) in sodium chloride 0.9% IVPB, sodium chloride flush, ondansetron, fentanNYL **OR** fentanNYL     CBC:   Recent Labs     10/12/20  0250 10/13/20  0542 10/14/20  0524   WBC 5.7 3.8 3.8   HGB 12.5 10.3* 10.5*    163 167     BMP:    Recent Labs     10/12/20  0250 10/13/20  0542 10/14/20  0524   * 133* 135   K 3.8 3.7 4.0   CL 99 101 103   CO2 22 19* 23   BUN 8 8 10   CREATININE 0.52 0.47* 0.44*   GLUCOSE 111* 75 97     Hepatic:   Recent Labs     10/12/20  0250 10/13/20  0542 10/14/20  0524   * 164* 98*   * 283* 221*   BILITOT 3.47* 3.84* 2.03*   ALKPHOS 184* 162* 170*     Troponin: No results for input(s): TROPONINI in the last 72 hours. BNP: No results for input(s): BNP in the last 72 hours. Lipids: No results for input(s): CHOL, HDL in the last 72 hours.     Invalid input(s): LDLCALCU  INR:   Recent Labs     10/12/20  0250   INR 1.0       Objective:   Vitals: BP (!) 112/57   Pulse 69   Temp 97.5 °F (36.4 °C) (Oral)   Resp 18   Ht 5' 3\" (1.6 m)   Wt 128 lb 1.4 oz (58.1 kg)   LMP 01/01/2004   SpO2 96%   BMI 22.69 kg/m²   General appearance: alert, appears stated age and cooperative  Skin: Skin color, texture, turgor normal. No rashes or lesions  Lungs: clear to auscultation bilaterally  Heart: regular rate and rhythm, S1, S2 normal, no murmur, click, rub or gallop  Abdomen: soft, non-tender; bowel sounds normal; no masses,  no organomegaly  Extremities: extremities normal, atraumatic, no cyanosis or edema  Neurologic: Mental status: Alert, oriented, thought content appropriate    Prophylaxis:   DVT with  [] lovenox        [] heparin        [] Scd        [x] none:     Radiology:  Xr Chest (2 Vw)    Result Date: 10/12/2020  EXAMINATION: TWO XRAY VIEWS OF THE CHEST 10/12/2020 9:00 pm COMPARISON: None. HISTORY: ORDERING SYSTEM PROVIDED HISTORY: clearance for surgery Reason for Exam: Pre op Acuity: Acute Type of Exam: Initial FINDINGS: The lungs are without acute focal process. No effusion or pneumothorax. The cardiomediastinal silhouette is normal.  Multilevel hypertrophic/degenerative change of the thoracic spine. Negative chest.     Ct Abdomen Pelvis W Iv Contrast Additional Contrast? None    Result Date: 10/12/2020  EXAMINATION: CT OF THE ABDOMEN AND PELVIS WITH CONTRAST 10/12/2020 3:34 am TECHNIQUE: CT of the abdomen and pelvis was performed with the administration of intravenous contrast. Multiplanar reformatted images are provided for review. Dose modulation, iterative reconstruction, and/or weight based adjustment of the mA/kV was utilized to reduce the radiation dose to as low as reasonably achievable. COMPARISON: None. HISTORY: ORDERING SYSTEM PROVIDED HISTORY: RUQ abd pain N/V, positive Mckeon's sign TECHNOLOGIST PROVIDED HISTORY: RUQ abd pain N/V, positive Mckeon's sign Reason for Exam: Patient c/o RUQ abdominal pain with nausea and vomiting x 1 day Acuity: Acute Type of Exam: Initial FINDINGS: Lower Chest: Minimal dependent changes are seen within the lungs bilaterally. Organs:  There is suggestion of a gallstone in the region of the gallbladder neck. There appears to be pericholecystic fluid. Mild prominence of the intrahepatic bile ducts is seen. Otherwise, the liver, spleen, pancreas and adrenal glands demonstrate no acute abnormality. The kidneys demonstrate symmetric enhancement. No convincing focal renal mass. There is no hydronephrosis. GI/Bowel: There is no evidence of a bowel obstruction. Stool is seen throughout the colon. No evidence of acute appendicitis. Pelvis: The urinary bladder demonstrates no acute abnormality. The patient is status post hysterectomy. Peritoneum/Retroperitoneum: The abdominal aorta is normal in caliber with scattered atherosclerosis. No bulky retroperitoneal or mesenteric adenopathy. No free fluid or free air seen in the abdomen or pelvis. Bones/Soft Tissues: There is a presumably chronic compression deformity of the T12 vertebral body. No acute osseous abnormality seen. 1. There appears to be a gallstone in the region the gallbladder neck with pericholecystic fluid as well as mild prominence of the intrahepatic bile ducts. Findings could represent acute cholecystitis. 2. Otherwise, no acute abnormality identified within the abdomen or pelvis. Mri Abdomen W Wo Contrast Mrcp    Result Date: 10/12/2020  EXAMINATION: MRI OF THE ABDOMEN WITH AND WITHOUT CONTRAST AND MRCP 10/12/2020 6:32 pm TECHNIQUE: Multiplanar multisequence MRI of the abdomen was performed with and without the administration of intravenous contrast.  After initial T2 axial and coronal images, thick slab, thin slab and 3D coronal MRCP sequences were obtained without the administration of intravenous contrast.  MIP images are provided for review. COMPARISON: CT abdomen 10/12/2020 at 3:36 a.m., ultrasound right upper quadrant 10/12/2020 at 9:12 a.m.  HISTORY: ORDERING SYSTEM PROVIDED HISTORY: Prominence of the hepatic bile ducts, cholecystitis and cholelithiasis described on CT earlier today FINDINGS: Lower chest: Trace right pleural effusion is likely reactive. Minimal subsegmental atelectasis posterior both lung bases. Visualized portions of cardiac and posterior mediastinal structures appear unremarkable. Gallbladder: Mild gallbladder wall thickening with pericholecystic fluid. Possible single gallstone seen is a filling defect near the gallbladder neck coronal series 8, image 15. No other discrete gallstone evident. Bile Ducts: Mild intrahepatic bile duct dilatation. There is confluent narrowing of the most proximal portion of common hepatic duct, mildly dilated at 7 mm. Distally, the common bile duct measures 5-6 mm diameter, within normal limits. Pancreatic Duct: No stricture or stone evident. 2-3 mm diameter. Other organs: Mild intrahepatic bile duct dilatation. Prominent periportal edema throughout. No discrete hepatic lesion. Unremarkable appearance of the spleen, adrenal glands, pancreas and kidneys. Simple renal cortical cyst measures 8 mm posterior midpole left kidney. Other: Right upper quadrant ascites is a technically degrading factor for the exam.  No gross adenopathy is seen. 1. Acute cholangitis suspected given confluent narrowing at the confluence of the hepatic ducts. 2. Prominent periportal edema, presumably reactive, and mild intrahepatic bile duct dilatation. 3. Acute cholecystitis likely given the gallbladder findings. 4. Cholelithiasis, probable small gallstone near the gallbladder neck. 5. No definite choledocholithiasis. Us Abdomen Limited Specify Organ?  Liver, Gallbladder    Result Date: 10/12/2020  EXAMINATION: RIGHT UPPER QUADRANT ULTRASOUND 10/12/2020 9:12 am COMPARISON: CT 10/12/2020 HISTORY: ORDERING SYSTEM PROVIDED HISTORY: acute cholecystitis TECHNOLOGIST PROVIDED HISTORY: acute cholecystitis Specify organ?->LIVER Specify organ?->GALLBLADDER Acuity: Acute Type of Exam: Subsequent/Follow-up FINDINGS: LIVER:  The liver has a fairly homogeneous echotexture with no focal masses identified by ultrasound. Liver length is 16.3 cm. Flow in the portal vein is hepatopetal. BILIARY SYSTEM:  The gallbladder appears very irregular with apparent diffuse wall thickening. The lumen is poorly defined. There is an echogenic focus with shadowing in the region of the neck compatible with a gallstone as shown on the recent CT. The patient reportedly has a positive sonographic Mckeon's sign. Mildly dilated common bile duct measuring up to 7.8 mm. RIGHT KIDNEY: No obstruction of the somewhat atrophic right kidney as visualized. PANCREAS:  Visualized portions of the pancreas are unremarkable. Overall the pancreas is not well seen due to overlying bowel gas. Pancreatic duct is mildly prominent measuring up to 3.4 mm. OTHER: No evidence of right upper quadrant ascites. Suspect a 7 mm gallstone with a diffusely thickened irregular gallbladder wall and collapsed lumen. Patient reportedly had a positive sonographic Mckeon's sign. Findings are suggestive of acute cholecystitis. Prominent common bile duct measuring up to 7.8 mm. Correlate with clinical findings and laboratory values to determine need for MRCP. The findings were sent to the Radiology Results [address] at 10:19 am on 10/12/2020to be communicated to a licensed caregiver. Nm Hepatobiliary Scan W Ejection Fraction    Result Date: 10/13/2020  EXAMINATION: NUCLEAR MEDICINE HEPATOBILIARY SCINTIGRAPHY (HIDA SCAN) WITH EJECTION FRACTION. TECHNIQUE: Approximately 3.4 millicuries GT25Z Mebrofenin (Choletec) was administered IV. Then, dynamic images of the abdomen were obtained in the anterior projection for 60 mins. A right lateral view was also obtained at 60 mins. Due to a shortage/inavailability of CCK, one can (237 ml) Ensure plus was substitued orally. Images were obtained in the AMY projection and regions of interest were drawn around the gallbladder and ejection fraction was calculated. HISTORY: ORDERING SYSTEM PROVIDED HISTORY: cholesystitis FINDINGS: Mildly delayed radiotracer uptake by the liver and excretion of radiotracer is noted suggesting hepatocellular dysfunction, please correlate with LFTs. Gallbladder and small bowel is visualized in appropriate sequence and time. Gallbladder ejection fraction measured 61%. Normal value is >33% for Ensure protocol. Note, Ensure normal range is based on a limited study. No evidence of cholecystitis with normal ejection fraction. Assessment :   1. Cholecystitis/negative hida  2. Ercp done/possible stricture? Mass await EUS     Plan:   1. Continue present care  2. See order    Patient Active Problem List:     Allergic rhinitis     Osteoarthritis     Osteoporosis     GERD (gastroesophageal reflux disease)     Headache     Mixed hyperlipidemia     Gout     Vitamin D deficiency     Migraine     Chronic headache     Anemia     Lymphadenopathy     Degenerative arthritis of knee, bilateral     Primary osteoarthritis of left knee     Primary osteoarthritis of right knee     Cholecystitis     Elevated LFTs     Abnormal MRI of the abdomen     Weight loss     Abdominal pain, right upper quadrant      Anticipated Disposition upon discharge: [] Home                                                                         [] Home with Home Health                                                                         [] [facility]                                                                         [] [address]      Patient is admitted as inpatient status because of co-morbidities listed above, severity of signs and symptoms as outlined, requirement for current medical therapies and most importantly because of direct risk to patient if care not provided in a hospital setting.           Devika Murillo MD  Rounding Hospitalist

## 2020-10-15 NOTE — FLOWSHEET NOTE
Discharge instructions reviewed with patient at this time. All questions answered. Patient refusing to use wheelchair for escort off unit.

## 2020-10-16 LAB — SURGICAL PATHOLOGY REPORT: NORMAL

## 2020-10-18 ENCOUNTER — HOSPITAL ENCOUNTER (EMERGENCY)
Age: 75
Discharge: HOME | End: 2020-10-18
Attending: EMERGENCY MEDICINE
Payer: MEDICARE

## 2020-10-18 VITALS
DIASTOLIC BLOOD PRESSURE: 78 MMHG | TEMPERATURE: 98.5 F | RESPIRATION RATE: 20 BRPM | BODY MASS INDEX: 22.86 KG/M2 | SYSTOLIC BLOOD PRESSURE: 137 MMHG | OXYGEN SATURATION: 98 % | HEIGHT: 63 IN | WEIGHT: 129 LBS | HEART RATE: 85 BPM

## 2020-10-18 PROCEDURE — 6370000000 HC RX 637 (ALT 250 FOR IP): Performed by: EMERGENCY MEDICINE

## 2020-10-18 PROCEDURE — 99284 EMERGENCY DEPT VISIT MOD MDM: CPT

## 2020-10-18 RX ORDER — DICYCLOMINE HYDROCHLORIDE 10 MG/1
10 CAPSULE ORAL EVERY 6 HOURS PRN
Qty: 20 CAPSULE | Refills: 0 | Status: SHIPPED | OUTPATIENT
Start: 2020-10-18 | End: 2020-11-04

## 2020-10-18 RX ORDER — LOPERAMIDE HYDROCHLORIDE 2 MG/1
2 CAPSULE ORAL ONCE
Status: COMPLETED | OUTPATIENT
Start: 2020-10-18 | End: 2020-10-18

## 2020-10-18 RX ORDER — DICYCLOMINE HYDROCHLORIDE 10 MG/1
10 CAPSULE ORAL ONCE
Status: COMPLETED | OUTPATIENT
Start: 2020-10-18 | End: 2020-10-18

## 2020-10-18 RX ADMIN — DICYCLOMINE HYDROCHLORIDE 10 MG: 10 CAPSULE ORAL at 13:18

## 2020-10-18 RX ADMIN — LOPERAMIDE HYDROCHLORIDE 2 MG: 2 CAPSULE ORAL at 13:18

## 2020-10-18 ASSESSMENT — ENCOUNTER SYMPTOMS
COUGH: 0
COLOR CHANGE: 0
TROUBLE SWALLOWING: 0
RHINORRHEA: 0
SHORTNESS OF BREATH: 0
FACIAL SWELLING: 0
CONSTIPATION: 0
CHEST TIGHTNESS: 0
EYE DISCHARGE: 0
EYE REDNESS: 0
ABDOMINAL PAIN: 1
SINUS PRESSURE: 0
DIARRHEA: 1
SORE THROAT: 0
BLOOD IN STOOL: 0
BACK PAIN: 0
EYE PAIN: 0
WHEEZING: 0
VOMITING: 0
NAUSEA: 0

## 2020-10-18 ASSESSMENT — PAIN DESCRIPTION - ORIENTATION: ORIENTATION: LEFT;RIGHT;LOWER

## 2020-10-18 ASSESSMENT — PAIN DESCRIPTION - LOCATION: LOCATION: ABDOMEN

## 2020-10-18 ASSESSMENT — PAIN DESCRIPTION - PAIN TYPE: TYPE: ACUTE PAIN

## 2020-10-18 ASSESSMENT — PAIN SCALES - GENERAL: PAINLEVEL_OUTOF10: 7

## 2020-10-18 NOTE — ED PROVIDER NOTES
Psychiatric/Behavioral: Negative for confusion, decreased concentration, hallucinations, self-injury, sleep disturbance and suicidal ideas. PAST MEDICAL HISTORY     Past Medical History:   Diagnosis Date    Allergic rhinitis     Closed tibia fracture     GERD (gastroesophageal reflux disease)     Gout     Headache(784.0)     h/o migraines    Hyperlipidemia     Osteoarthritis     DJD Lt knee    Osteoporosis     Posterior tibial tendon dysfunction     right, wears braces on both legs    Vitamin D deficient rickets     Wears glasses     Wears partial dentures     lower       SURGICAL HISTORY       Past Surgical History:   Procedure Laterality Date    APPENDECTOMY      CARPAL TUNNEL RELEASE      bilateral    CATARACT REMOVAL WITH IMPLANT [facility] 03/07/2019    [facility]    CATARACT REMOVAL WITH IMPLANT Right 04/04/2019    [facility]    COLONOSCOPY      ERCP N/A 10/14/2020    ERCP WITH BILE DUCT BRUSHING performed by Alla Samson MD at [address] [place] [address]      left tibia    HYSTERECTOMY  4/12    INTRACAPSULAR CATARACT EXTRACTION Left 3/7/2019    EYE CATARACT EMULSIFICATION IOL IMPLANT - TOPICAL performed by Jamila Hill MD at [address] [place] [address] Right 4/4/2019    EYE CATARACT EMULSIFICATION IOL IMPLANT performed by Jamila Hill MD at [facility] Left 11/06/2018    hardware removal and then TKA    KNEE ARTHROSCOPY Left 2002?     [place] TOTAL KNEE ARTHROPLASTY Left 11/6/2018    KNEE TOTAL ARTHROPLASTY W/REMOVAL HARDWARE PLATE & SCREWS performed by Smith Whitman MD at [address] [place] [address] Left     TONSILLECTOMY AND ADENOIDECTOMY      TOTAL KNEE ARTHROPLASTY Right 12/10/2019    KNEE TOTAL ARTHROPLASTY performed by Smith Whitman MD at [address] [place] [address]       Previous Medications    ALENDRONATE (FOSAMAX) 70 MG TABLET    TAKE 1 TABLET BY MOUTH ONCE WEEKLY BEFORE BREAKFAST, ON AN EMPTY STOMACH: REMAIN UPRIGHT FOR 30 MINUTES:TAKE WITH 8 OUNCES OF WATER    ALLOPURINOL (ZYLOPRIM) 100 MG TABLET    TAKE ONE TABLET BY MOUTH DAILY    ASPIRIN 81 MG TABLET    Take 81 mg by mouth daily    ASPIRIN-ACETAMINOPHEN-CAFFEINE (EXCEDRIN MIGRAINE PO)    Take by mouth as needed     CEFUROXIME (CEFTIN) 250 MG TABLET    Take 1 tablet by mouth 2 times daily for 10 days    CETIRIZINE (ZYRTEC) 10 MG TABLET    TAKE ONE TABLET BY MOUTH DAILY    DOCUSATE SODIUM (COLACE) 100 MG CAPSULE    Take 1 capsule by mouth 2 times daily    FAMOTIDINE (PEPCID) 20 MG TABLET    TAKE ONE TABLET BY MOUTH TWICE A DAY    FLUTICASONE (FLONASE) 50 MCG/ACT NASAL SPRAY    SPRAY TWO SPRAYS IN EACH NOSTRIL ONCE DAILY    HYDROCODONE-ACETAMINOPHEN (NORCO) 5-325 MG PER TABLET    Take 1 tablet by mouth every 8 hours as needed for Pain for up to 7 days. Intended supply: 7 days. Take lowest dose possible to manage pain    NAPROXEN (NAPROSYN) 250 MG TABLET    TAKE ONE TABLET BY MOUTH THREE TIMES A DAY WITH FOOD AS NEEDED FOR GOUT FLARE UP UNTIL ATTACK SUBSIDES    SIMVASTATIN (ZOCOR) 20 MG TABLET    TAKE ONE TABLET BY MOUTH DAILY    VERAPAMIL (CALAN SR) 240 MG EXTENDED RELEASE TABLET    TAKE ONE TABLET BY MOUTH ONCE NIGHTLY FOR MIGRAINE HEADACHE    VITAMIN D (ERGOCALCIFEROL) 1.25 MG (07283 UT) CAPS CAPSULE    TAKE ONE CAPSULE BY MOUTH EVERY 2 WEEKS       ALLERGIES     has No Known Allergies. SOCIAL HISTORY      reports that she quit smoking about 33 years ago. Her smoking use included cigarettes. She has a 5.00 pack-year smoking history. She has never used smokeless tobacco. She reports that she does not drink alcohol or use drugs. PHYSICAL EXAM     INITIAL VITALS: /78   Pulse 85   Temp 98.5 °F (36.9 °C) (Oral)   Resp 20   Ht 5' 3\" (1.6 m)   Wt 129 lb (58.5 kg)   LMP 01/01/2004   SpO2 98%   BMI 22.85 kg/m²      Physical Exam  Vitals signs and nursing note reviewed. Constitutional:       General: She is not in acute distress. Appearance: She is well-developed. She is not diaphoretic. HENT:      Head: Normocephalic and atraumatic. Eyes:      General: No scleral icterus. Right eye: No discharge. Left eye: No discharge. Conjunctiva/sclera: Conjunctivae normal.      Pupils: Pupils are equal, round, and reactive to light. Cardiovascular:      Rate and Rhythm: Normal rate and regular rhythm. Heart sounds: Normal heart sounds. No murmur. No friction rub. No gallop. Pulmonary:      Effort: Pulmonary effort is normal. No respiratory distress. Breath sounds: Normal breath sounds. No wheezing or rales. Chest:      Chest wall: No tenderness. Abdominal:      General: Bowel sounds are normal. There is no distension. Palpations: Abdomen is soft. There is no mass. Tenderness: There is no abdominal tenderness. There is no guarding or rebound. Musculoskeletal: Normal range of motion. General: No tenderness. Skin:     General: Skin is warm and dry. Coloration: Skin is not pale. Findings: No erythema or rash. Neurological:      Mental Status: She is alert and oriented to person, place, and time. Cranial Nerves: No cranial nerve deficit. Sensory: No sensory deficit. Motor: No abnormal muscle tone. Coordination: Coordination normal.      Deep Tendon Reflexes: Reflexes normal.   Psychiatric:         Behavior: Behavior normal.         Thought Content: Thought content normal.         Judgment: Judgment normal.         DIAGNOSTIC RESULTS     RADIOLOGY:All plain film, CT,MRI, and formal ultrasound images (except ED bedside ultrasound) are read by the radiologist and the interpretations are directly viewed by the emergency physician. LABS: All lab results were reviewed by myself, and all abnormals are listed below.   Labs Reviewed   C DIFF TOXIN/ANTIGEN         MEDICAL DECISION MAKING: Patient's diarrhea sounds like probably antibiotic induced but since she has been hospitalized and in antibiotics we will try to get a stool sample to check for C. difficile. EMERGENCY DEPARTMENT COURSE:   Vitals:    Vitals:    10/18/20 1233   BP: 137/78   Pulse: 85   Resp: 20   Temp: 98.5 °F (36.9 °C)   TempSrc: Oral   SpO2: 98%   Weight: 129 lb (58.5 kg)   Height: 5' 3\" (1.6 m)       The patient was given the following medications while in the emergency department:  Orders Placed This Encounter   Medications    dicyclomine (BENTYL) capsule 10 mg    loperamide (IMODIUM) capsule 2 mg    dicyclomine (BENTYL) 10 MG capsule     Sig: Take 1 capsule by mouth every 6 hours as needed (cramps)     Dispense:  20 capsule     Refill:  0       -------------------------      CONSULTS:  None    PROCEDURES:  None    FINAL IMPRESSION      1.  Diarrhea, unspecified type          DISPOSITION/PLAN   DISPOSITION Decision To Discharge 10/18/2020 01:17:58 PM      PATIENT REFERREDTO:  Jeffrey Molina MD  [facility], [address] [facility],[address]  [address] [address] [phone]    In 1 week      [facility] ED  [place] [address]  [phone]    If symptoms worsen      DISCHARGEMEDICATIONS:  New Prescriptions    DICYCLOMINE (BENTYL) 10 MG CAPSULE    Take 1 capsule by mouth every 6 hours as needed (cramps)       (Please note that portions of this note were completed with a voice recognition program.  Efforts were made to edit thedictations but occasionally words are mis-transcribed.)    Dwayne Cortes MD  Attending Emergency Physician                        Dwayne Cortes MD  10/18/20 [id number]

## 2020-10-19 ENCOUNTER — OFFICE VISIT (OUTPATIENT)
Dept: FAMILY MEDICINE CLINIC | Age: 75
End: 2020-10-19
Payer: MEDICARE

## 2020-10-19 VITALS
SYSTOLIC BLOOD PRESSURE: 102 MMHG | RESPIRATION RATE: 16 BRPM | WEIGHT: 127.2 LBS | BODY MASS INDEX: 22.53 KG/M2 | DIASTOLIC BLOOD PRESSURE: 60 MMHG | HEART RATE: 84 BPM | TEMPERATURE: 97.7 F

## 2020-10-19 PROCEDURE — 99214 OFFICE O/P EST MOD 30 MIN: CPT | Performed by: NURSE PRACTITIONER

## 2020-10-19 ASSESSMENT — PATIENT HEALTH QUESTIONNAIRE - PHQ9
SUM OF ALL RESPONSES TO PHQ9 QUESTIONS 1 & 2: 0
SUM OF ALL RESPONSES TO PHQ QUESTIONS 1-9: 0
1. LITTLE INTEREST OR PLEASURE IN DOING THINGS: 0
SUM OF ALL RESPONSES TO PHQ QUESTIONS 1-9: 0
2. FEELING DOWN, DEPRESSED OR HOPELESS: 0
SUM OF ALL RESPONSES TO PHQ QUESTIONS 1-9: 0

## 2020-10-19 ASSESSMENT — ENCOUNTER SYMPTOMS
NAUSEA: 0
SHORTNESS OF BREATH: 0
WHEEZING: 0
ABDOMINAL PAIN: 0
VOMITING: 0
DIARRHEA: 1

## 2020-10-19 NOTE — PROGRESS NOTES
Subjective:      Patient ID: Umu Jo is a 67 y.o. female. Visit Information    Have you changed or started any medications since your last visit including any over-the-counter medicines, vitamins, or herbal medicines? yes - see med list    Are you having any side effects from any of your medications? -  no  Have you stopped taking any of your medications? Is so, why? -  no    Have you seen any other physician or provider since your last visit? Yes - Records Obtained  Have you had any other diagnostic tests since your last visit? Yes - Records Obtained  Have you been seen in the emergency room and/or had an admission to a hospital since we last saw you? Yes - Records Obtained  Have you had your routine dental cleaning in the past 6 months? no    Have you activated your [employer] account? If not, what are your barriers? No: Declined      Patient Care Team:  Ovi Stover MD as PCP - General (Family Medicine)  Ovi Stover MD as PCP - [facility] Provider    Medical History Review  Past Medical, Family, and Social History reviewed and does contribute to the patient presenting condition    Health Maintenance   Topic Date Due    DTaP/Tdap/Td vaccine (1 - Tdap) 03/20/1963    Shingles Vaccine (1 of 2) 03/20/1994    Annual Wellness Visit (AWV)  05/29/2019    Lipid screen  09/01/2021    DEXA (modify frequency per FRAX score)  Completed    Flu vaccine  Completed    Pneumococcal 65+ years Vaccine  Completed    Hepatitis A vaccine  Aged Out    Hepatitis B vaccine  Aged Out    Hib vaccine  Aged Out    Meningococcal (ACWY) vaccine  Aged Out     HPI     67year old female presents with follow up s/p hospital discharge for cholecystitis diarrhea and elevated LFTs. Was admitted  a week ago for RUQ abd pain and extensive imaging studies showed acute cholecystitis. Blood tests showed significantly elevated LFTs and low HH. Pt was discharged home with oral ceftin .  She developed massive diarrhea after starting antibiotics. Again was evaluated in ER yesterday and received IV vanco. currently is on bentyl. States she hasn't had loose stool since this morning and doesn't have pain right now. Pt is scheduled for EUS with Dr. Zachary Ordonez 10/30 and is going to follow up with Dr. Errol Schulz. States fever chills cough sob cp or nv but she doesn't have good appetite.      Review of Systems   Constitutional: Negative for chills and fever. Respiratory: Negative for shortness of breath and wheezing. Cardiovascular: Negative for chest pain and leg swelling. Gastrointestinal: Positive for diarrhea. Negative for abdominal pain, nausea and vomiting. Neurological: Negative for dizziness, weakness and numbness. Psychiatric/Behavioral: Negative for agitation and behavioral problems. Objective:   Physical Exam  Vitals signs and nursing note reviewed. Constitutional:       General: She is not in acute distress. Appearance: Normal appearance. HENT:      Nose: Nose normal. No congestion. Eyes:      General: No scleral icterus. Conjunctiva/sclera: Conjunctivae normal.   Neck:      Musculoskeletal: Normal range of motion and neck supple. Cardiovascular:      Rate and Rhythm: Normal rate and regular rhythm. Pulses: Normal pulses. Heart sounds: Normal heart sounds. Pulmonary:      Effort: Pulmonary effort is normal. No respiratory distress. Breath sounds: Normal breath sounds. Abdominal:      Palpations: Abdomen is soft. Tenderness: There is abdominal tenderness. Musculoskeletal: Normal range of motion. General: No tenderness. Skin:     General: Skin is warm and dry. Neurological:      Mental Status: She is alert and oriented to person, place, and time. Cranial Nerves: No cranial nerve deficit. Psychiatric:         Mood and Affect: Mood normal.         Behavior: Behavior normal.         Assessment:      1. Cholecystitis    2. Elevated LFTs    3.  Diarrhea, unspecified type    4. Anemia, unspecified type            Plan:      BP Readings from Last 3 Encounters:   10/19/20 102/60   10/18/20 137/78   10/15/20 (!) 119/57     /60 (Site: Right Upper Arm, Position: Sitting, Cuff Size: Medium Adult)   Pulse 84   Temp 97.7 °F (36.5 °C) (Infrared)   Resp 16   Wt 127 lb 3.2 oz (57.7 kg)   LMP 01/01/2004   BMI 22.53 kg/m²   Lab Results   Component Value Date    WBC 3.5 10/15/2020    HGB 10.1 (L) 10/15/2020    HCT 30.8 (L) 10/15/2020     (L) 10/15/2020    CHOL 138 09/01/2020    TRIG 88 09/01/2020    HDL 52 09/01/2020     (H) 10/15/2020    AST 98 (H) 10/15/2020     10/15/2020    K 4.0 10/15/2020     10/15/2020    CREATININE 0.47 (L) 10/15/2020    BUN 7 (L) 10/15/2020    CO2 24 10/15/2020    TSH 1.19 01/08/2020    INR 1.0 10/12/2020     Lab Results   Component Value Date    CALCIUM 8.7 10/15/2020     Lab Results   Component Value Date    LDLCHOLESTEROL 68 09/01/2020       1. Cholecystitis  - Hepatic Function Panel; Future  - follow up with Dr. Jose De Jesus Shipman as scheduled     2. Elevated LFTs  - Hepatic Function Panel; Future  - hold zocor for the time being. 3. Diarrhea, unspecified type  - improving. Cont bentyl as needed     4. Anemia, unspecified type  - CBC; Future  - Iron and TIBC; Future  - Ferritin; Future    Requested Prescriptions      No prescriptions requested or ordered in this encounter     There are no discontinued medications. Discussed use, benefit, and side effects of prescribed medications. Barriers to medication compliance addressed. All patient questions answered. Pt voiced understanding. No follow-ups on file.

## 2020-10-21 ENCOUNTER — OFFICE VISIT (OUTPATIENT)
Dept: GASTROENTEROLOGY | Age: 75
End: 2020-10-21
Payer: MEDICARE

## 2020-10-21 VITALS — TEMPERATURE: 97.2 F | BODY MASS INDEX: 22.04 KG/M2 | WEIGHT: 124.4 LBS

## 2020-10-21 PROBLEM — R10.84 ABDOMINAL PAIN, GENERALIZED: Status: ACTIVE | Noted: 2020-10-21

## 2020-10-21 PROBLEM — R19.7 DIARRHEA: Status: ACTIVE | Noted: 2020-10-21

## 2020-10-21 PROBLEM — R10.9 ABDOMINAL CRAMPING: Status: ACTIVE | Noted: 2020-10-21

## 2020-10-21 PROCEDURE — 99214 OFFICE O/P EST MOD 30 MIN: CPT | Performed by: INTERNAL MEDICINE

## 2020-10-21 ASSESSMENT — ENCOUNTER SYMPTOMS
DIARRHEA: 1
ABDOMINAL PAIN: 1

## 2020-10-21 NOTE — PROGRESS NOTES
GI OFFICE FOLLOW UP    Ofelia Danielson is a 67 y.o. female evaluated via on 10/21/2020. Consent:  She and/or health care decision maker is aware that that she may receive a bill for this telephone service, depending on her insurance coverage, and has provided verbal consent to proceed: YES      INTERVAL HISTORY:   No referring provider defined for this encounter. Chief Complaint   Patient presents with    GI Problem     hosp f/u with ERCP.  states diarrhea and cramping, stopped using her antibiotic       1. S/P ERCP    2. Elevated LFTs    3. Abdominal pain, generalized    4. Diarrhea, unspecified type    5. Abdominal cramping         The patient is here as a follow up of her recent GI procedure.    The results have been sent to you separately   The findings were explained to the patient in detail and biopsies were also discussed   with her    This patient was recently hospitalized at [facility] history for elevated LFTs  And MRCP was performed had revealed nonspecific narrowing of the  Hepatic ducts    An ERCP was performed by me brushings were taken from the junction have not revealed any evidence of malignancy specimens were adequate for epithelial cells and are benign    It appears that since her discharge she has been having some significant lower abdominal cramping and diarrhea was also seen in emergency room recently she claims that cramping is severe in the lower abdominal areas and associated with some diarrhea she says that the frequency of 6 to 7/day now        It is down to 2 to 3/day she denies overt rectal bleeding melanotic stools complains of some nausea symptoms      No hematemesis    HISTORY OF PRESENT ILLNESS: Shonda Steve is a 67 y.o. female with a past history remarkable for , referred for evaluation of   Chief Complaint   Patient presents with    GI Problem     hosp f/u with ERCP.  states diarrhea and cramping, stopped using her antibiotic   . Past Medical,Family, and Social History reviewed and does contribute to the patient presenting condition. Patient's PMH/PSH,SH,PSYCH Hx, MEDs, ALLERGIES, and ROS were all reviewed and updated in the appropriate sections. PAST MEDICAL HISTORY:  Past Medical History:   Diagnosis Date    Allergic rhinitis     Closed tibia fracture     GERD (gastroesophageal reflux disease)     Gout     Headache(784.0)     h/o migraines    Hyperlipidemia     Osteoarthritis     DJD Lt knee    Osteoporosis     Posterior tibial tendon dysfunction     right, wears braces on both legs    Vitamin D deficient rickets     Wears glasses     Wears partial dentures     lower       Past Surgical History:   Procedure Laterality Date    APPENDECTOMY      CARPAL TUNNEL RELEASE      bilateral    CATARACT REMOVAL WITH IMPLANT Left 03/07/2019    [facility]    CATARACT REMOVAL WITH IMPLANT Right 04/04/2019    [facility]    COLONOSCOPY      2007? per pt wtih polyps    ERCP N/A 10/14/2020    ERCP WITH BILE DUCT BRUSHING performed by Sammy Jimenez MD at [address] [place] [address]      left tibia    HYSTERECTOMY  4/12    INTRACAPSULAR CATARACT EXTRACTION Left 3/7/2019    EYE CATARACT EMULSIFICATION IOL IMPLANT - TOPICAL performed by Phillip Elizabeth MD at [address] [place] [address] Right 4/4/2019    EYE CATARACT EMULSIFICATION IOL IMPLANT performed by Phillip Elizabeth MD at [place] [address] Left 11/06/2018    hardware removal and then TKA    KNEE ARTHROSCOPY Left 2002?     KS TOTAL KNEE ARTHROPLASTY Left 11/6/2018    KNEE TOTAL ARTHROPLASTY W/REMOVAL HARDWARE PLATE & SCREWS performed by Vincent Goodell, MD at [address] [place] [address] Left     TONSILLECTOMY AND ADENOIDECTOMY      TOTAL KNEE ARTHROPLASTY Right 12/10/2019    KNEE TOTAL ARTHROPLASTY performed by Yuni Mills MD at [facility] EXTRACTION         CURRENT MEDICATIONS:    Current Outpatient Medications:     dicyclomine (BENTYL) 10 MG capsule, Take 1 capsule by mouth every 6 hours as needed (cramps), Disp: 20 capsule, Rfl: 0    HYDROcodone-acetaminophen (NORCO) 5-325 MG per tablet, Take 1 tablet by mouth every 8 hours as needed for Pain for up to 7 days. Intended supply: 7 days.  Take lowest dose possible to manage pain, Disp: 21 tablet, Rfl: 0    cetirizine (ZYRTEC) 10 MG tablet, TAKE ONE TABLET BY MOUTH DAILY, Disp: 90 tablet, Rfl: 0    simvastatin (ZOCOR) 20 MG tablet, TAKE ONE TABLET BY MOUTH DAILY, Disp: 90 tablet, Rfl: 0    alendronate (FOSAMAX) 70 MG tablet, TAKE 1 TABLET BY MOUTH ONCE WEEKLY BEFORE BREAKFAST, ON AN EMPTY STOMACH: REMAIN UPRIGHT FOR 30 MINUTES:TAKE WITH 8 OUNCES OF WATER, Disp: 4 tablet, Rfl: 3    vitamin D (ERGOCALCIFEROL) 1.25 MG (93043 UT) CAPS capsule, TAKE ONE CAPSULE BY MOUTH EVERY 2 WEEKS, Disp: 12 capsule, Rfl: 0    verapamil (CALAN SR) 240 MG extended release tablet, TAKE ONE TABLET BY MOUTH ONCE NIGHTLY FOR MIGRAINE HEADACHE, Disp: 90 tablet, Rfl: 1    allopurinol (ZYLOPRIM) 100 MG tablet, TAKE ONE TABLET BY MOUTH DAILY, Disp: 90 tablet, Rfl: 1    famotidine (PEPCID) 20 MG tablet, TAKE ONE TABLET BY MOUTH TWICE A DAY, Disp: 180 tablet, Rfl: 1    fluticasone (FLONASE) 50 MCG/ACT nasal spray, SPRAY TWO SPRAYS IN EACH NOSTRIL ONCE DAILY, Disp: 2 Bottle, Rfl: 1    aspirin 81 MG tablet, Take 81 mg by mouth daily, Disp: , Rfl:     naproxen (NAPROSYN) 250 MG tablet, TAKE ONE TABLET BY MOUTH THREE TIMES A DAY WITH FOOD AS NEEDED FOR GOUT FLARE UP UNTIL ATTACK SUBSIDES, Disp: 60 tablet, Rfl: 0    Aspirin-Acetaminophen-Caffeine (EXCEDRIN MIGRAINE PO), Take by mouth as needed , Disp: , Rfl:     docusate sodium (COLACE) 100 MG capsule, Take 1 capsule by mouth 2 times daily, Disp: 60 capsule, Rfl: 2    cefUROXime (CEFTIN) 250 MG tablet, Take 1 tablet by mouth 2 HENT: Negative. Denies   Eyes: Positive for visual disturbance. Gastrointestinal: Positive for abdominal pain (cramping) and diarrhea (very watery stools). PHYSICAL EXAMINATION: Vital signs reviewed per the nursing documentation. Temp 97.2 °F (36.2 °C)   Wt 124 lb 6.4 oz (56.4 kg)   LMP 01/01/2004   BMI 22.04 kg/m²   Body mass index is 22.04 kg/m². Physical Exam  Nursing note reviewed. Constitutional:       Appearance: She is well-developed. Comments: Anxious  Appears to be in pain   HENT:      Head: Normocephalic and atraumatic. Eyes:      Conjunctiva/sclera: Conjunctivae normal.      Pupils: Pupils are equal, round, and reactive to light. Neck:      Musculoskeletal: Normal range of motion and neck supple. Cardiovascular:      Heart sounds: Normal heart sounds. Pulmonary:      Effort: Pulmonary effort is normal.      Breath sounds: Normal breath sounds. Abdominal:      General: Bowel sounds are normal.      Palpations: Abdomen is soft. Comments: Lower abdominal tenderness  No obvious masses palpable   Musculoskeletal: Normal range of motion. Skin:     General: Skin is warm. Neurological:      Mental Status: She is alert and oriented to person, place, and time.    Psychiatric:         Behavior: Behavior normal.           LABORATORY DATA: Reviewed  Lab Results   Component Value Date    WBC 3.5 10/15/2020    HGB 10.1 (L) 10/15/2020    HCT 30.8 (L) 10/15/2020    MCV 90.1 10/15/2020     (L) 10/15/2020     10/15/2020    K 4.0 10/15/2020     10/15/2020    CO2 24 10/15/2020    BUN 7 (L) 10/15/2020    CREATININE 0.47 (L) 10/15/2020    LABPROT 6.9 11/19/2012    LABALBU 3.1 (L) 10/15/2020    BILITOT 1.81 (H) 10/15/2020    ALKPHOS 159 (H) 10/15/2020    AST 98 (H) 10/15/2020     (H) 10/15/2020    INR 1.0 10/12/2020         Lab Results   Component Value Date    RBC 3.42 (L) 10/15/2020    HGB 10.1 (L) 10/15/2020    MCV 90.1 10/15/2020    MCH 29.4 10/15/2020    MCHC 32.7 10/15/2020    RDW 14.9 10/15/2020    MPV 8.3 10/15/2020    BASOPCT 0 10/15/2020    LYMPHSABS 0.84 (L) 10/15/2020    MONOSABS 0.56 10/15/2020    NEUTROABS 1.96 10/15/2020    EOSABS 0.14 10/15/2020    BASOSABS 0.00 10/15/2020         DIAGNOSTIC TESTING:     Xr Chest (2 Vw)    Result Date: 10/12/2020  EXAMINATION: TWO XRAY VIEWS OF THE CHEST 10/12/2020 9:00 pm COMPARISON: None. HISTORY: ORDERING SYSTEM PROVIDED HISTORY: clearance for surgery Reason for Exam: Pre op Acuity: Acute Type of Exam: Initial FINDINGS: The lungs are without acute focal process. No effusion or pneumothorax. The cardiomediastinal silhouette is normal.  Multilevel hypertrophic/degenerative change of the thoracic spine. Negative chest.     Ct Abdomen Pelvis W Iv Contrast Additional Contrast? None    Result Date: 10/12/2020  EXAMINATION: CT OF THE ABDOMEN AND PELVIS WITH CONTRAST 10/12/2020 3:34 am TECHNIQUE: CT of the abdomen and pelvis was performed with the administration of intravenous contrast. Multiplanar reformatted images are provided for review. Dose modulation, iterative reconstruction, and/or weight based adjustment of the mA/kV was utilized to reduce the radiation dose to as low as reasonably achievable. COMPARISON: None. HISTORY: ORDERING SYSTEM PROVIDED HISTORY: RUQ abd pain N/V, positive Mckeon's sign TECHNOLOGIST PROVIDED HISTORY: RUQ abd pain N/V, positive Mckeon's sign Reason for Exam: Patient c/o RUQ abdominal pain with nausea and vomiting x 1 day Acuity: Acute Type of Exam: Initial FINDINGS: Lower Chest: Minimal dependent changes are seen within the lungs bilaterally. Organs: There is suggestion of a gallstone in the region of the gallbladder neck. There appears to be pericholecystic fluid. Mild prominence of the intrahepatic bile ducts is seen. Otherwise, the liver, spleen, pancreas and adrenal glands demonstrate no acute abnormality. The kidneys demonstrate symmetric enhancement.   No convincing focal renal mass. There is no hydronephrosis. GI/Bowel: There is no evidence of a bowel obstruction. Stool is seen throughout the colon. No evidence of acute appendicitis. Pelvis: The urinary bladder demonstrates no acute abnormality. The patient is status post hysterectomy. Peritoneum/Retroperitoneum: The abdominal aorta is normal in caliber with scattered atherosclerosis. No bulky retroperitoneal or mesenteric adenopathy. No free fluid or free air seen in the abdomen or pelvis. Bones/Soft Tissues: There is a presumably chronic compression deformity of the T12 vertebral body. No acute osseous abnormality seen. 1. There appears to be a gallstone in the region the gallbladder neck with pericholecystic fluid as well as mild prominence of the intrahepatic bile ducts. Findings could represent acute cholecystitis. 2. Otherwise, no acute abnormality identified within the abdomen or pelvis. Fl Ercp Biliary And Pancreatic S&i    Result Date: 10/14/2020  EXAMINATION: 10 SPOT IMAGES FROM AN ERCP FLUOROSCOPY DOSE OR TIME/IMAGES: Fluoro time 150.6 sec AIR KERMA 43.02 mGy HISTORY: ORDERING SYSTEM PROVIDED HISTORY: ERCP TECHNOLOGIST PROVIDED HISTORY: ERCP Reason for Exam: ERCP Acuity: Acute Type of Exam: Initial Extrahepatic biliary ductal dilatation. FINDINGS: Endoscopy and cannulation of the major papilla was performed by the gastroenterology service under the supervision of [address] [place] [address]. Spot views are presented for interpretation. Contrast is identified in the intrahepatic and extrahepatic bile ducts. No intraluminal filling defects or strictures are identified in the common bile duct, common hepatic duct or visualized portions of the intrahepatic bile ducts. No contrast extravasation is identified. Contrast is also seen opacifying the cystic duct and gallbladder. No filling defect is identified. Please refer to the procedure report for further details.      Mri Abdomen W Wo Contrast Mrcp    Result Date: 10/12/2020  EXAMINATION: MRI OF THE ABDOMEN WITH AND WITHOUT CONTRAST AND MRCP 10/12/2020 6:32 pm TECHNIQUE: Multiplanar multisequence MRI of the abdomen was performed with and without the administration of intravenous contrast.  After initial T2 axial and coronal images, thick slab, thin slab and 3D coronal MRCP sequences were obtained without the administration of intravenous contrast.  MIP images are provided for review. COMPARISON: CT abdomen 10/12/2020 at 3:36 a.m., ultrasound right upper quadrant 10/12/2020 at 9:12 a.m. HISTORY: ORDERING SYSTEM PROVIDED HISTORY: Prominence of the hepatic bile ducts, cholecystitis and cholelithiasis described on CT earlier today FINDINGS: Lower chest: Trace right pleural effusion is likely reactive. Minimal subsegmental atelectasis posterior both lung bases. Visualized portions of cardiac and posterior mediastinal structures appear unremarkable. Gallbladder: Mild gallbladder wall thickening with pericholecystic fluid. Possible single gallstone seen is a filling defect near the gallbladder neck coronal series 8, image 15. No other discrete gallstone evident. Bile Ducts: Mild intrahepatic bile duct dilatation. There is confluent narrowing of the most proximal portion of common hepatic duct, mildly dilated at 7 mm. Distally, the common bile duct measures 5-6 mm diameter, within normal limits. Pancreatic Duct: No stricture or stone evident. 2-3 mm diameter. Other organs: Mild intrahepatic bile duct dilatation. Prominent periportal edema throughout. No discrete hepatic lesion. Unremarkable appearance of the spleen, adrenal glands, pancreas and kidneys. Simple renal cortical cyst measures 8 mm posterior midpole left kidney. Other: Right upper quadrant ascites is a technically degrading factor for the exam.  No gross adenopathy is seen. 1. Acute cholangitis suspected given confluent narrowing at the confluence of the hepatic ducts.  2. Prominent periportal edema, presumably reactive, and mild intrahepatic bile duct dilatation. 3. Acute cholecystitis likely given the gallbladder findings. 4. Cholelithiasis, probable small gallstone near the gallbladder neck. 5. No definite choledocholithiasis. Us Abdomen Limited Specify Organ? Liver, Gallbladder    Result Date: 10/12/2020  EXAMINATION: RIGHT UPPER QUADRANT ULTRASOUND 10/12/2020 9:12 am COMPARISON: CT 10/12/2020 HISTORY: ORDERING SYSTEM PROVIDED HISTORY: acute cholecystitis TECHNOLOGIST PROVIDED HISTORY: acute cholecystitis Specify organ?->LIVER Specify organ?->GALLBLADDER Acuity: Acute Type of Exam: Subsequent/Follow-up FINDINGS: LIVER:  The liver has a fairly homogeneous echotexture with no focal masses identified by ultrasound. Liver length is 16.3 cm. Flow in the portal vein is hepatopetal. BILIARY SYSTEM:  The gallbladder appears very irregular with apparent diffuse wall thickening. The lumen is poorly defined. There is an echogenic focus with shadowing in the region of the neck compatible with a gallstone as shown on the recent CT. The patient reportedly has a positive sonographic Mckeon's sign. Mildly dilated common bile duct measuring up to 7.8 mm. RIGHT KIDNEY: No obstruction of the somewhat atrophic right kidney as visualized. PANCREAS:  Visualized portions of the pancreas are unremarkable. Overall the pancreas is not well seen due to overlying bowel gas. Pancreatic duct is mildly prominent measuring up to 3.4 mm. OTHER: No evidence of right upper quadrant ascites. Suspect a 7 mm gallstone with a diffusely thickened irregular gallbladder wall and collapsed lumen. Patient reportedly had a positive sonographic Mckeon's sign. Findings are suggestive of acute cholecystitis. Prominent common bile duct measuring up to 7.8 mm. Correlate with clinical findings and laboratory values to determine need for MRCP.  The findings were sent to the Radiology Results Communication Center at 10:19 am on 10/12/2020to be communicated to a licensed caregiver. Nm Hepatobiliary Scan W Ejection Fraction    Result Date: 10/13/2020  EXAMINATION: NUCLEAR MEDICINE HEPATOBILIARY SCINTIGRAPHY (HIDA SCAN) WITH EJECTION FRACTION. TECHNIQUE: Approximately 3.4 millicuries LP68P Mebrofenin (Choletec) was administered IV. Then, dynamic images of the abdomen were obtained in the anterior projection for 60 mins. A right lateral view was also obtained at 60 mins. Due to a shortage/inavailability of CCK, one can (237 ml) Ensure plus was substitued orally. Images were obtained in the Welsh projection and regions of interest were drawn around the gallbladder and ejection fraction was calculated. HISTORY: ORDERING SYSTEM PROVIDED HISTORY: cholesystitis FINDINGS: Mildly delayed radiotracer uptake by the liver and excretion of radiotracer is noted suggesting hepatocellular dysfunction, please correlate with LFTs. Gallbladder and small bowel is visualized in appropriate sequence and time. Gallbladder ejection fraction measured 61%. Normal value is >33% for Ensure protocol. Note, Ensure normal range is based on a limited study. No evidence of cholecystitis with normal ejection fraction. Assessment  1. S/P ERCP    2. Elevated LFTs    3. Abdominal pain, generalized    4. Diarrhea, unspecified type    5. Abdominal cramping        Plan    I will order CT scan of the abdomen and pelvis to evaluate her significant abdominal pain    We will check stool for C.  Difficile    Told her to start taking some probiotic supplements    If C. difficile negative may require Questran therapy we will check old records for any previous colonoscopies    Follow-up LFTs have been ordered    See her back in a week or so    She was asked to come to the emergency room if symptoms get worse    Patient verbalized understanding and agreement to this plan      I communicated with the patient and/or health care decision maker about   Details of this discussion including any medical advice provided:YES      I affirm this is a Patient Initiated Episode with an Established Patient who has not had a related appointment within my department in the past 7 days or scheduled within the next 24 hours. Total Time: minutes: 21-30 minutes    Note: not billable if this call serves to triage the patient into an appointment for the relevant concern      Thank you for allowing me to participate in the care of Ms. Wm Westfall. For any further questions please do not hesitate to contact me. I have reviewed and agree with the ROS entered by the MA/LPN.          Trista Argueta MD, [facility]  Board Certified in Gastroenterology and [address] [place] [address] Gastroenterology  Office #: [phone]

## 2020-10-26 ENCOUNTER — TELEPHONE (OUTPATIENT)
Dept: GASTROENTEROLOGY | Age: 75
End: 2020-10-26

## 2020-10-26 ENCOUNTER — HOSPITAL ENCOUNTER (OUTPATIENT)
Age: 75
Discharge: HOME | End: 2020-10-26
Payer: MEDICARE

## 2020-10-26 LAB
ALBUMIN SERPL-MCNC: 3.3 G/DL (ref 3.5–5.2)
ALBUMIN/GLOBULIN RATIO: ABNORMAL (ref 1–2.5)
ALP BLD-CCNC: 100 U/L (ref 35–104)
ALT SERPL-CCNC: 27 U/L (ref 5–33)
AST SERPL-CCNC: 27 U/L
BILIRUB SERPL-MCNC: 0.52 MG/DL (ref 0.3–1.2)
BILIRUBIN DIRECT: 0.32 MG/DL
BILIRUBIN, INDIRECT: 0.2 MG/DL (ref 0–1)
FERRITIN: 118 UG/L (ref 13–150)
GLOBULIN: ABNORMAL G/DL (ref 1.5–3.8)
HCT VFR BLD CALC: 33.5 % (ref 36–46)
HEMOGLOBIN: 11.1 G/DL (ref 12–16)
IRON SATURATION: 23 % (ref 20–55)
IRON: 56 UG/DL (ref 37–145)
MCH RBC QN AUTO: 29.6 PG (ref 26–34)
MCHC RBC AUTO-ENTMCNC: 33.2 G/DL (ref 31–37)
MCV RBC AUTO: 89.1 FL (ref 80–100)
NRBC AUTOMATED: ABNORMAL PER 100 WBC
PDW BLD-RTO: 14.4 % (ref 11.5–14.9)
PLATELET # BLD: 330 K/UL (ref 150–450)
PMV BLD AUTO: 7.4 FL (ref 6–12)
RBC # BLD: 3.76 M/UL (ref 4–5.2)
TOTAL IRON BINDING CAPACITY: 243 UG/DL (ref 250–450)
TOTAL PROTEIN: 6.4 G/DL (ref 6.4–8.3)
UNSATURATED IRON BINDING CAPACITY: 187 UG/DL (ref 112–347)
WBC # BLD: 5.8 K/UL (ref 3.5–11)

## 2020-10-26 PROCEDURE — 80076 HEPATIC FUNCTION PANEL: CPT

## 2020-10-26 PROCEDURE — 36415 COLL VENOUS BLD VENIPUNCTURE: CPT

## 2020-10-26 PROCEDURE — 85027 COMPLETE CBC AUTOMATED: CPT

## 2020-10-26 PROCEDURE — 83540 ASSAY OF IRON: CPT

## 2020-10-26 PROCEDURE — 82728 ASSAY OF FERRITIN: CPT

## 2020-10-26 PROCEDURE — 83550 IRON BINDING TEST: CPT

## 2020-10-26 NOTE — TELEPHONE ENCOUNTER
Spoke to pt. She states she got the cup for stool sample for C Diff this morning. Pt reports since taking Align, her stools have been solid and she hasn't had a BM yet today. Did instruct pt that they will not run C Diff on solid stool. She agrees to keep cup incase the diarrhea returns. Will see her for f/u in office next week at scheduled.

## 2020-10-27 ENCOUNTER — HOSPITAL ENCOUNTER (OUTPATIENT)
Dept: CT IMAGING | Age: 75
Discharge: HOME | End: 2020-10-29
Payer: MEDICARE

## 2020-10-27 PROCEDURE — 74177 CT ABD & PELVIS W/CONTRAST: CPT

## 2020-10-27 PROCEDURE — 6360000004 HC RX CONTRAST MEDICATION: Performed by: INTERNAL MEDICINE

## 2020-10-27 PROCEDURE — 2580000003 HC RX 258: Performed by: INTERNAL MEDICINE

## 2020-10-27 RX ORDER — SODIUM CHLORIDE 0.9 % (FLUSH) 0.9 %
10 SYRINGE (ML) INJECTION PRN
Status: DISCONTINUED | OUTPATIENT
Start: 2020-10-27 | End: 2020-10-30 | Stop reason: HOSPADM

## 2020-10-27 RX ORDER — 0.9 % SODIUM CHLORIDE 0.9 %
80 INTRAVENOUS SOLUTION INTRAVENOUS ONCE
Status: COMPLETED | OUTPATIENT
Start: 2020-10-27 | End: 2020-10-27

## 2020-10-27 RX ADMIN — Medication 10 ML: at 14:20

## 2020-10-27 RX ADMIN — IOHEXOL 50 ML: 240 INJECTION, SOLUTION INTRATHECAL; INTRAVASCULAR; INTRAVENOUS; ORAL at 14:20

## 2020-10-27 RX ADMIN — SODIUM CHLORIDE 80 ML: 9 INJECTION, SOLUTION INTRAVENOUS at 14:20

## 2020-10-27 RX ADMIN — IOPAMIDOL 75 ML: 755 INJECTION, SOLUTION INTRAVENOUS at 14:20

## 2020-11-04 ENCOUNTER — OFFICE VISIT (OUTPATIENT)
Dept: GASTROENTEROLOGY | Age: 75
End: 2020-11-04
Payer: MEDICARE

## 2020-11-04 VITALS — TEMPERATURE: 97.4 F | WEIGHT: 133 LBS | BODY MASS INDEX: 23.56 KG/M2

## 2020-11-04 PROBLEM — Z98.890 STATUS POST ENDOSCOPIC RETROGRADE CHOLANGIOPANCREATOGRAPHY: Status: ACTIVE | Noted: 2020-11-04

## 2020-11-04 PROBLEM — K58.1 IRRITABLE BOWEL SYNDROME WITH CONSTIPATION: Status: ACTIVE | Noted: 2020-11-04

## 2020-11-04 PROBLEM — R14.0 ABDOMINAL BLOATING: Status: ACTIVE | Noted: 2020-11-04

## 2020-11-04 PROCEDURE — 99214 OFFICE O/P EST MOD 30 MIN: CPT | Performed by: INTERNAL MEDICINE

## 2020-11-04 ASSESSMENT — ENCOUNTER SYMPTOMS
ABDOMINAL DISTENTION: 1
RECTAL PAIN: 0
CONSTIPATION: 1
VOMITING: 0
NAUSEA: 0
ANAL BLEEDING: 0
DIARRHEA: 0
BLOOD IN STOOL: 0
ABDOMINAL PAIN: 0

## 2020-11-04 NOTE — PROGRESS NOTES
GI OFFICE FOLLOW UP    Marcia Bazan is a 67 y.o. female evaluated via on 11/4/2020. Consent:  She and/or health care decision maker is aware that that she may receive a bill for this telephone service, depending on her insurance coverage, and has provided verbal consent to proceed: YES      INTERVAL HISTORY:   No referring provider defined for this encounter. Chief Complaint   Patient presents with    Diarrhea     diarrhea has stopped, did not do stool test, some constipation, had CT abd done       1. Abdominal pain, generalized    2. Irritable bowel syndrome with constipation    3. Abdominal bloating    4.  Status post endoscopic retrograde cholangiopancreatography      Seen in my office as a follow-up    Had some abdominal pain during last office visit which was done after her hospitalization and an ERCP was performed    Patient had nonspecific dilation of the proximal biliary tree and brushings were done which were negative    She has been followed by a surgeon as well for gallstones    Patient has been complaining of some significant abdominal pain constipation issues    A CT scan was ordered which was reviewed with her today large amount of stools were seen throughout the colon    Although she has complained of some diarrhea in the past?    She denies any overt rectal bleeding melanotic stools she says that was taking stool softener she is having bowel movements and clinically feeling much better with her abdominal pain    Bloating cramping    Denies any rectal bleeding denies any melanotic stools she is not interested to have any colonoscopy done at this time          HISTORY OF PRESENT ILLNESS: Sophie Olivarez is a 67 y.o. female with a past history remarkable for , referred for evaluation of   Chief Complaint   Patient presents with    Diarrhea     diarrhea has stopped, did not do stool test, some constipation, had CT abd done   . Past Medical,Family, and Social History reviewed and does contribute to the patient presenting condition. Patient's PMH/PSH,SH,PSYCH Hx, MEDs, ALLERGIES, and ROS were all reviewed and updated in the appropriate sections. PAST MEDICAL HISTORY:  Past Medical History:   Diagnosis Date    Allergic rhinitis     Closed tibia fracture     GERD (gastroesophageal reflux disease)     Gout     Headache(784.0)     h/o migraines    Hyperlipidemia     Osteoarthritis     DJD Lt knee    Osteoporosis     Posterior tibial tendon dysfunction     right, wears braces on both legs    Vitamin D deficient rickets     Wears glasses     Wears partial dentures     lower       Past Surgical History:   Procedure Laterality Date    APPENDECTOMY      CARPAL TUNNEL RELEASE      bilateral    CATARACT REMOVAL WITH IMPLANT [facility] 03/07/2019    [facility]    CATARACT REMOVAL WITH IMPLANT Right 04/04/2019    [facility]    COLONOSCOPY      2007? per pt wtih polyps    ERCP N/A 10/14/2020    ERCP WITH BILE DUCT BRUSHING performed by Sammy Jimenez MD at [address] [place] [address]      left tibia    HYSTERECTOMY  4/12    INTRACAPSULAR CATARACT EXTRACTION Left 3/7/2019    EYE CATARACT EMULSIFICATION IOL IMPLANT - TOPICAL performed by Phillip Elizabeth MD at [address] [place] [address] Right 4/4/2019    EYE CATARACT EMULSIFICATION IOL IMPLANT performed by Phillip Elizabeth MD at [facility] Left 11/06/2018    hardware removal and then TKA    KNEE ARTHROSCOPY Left 2002?     OH TOTAL KNEE ARTHROPLASTY Left 11/6/2018    KNEE TOTAL ARTHROPLASTY W/REMOVAL HARDWARE PLATE & SCREWS performed by Vincent Goodell, MD at [address] [place] [address] Left     TONSILLECTOMY AND ADENOIDECTOMY      TOTAL KNEE ARTHROPLASTY Right 12/10/2019    KNEE TOTAL ARTHROPLASTY performed by Vincent Goodell, MD at [place] [address] TOOTH EXTRACTION         CURRENT MEDICATIONS:    Current Outpatient Medications:     cetirizine (ZYRTEC) 10 MG tablet, TAKE ONE TABLET BY MOUTH DAILY, Disp: 90 tablet, Rfl: 0    simvastatin (ZOCOR) 20 MG tablet, TAKE ONE TABLET BY MOUTH DAILY, Disp: 90 tablet, Rfl: 0    alendronate (FOSAMAX) 70 MG tablet, TAKE 1 TABLET BY MOUTH ONCE WEEKLY BEFORE BREAKFAST, ON AN EMPTY STOMACH: REMAIN UPRIGHT FOR 30 MINUTES:TAKE WITH 8 OUNCES OF WATER, Disp: 4 tablet, Rfl: 3    vitamin D (ERGOCALCIFEROL) 1.25 MG (17241 UT) CAPS capsule, TAKE ONE CAPSULE BY MOUTH EVERY 2 WEEKS, Disp: 12 capsule, Rfl: 0    verapamil (CALAN SR) 240 MG extended release tablet, TAKE ONE TABLET BY MOUTH ONCE NIGHTLY FOR MIGRAINE HEADACHE, Disp: 90 tablet, Rfl: 1    allopurinol (ZYLOPRIM) 100 MG tablet, TAKE ONE TABLET BY MOUTH DAILY, Disp: 90 tablet, Rfl: 1    famotidine (PEPCID) 20 MG tablet, TAKE ONE TABLET BY MOUTH TWICE A DAY, Disp: 180 tablet, Rfl: 1    fluticasone (FLONASE) 50 MCG/ACT nasal spray, SPRAY TWO SPRAYS IN EACH NOSTRIL ONCE DAILY, Disp: 2 Bottle, Rfl: 1    aspirin 81 MG tablet, Take 81 mg by mouth daily, Disp: , Rfl:     naproxen (NAPROSYN) 250 MG tablet, TAKE ONE TABLET BY MOUTH THREE TIMES A DAY WITH FOOD AS NEEDED FOR GOUT FLARE UP UNTIL ATTACK SUBSIDES, Disp: 60 tablet, Rfl: 0    Aspirin-Acetaminophen-Caffeine (EXCEDRIN MIGRAINE PO), Take by mouth as needed , Disp: , Rfl:     docusate sodium (COLACE) 100 MG capsule, Take 1 capsule by mouth 2 times daily, Disp: 60 capsule, Rfl: 2    ALLERGIES:   No Known Allergies    FAMILY HISTORY:       Problem Relation Age of Onset    Arthritis Other     Thyroid Disease Other     COPD Other     Stroke Father          SOCIAL HISTORY:   Social History     Socioeconomic History    Marital status:       Spouse name: Not on file    Number of children: Not on file    Years of education: Not on file    Highest education level: Not on file   Occupational History    Not on file Social Needs    Financial resource strain: Not hard at all   [employer] insecurity     Worry: Never true     Inability: Never true   [employer] needs     Medical: Not on file     Non-medical: Not on file   Tobacco Use    Smoking status: Former Smoker     Packs/day: 0.50     Years: 10.00     Pack years: 5.00     Types: Cigarettes     Last attempt to quit: 1987     Years since quittin.7    Smokeless tobacco: Never Used   Substance and Sexual Activity    Alcohol use: No     Alcohol/week: 0.0 standard drinks    Drug use: No    Sexual activity: Not Currently   Lifestyle    Physical activity     Days per week: Not on file     Minutes per session: Not on file    Stress: Not on file   Relationships    Social connections     Talks on phone: Not on file     Gets together: Not on file     Attends Jehovah's witness service: Not on file     Active member of club or organization: Not on file     Attends meetings of clubs or organizations: Not on file     Relationship status: Not on file    Intimate partner violence     Fear of current or ex partner: Not on file     Emotionally abused: Not on file     Physically abused: Not on file     Forced sexual activity: Not on file   Other Topics Concern    Not on file   Social History Narrative    Not on file         REVIEW OF SYSTEMS:         Review of Systems   Constitutional: Negative for appetite change (afraid to eat d/t diarrhea). HENT: Negative. Denies   Eyes: Positive for visual disturbance. Gastrointestinal: Positive for abdominal distention (lower) and constipation (some off and on). Negative for abdominal pain (cramping), anal bleeding, blood in stool, diarrhea (very watery stools), nausea, rectal pain and vomiting. PHYSICAL EXAMINATION: Vital signs reviewed per the nursing documentation. Temp 97.4 °F (36.3 °C)   Wt 133 lb (60.3 kg)   LMP 2004   BMI 23.56 kg/m²   Body mass index is 23.56 kg/m².    Physical Exam  Nursing note reviewed. Constitutional:       Appearance: She is well-developed. Comments: Anxious    HENT:      Head: Normocephalic and atraumatic. Eyes:      Conjunctiva/sclera: Conjunctivae normal.      Pupils: Pupils are equal, round, and reactive to light. Neck:      Musculoskeletal: Normal range of motion and neck supple. Cardiovascular:      Heart sounds: Normal heart sounds. Pulmonary:      Effort: Pulmonary effort is normal.      Breath sounds: Normal breath sounds. Abdominal:      General: Bowel sounds are normal.      Palpations: Abdomen is soft. Comments: NON TENDER, NON DISTENTED  LIVER SPLEEN AND HERNIAS ARE NOT  PALPABLE  BOWEL SOUNDS ARE POSITIVE      Musculoskeletal: Normal range of motion. Skin:     General: Skin is warm. Neurological:      Mental Status: She is alert and oriented to person, place, and time. Psychiatric:         Behavior: Behavior normal.           LABORATORY DATA: Reviewed  Lab Results   Component Value Date    WBC 5.8 10/26/2020    HGB 11.1 (L) 10/26/2020    HCT 33.5 (L) 10/26/2020    MCV 89.1 10/26/2020     10/26/2020     10/15/2020    K 4.0 10/15/2020     10/15/2020    CO2 24 10/15/2020    BUN 7 (L) 10/15/2020    CREATININE 0.47 (L) 10/15/2020    LABPROT 6.9 11/19/2012    LABALBU 3.3 (L) 10/26/2020    BILITOT 0.52 10/26/2020    ALKPHOS 100 10/26/2020    AST 27 10/26/2020    ALT 27 10/26/2020    INR 1.0 10/12/2020         Lab Results   Component Value Date    RBC 3.76 (L) 10/26/2020    HGB 11.1 (L) 10/26/2020    MCV 89.1 10/26/2020    MCH 29.6 10/26/2020    MCHC 33.2 10/26/2020    RDW 14.4 10/26/2020    MPV 7.4 10/26/2020    BASOPCT 0 10/15/2020    LYMPHSABS 0.84 (L) 10/15/2020    MONOSABS 0.56 10/15/2020    NEUTROABS 1.96 10/15/2020    EOSABS 0.14 10/15/2020    BASOSABS 0.00 10/15/2020         DIAGNOSTIC TESTING:     Xr Chest (2 Vw)    Result Date: 10/12/2020  EXAMINATION: TWO XRAY VIEWS OF THE CHEST 10/12/2020 9:00 pm COMPARISON: None.  HISTORY: ORDERING SYSTEM PROVIDED HISTORY: clearance for surgery Reason for Exam: Pre op Acuity: Acute Type of Exam: Initial FINDINGS: The lungs are without acute focal process. No effusion or pneumothorax. The cardiomediastinal silhouette is normal.  Multilevel hypertrophic/degenerative change of the thoracic spine. Negative chest.     Ct Abdomen Pelvis W Iv Contrast    Result Date: 10/27/2020  EXAMINATION: CT OF THE ABDOMEN AND PELVIS WITH CONTRAST 10/27/2020 2:19 pm TECHNIQUE: CT of the abdomen and pelvis was performed with the administration of intravenous contrast. Multiplanar reformatted images are provided for review. Dose modulation, iterative reconstruction, and/or weight based adjustment of the mA/kV was utilized to reduce the radiation dose to as low as reasonably achievable. COMPARISON: 12 October 2020 HISTORY: ORDERING SYSTEM PROVIDED HISTORY: S/P ERCP TECHNOLOGIST PROVIDED HISTORY: Reason for Exam: lower abd pain and diarrhea, ? Gb disease Acuity: Unknown Type of Exam: Unknown FINDINGS: Lower Chest: Minimal posterior pleural thickening at both lung bases is noted. Bibasilar scar or atelectasis is noted. Coronary artery calcifications are noted. No effusion or focal consolidation is evident. No pericardial effusion or epicardial adenopathy. Organs: Gallbladder is contracted and demonstrates no radiodense gallstones. Common bile duct measures approximately 5.9 mm to 7 mm. No densities in the common bile duct are noted. Liver demonstrates mild central ductal prominence without focal masses. Spleen, pancreas, adrenals, and kidneys demonstrate no acute abnormality. Small hypodensities are present in the kidneys consistent with cysts. GI/Bowel: Large stool burden in the colon is noted in the right hemicolon, with minimal stool noted distal to the mid sigmoid. Wall thickening of the proximal mid transverse colon and mid descending colon is noted suggesting colitis without pericolonic abscess. Fecalization of the distal small bowel is noted. No small bowel obstruction is noted. Small periumbilical fat containing hernia is noted without strangulation. No free fluid or free air is noted. Transverse colon is redundant. Pelvis: No evidence of appendicitis. Bladder is unremarkable. No free pelvic fluid, pelvic or inguinal adenopathy is demonstrated. Peritoneum/Retroperitoneum: No aortic aneurysm, retroperitoneal or mesenteric adenopathy is noted. Some shotty lymph nodes are present in the upper abdomen. Bones/Soft Tissues: No acute osseous or soft tissue abnormality. 1.  Large amount of stool in the colon extending down to the descending colon distally. The patient has wall thickening in the mid descending colon and in the mid transverse colon suggesting colitis. Fecalization of small bowel is noted. Findings are new since prior study of 12 October 2020. 2.  No small bowel obstruction. Fecalization of small bowel is noted, likely related to large amount of stool in the colon which may be secondary to inflammation in the mid descending colon. 3.  Gallbladder is contracted but demonstrates no dense gallstones. No common bile duct dilatation. Central hepatic biliary ducts are top normal. 4.  No urinary obstruction, or appendicitis. Ct Abdomen Pelvis W Iv Contrast Additional Contrast? None    Result Date: 10/12/2020  EXAMINATION: CT OF THE ABDOMEN AND PELVIS WITH CONTRAST 10/12/2020 3:34 am TECHNIQUE: CT of the abdomen and pelvis was performed with the administration of intravenous contrast. Multiplanar reformatted images are provided for review. Dose modulation, iterative reconstruction, and/or weight based adjustment of the mA/kV was utilized to reduce the radiation dose to as low as reasonably achievable. COMPARISON: None.  HISTORY: ORDERING SYSTEM PROVIDED HISTORY: RUQ abd pain N/V, positive Mckeon's sign TECHNOLOGIST PROVIDED HISTORY: RUQ abd pain N/V, positive Mckeon's sign Reason for Exam: Patient c/o RUQ abdominal pain with nausea and vomiting x 1 day Acuity: Acute Type of Exam: Initial FINDINGS: Lower Chest: Minimal dependent changes are seen within the lungs bilaterally. Organs: There is suggestion of a gallstone in the region of the gallbladder neck. There appears to be pericholecystic fluid. Mild prominence of the intrahepatic bile ducts is seen. Otherwise, the liver, spleen, pancreas and adrenal glands demonstrate no acute abnormality. The kidneys demonstrate symmetric enhancement. No convincing focal renal mass. There is no hydronephrosis. GI/Bowel: There is no evidence of a bowel obstruction. Stool is seen throughout the colon. No evidence of acute appendicitis. Pelvis: The urinary bladder demonstrates no acute abnormality. The patient is status post hysterectomy. Peritoneum/Retroperitoneum: The abdominal aorta is normal in caliber with scattered atherosclerosis. No bulky retroperitoneal or mesenteric adenopathy. No free fluid or free air seen in the abdomen or pelvis. Bones/Soft Tissues: There is a presumably chronic compression deformity of the T12 vertebral body. No acute osseous abnormality seen. 1. There appears to be a gallstone in the region the gallbladder neck with pericholecystic fluid as well as mild prominence of the intrahepatic bile ducts. Findings could represent acute cholecystitis. 2. Otherwise, no acute abnormality identified within the abdomen or pelvis. Fl Ercp Biliary And Pancreatic S&i    Result Date: 10/14/2020  EXAMINATION: 10 SPOT IMAGES FROM AN ERCP FLUOROSCOPY DOSE OR TIME/IMAGES: Fluoro time 150.6 sec AIR KERMA 43.02 mGy HISTORY: ORDERING SYSTEM PROVIDED HISTORY: ERCP TECHNOLOGIST PROVIDED HISTORY: ERCP Reason for Exam: ERCP Acuity: Acute Type of Exam: Initial Extrahepatic biliary ductal dilatation. FINDINGS: Endoscopy and cannulation of the major papilla was performed by the gastroenterology service under the supervision of [address] [place] [address] Gopi. Spot views are presented for interpretation. Contrast is identified in the intrahepatic and extrahepatic bile ducts. No intraluminal filling defects or strictures are identified in the common bile duct, common hepatic duct or visualized portions of the intrahepatic bile ducts. No contrast extravasation is identified. Contrast is also seen opacifying the cystic duct and gallbladder. No filling defect is identified. Please refer to the procedure report for further details. Mri Abdomen W Wo Contrast Mrcp    Result Date: 10/12/2020  EXAMINATION: MRI OF THE ABDOMEN WITH AND WITHOUT CONTRAST AND MRCP 10/12/2020 6:32 pm TECHNIQUE: Multiplanar multisequence MRI of the abdomen was performed with and without the administration of intravenous contrast.  After initial T2 axial and coronal images, thick slab, thin slab and 3D coronal MRCP sequences were obtained without the administration of intravenous contrast.  MIP images are provided for review. COMPARISON: CT abdomen 10/12/2020 at 3:36 a.m., ultrasound right upper quadrant 10/12/2020 at 9:12 a.m. HISTORY: ORDERING SYSTEM PROVIDED HISTORY: Prominence of the hepatic bile ducts, cholecystitis and cholelithiasis described on CT earlier today FINDINGS: Lower chest: Trace right pleural effusion is likely reactive. Minimal subsegmental atelectasis posterior both lung bases. Visualized portions of cardiac and posterior mediastinal structures appear unremarkable. Gallbladder: Mild gallbladder wall thickening with pericholecystic fluid. Possible single gallstone seen is a filling defect near the gallbladder neck coronal series 8, image 15. No other discrete gallstone evident. Bile Ducts: Mild intrahepatic bile duct dilatation. There is confluent narrowing of the most proximal portion of common hepatic duct, mildly dilated at 7 mm. Distally, the common bile duct measures 5-6 mm diameter, within normal limits. Pancreatic Duct: No stricture or stone evident.   2-3 mm diameter. Other organs: Mild intrahepatic bile duct dilatation. Prominent periportal edema throughout. No discrete hepatic lesion. Unremarkable appearance of the spleen, adrenal glands, pancreas and kidneys. Simple renal cortical cyst measures 8 mm posterior midpole left kidney. Other: Right upper quadrant ascites is a technically degrading factor for the exam.  No gross adenopathy is seen. 1. Acute cholangitis suspected given confluent narrowing at the confluence of the hepatic ducts. 2. Prominent periportal edema, presumably reactive, and mild intrahepatic bile duct dilatation. 3. Acute cholecystitis likely given the gallbladder findings. 4. Cholelithiasis, probable small gallstone near the gallbladder neck. 5. No definite choledocholithiasis. Us Abdomen Limited Specify Organ? Liver, Gallbladder    Result Date: 10/12/2020  EXAMINATION: RIGHT UPPER QUADRANT ULTRASOUND 10/12/2020 9:12 am COMPARISON: CT 10/12/2020 HISTORY: ORDERING SYSTEM PROVIDED HISTORY: acute cholecystitis TECHNOLOGIST PROVIDED HISTORY: acute cholecystitis Specify organ?->LIVER Specify organ?->GALLBLADDER Acuity: Acute Type of Exam: Subsequent/Follow-up FINDINGS: LIVER:  The liver has a fairly homogeneous echotexture with no focal masses identified by ultrasound. Liver length is 16.3 cm. Flow in the portal vein is hepatopetal. BILIARY SYSTEM:  The gallbladder appears very irregular with apparent diffuse wall thickening. The lumen is poorly defined. There is an echogenic focus with shadowing in the region of the neck compatible with a gallstone as shown on the recent CT. The patient reportedly has a positive sonographic Mckeon's sign. Mildly dilated common bile duct measuring up to 7.8 mm. RIGHT KIDNEY: No obstruction of the somewhat atrophic right kidney as visualized. PANCREAS:  Visualized portions of the pancreas are unremarkable. Overall the pancreas is not well seen due to overlying bowel gas.   Pancreatic duct is mildly prominent measuring up to 3.4 mm. OTHER: No evidence of right upper quadrant ascites. Suspect a 7 mm gallstone with a diffusely thickened irregular gallbladder wall and collapsed lumen. Patient reportedly had a positive sonographic Mckeon's sign. Findings are suggestive of acute cholecystitis. Prominent common bile duct measuring up to 7.8 mm. Correlate with clinical findings and laboratory values to determine need for MRCP. The findings were sent to the Radiology Results [address] at 10:19 am on 10/12/2020to be communicated to a licensed caregiver. Nm Hepatobiliary Scan W Ejection Fraction    Result Date: 10/13/2020  EXAMINATION: NUCLEAR MEDICINE HEPATOBILIARY SCINTIGRAPHY (HIDA SCAN) WITH EJECTION FRACTION. TECHNIQUE: Approximately 3.4 millicuries MA53J Mebrofenin (Choletec) was administered IV. Then, dynamic images of the abdomen were obtained in the anterior projection for 60 mins. A right lateral view was also obtained at 60 mins. Due to a shortage/inavailability of CCK, one can (237 ml) Ensure plus was substitued orally. Images were obtained in the AMY projection and regions of interest were drawn around the gallbladder and ejection fraction was calculated. HISTORY: ORDERING SYSTEM PROVIDED HISTORY: cholesystitis FINDINGS: Mildly delayed radiotracer uptake by the liver and excretion of radiotracer is noted suggesting hepatocellular dysfunction, please correlate with LFTs. Gallbladder and small bowel is visualized in appropriate sequence and time. Gallbladder ejection fraction measured 61%. Normal value is >33% for Ensure protocol. Note, Ensure normal range is based on a limited study. No evidence of cholecystitis with normal ejection fraction. Assessment  1. Abdominal pain, generalized    2. Irritable bowel syndrome with constipation    3. Abdominal bloating    4.  Status post endoscopic retrograde cholangiopancreatography        Plan    Pt was advised in detail about some life style and dietary modifications. She was advised about avoidance of caffeine, nicotine and chocolate. Pt was also told to stay away from any kind of fast foods, soda pops. She was also advised to avoid lots of spices, grease and fried food etc.     Instructions were also given about trying to arrange the timing, quality and quantity of food. Instructions were given about using ample amount of fiber including dietary and supplemental fiber either metamucil, bennafiber or citrucell etc.  Pt was advised about drinking ample amount of water without any colors or chemicals. Stress was given about regular exercise. Pt has verbalized understanding and agreement to these modifications. Pt was given instructions and advice in detail about the symptom of constipation. She was explained about avoidance of fast food, soda pops, cheese and red meat. Was also told to avoid sedatives narcotics and pain killers if possible. Pt was advised to start drinking ample amount of water and liquid. Was told to adapt and follow an exercise regimen. Instructions were given to increase the amount of fiber including dietary in terms of bran, cereals, whole wheat, brown bread etc. Was also instructed to start using supplemental fiber either Metamucil, citrucell or bennafiber with ample liquids. She was told to start drinking prune juice which is good for constipation. If symptoms don't resolve she will require medicines to assist with her symptoms    Pt has verbalized understanding and agreement to this plan. Pt seems to have signs and symptoms consistent with GERD, acid indigestion and heartburns. She was discussed  in detail about some possible life style and dietary modifications. She was stressed about the maintenance  of appropriate weight and effect of obesity contributing to reflux symptoms. Routine exercise was streesed. Avoidance of Caffeine, nicotine and chocolate were explained.  Pt was asked to avoid spices grease and fried food. Advices were also given about avoidance of any kind of fast foods, soda pops and high energy drinks. Pt was advised to place two small block under the head end of the bed which may help with night time reflux. Was advised not to eat any thin at least 2-3 hrs before going to bed and walk especially after dinner    Pt has verbalized understanding and agreement to this plan. More than half of patient's clinic visit time was spent in counseling about lifestyle and dietary modifications  Patient's  questions were answered in this regard as well  The patient has verbalized understanding and agreement     I communicated with the patient and/or health care decision maker about   Details of this discussion including any medical advice provided:YES    Wait for colon not interested at this time    I affirm this is a Patient Initiated Episode with an Established Patient who has not had a related appointment within my department in the past 7 days or scheduled within the next 24 hours. Total Time: minutes: 21-30 minutes    Note: not billable if this call serves to triage the patient into an appointment for the relevant concern      Thank you for allowing me to participate in the care of Ms. Yuniel Jane. For any further questions please do not hesitate to contact me. I have reviewed and agree with the ROS entered by the MA/LPN.          Leticia Luther MD, [facility]  Board Certified in Gastroenterology and [address] [place] [address] Gastroenterology  Office #: [phone]

## 2020-11-19 ENCOUNTER — OFFICE VISIT (OUTPATIENT)
Dept: FAMILY MEDICINE CLINIC | Age: 75
End: 2020-11-19
Payer: MEDICARE

## 2020-11-19 ENCOUNTER — HOSPITAL ENCOUNTER (OUTPATIENT)
Dept: PREADMISSION TESTING | Age: 75
Discharge: HOME | End: 2020-11-23
Payer: MEDICARE

## 2020-11-19 VITALS
OXYGEN SATURATION: 99 % | TEMPERATURE: 97.9 F | HEART RATE: 83 BPM | WEIGHT: 127 LBS | BODY MASS INDEX: 22.5 KG/M2 | HEIGHT: 63 IN | DIASTOLIC BLOOD PRESSURE: 71 MMHG | SYSTOLIC BLOOD PRESSURE: 115 MMHG | RESPIRATION RATE: 16 BRPM

## 2020-11-19 VITALS
RESPIRATION RATE: 16 BRPM | WEIGHT: 127.2 LBS | DIASTOLIC BLOOD PRESSURE: 70 MMHG | BODY MASS INDEX: 22.53 KG/M2 | HEART RATE: 72 BPM | SYSTOLIC BLOOD PRESSURE: 126 MMHG | TEMPERATURE: 97.3 F

## 2020-11-19 LAB
ABSOLUTE BANDS #: 0.04 K/UL (ref 0–1)
ABSOLUTE EOS #: 0.3 K/UL (ref 0–0.4)
ABSOLUTE IMMATURE GRANULOCYTE: ABNORMAL K/UL (ref 0–0.3)
ABSOLUTE LYMPH #: 1.59 K/UL (ref 1–4.8)
ABSOLUTE MONO #: 0.43 K/UL (ref 0.1–1.3)
ALBUMIN SERPL-MCNC: 3.9 G/DL (ref 3.5–5.2)
ALBUMIN/GLOBULIN RATIO: NORMAL (ref 1–2.5)
ALP BLD-CCNC: 75 U/L (ref 35–104)
ALT SERPL-CCNC: 16 U/L (ref 5–33)
AMYLASE: 65 U/L (ref 28–100)
ANION GAP SERPL CALCULATED.3IONS-SCNC: 9 MMOL/L (ref 9–17)
AST SERPL-CCNC: 24 U/L
ATYPICAL LYMPHOCYTE ABSOLUTE COUNT: 0.09 K/UL
ATYPICAL LYMPHOCYTES: 2 %
BANDS: 1 % (ref 0–10)
BASOPHILS # BLD: 0 % (ref 0–2)
BASOPHILS ABSOLUTE: 0 K/UL (ref 0–0.2)
BILIRUB SERPL-MCNC: 0.48 MG/DL (ref 0.3–1.2)
BILIRUBIN DIRECT: 0.2 MG/DL
BILIRUBIN, INDIRECT: 0.28 MG/DL (ref 0–1)
BUN BLDV-MCNC: 15 MG/DL (ref 8–23)
BUN/CREAT BLD: ABNORMAL (ref 9–20)
CALCIUM SERPL-MCNC: 9.2 MG/DL (ref 8.6–10.4)
CHLORIDE BLD-SCNC: 99 MMOL/L (ref 98–107)
CO2: 27 MMOL/L (ref 20–31)
CREAT SERPL-MCNC: 0.81 MG/DL (ref 0.5–0.9)
DIFFERENTIAL TYPE: ABNORMAL
EOSINOPHILS RELATIVE PERCENT: 7 % (ref 0–4)
GFR AFRICAN AMERICAN: >60 ML/MIN
GFR NON-AFRICAN AMERICAN: >60 ML/MIN
GFR SERPL CREATININE-BSD FRML MDRD: ABNORMAL ML/MIN/{1.73_M2}
GFR SERPL CREATININE-BSD FRML MDRD: ABNORMAL ML/MIN/{1.73_M2}
GLOBULIN: NORMAL G/DL (ref 1.5–3.8)
GLUCOSE BLD-MCNC: 113 MG/DL (ref 70–99)
HCT VFR BLD CALC: 35.6 % (ref 36–46)
HEMOGLOBIN: 11.8 G/DL (ref 12–16)
IMMATURE GRANULOCYTES: ABNORMAL %
LIPASE: 37 U/L (ref 13–60)
LYMPHOCYTES # BLD: 37 % (ref 24–44)
MCH RBC QN AUTO: 29.6 PG (ref 26–34)
MCHC RBC AUTO-ENTMCNC: 33.1 G/DL (ref 31–37)
MCV RBC AUTO: 89.4 FL (ref 80–100)
MONOCYTES # BLD: 10 % (ref 1–7)
MORPHOLOGY: ABNORMAL
NRBC AUTOMATED: ABNORMAL PER 100 WBC
PDW BLD-RTO: 14.3 % (ref 11.5–14.9)
PLATELET # BLD: 234 K/UL (ref 150–450)
PLATELET ESTIMATE: ABNORMAL
PMV BLD AUTO: 8.2 FL (ref 6–12)
POTASSIUM SERPL-SCNC: 4.5 MMOL/L (ref 3.7–5.3)
RBC # BLD: 3.98 M/UL (ref 4–5.2)
RBC # BLD: ABNORMAL 10*6/UL
SEG NEUTROPHILS: 43 % (ref 36–66)
SEGMENTED NEUTROPHILS ABSOLUTE COUNT: 1.85 K/UL (ref 1.3–9.1)
SODIUM BLD-SCNC: 135 MMOL/L (ref 135–144)
TOTAL PROTEIN: 6.9 G/DL (ref 6.4–8.3)
WBC # BLD: 4.3 K/UL (ref 3.5–11)
WBC # BLD: ABNORMAL 10*3/UL

## 2020-11-19 PROCEDURE — 99214 OFFICE O/P EST MOD 30 MIN: CPT | Performed by: NURSE PRACTITIONER

## 2020-11-19 PROCEDURE — 85025 COMPLETE CBC W/AUTO DIFF WBC: CPT

## 2020-11-19 PROCEDURE — 83690 ASSAY OF LIPASE: CPT

## 2020-11-19 PROCEDURE — 80048 BASIC METABOLIC PNL TOTAL CA: CPT

## 2020-11-19 PROCEDURE — 93005 ELECTROCARDIOGRAM TRACING: CPT | Performed by: SURGERY

## 2020-11-19 PROCEDURE — 36415 COLL VENOUS BLD VENIPUNCTURE: CPT

## 2020-11-19 PROCEDURE — 82150 ASSAY OF AMYLASE: CPT

## 2020-11-19 PROCEDURE — 80076 HEPATIC FUNCTION PANEL: CPT

## 2020-11-19 RX ORDER — PSYLLIUM SEED (WITH DEXTROSE)
3.4 POWDER (GRAM) ORAL DAILY
Status: ON HOLD | COMMUNITY
End: 2021-01-07 | Stop reason: HOSPADM

## 2020-11-19 ASSESSMENT — PATIENT HEALTH QUESTIONNAIRE - PHQ9
2. FEELING DOWN, DEPRESSED OR HOPELESS: 0
SUM OF ALL RESPONSES TO PHQ QUESTIONS 1-9: 0
SUM OF ALL RESPONSES TO PHQ QUESTIONS 1-9: 0
SUM OF ALL RESPONSES TO PHQ9 QUESTIONS 1 & 2: 0
SUM OF ALL RESPONSES TO PHQ QUESTIONS 1-9: 0
1. LITTLE INTEREST OR PLEASURE IN DOING THINGS: 0

## 2020-11-19 ASSESSMENT — ENCOUNTER SYMPTOMS
WHEEZING: 0
VOMITING: 0
SHORTNESS OF BREATH: 0
NAUSEA: 0
ABDOMINAL PAIN: 0

## 2020-11-19 NOTE — PROGRESS NOTES
Subjective:      Patient ID: Gadiel Goodwin is a 67 y.o. female. Visit Information    Have you changed or started any medications since your last visit including any over-the-counter medicines, vitamins, or herbal medicines? no   Are you having any side effects from any of your medications? -  no  Have you stopped taking any of your medications? Is so, why? -  no    Have you seen any other physician or provider since your last visit? Yes - Records Obtained  Have you had any other diagnostic tests since your last visit? Yes - Records Obtained  Have you been seen in the emergency room and/or had an admission to a hospital since we last saw you? No  Have you had your routine dental cleaning in the past 6 months? no    Have you activated your [employer] account? If not, what are your barriers? No: Declined     Patient Care Team:  Manoj Myers MD as PCP - General (Family Medicine)  Manoj Myers MD as PCP - [address] [place], [place] [address]lili Shane Provider    Medical History Review  Past Medical, Family, and Social History reviewed and does contribute to the patient presenting condition    Health Maintenance   Topic Date Due    DTaP/Tdap/Td vaccine (1 - Tdap) 03/20/1963    Shingles Vaccine (1 of 2) 03/20/1994    Annual Wellness Visit (AWV)  05/29/2019    Lipid screen  09/01/2021    DEXA (modify frequency per FRAX score)  Completed    Flu vaccine  Completed    Pneumococcal 65+ years Vaccine  Completed    Hepatitis A vaccine  Aged Out    Hepatitis B vaccine  Aged Out    Hib vaccine  Aged Out    Meningococcal (ACWY) vaccine  Aged Out     HPI    67year old female presents with HTN HLD vit d deficiency and pre op clearance. Currently is on monotherapy for bp and it has been stable. zocor was on hold due to elevated LFTs which came back normal per recent blood tests. Was placed on weekly vit d supplements and tolerates it well. Pt is scheduled for cholecystectomy next Tuesday with Dr. Nowak March due to cholecystitis.  Pt is going for pre op testing today and needs pre op clearance. Denies fever chills cough sob cp or nv abd pain. Hx of gout stable with allopurinol    Review of Systems   Constitutional: Negative for chills and fever. Eyes: Negative for visual disturbance. Respiratory: Negative for shortness of breath and wheezing. Cardiovascular: Negative for chest pain and leg swelling. Gastrointestinal: Negative for abdominal pain, nausea and vomiting. Neurological: Negative for dizziness, weakness and numbness. Psychiatric/Behavioral: Negative for agitation and behavioral problems. Objective:   Physical Exam  Vitals signs and nursing note reviewed. Constitutional:       General: She is not in acute distress. Appearance: Normal appearance. HENT:      Nose: Nose normal. No congestion. Eyes:      General: No scleral icterus. Conjunctiva/sclera: Conjunctivae normal.   Neck:      Musculoskeletal: Normal range of motion and neck supple. Cardiovascular:      Rate and Rhythm: Normal rate and regular rhythm. Pulses: Normal pulses. Heart sounds: Normal heart sounds. Pulmonary:      Effort: Pulmonary effort is normal. No respiratory distress. Breath sounds: Normal breath sounds. Abdominal:      Palpations: Abdomen is soft. Tenderness: There is no abdominal tenderness. Musculoskeletal: Normal range of motion. General: No tenderness. Skin:     General: Skin is warm and dry. Neurological:      Mental Status: She is alert and oriented to person, place, and time. Cranial Nerves: No cranial nerve deficit. Psychiatric:         Mood and Affect: Mood normal.         Behavior: Behavior normal.         Assessment:      1. Essential hypertension    2. Mixed hyperlipidemia    3. Vitamin D deficiency    4. Cholecystitis    5.  Pre-operative clearance            Plan:      BP Readings from Last 3 Encounters:   11/19/20 126/70   10/19/20 102/60   10/18/20 137/78     /70 (Site: Left Upper Arm, Position: Sitting, Cuff Size: Medium Adult)   Pulse 72   Temp 97.3 °F (36.3 °C) (Infrared)   Resp 16   Wt 127 lb 3.2 oz (57.7 kg)   LMP 01/01/2004   BMI 22.53 kg/m²   Lab Results   Component Value Date    WBC 5.8 10/26/2020    HGB 11.1 (L) 10/26/2020    HCT 33.5 (L) 10/26/2020     10/26/2020    CHOL 138 09/01/2020    TRIG 88 09/01/2020    HDL 52 09/01/2020    ALT 27 10/26/2020    AST 27 10/26/2020     10/15/2020    K 4.0 10/15/2020     10/15/2020    CREATININE 0.47 (L) 10/15/2020    BUN 7 (L) 10/15/2020    CO2 24 10/15/2020    TSH 1.19 01/08/2020    INR 1.0 10/12/2020     Lab Results   Component Value Date    CALCIUM 8.7 10/15/2020     Lab Results   Component Value Date    LDLCHOLESTEROL 68 09/01/2020         1. Essential hypertension  - stable. No therapy change    2. Mixed hyperlipidemia  - resume zocor . 3. Vitamin D deficiency  - cont weekly supplements     4. Cholecystitis 5. Pre-operative clearance  - surgery is scheduled 11/24   - CBC; Future  - Basic Metabolic Panel; Future  - EKG 12 lead; Future  - pt is cleared for surgery pending on lab and ekg results     Requested Prescriptions      No prescriptions requested or ordered in this encounter       There are no discontinued medications. Discussed use, benefit, and side effects of prescribed medications. Barriers to medication compliance addressed. All patient questions answered. Pt voiced understanding. Return in about 6 months (around 5/19/2021) for HTN, HLD, vit d deficiency.

## 2020-11-19 NOTE — PROGRESS NOTES
Dr. Chelsi Mark, anesthesia, was contacted and informed of the patient's planned surgery, history, and unconfirmed abnormal EKG results from EKG done today in [facility]. Medical clearance requested per Dr. Neel Harvey office note, Dr. Chelsi Mark agrees. Surgery scheduling will notify Dr. Neel Harvey office who will be responsible for making sure the clearance is obtained and is in the chart for surgery.

## 2020-11-19 NOTE — H&P
or appendicitis. PMH: GERD, hyperlipidemia, osteoporosis,  Gout, and Migraines. PAST MEDICAL HISTORY     Past Medical History:   Diagnosis Date    Allergic rhinitis     Closed tibia fracture     GERD (gastroesophageal reflux disease)     Gout     Headache(784.0)     h/o migraines    Hyperlipidemia     Osteoarthritis     DJD Lt knee    Osteoporosis     Posterior tibial tendon dysfunction     right, wears braces on both legs    Vitamin D deficient rickets     Wears glasses     Wears partial dentures     lower       SURGICAL HISTORY       Past Surgical History:   Procedure Laterality Date    APPENDECTOMY      CARPAL TUNNEL RELEASE      bilateral    CATARACT REMOVAL WITH IMPLANT Left 03/07/2019    [facility]    CATARACT REMOVAL WITH IMPLANT Right 04/04/2019    [facility]    COLONOSCOPY      2007? per pt wtih polyps    ERCP N/A 10/14/2020    ERCP WITH BILE DUCT BRUSHING performed by Shakira Lozano MD at [address] [place] [address]      left tibia    HYSTERECTOMY  4/12    INTRACAPSULAR CATARACT EXTRACTION Left 3/7/2019    EYE CATARACT EMULSIFICATION IOL IMPLANT - TOPICAL performed by Bk Ziegler MD at [address] [place] [address] Right 4/4/2019    EYE CATARACT EMULSIFICATION IOL IMPLANT performed by Bk Ziegler MD at [facility] Left 11/06/2018    hardware removal and then TKA    KNEE ARTHROSCOPY Left 2002?     LA TOTAL KNEE ARTHROPLASTY Left 11/6/2018    KNEE TOTAL ARTHROPLASTY W/REMOVAL HARDWARE PLATE & SCREWS performed by Mily Armstrong MD at [address] [place] [address] Nw Left     TONSILLECTOMY AND ADENOIDECTOMY      TOTAL KNEE ARTHROPLASTY Right 12/10/2019    KNEE TOTAL ARTHROPLASTY performed by Mily Armstrong MD at [address] [place] [address] EXTRACTION         FAMILY HISTORY       Family History   Problem Relation Age of Onset    Arthritis Other     Thyroid Disease Other     COPD Other     Stroke Father        SOCIAL HISTORY       Social History     Socioeconomic History    Marital status:      Spouse name: None    Number of children: None    Years of education: None    Highest education level: None   Occupational History    None   Social Needs    Financial resource strain: Not hard at all   [place] insecurity     Worry: Never true     Inability: Never true   [employer] needs     Medical: None     Non-medical: None   Tobacco Use    Smoking status: Former Smoker     Packs/day: 0.50     Years: 10.00     Pack years: 5.00     Types: Cigarettes     Last attempt to quit: 1987     Years since quittin.7    Smokeless tobacco: Never Used   Substance and Sexual Activity    Alcohol use:  Yes     Alcohol/week: 0.0 standard drinks     Comment: rare    Drug use: No    Sexual activity: Not Currently   Lifestyle    Physical activity     Days per week: None     Minutes per session: None    Stress: None   Relationships    Social connections     Talks on phone: None     Gets together: None     Attends [employer]ology service: None     Active member of club or organization: None     Attends meetings of clubs or organizations: None     Relationship status: None    Intimate partner violence     Fear of current or ex partner: None     Emotionally abused: None     Physically abused: None     Forced sexual activity: None   Other Topics Concern    None   Social History Narrative    None       REVIEW OF SYSTEMS      No Known Allergies    Current Outpatient Medications on File Prior to Encounter   Medication Sig Dispense Refill    psyllium (METAMUCIL) 28.3 % POWD powder Take 3.4 g by mouth daily      Bacillus Coagulans-Inulin (ALIGN PREBIOTIC-PROBIOTIC PO) Take by mouth daily       cetirizine (ZYRTEC) 10 MG tablet TAKE ONE TABLET BY MOUTH DAILY 90 tablet 0    simvastatin (ZOCOR) 20 MG tablet TAKE ONE TABLET BY MOUTH DAILY 90 tablet 0    alendronate (FOSAMAX) 70 MG tablet TAKE 1 TABLET BY MOUTH ONCE WEEKLY BEFORE BREAKFAST, ON AN EMPTY STOMACH: REMAIN UPRIGHT FOR 30 MINUTES:TAKE WITH 8 OUNCES OF WATER 4 tablet 3    vitamin D (ERGOCALCIFEROL) 1.25 MG (26425 UT) CAPS capsule TAKE ONE CAPSULE BY MOUTH EVERY 2 WEEKS 12 capsule 0    verapamil (CALAN SR) 240 MG extended release tablet TAKE ONE TABLET BY MOUTH ONCE NIGHTLY FOR MIGRAINE HEADACHE 90 tablet 1    allopurinol (ZYLOPRIM) 100 MG tablet TAKE ONE TABLET BY MOUTH DAILY 90 tablet 1    famotidine (PEPCID) 20 MG tablet TAKE ONE TABLET BY MOUTH TWICE A  tablet 1    fluticasone (FLONASE) 50 MCG/ACT nasal spray SPRAY TWO SPRAYS IN EACH NOSTRIL ONCE DAILY 2 Bottle 1    aspirin 81 MG tablet Take 81 mg by mouth daily      naproxen (NAPROSYN) 250 MG tablet TAKE ONE TABLET BY MOUTH THREE TIMES A DAY WITH FOOD AS NEEDED FOR GOUT FLARE UP UNTIL ATTACK SUBSIDES 60 tablet 0    Aspirin-Acetaminophen-Caffeine (EXCEDRIN MIGRAINE PO) Take by mouth as needed        No current facility-administered medications on file prior to encounter. Negative except for what is mentioned in the HPI. GENERAL PHYSICAL EXAM     Vitals: /71   Pulse 83   Temp 97.9 °F (36.6 °C)   Resp 16   Ht 5' 3\" (1.6 m)   Wt 127 lb (57.6 kg)   LMP 01/01/2004   SpO2 99%   BMI 22.50 kg/m²  Body mass index is 22.5 kg/m². GENERAL APPEARANCE:   Damian Weaver is 67 y.o.,  female, not obese, nourished, conscious, alert. Does not appear to be distress or pain at this time. SKIN:  Warm, dry, no cyanosis or jaundice. HEAD:  Normocephalic, atraumatic, no swelling or tenderness. EYES:  Pupils equal, reactive to light. EARS:  No discharge, no marked hearing loss. NOSE:  No rhinorrhea, epistaxis or septal deformity. THROAT:  Not congested. No ulceration bleeding or discharge.                   NECK:  No stiffness, trachea central.  No palpable masses or L.N. CHEST:  Symmetrical and equal on expansion. HEART:  RRR S1 > S2. No audible murmurs or gallops. LUNGS:  Equal on expansion, normal breath sounds. No adventitious sounds. ABDOMEN:  Soft on palpation. No dysphagia, No localized tenderness. Bowel sounds are positive  No guarding or rigidity. No palpable hepatosplenomegaly. LYMPHATICS:  No palpable cervical lymphadenopathy. LOCOMOTOR, BACK AND SPINE:  No tenderness or deformities. EXTREMITIES:  Symmetrical, no pretibial edema. No calf tenderness,  No discoloration or ulcerations. NEUROLOGIC:  The patient is conscious, alert, oriented,Cranial nerve II-XII intact, taste and smell were not examined. No apparent focal sensory or motor deficits.              PROVISIONAL DIAGNOSES / SURGERY:      CHRONIC CHOLECYSTITIS  CHOLECYSTECTOMY LAPAROSCOPIC ROBOTIC [address] PORT     Patient Active Problem List    Diagnosis Date Noted    Abdominal bloating 11/04/2020    Irritable bowel syndrome with constipation 11/04/2020    Status post endoscopic retrograde cholangiopancreatography 11/04/2020    Abdominal cramping 10/21/2020    Diarrhea 10/21/2020    Abdominal pain, generalized 10/21/2020    Elevated LFTs     Abnormal MRI of the abdomen     Weight loss     Abdominal pain, right upper quadrant     Cholecystitis 10/12/2020    Primary osteoarthritis of right knee 12/10/2019    Primary osteoarthritis of left knee 11/06/2018    Degenerative arthritis of knee, bilateral 03/29/2018    Lymphadenopathy 10/12/2015    Chronic headache 03/03/2014    Anemia 03/03/2014    Migraine 06/21/2013    Gout 02/14/2012    Vitamin D deficiency 02/14/2012    Allergic rhinitis     Osteoarthritis     Osteoporosis     GERD (gastroesophageal reflux disease)     Headache     Mixed hyperlipidemia            PASCUAL Dunn CNP on 11/19/2020 at 2:48 PM

## 2020-11-20 ENCOUNTER — HOSPITAL ENCOUNTER (OUTPATIENT)
Dept: PREADMISSION TESTING | Age: 75
Setting detail: SPECIMEN
Discharge: HOME | End: 2020-11-24
Payer: MEDICARE

## 2020-11-20 LAB
EKG ATRIAL RATE: 75 BPM
EKG P AXIS: 83 DEGREES
EKG P-R INTERVAL: 152 MS
EKG Q-T INTERVAL: 396 MS
EKG QRS DURATION: 80 MS
EKG QTC CALCULATION (BAZETT): 442 MS
EKG R AXIS: -35 DEGREES
EKG T AXIS: 69 DEGREES
EKG VENTRICULAR RATE: 75 BPM

## 2020-11-20 PROCEDURE — 93010 ELECTROCARDIOGRAM REPORT: CPT | Performed by: INTERNAL MEDICINE

## 2020-11-20 PROCEDURE — U0003 INFECTIOUS AGENT DETECTION BY NUCLEIC ACID (DNA OR RNA); SEVERE ACUTE RESPIRATORY SYNDROME CORONAVIRUS 2 (SARS-COV-2) (CORONAVIRUS DISEASE [COVID-19]), AMPLIFIED PROBE TECHNIQUE, MAKING USE OF HIGH THROUGHPUT TECHNOLOGIES AS DESCRIBED BY CMS-2020-01-R: HCPCS

## 2020-11-20 RX ORDER — FAMOTIDINE 20 MG/1
TABLET, FILM COATED ORAL
Qty: 180 TABLET | Refills: 1 | Status: ON HOLD
Start: 2020-11-20 | End: 2021-01-07 | Stop reason: HOSPADM

## 2020-11-20 RX ORDER — ALLOPURINOL 100 MG/1
TABLET ORAL
Qty: 90 TABLET | Refills: 1 | Status: SHIPPED | OUTPATIENT
Start: 2020-11-20 | End: 2021-03-01

## 2020-11-21 LAB
SARS-COV-2, RAPID: NORMAL
SARS-COV-2: NORMAL
SARS-COV-2: NOT DETECTED
SOURCE: NORMAL

## 2020-11-23 ENCOUNTER — HOSPITAL ENCOUNTER (OUTPATIENT)
Age: 75
Setting detail: OBSERVATION
Discharge: HOME | End: 2020-11-24
Attending: SURGERY | Admitting: SURGERY
Payer: MEDICARE

## 2020-11-23 ENCOUNTER — TELEPHONE (OUTPATIENT)
Dept: FAMILY MEDICINE CLINIC | Age: 75
End: 2020-11-23

## 2020-11-23 LAB
ABSOLUTE EOS #: 0.2 K/UL (ref 0–0.4)
ABSOLUTE IMMATURE GRANULOCYTE: ABNORMAL K/UL (ref 0–0.3)
ABSOLUTE LYMPH #: 1.4 K/UL (ref 1–4.8)
ABSOLUTE MONO #: 0.4 K/UL (ref 0.1–1.3)
ANION GAP SERPL CALCULATED.3IONS-SCNC: 7 MMOL/L (ref 9–17)
BASOPHILS # BLD: 1 % (ref 0–2)
BASOPHILS ABSOLUTE: 0.1 K/UL (ref 0–0.2)
BUN BLDV-MCNC: 13 MG/DL (ref 8–23)
BUN/CREAT BLD: ABNORMAL (ref 9–20)
CALCIUM SERPL-MCNC: 9.6 MG/DL (ref 8.6–10.4)
CHLORIDE BLD-SCNC: 101 MMOL/L (ref 98–107)
CO2: 28 MMOL/L (ref 20–31)
CREAT SERPL-MCNC: 0.9 MG/DL (ref 0.5–0.9)
DIFFERENTIAL TYPE: ABNORMAL
EOSINOPHILS RELATIVE PERCENT: 6 % (ref 0–4)
GFR AFRICAN AMERICAN: >60 ML/MIN
GFR NON-AFRICAN AMERICAN: >60 ML/MIN
GFR SERPL CREATININE-BSD FRML MDRD: ABNORMAL ML/MIN/{1.73_M2}
GFR SERPL CREATININE-BSD FRML MDRD: ABNORMAL ML/MIN/{1.73_M2}
GLUCOSE BLD-MCNC: 136 MG/DL (ref 70–99)
HCT VFR BLD CALC: 35.2 % (ref 36–46)
HEMOGLOBIN: 11.9 G/DL (ref 12–16)
IMMATURE GRANULOCYTES: ABNORMAL %
INR BLD: 1
LYMPHOCYTES # BLD: 34 % (ref 24–44)
MCH RBC QN AUTO: 29.9 PG (ref 26–34)
MCHC RBC AUTO-ENTMCNC: 33.7 G/DL (ref 31–37)
MCV RBC AUTO: 88.9 FL (ref 80–100)
MONOCYTES # BLD: 10 % (ref 1–7)
NRBC AUTOMATED: ABNORMAL PER 100 WBC
PDW BLD-RTO: 14.6 % (ref 11.5–14.9)
PLATELET # BLD: 224 K/UL (ref 150–450)
PLATELET ESTIMATE: ABNORMAL
PMV BLD AUTO: 8 FL (ref 6–12)
POTASSIUM SERPL-SCNC: 4 MMOL/L (ref 3.7–5.3)
PROTHROMBIN TIME: 13.1 SEC (ref 11.8–14.6)
RBC # BLD: 3.96 M/UL (ref 4–5.2)
RBC # BLD: ABNORMAL 10*6/UL
SEG NEUTROPHILS: 49 % (ref 36–66)
SEGMENTED NEUTROPHILS ABSOLUTE COUNT: 2.1 K/UL (ref 1.3–9.1)
SODIUM BLD-SCNC: 136 MMOL/L (ref 135–144)
WBC # BLD: 4.2 K/UL (ref 3.5–11)
WBC # BLD: ABNORMAL 10*3/UL

## 2020-11-23 PROCEDURE — G0378 HOSPITAL OBSERVATION PER HR: HCPCS

## 2020-11-23 PROCEDURE — 2580000003 HC RX 258: Performed by: SURGERY

## 2020-11-23 PROCEDURE — 85025 COMPLETE CBC W/AUTO DIFF WBC: CPT

## 2020-11-23 PROCEDURE — G0379 DIRECT REFER HOSPITAL OBSERV: HCPCS

## 2020-11-23 PROCEDURE — 96374 THER/PROPH/DIAG INJ IV PUSH: CPT

## 2020-11-23 PROCEDURE — 80048 BASIC METABOLIC PNL TOTAL CA: CPT

## 2020-11-23 PROCEDURE — 36415 COLL VENOUS BLD VENIPUNCTURE: CPT

## 2020-11-23 PROCEDURE — 2500000003 HC RX 250 WO HCPCS: Performed by: SURGERY

## 2020-11-23 PROCEDURE — 85610 PROTHROMBIN TIME: CPT

## 2020-11-23 RX ORDER — FENTANYL CITRATE 50 UG/ML
25 INJECTION, SOLUTION INTRAMUSCULAR; INTRAVENOUS
Status: DISCONTINUED | OUTPATIENT
Start: 2020-11-23 | End: 2020-11-24 | Stop reason: HOSPADM

## 2020-11-23 RX ORDER — FENTANYL CITRATE 50 UG/ML
50 INJECTION, SOLUTION INTRAMUSCULAR; INTRAVENOUS
Status: DISCONTINUED | OUTPATIENT
Start: 2020-11-23 | End: 2020-11-24 | Stop reason: HOSPADM

## 2020-11-23 RX ORDER — ONDANSETRON 2 MG/ML
4 INJECTION INTRAMUSCULAR; INTRAVENOUS EVERY 6 HOURS PRN
Status: DISCONTINUED | OUTPATIENT
Start: 2020-11-23 | End: 2020-11-24 | Stop reason: HOSPADM

## 2020-11-23 RX ORDER — SODIUM CHLORIDE 9 MG/ML
INJECTION, SOLUTION INTRAVENOUS CONTINUOUS
Status: DISCONTINUED | OUTPATIENT
Start: 2020-11-23 | End: 2020-11-24 | Stop reason: HOSPADM

## 2020-11-23 RX ADMIN — FAMOTIDINE 20 MG: 10 INJECTION INTRAVENOUS at 22:03

## 2020-11-23 RX ADMIN — SODIUM CHLORIDE: 9 INJECTION, SOLUTION INTRAVENOUS at 22:13

## 2020-11-23 NOTE — PROGRESS NOTES
Patient admitted directly. Vital signs obtained. Patient resting in bed and call light within reach.

## 2020-11-23 NOTE — TELEPHONE ENCOUNTER
MAIRSSA:  I spoke with the patient. She states that Dr. Mary Kayser put her in the hospital today and is going to do the surgery (Cholecyst). He told her they will do the appropriate tests in the hospital to clear her. I had made an appointment with Dr. Fela Liriano for abnormal EKG on  12-2-20, then cancelled it due to the circumstances.

## 2020-11-24 ENCOUNTER — ANESTHESIA (OUTPATIENT)
Dept: OPERATING ROOM | Age: 75
End: 2020-11-24
Payer: MEDICARE

## 2020-11-24 ENCOUNTER — ANESTHESIA EVENT (OUTPATIENT)
Dept: OPERATING ROOM | Age: 75
End: 2020-11-24
Payer: MEDICARE

## 2020-11-24 VITALS
BODY MASS INDEX: 22.68 KG/M2 | OXYGEN SATURATION: 95 % | HEIGHT: 63 IN | RESPIRATION RATE: 10 BRPM | HEART RATE: 77 BPM | SYSTOLIC BLOOD PRESSURE: 133 MMHG | WEIGHT: 128 LBS | TEMPERATURE: 97.1 F | DIASTOLIC BLOOD PRESSURE: 76 MMHG

## 2020-11-24 VITALS — SYSTOLIC BLOOD PRESSURE: 110 MMHG | TEMPERATURE: 97.5 F | DIASTOLIC BLOOD PRESSURE: 60 MMHG | OXYGEN SATURATION: 99 %

## 2020-11-24 PROBLEM — K80.50 BILIARY COLIC: Status: ACTIVE | Noted: 2020-11-24

## 2020-11-24 LAB
ABSOLUTE EOS #: 0.16 K/UL (ref 0–0.4)
ABSOLUTE IMMATURE GRANULOCYTE: ABNORMAL K/UL (ref 0–0.3)
ABSOLUTE LYMPH #: 1.44 K/UL (ref 1–4.8)
ABSOLUTE MONO #: 0.48 K/UL (ref 0.1–1.3)
ALBUMIN SERPL-MCNC: 3.5 G/DL (ref 3.5–5.2)
ALBUMIN/GLOBULIN RATIO: ABNORMAL (ref 1–2.5)
ALP BLD-CCNC: 56 U/L (ref 35–104)
ALT SERPL-CCNC: 14 U/L (ref 5–33)
ANION GAP SERPL CALCULATED.3IONS-SCNC: 7 MMOL/L (ref 9–17)
AST SERPL-CCNC: 25 U/L
BASOPHILS # BLD: 0 % (ref 0–2)
BASOPHILS ABSOLUTE: 0 K/UL (ref 0–0.2)
BILIRUB SERPL-MCNC: 0.41 MG/DL (ref 0.3–1.2)
BILIRUBIN DIRECT: 0.16 MG/DL
BILIRUBIN, INDIRECT: 0.25 MG/DL (ref 0–1)
BUN BLDV-MCNC: 14 MG/DL (ref 8–23)
BUN/CREAT BLD: ABNORMAL (ref 9–20)
CALCIUM SERPL-MCNC: 9.1 MG/DL (ref 8.6–10.4)
CHLORIDE BLD-SCNC: 103 MMOL/L (ref 98–107)
CO2: 25 MMOL/L (ref 20–31)
CREAT SERPL-MCNC: 0.75 MG/DL (ref 0.5–0.9)
DIFFERENTIAL TYPE: ABNORMAL
EOSINOPHILS RELATIVE PERCENT: 4 % (ref 0–4)
GFR AFRICAN AMERICAN: >60 ML/MIN
GFR NON-AFRICAN AMERICAN: >60 ML/MIN
GFR SERPL CREATININE-BSD FRML MDRD: ABNORMAL ML/MIN/{1.73_M2}
GFR SERPL CREATININE-BSD FRML MDRD: ABNORMAL ML/MIN/{1.73_M2}
GLOBULIN: ABNORMAL G/DL (ref 1.5–3.8)
GLUCOSE BLD-MCNC: 77 MG/DL (ref 65–105)
GLUCOSE BLD-MCNC: 83 MG/DL (ref 70–99)
HCT VFR BLD CALC: 33.8 % (ref 36–46)
HEMOGLOBIN: 11.2 G/DL (ref 12–16)
IMMATURE GRANULOCYTES: ABNORMAL %
LV EF: 55 %
LVEF MODALITY: NORMAL
LYMPHOCYTES # BLD: 36 % (ref 24–44)
MCH RBC QN AUTO: 29.6 PG (ref 26–34)
MCHC RBC AUTO-ENTMCNC: 33.1 G/DL (ref 31–37)
MCV RBC AUTO: 89.3 FL (ref 80–100)
MONOCYTES # BLD: 12 % (ref 1–7)
MORPHOLOGY: NORMAL
NRBC AUTOMATED: ABNORMAL PER 100 WBC
PDW BLD-RTO: 14.8 % (ref 11.5–14.9)
PLATELET # BLD: 192 K/UL (ref 150–450)
PLATELET ESTIMATE: ABNORMAL
PMV BLD AUTO: 8.9 FL (ref 6–12)
POTASSIUM SERPL-SCNC: 3.9 MMOL/L (ref 3.7–5.3)
RBC # BLD: 3.79 M/UL (ref 4–5.2)
RBC # BLD: ABNORMAL 10*6/UL
SEG NEUTROPHILS: 48 % (ref 36–66)
SEGMENTED NEUTROPHILS ABSOLUTE COUNT: 1.92 K/UL (ref 1.3–9.1)
SODIUM BLD-SCNC: 135 MMOL/L (ref 135–144)
TOTAL PROTEIN: 6.3 G/DL (ref 6.4–8.3)
WBC # BLD: 4 K/UL (ref 3.5–11)
WBC # BLD: ABNORMAL 10*3/UL

## 2020-11-24 PROCEDURE — S2900 ROBOTIC SURGICAL SYSTEM: HCPCS | Performed by: SURGERY

## 2020-11-24 PROCEDURE — 6360000002 HC RX W HCPCS: Performed by: NURSE ANESTHETIST, CERTIFIED REGISTERED

## 2020-11-24 PROCEDURE — 6370000000 HC RX 637 (ALT 250 FOR IP): Performed by: SURGERY

## 2020-11-24 PROCEDURE — 7100000000 HC PACU RECOVERY - FIRST 15 MIN: Performed by: SURGERY

## 2020-11-24 PROCEDURE — 3700000001 HC ADD 15 MINUTES (ANESTHESIA): Performed by: SURGERY

## 2020-11-24 PROCEDURE — G0378 HOSPITAL OBSERVATION PER HR: HCPCS

## 2020-11-24 PROCEDURE — 93306 TTE W/DOPPLER COMPLETE: CPT

## 2020-11-24 PROCEDURE — 3600000019 HC SURGERY ROBOT ADDTL 15MIN: Performed by: SURGERY

## 2020-11-24 PROCEDURE — 7100000001 HC PACU RECOVERY - ADDTL 15 MIN: Performed by: SURGERY

## 2020-11-24 PROCEDURE — 36415 COLL VENOUS BLD VENIPUNCTURE: CPT

## 2020-11-24 PROCEDURE — 80048 BASIC METABOLIC PNL TOTAL CA: CPT

## 2020-11-24 PROCEDURE — 2500000003 HC RX 250 WO HCPCS: Performed by: NURSE ANESTHETIST, CERTIFIED REGISTERED

## 2020-11-24 PROCEDURE — 3600000009 HC SURGERY ROBOT BASE: Performed by: SURGERY

## 2020-11-24 PROCEDURE — 3700000000 HC ANESTHESIA ATTENDED CARE: Performed by: SURGERY

## 2020-11-24 PROCEDURE — 99999 PR OFFICE/OUTPT VISIT,PROCEDURE ONLY: CPT | Performed by: PHYSICIAN ASSISTANT

## 2020-11-24 PROCEDURE — 2709999900 HC NON-CHARGEABLE SUPPLY: Performed by: SURGERY

## 2020-11-24 PROCEDURE — 6360000002 HC RX W HCPCS: Performed by: SURGERY

## 2020-11-24 PROCEDURE — 80076 HEPATIC FUNCTION PANEL: CPT

## 2020-11-24 PROCEDURE — 2580000003 HC RX 258: Performed by: ANESTHESIOLOGY

## 2020-11-24 PROCEDURE — 88304 TISSUE EXAM BY PATHOLOGIST: CPT

## 2020-11-24 PROCEDURE — 2500000003 HC RX 250 WO HCPCS: Performed by: SURGERY

## 2020-11-24 PROCEDURE — 6360000002 HC RX W HCPCS: Performed by: ANESTHESIOLOGY

## 2020-11-24 PROCEDURE — 82947 ASSAY GLUCOSE BLOOD QUANT: CPT

## 2020-11-24 PROCEDURE — 85025 COMPLETE CBC W/AUTO DIFF WBC: CPT

## 2020-11-24 RX ORDER — ONDANSETRON 4 MG/1
TABLET, FILM COATED ORAL
Qty: 20 TABLET | Refills: 0 | Status: SHIPPED | OUTPATIENT
Start: 2020-11-24 | End: 2021-01-04

## 2020-11-24 RX ORDER — SODIUM CHLORIDE 0.9 % (FLUSH) 0.9 %
10 SYRINGE (ML) INJECTION EVERY 12 HOURS SCHEDULED
Status: DISCONTINUED | OUTPATIENT
Start: 2020-11-24 | End: 2020-11-24 | Stop reason: HOSPADM

## 2020-11-24 RX ORDER — DIPHENHYDRAMINE HYDROCHLORIDE 50 MG/ML
12.5 INJECTION INTRAMUSCULAR; INTRAVENOUS
Status: DISCONTINUED | OUTPATIENT
Start: 2020-11-24 | End: 2020-11-24 | Stop reason: HOSPADM

## 2020-11-24 RX ORDER — SODIUM CHLORIDE 0.9 % (FLUSH) 0.9 %
10 SYRINGE (ML) INJECTION PRN
Status: DISCONTINUED | OUTPATIENT
Start: 2020-11-24 | End: 2020-11-24 | Stop reason: HOSPADM

## 2020-11-24 RX ORDER — FENTANYL CITRATE 50 UG/ML
INJECTION, SOLUTION INTRAMUSCULAR; INTRAVENOUS PRN
Status: DISCONTINUED | OUTPATIENT
Start: 2020-11-24 | End: 2020-11-24 | Stop reason: SDUPTHER

## 2020-11-24 RX ORDER — ONDANSETRON 2 MG/ML
4 INJECTION INTRAMUSCULAR; INTRAVENOUS
Status: DISCONTINUED | OUTPATIENT
Start: 2020-11-24 | End: 2020-11-24 | Stop reason: HOSPADM

## 2020-11-24 RX ORDER — DEXAMETHASONE SODIUM PHOSPHATE 4 MG/ML
INJECTION, SOLUTION INTRA-ARTICULAR; INTRALESIONAL; INTRAMUSCULAR; INTRAVENOUS; SOFT TISSUE PRN
Status: DISCONTINUED | OUTPATIENT
Start: 2020-11-24 | End: 2020-11-24 | Stop reason: SDUPTHER

## 2020-11-24 RX ORDER — BUPIVACAINE HYDROCHLORIDE 5 MG/ML
INJECTION, SOLUTION EPIDURAL; INTRACAUDAL PRN
Status: DISCONTINUED | OUTPATIENT
Start: 2020-11-24 | End: 2020-11-24 | Stop reason: ALTCHOICE

## 2020-11-24 RX ORDER — LIDOCAINE HYDROCHLORIDE 10 MG/ML
INJECTION, SOLUTION EPIDURAL; INFILTRATION; INTRACAUDAL; PERINEURAL PRN
Status: DISCONTINUED | OUTPATIENT
Start: 2020-11-24 | End: 2020-11-24 | Stop reason: SDUPTHER

## 2020-11-24 RX ORDER — CEPHALEXIN 500 MG/1
CAPSULE ORAL
Qty: 21 CAPSULE | Refills: 0 | Status: ON HOLD | OUTPATIENT
Start: 2020-11-24 | End: 2020-11-27 | Stop reason: ALTCHOICE

## 2020-11-24 RX ORDER — SODIUM CHLORIDE, SODIUM LACTATE, POTASSIUM CHLORIDE, CALCIUM CHLORIDE 600; 310; 30; 20 MG/100ML; MG/100ML; MG/100ML; MG/100ML
INJECTION, SOLUTION INTRAVENOUS CONTINUOUS
Status: DISCONTINUED | OUTPATIENT
Start: 2020-11-24 | End: 2020-11-24 | Stop reason: HOSPADM

## 2020-11-24 RX ORDER — OXYCODONE HYDROCHLORIDE AND ACETAMINOPHEN 5; 325 MG/1; MG/1
1 TABLET ORAL PRN
Status: DISCONTINUED | OUTPATIENT
Start: 2020-11-24 | End: 2020-11-24 | Stop reason: HOSPADM

## 2020-11-24 RX ORDER — OXYCODONE HYDROCHLORIDE AND ACETAMINOPHEN 5; 325 MG/1; MG/1
1 TABLET ORAL EVERY 6 HOURS PRN
Qty: 28 TABLET | Refills: 0 | Status: ON HOLD | OUTPATIENT
Start: 2020-11-24 | End: 2020-12-03

## 2020-11-24 RX ORDER — PROPOFOL 10 MG/ML
INJECTION, EMULSION INTRAVENOUS PRN
Status: DISCONTINUED | OUTPATIENT
Start: 2020-11-24 | End: 2020-11-24 | Stop reason: SDUPTHER

## 2020-11-24 RX ORDER — LABETALOL HYDROCHLORIDE 5 MG/ML
5 INJECTION, SOLUTION INTRAVENOUS EVERY 10 MIN PRN
Status: DISCONTINUED | OUTPATIENT
Start: 2020-11-24 | End: 2020-11-24 | Stop reason: HOSPADM

## 2020-11-24 RX ORDER — OXYCODONE HYDROCHLORIDE AND ACETAMINOPHEN 5; 325 MG/1; MG/1
1 TABLET ORAL EVERY 4 HOURS PRN
Status: DISCONTINUED | OUTPATIENT
Start: 2020-11-24 | End: 2020-11-24 | Stop reason: HOSPADM

## 2020-11-24 RX ORDER — ONDANSETRON 2 MG/ML
INJECTION INTRAMUSCULAR; INTRAVENOUS PRN
Status: DISCONTINUED | OUTPATIENT
Start: 2020-11-24 | End: 2020-11-24 | Stop reason: SDUPTHER

## 2020-11-24 RX ORDER — ROCURONIUM BROMIDE 10 MG/ML
INJECTION, SOLUTION INTRAVENOUS PRN
Status: DISCONTINUED | OUTPATIENT
Start: 2020-11-24 | End: 2020-11-24 | Stop reason: SDUPTHER

## 2020-11-24 RX ORDER — OXYCODONE HYDROCHLORIDE AND ACETAMINOPHEN 5; 325 MG/1; MG/1
2 TABLET ORAL PRN
Status: DISCONTINUED | OUTPATIENT
Start: 2020-11-24 | End: 2020-11-24 | Stop reason: HOSPADM

## 2020-11-24 RX ORDER — LIDOCAINE HYDROCHLORIDE 10 MG/ML
1 INJECTION, SOLUTION EPIDURAL; INFILTRATION; INTRACAUDAL; PERINEURAL
Status: DISCONTINUED | OUTPATIENT
Start: 2020-11-24 | End: 2020-11-24 | Stop reason: HOSPADM

## 2020-11-24 RX ADMIN — SODIUM CHLORIDE, POTASSIUM CHLORIDE, SODIUM LACTATE AND CALCIUM CHLORIDE: 600; 310; 30; 20 INJECTION, SOLUTION INTRAVENOUS at 09:50

## 2020-11-24 RX ADMIN — CEFAZOLIN 2 G: 10 INJECTION, POWDER, FOR SOLUTION INTRAVENOUS at 10:34

## 2020-11-24 RX ADMIN — FENTANYL CITRATE 50 MCG: 50 INJECTION, SOLUTION INTRAMUSCULAR; INTRAVENOUS at 11:35

## 2020-11-24 RX ADMIN — FENTANYL CITRATE 25 MCG: 50 INJECTION, SOLUTION INTRAMUSCULAR; INTRAVENOUS at 11:59

## 2020-11-24 RX ADMIN — PROPOFOL 150 MG: 10 INJECTION, EMULSION INTRAVENOUS at 10:24

## 2020-11-24 RX ADMIN — ROCURONIUM BROMIDE 50 MG: 10 INJECTION INTRAVENOUS at 10:24

## 2020-11-24 RX ADMIN — OXYCODONE HYDROCHLORIDE AND ACETAMINOPHEN 1 TABLET: 5; 325 TABLET ORAL at 17:51

## 2020-11-24 RX ADMIN — HYDROMORPHONE HYDROCHLORIDE 0.5 MG: 1 INJECTION, SOLUTION INTRAMUSCULAR; INTRAVENOUS; SUBCUTANEOUS at 12:45

## 2020-11-24 RX ADMIN — SODIUM CHLORIDE, POTASSIUM CHLORIDE, SODIUM LACTATE AND CALCIUM CHLORIDE: 600; 310; 30; 20 INJECTION, SOLUTION INTRAVENOUS at 12:01

## 2020-11-24 RX ADMIN — ROCURONIUM BROMIDE 20 MG: 10 INJECTION INTRAVENOUS at 11:14

## 2020-11-24 RX ADMIN — LIDOCAINE HYDROCHLORIDE 50 MG: 10 INJECTION, SOLUTION EPIDURAL; INFILTRATION; INTRACAUDAL; PERINEURAL at 10:24

## 2020-11-24 RX ADMIN — DEXAMETHASONE SODIUM PHOSPHATE 4 MG: 4 INJECTION, SOLUTION INTRA-ARTICULAR; INTRALESIONAL; INTRAMUSCULAR; INTRAVENOUS; SOFT TISSUE at 10:55

## 2020-11-24 RX ADMIN — ONDANSETRON 4 MG: 2 INJECTION INTRAMUSCULAR; INTRAVENOUS at 11:41

## 2020-11-24 RX ADMIN — SUGAMMADEX 200 MG: 100 INJECTION, SOLUTION INTRAVENOUS at 11:46

## 2020-11-24 RX ADMIN — FENTANYL CITRATE 100 MCG: 50 INJECTION, SOLUTION INTRAMUSCULAR; INTRAVENOUS at 10:24

## 2020-11-24 RX ADMIN — FENTANYL CITRATE 25 MCG: 50 INJECTION, SOLUTION INTRAMUSCULAR; INTRAVENOUS at 11:48

## 2020-11-24 RX ADMIN — HYDROMORPHONE HYDROCHLORIDE 0.25 MG: 1 INJECTION, SOLUTION INTRAMUSCULAR; INTRAVENOUS; SUBCUTANEOUS at 12:56

## 2020-11-24 ASSESSMENT — PAIN DESCRIPTION - PAIN TYPE
TYPE: SURGICAL PAIN

## 2020-11-24 ASSESSMENT — PULMONARY FUNCTION TESTS
PIF_VALUE: 13
PIF_VALUE: 13
PIF_VALUE: 22
PIF_VALUE: 13
PIF_VALUE: 18
PIF_VALUE: 0
PIF_VALUE: 1
PIF_VALUE: 2
PIF_VALUE: 13
PIF_VALUE: 22
PIF_VALUE: 17
PIF_VALUE: 16
PIF_VALUE: 15
PIF_VALUE: 17
PIF_VALUE: 18
PIF_VALUE: 23
PIF_VALUE: 18
PIF_VALUE: 13
PIF_VALUE: 13
PIF_VALUE: 18
PIF_VALUE: 18
PIF_VALUE: 17
PIF_VALUE: 14
PIF_VALUE: 13
PIF_VALUE: 1
PIF_VALUE: 19
PIF_VALUE: 23
PIF_VALUE: 18
PIF_VALUE: 0
PIF_VALUE: 18
PIF_VALUE: 13
PIF_VALUE: 15
PIF_VALUE: 13
PIF_VALUE: 19
PIF_VALUE: 16
PIF_VALUE: 7
PIF_VALUE: 19
PIF_VALUE: 18
PIF_VALUE: 2
PIF_VALUE: 18
PIF_VALUE: 23
PIF_VALUE: 14
PIF_VALUE: 22
PIF_VALUE: 13
PIF_VALUE: 23
PIF_VALUE: 18
PIF_VALUE: 14
PIF_VALUE: 1
PIF_VALUE: 22
PIF_VALUE: 19
PIF_VALUE: 18
PIF_VALUE: 14
PIF_VALUE: 1
PIF_VALUE: 1
PIF_VALUE: 13
PIF_VALUE: 2
PIF_VALUE: 15
PIF_VALUE: 22
PIF_VALUE: 13
PIF_VALUE: 13
PIF_VALUE: 21
PIF_VALUE: 13
PIF_VALUE: 15
PIF_VALUE: 1
PIF_VALUE: 14
PIF_VALUE: 18
PIF_VALUE: 15
PIF_VALUE: 18
PIF_VALUE: 2
PIF_VALUE: 18
PIF_VALUE: 13
PIF_VALUE: 23
PIF_VALUE: 22
PIF_VALUE: 13
PIF_VALUE: 2
PIF_VALUE: 1
PIF_VALUE: 14
PIF_VALUE: 1
PIF_VALUE: 22
PIF_VALUE: 2
PIF_VALUE: 1
PIF_VALUE: 14
PIF_VALUE: 20
PIF_VALUE: 22
PIF_VALUE: 23
PIF_VALUE: 19
PIF_VALUE: 17
PIF_VALUE: 2
PIF_VALUE: 1
PIF_VALUE: 18
PIF_VALUE: 16
PIF_VALUE: 2
PIF_VALUE: 1
PIF_VALUE: 16
PIF_VALUE: 1
PIF_VALUE: 22
PIF_VALUE: 14
PIF_VALUE: 17
PIF_VALUE: 14
PIF_VALUE: 17

## 2020-11-24 ASSESSMENT — PAIN SCALES - GENERAL
PAINLEVEL_OUTOF10: 4
PAINLEVEL_OUTOF10: 5
PAINLEVEL_OUTOF10: 6
PAINLEVEL_OUTOF10: 8
PAINLEVEL_OUTOF10: 0
PAINLEVEL_OUTOF10: 5

## 2020-11-24 ASSESSMENT — ENCOUNTER SYMPTOMS
STRIDOR: 0
SHORTNESS OF BREATH: 0

## 2020-11-24 ASSESSMENT — PAIN - FUNCTIONAL ASSESSMENT: PAIN_FUNCTIONAL_ASSESSMENT: 0-10

## 2020-11-24 ASSESSMENT — LIFESTYLE VARIABLES: SMOKING_STATUS: 0

## 2020-11-24 ASSESSMENT — PAIN DESCRIPTION - DESCRIPTORS
DESCRIPTORS: ACHING
DESCRIPTORS: ACHING

## 2020-11-24 ASSESSMENT — PAIN DESCRIPTION - LOCATION
LOCATION: ABDOMEN

## 2020-11-24 NOTE — PROGRESS NOTES
Assessment completed and charted. Denies any needs at this time. Call light within reach.     Electronically signed by Annelle Duverney, RN on 11/24/20 at 8:55 AM EST

## 2020-11-24 NOTE — H&P
04/04/2019); Intracapsular cataract extraction (Right, 4/4/2019); joint replacement (Left, 11/06/2018); Total knee arthroplasty (Right, 12/10/2019); and ERCP (N/A, 10/14/2020). Medications  Prior to Admission medications    Medication Sig Start Date End Date Taking? Authorizing Provider   famotidine (PEPCID) 20 MG tablet TAKE ONE TABLET BY MOUTH TWICE A DAY 11/20/20  Yes PASCUAL Ariza CNP   allopurinol (ZYLOPRIM) 100 MG tablet TAKE ONE TABLET BY MOUTH DAILY 11/20/20  Yes PASCUAL Ariza CNP   psyllium (METAMUCIL) 28.3 % POWD powder Take 3.4 g by mouth daily   Yes Historical Provider, MD   Bacillus Coagulans-Inulin (ALIGN PREBIOTIC-PROBIOTIC PO) Take by mouth daily    Yes Historical Provider, MD   cetirizine (ZYRTEC) 10 MG tablet TAKE ONE TABLET BY MOUTH DAILY 9/18/20  Yes PASCUAL Ariza CNP   simvastatin (ZOCOR) 20 MG tablet TAKE ONE TABLET BY MOUTH DAILY 9/18/20  Yes PASCUAL Ariza CNP   alendronate (FOSAMAX) 70 MG tablet TAKE 1 TABLET BY MOUTH ONCE WEEKLY BEFORE BREAKFAST, ON AN EMPTY STOMACH: REMAIN UPRIGHT FOR 30 MINUTES:TAKE WITH 8 OUNCES OF WATER 9/2/20  Yes PASCUAL Ariza CNP   vitamin D (ERGOCALCIFEROL) 1.25 MG (21991 UT) CAPS capsule TAKE ONE CAPSULE BY MOUTH EVERY 2 WEEKS 7/20/20  Yes PASCUAL Ariza CNP   verapamil (CALAN SR) 240 MG extended release tablet TAKE ONE TABLET BY MOUTH ONCE NIGHTLY FOR MIGRAINE HEADACHE 6/12/20  Yes PASCUAL Ariza CNP   fluticasone (FLONASE) 50 MCG/ACT nasal spray SPRAY TWO SPRAYS IN EACH NOSTRIL ONCE DAILY 5/1/20  Yes PASCUAL Ariza CNP   aspirin 81 MG tablet Take 81 mg by mouth daily   Yes Historical Provider, MD   naproxen (NAPROSYN) 250 MG tablet TAKE ONE TABLET BY MOUTH THREE TIMES A DAY WITH FOOD AS NEEDED FOR GOUT FLARE UP UNTIL ATTACK SUBSIDES 11/25/19  Yes Aubrey Treviño MD   Aspirin-Acetaminophen-Caffeine (EXCEDRIN MIGRAINE PO) Take by mouth as needed    Yes Historical Provider, MD     Allergies  has No Known Allergies.     Family History  family history includes Arthritis in an other family member; COPD in an other family member; Stroke in her father; Thyroid Disease in an other family member. Social History   reports that she quit smoking about 33 years ago. Her smoking use included cigarettes. She has a 5.00 pack-year smoking history. She has never used smokeless tobacco. She reports current alcohol use. She reports that she does not use drugs. Review of Systems:  General Denies any fever or chills  HEENT Denies any diplopia, tinnitus or vertigo  Resp Denies any shortness of breath, cough or wheezing  Cardiac Denies any chest pain, palpitations, claudication or edema  GI Denies any melena, hematochezia, hematemesis or pyrosis   Denies any frequency, urgency, hesitancy or incontinence  Heme Denies bruising or bleeding easily, daily PO aspirin no other AC  Endocrine Denies any history of diabetes or thyroid disease  Neuro Denies any focal motor or sensory deficits    OBJECTIVE:   VITALS:  height is 5' 3\" (1.6 m) and weight is 128 lb 15.5 oz (58.5 kg). Her oral temperature is 98.1 °F (36.7 °C). Her blood pressure is 120/66 and her pulse is 60. Her respiration is 16 and oxygen saturation is 97%. CONSTITUTIONAL: Alert and oriented times 3, no acute distress and cooperative to examination with proper mood and affect. SKIN: Skin color, texture, turgor normal. No rashes or lesions. LYMPH: no cervical nodes, no inguinal nodes  HEENT: Head is normocephalic, atraumatic. EOMI, PERRLA  NECK: Supple, symmetrical, trachea midline, no adenopathy, thyroid symmetric, not enlarged and no tenderness, skin normal  CHEST/LUNGS: chest symmetric with normal A/P diameter, normal respiratory rate and rhythm, lungs clear to auscultation without wheezes, rales or rhonchi. No accessory muscle use. Scars None   CARDIOVASCULAR: Heart regular rate and rhythm Normal S1 and S2. . Carotid and femoral pulses 2+/4 and equal bilaterally  ABDOMEN: Normal shape. Clenton Reas Multiple well healed surgical scar(s) present. Normal bowel sounds. No bruits. Soft, nondistended, no masses or organomegaly. no evidence of hernia. Percussion: Normal without hepatosplenomegally. Tenderness: very mild RUQ and epigastric  RECTAL: deferred, not clinically indicated  NEUROLOGIC: There are no focalizing motor or sensory deficits. CN II-XII are grossly intact.   EXTREMITIES: no cyanosis, no clubbing and no edema    LABS:   CBC with Differential:    Lab Results   Component Value Date    WBC 4.0 11/24/2020    RBC 3.79 11/24/2020    RBC 2.91 04/20/2012    HGB 11.2 11/24/2020    HCT 33.8 11/24/2020     11/24/2020     04/20/2012    MCV 89.3 11/24/2020    MCH 29.6 11/24/2020    MCHC 33.1 11/24/2020    RDW 14.8 11/24/2020    METASPCT 2 10/14/2020    LYMPHOPCT 36 11/24/2020    MONOPCT 12 11/24/2020    MYELOPCT 1 10/14/2020    BASOPCT 0 11/24/2020    MONOSABS 0.48 11/24/2020    LYMPHSABS 1.44 11/24/2020    EOSABS 0.16 11/24/2020    BASOSABS 0.00 11/24/2020    DIFFTYPE NOT REPORTED 11/24/2020     BMP:    Lab Results   Component Value Date     11/24/2020    K 3.9 11/24/2020     11/24/2020    CO2 25 11/24/2020    BUN 14 11/24/2020    LABALBU 3.9 11/19/2020    LABALBU 4.2 04/05/2012    CREATININE 0.75 11/24/2020    CALCIUM 9.1 11/24/2020    GFRAA >60 11/24/2020    LABGLOM >60 11/24/2020    GLUCOSE 83 11/24/2020    GLUCOSE 84 04/09/2012     Hepatic Function Panel:    Lab Results   Component Value Date    ALKPHOS 75 11/19/2020    ALT 16 11/19/2020    AST 24 11/19/2020    PROT 6.9 11/19/2020    BILITOT 0.48 11/19/2020    BILIDIR 0.20 11/19/2020    IBILI 0.28 11/19/2020    LABALBU 3.9 11/19/2020    LABALBU 4.2 04/05/2012     Calcium:    Lab Results   Component Value Date    CALCIUM 9.1 11/24/2020     Magnesium:    Lab Results   Component Value Date    MG 2.0 10/12/2020     Phosphorus:  No results found for: PHOS  PT/INR:    Lab Results   Component Value Date    PROTIME 13.1 11/23/2020    INR 1.0 11/23/2020     ABG:  No results found for: PHART, PH, LZL7NOO, PCO2, PO2ART, PO2, CEE2WVS, HCO3, BEART, BE, THGBART, THB, SQJ7MXJ, X9KCAEUB, O2SAT  Urine Culture:  No components found for: CURINE  Blood Culture:  No components found for: CBLOOD, CFUNGUSBL  Stool Culture:  No components found for: CSTOOL    RADIOLOGY:   I have personally reviewed the following films:  No results found. IMPRESSION:   1. Chronic calculous cholecystitis   2. PMHx cholangitis s/p ERCP 10/14/2020  3. S/p appendectomy, tubal ligation and hysterectomy  4. COVID-19 negative  5. Medically cleared for surgery per PCP    does not have any pertinent problems on file. PLAN:   1. Will proceed with robotic laparoscopic cholecystectomy today  2. Patient agrees and wishes to proceed  3. Written consent obtained and in chart      Thank you for this interesting consult and for allowing us to participate in the care of this patient. If you have any questions please don't hesitate to call.       Electronically signed by VIJAY Rain  on 11/24/2020 at 7:31 AM

## 2020-11-24 NOTE — CARE COORDINATION
CASE MANAGEMENT NOTE:    Admission Date:  11/23/2020 Opal Jones is a 67 y.o.  female    Admitted for : Biliary colic [G81.28]  Biliary colic [H10.91]    Patient off the floor for lap vazquez. Was unable to meet with patient, chart was reviewed. PCP:  Dr. Helena Mendoza:  [facility] Medicare. Current Residence/ Living Arrangements:  independently at home alone. Current Services PTA:  No    Is patient agreeable to VNS: unsure, will have to ask patient. Freedom of choice provided:  NA    List of [address] provided: NA    VNS chosen:  NA    DME:  straight cane, walker, shower chair, transport chair, grab bars    Home Oxygen: No    Nebulizer: No    CPAP/BIPAP: No    Supplier: N/A    Potential Assistance Needed: to be determined. SNF needed: No    Freedom of choice and list provided: NA    Pharmacy:  [facility] [address]       Does Patient want to use MEDS to BEDS? No    Is patient currently receiving oral anticoagulation therapy? No    Is the Patient an KANDICE GRANGER [facility] with Readmission Risk Score greater than 14%? No  If yes, pt needs a follow up appointment made within 7 days. Family Members/Caregivers that pt would like involved in their care:    Yes    If yes, list name here:  Tomasa Lauren    Transportation Provider:  Patient                     Discharge Plan:  Home with needs to be determined. In surgery now for lap vazquez. Electronically signed by: Chilo Land RN on 11/24/2020 at 11:05 AM     Spoke with patient and she confirmed all the above. She does NOT feel she needs VNS, and is hoping to discharge home today.     Electronically signed by Chilo Land RN on 11/24/2020 at 1:45 PM

## 2020-11-24 NOTE — OP NOTE
Operative Note      Patient: Zuleyma Patel  YOB: 1945  MRN: [id number]    Date of Procedure: 11/24/2020                Preoperative diagnosis: Chronic cholecystitis    Postoperative diagnosis: Same    Procedure: Robotic cholecystectomy    Surgeon: Dr. Cain Nathan    Asst.: VIJAY Lawrence    Anesthesia: General    Preparation: Chloraprep    EBL: Less than 10 mL    Specimen: Gallbladder    Procedure: Informed consent was obtained. Preoperative antibiotics were given. Patient was taken to the operating room. General anesthesia was given. Abdomen was prepped and draped in usual sterile fashion. Timeout was done. Supraumbilical incision was made. Andi Epi port was introduced using the open technique without any difficulty. CO2 insufflation was carried out. Scope was introduced. Patient was placed in the reverse Trendelenburg position. 3 additional ports were placed in the usual fashion. Robot was brought in. All the ports were docked in usual fashion. Camera and the ancillary instruments were advanced towards the target anatomy. Assistant grabbed the fundus of the gallbladder and that was retracted anterosuperiorly. Careful dissection was continued in the triangle of calot. Cystic duct was isolated was skeletonized. Cystic artery was isolated was skeletonized. Critical view was obtained. Anatomy was confirmed. After confirming the anatomy cystic duct was clipped and divided. Cystic artery was clipped and divided. Gallbladder was peeled off the liver bed using the hook cautery. Complete hemostasis was confirmed. All the clips were found to be intact. At this point robot was undocked. Gallbladder was retrieved and sent to pathology for further evaluation. All the port sites are visualized. No bleeding noted. All the ports were removed. Fascia and the skin was approximated in the usual fashion. Local anesthetic was infiltrated. Dermabond was applied. Steri-Strips were applied. Sterile dressing was applied. Patient tolerated procedure well and was transferred to the recovery room in a stable condition.     -  Recommendations: Resume diet. Discharge to home later today. Discharge instructions in the chart. Prescriptions called in.

## 2020-11-24 NOTE — DISCHARGE SUMMARY
Physician Discharge Summary     Date of admission: 11/23/2020    Discharge date: 11/24/2020    Admission Diagnosis: Biliary colic [J62.36]  Biliary colic [K85.03]    Discharge Diagnosis: Abdominal pain biliary colic/chronic cholecystitis    Brief Hospitalization Details:  Bandar Chris is a 67 y.o. female who was admitted for the management of <principal problem not specified>    69-year-old female with chronic cholecystitis biliary colic underwent robotic cholecystectomy today after she was cleared by cardiology. Procedure was uneventful. Diet will be resumed. Patient will be discharged home in a stable condition. Discharge instructions in the chart. Prescriptions called in to patient's pharmacy. Current Discharge Medication List      START taking these medications    Details   cephALEXin (KEFLEX) 500 MG capsule 500 mgTake three times daily  Qty: 21 capsule, Refills: 0      ondansetron (ZOFRAN) 4 MG tablet Take every six hours as needed  Qty: 20 tablet, Refills: 0      oxyCODONE-acetaminophen (PERCOCET) 5-325 MG per tablet Take 1 tablet by mouth every 6 hours as needed for Pain for up to 7 days.  . Take lowest dose possible to manage pain  Qty: 28 tablet, Refills: 0    Comments: Reduce doses taken as pain becomes manageable  Associated Diagnoses: Biliary colic         CONTINUE these medications which have NOT CHANGED    Details   famotidine (PEPCID) 20 MG tablet TAKE ONE TABLET BY MOUTH TWICE A DAY  Qty: 180 tablet, Refills: 1      allopurinol (ZYLOPRIM) 100 MG tablet TAKE ONE TABLET BY MOUTH DAILY  Qty: 90 tablet, Refills: 1      psyllium (METAMUCIL) 28.3 % POWD powder Take 3.4 g by mouth daily      Bacillus Coagulans-Inulin (ALIGN PREBIOTIC-PROBIOTIC PO) Take by mouth daily       cetirizine (ZYRTEC) 10 MG tablet TAKE ONE TABLET BY MOUTH DAILY  Qty: 90 tablet, Refills: 0      simvastatin (ZOCOR) 20 MG tablet TAKE ONE TABLET BY MOUTH DAILY  Qty: 90 tablet, Refills: 0      alendronate (FOSAMAX) 70 MG tablet TAKE 1 TABLET BY MOUTH ONCE WEEKLY BEFORE BREAKFAST, ON AN EMPTY STOMACH: REMAIN UPRIGHT FOR 30 MINUTES:TAKE WITH 8 OUNCES OF WATER  Qty: 4 tablet, Refills: 3      vitamin D (ERGOCALCIFEROL) 1.25 MG (84999 UT) CAPS capsule TAKE ONE CAPSULE BY MOUTH EVERY 2 WEEKS  Qty: 12 capsule, Refills: 0      verapamil (CALAN SR) 240 MG extended release tablet TAKE ONE TABLET BY MOUTH ONCE NIGHTLY FOR MIGRAINE HEADACHE  Qty: 90 tablet, Refills: 1      fluticasone (FLONASE) 50 MCG/ACT nasal spray SPRAY TWO SPRAYS IN EACH NOSTRIL ONCE DAILY  Qty: 2 Bottle, Refills: 1      aspirin 81 MG tablet Take 81 mg by mouth daily      naproxen (NAPROSYN) 250 MG tablet TAKE ONE TABLET BY MOUTH THREE TIMES A DAY WITH FOOD AS NEEDED FOR GOUT FLARE UP UNTIL ATTACK SUBSIDES  Qty: 60 tablet, Refills: 0      Aspirin-Acetaminophen-Caffeine (EXCEDRIN MIGRAINE PO) Take by mouth as needed              Condition at Discharge: good    Electronically signed by Christal Carlson MD on 11/24/2020 at 12:10 PM

## 2020-11-24 NOTE — PROGRESS NOTES
Patient given dinner, now c/o pain, prn pain meds given.      Electronically signed by Akilah Ross RN on 11/24/20 at 6:00 PM EST

## 2020-11-24 NOTE — PROGRESS NOTES
Patient to surgery via transport.       Electronically signed by Serena Tyson RN on 11/24/20 at 9:44 AM EST

## 2020-11-24 NOTE — PROGRESS NOTES
Patient returned to room 2081. Writer updated patients sister.     Electronically signed by Doris Miller RN on 11/24/20 at 1:26 PM EST

## 2020-11-24 NOTE — CONSULTS
[facility]  NOTE            Date:   11/24/2020  Patient name:  Lucero Darby  Date of admission:  11/23/2020  2:36 PM  MRN:   [id number]  Account:  [de-identified]  YOB: 1945  PCP:    Marciano Mittal MD  Room:   2081/2081-01  Code Status:    Prior    Chief Complaint:     No chief complaint on file. History Obtained From:     patient, electronic medical record    History of Present Illness:     Lucero Darby is a 67 y.o. Non-/non  female who admitted for an elective laparoscopic cholecystectomy. She has history of chronic cholelithiasis. Patient underwent the procedure today. It went well. She complains of some abdominal pain but states that otherwise she feels well  We have been consulted for medical management. Patient had abnormal EKG, hence cardiology was consulted for preoperative risk stratification. ECHO showed normal systolic function, mild diastolic dysfunction, mild AR and MR.   Patient does not have any chest pain, shortness of breath, palpitations or lower extremity edema    Past Medical History:     Past Medical History:   Diagnosis Date    Allergic rhinitis     Closed tibia fracture     GERD (gastroesophageal reflux disease)     Gout     Headache(784.0)     h/o migraines    Hyperlipidemia     Osteoarthritis     DJD Lt knee    Osteoporosis     Posterior tibial tendon dysfunction     right, wears braces on both legs    Vitamin D deficient rickets     Wears glasses     Wears partial dentures     lower        Past Surgical History:     Past Surgical History:   Procedure Laterality Date    APPENDECTOMY      CARPAL TUNNEL RELEASE      bilateral    CATARACT REMOVAL WITH IMPLANT Left 03/07/2019    [facility]    CATARACT REMOVAL WITH IMPLANT Right 04/04/2019    [facility]    CHOLECYSTECTOMY, LAPAROSCOPIC N/A 11/24/2020    CHOLECYSTECTOMY LAPAROSCOPIC ROBOTIC XI MULTIPORT PREBIOTIC-PROBIOTIC PO) Take by mouth daily    Yes Historical Provider, MD   cetirizine (ZYRTEC) 10 MG tablet TAKE ONE TABLET BY MOUTH DAILY 9/18/20  Yes PASCUAL Myrick CNP   simvastatin (ZOCOR) 20 MG tablet TAKE ONE TABLET BY MOUTH DAILY 9/18/20  Yes PASCUAL Myrick CNP   alendronate (FOSAMAX) 70 MG tablet TAKE 1 TABLET BY MOUTH ONCE WEEKLY BEFORE BREAKFAST, ON AN EMPTY STOMACH: REMAIN UPRIGHT FOR 30 MINUTES:TAKE WITH 8 OUNCES OF WATER 9/2/20  Yes PASCUAL Myrick CNP   vitamin D (ERGOCALCIFEROL) 1.25 MG (43872 UT) CAPS capsule TAKE ONE CAPSULE BY MOUTH EVERY 2 WEEKS 7/20/20  Yes PASCUAL Myrick CNP   verapamil (CALAN SR) 240 MG extended release tablet TAKE ONE TABLET BY MOUTH ONCE NIGHTLY FOR MIGRAINE HEADACHE 6/12/20  Yes PASCUAL Myrick CNP   fluticasone (FLONASE) 50 MCG/ACT nasal spray SPRAY TWO SPRAYS IN EACH NOSTRIL ONCE DAILY 5/1/20  Yes PASCUAL Myrick CNP   aspirin 81 MG tablet Take 81 mg by mouth daily   Yes Historical Provider, MD   naproxen (NAPROSYN) 250 MG tablet TAKE ONE TABLET BY MOUTH THREE TIMES A DAY WITH FOOD AS NEEDED FOR GOUT FLARE UP UNTIL ATTACK SUBSIDES 11/25/19  Yes Analy Rodrigez MD   Aspirin-Acetaminophen-Caffeine (EXCEDRIN MIGRAINE PO) Take by mouth as needed    Yes Historical Provider, MD        Allergies:     Patient has no known allergies. Social History:     Tobacco:    reports that she quit smoking about 33 years ago. Her smoking use included cigarettes. She has a 5.00 pack-year smoking history. She has never used smokeless tobacco.  Alcohol:      reports current alcohol use. Drug Use:  reports no history of drug use. Family History:     Family History   Problem Relation Age of Onset    Arthritis Other     Thyroid Disease Other     COPD Other     Stroke Father        Review of Systems:     Positive and Negative as described in HPI.     CONSTITUTIONAL:  negative for fevers, chills, sweats, fatigue, weight loss  HEENT:  negative for vision, hearing changes, runny nose, throat pain  RESPIRATORY:  negative for shortness of breath, cough, congestion, wheezing  CARDIOVASCULAR:  negative for chest pain, palpitations  GASTROINTESTINAL:  negative for , vomiting, diarrhea,  GENITOURINARY:  negative for difficulty of urination, burning with urination, frequency   INTEGUMENT:  negative for rash, skin lesions, easy bruising   HEMATOLOGIC/LYMPHATIC:  negative for swelling/edema   ALLERGIC/IMMUNOLOGIC:  negative for urticaria , itching  NEUROLOGICAL:  negative for headaches, dizziness,   BEHAVIOR/PSYCH:  negative for depression, anxiety    Physical Exam:   /76   Pulse 77   Temp 97.1 °F (36.2 °C)   Resp 10   Ht 5' 3\" (1.6 m)   Wt 128 lb (58.1 kg)   LMP 2004   SpO2 95%   BMI 22.67 kg/m²   Temp (24hrs), Av.4 °F (36.3 °C), Min:94.8 °F (34.9 °C), Max:98.6 °F (37 °C)    No results for input(s): POCGLU in the last 72 hours.     Intake/Output Summary (Last 24 hours) at 2020 1856  Last data filed at 2020 1310  Gross per 24 hour   Intake 1659.17 ml   Output 220 ml   Net 1439.17 ml       General Appearance: alert, well appearing, and in no acute distress  Mental status: oriented to person, place, and time  Head: normocephalic, atraumatic  Eye: no icterus, redness, pupils equal and reactive, extraocular eye movements intact, conjunctiva clear  Ear: normal external ear, no discharge, hearing intact  Nose: no drainage noted  Mouth: mucous membranes moist  Neck: supple,   Lungs: Bilateral equal air entry, clear to ausculation, no wheezing, rales or rhonchi, normal effort  Cardiovascular: normal rate, regular rhythm,   Abdomen: Soft,   Neurologic: There are no new focal motor or sensory deficits,   Skin: No gross lesions, rashes, bruising or bleeding on exposed skin area  Extremities: peripheral pulses palpable, no pedal edema or calf pain with palpation  Psych: normal affect    Investigations:      Laboratory Testing:  Recent Results (from the past 24 hour(s))   CBC with DIFF    Collection Time: 11/24/20  5:43 AM   Result Value Ref Range    WBC 4.0 3.5 - 11.0 k/uL    RBC 3.79 (L) 4.0 - 5.2 m/uL    Hemoglobin 11.2 (L) 12.0 - 16.0 g/dL    Hematocrit 33.8 (L) 36 - 46 %    MCV 89.3 80 - 100 fL    MCH 29.6 26 - 34 pg    MCHC 33.1 31 - 37 g/dL    RDW 14.8 11.5 - 14.9 %    Platelets 271 217 - 167 k/uL    MPV 8.9 6.0 - 12.0 fL    NRBC Automated NOT REPORTED per 100 WBC    Differential Type NOT REPORTED     Immature Granulocytes NOT REPORTED 0 %    Absolute Immature Granulocyte NOT REPORTED 0.00 - 0.30 k/uL    WBC Morphology NOT REPORTED     RBC Morphology NOT REPORTED     Platelet Estimate NOT REPORTED     Seg Neutrophils 48 36 - 66 %    Lymphocytes 36 24 - 44 %    Monocytes 12 (H) 1 - 7 %    Eosinophils % 4 0 - 4 %    Basophils 0 0 - 2 %    Segs Absolute 1.92 1.3 - 9.1 k/uL    Absolute Lymph # 1.44 1.0 - 4.8 k/uL    Absolute Mono # 0.48 0.1 - 1.3 k/uL    Absolute Eos # 0.16 0.0 - 0.4 k/uL    Basophils Absolute 0.00 0.0 - 0.2 k/uL    Morphology Normal    Basic Metabolic Prof    Collection Time: 11/24/20  5:43 AM   Result Value Ref Range    Glucose 83 70 - 99 mg/dL    BUN 14 8 - 23 mg/dL    CREATININE 0.75 0.50 - 0.90 mg/dL    Bun/Cre Ratio NOT REPORTED 9 - 20    Calcium 9.1 8.6 - 10.4 mg/dL    Sodium 135 135 - 144 mmol/L    Potassium 3.9 3.7 - 5.3 mmol/L    Chloride 103 98 - 107 mmol/L    CO2 25 20 - 31 mmol/L    Anion Gap 7 (L) 9 - 17 mmol/L    GFR Non-African American >60 >60 mL/min    GFR African American >60 >60 mL/min    GFR Comment          GFR Staging NOT REPORTED    Liver Profile    Collection Time: 11/24/20  5:43 AM   Result Value Ref Range    Alb 3.5 3.5 - 5.2 g/dL    Alkaline Phosphatase 56 35 - 104 U/L    ALT 14 5 - 33 U/L    AST 25 <32 U/L    Total Bilirubin 0.41 0.3 - 1.2 mg/dL    Bilirubin, Direct 0.16 <0.31 mg/dL    Bilirubin, Indirect 0.25 0.00 - 1.00 mg/dL    Total Protein 6.3 (L) 6.4 - 8.3 g/dL    Globulin NOT REPORTED 1.5 - 3.8 g/dL    Albumin/Globulin Ratio NOT REPORTED 1.0 - 2.5       Imaging/Diagnostics:  No results found. Assessment :      Hospital Problems           Last Modified POA    * (Principal) Biliary colic 94/11/0057 Yes    Osteoporosis 11/24/2020 Yes    Overview Signed 9/7/2015  4:27 AM by [facility]     replace inactive diagnosis         GERD (gastroesophageal reflux disease) 11/24/2020 Yes    Headache 11/24/2020 Yes    Overview Signed 2/14/2012  4:07 PM by Candi Pardo     h/o migraines         Mixed hyperlipidemia 11/24/2020 Yes    Gout 11/24/2020 Yes          Plan:       1. S/P laparoscopic cholecystectomy. Pain control. Incentive spirometry. Advance diet as tolerated  2. On simvastatin for hyperlipidemia  3. GERD- Pepcid  4. Osteoporosis- on Fosamax, Vit D  5. Headache-Verapamil  6.  Gout- Allopurinol    Consultations:   IP CONSULT TO GI  IP CONSULT TO PRIMARY CARE PROVIDER  IP CONSULT TO Mary Amezcua MD  11/24/2020  6:56 PM    Copy sent to Dr. Nabeel Colmenares MD

## 2020-11-24 NOTE — PLAN OF CARE
Problem: Falls - Risk of:  Goal: Will remain free from falls  Description: Will remain free from falls  Outcome: Ongoing  Note: No falls this shift. Call light is within reach, side rails up x2, and bed in lowest position. Pt safety is maintained. Goal: Absence of physical injury  Description: Absence of physical injury  Outcome: Ongoing  Note: No injury this shift. Pt safety maintained.

## 2020-11-24 NOTE — ANESTHESIA POSTPROCEDURE EVALUATION
Department of Anesthesiology  Postprocedure Note    Patient: Lucero Darby  MRN: [id number]  YOB: 1945  Date of evaluation: 11/24/2020  Time:  12:47 PM     Procedure Summary     Date:  11/24/20 Room / Location:  [address] [place] [address] Rae Mcmahon 10 / [address]    Anesthesia Start:  1019 Anesthesia Stop:  8768    Procedure:  CHOLECYSTECTOMY LAPAROSCOPIC ROBOTIC XI MULTIPORT (N/A Abdomen) Diagnosis:  (BILIARY COLIC)    Surgeon:  Jono Jewell MD Responsible Provider:  Talia Caraballo MD    Anesthesia Type:  general ASA Status:  3          Anesthesia Type: general    Karie Phase I: Karie Score: 5    Karie Phase II:      Last vitals: Reviewed and per EMR flowsheets.        Anesthesia Post Evaluation    Comments: POST- ANESTHESIA EVALUATION       Pt Name: Lucero Darby  MRN: [id number]  YOB: 1945  Date of evaluation: 11/24/2020  Time:  12:47 PM      BP (!) 141/89   Pulse 74   Temp 97.3 °F (36.3 °C) (Infrared)   Resp 14   Ht 5' 3\" (1.6 m)   Wt 128 lb (58.1 kg)   LMP 01/01/2004   SpO2 98%   BMI 22.67 kg/m²      Consciousness Level  Awake  Cardiopulmonary Status  Stable  Pain Adequately Treated YES  Nausea / Vomiting  NO  Adequate Hydration  YES  Anesthesia Related Complications NONE      Electronically signed by Talia Caraballo MD on 11/24/2020 at 12:47 PM

## 2020-11-24 NOTE — CONSULTS
early CAD    REVIEW OF SYSTEMS:    · Constitutional: there has been no unanticipated weight loss. There's been No change in energy level, No change in activity level. · Eyes: No visual changes or diplopia. No scleral icterus. · ENT: No Headaches, hearing loss or vertigo. No mouth sores or sore throat. · Cardiovascular: As HPI  · Respiratory: As HPI  · Gastrointestinal: No abdominal pain, appetite loss, blood in stools. No change in bowel or bladder habits. · Genitourinary: No dysuria, trouble voiding, or hematuria. · Musculoskeletal:  No gait disturbance, No weakness or joint complaints. · Integumentary: No rash or pruritis. · Neurological: No headache, diplopia, change in muscle strength, numbness or tingling. No change in gait, balance, coordination, mood, affect, memory, mentation, behavior. · Psychiatric: No anxiety, or depression. · Endocrine: No temperature intolerance. No excessive thirst, fluid intake, or urination. No tremor. · Hematologic/Lymphatic: No abnormal bruising or bleeding, blood clots or swollen lymph nodes. · Allergic/Immunologic: No nasal congestion or hives. PHYSICAL EXAM:    Physical Examination:    /66   Pulse 60   Temp 98.1 °F (36.7 °C) (Oral)   Resp 16   Ht 5' 3\" (1.6 m)   Wt 128 lb 15.5 oz (58.5 kg)   LMP 01/01/2004   SpO2 97%   BMI 22.85 kg/m²    Constitutional and General Appearance: alert, cooperative, no distress and appears stated age  [de-identified]: PERRL, no cervical lymphadenopathy. No masses palpable. Normal oral mucosa  Respiratory:  · Normal excursion and expansion without use of accessory muscles  · Resp Auscultation: Good respiratory effort. No for increased work of breathing.  On auscultation: Clear  Cardiovascular:  · The apical impulse is not displaced  · Heart tones are crisp and normal. regular S1 and S2. Murmurs: none  · Jugular venous pulsation Normal  · The carotid upstroke is normal in amplitude and contour without delay or bruit  · Peripheral pulses are symmetrical and full   Abdomen:  · No masses or tenderness  · Bowel sounds present  Extremities:  ·  No Cyanosis or Clubbing  ·  Lower extremity edema: none  ·  Skin: Warm and dry  Neurological:  · Alert and oriented. · Moves all extremities well  · No abnormalities of mood, affect, memory, mentation, or behavior are noted    DATA:    Diagnostics:      EKG:   Results for orders placed or performed during the hospital encounter of 11/19/20   EKG 12 Lead   Result Value Ref Range    Ventricular Rate 75 BPM    Atrial Rate 75 BPM    P-R Interval 152 ms    QRS Duration 80 ms    Q-T Interval 396 ms    QTc Calculation (Bazett) 442 ms    P Axis 83 degrees    R Axis -35 degrees    T Axis 69 degrees    Narrative    Normal sinus rhythm with sinus arrhythmia  Left axis deviation  Abnormal ECG  When compared with ECG of 12-OCT-2020 05:28,  Nonspecific T wave abnormality no longer evident in Inferior leads       Echo 11/24/20- prelim- LVEF > 55%, MVP, Mild AI, MR, TR. Labs:     CBC:   Recent Labs     11/23/20 1837 11/24/20  0543   WBC 4.2 4.0   HGB 11.9* 11.2*   HCT 35.2* 33.8*    192     BMP:   Recent Labs     11/23/20 1837 11/24/20  0543    135   K 4.0 3.9   CO2 28 25   BUN 13 14   CREATININE 0.90 0.75   LABGLOM >60 >60   GLUCOSE 136* 83     BNP: No results for input(s): BNP in the last 72 hours. PT/INR:   Recent Labs     11/23/20 1837   PROTIME 13.1   INR 1.0     APTT:No results for input(s): APTT in the last 72 hours. CARDIAC ENZYMES:No results for input(s): TROPHS in the last 72 hours. FASTING LIPID PANEL:  Lab Results   Component Value Date    HDL 52 09/01/2020    TRIG 88 09/01/2020     LIVER PROFILE:No results for input(s): AST, ALT, LABALBU in the last 72 hours.       IMPRESSION:    Patient Active Problem List   Diagnosis    Allergic rhinitis    Osteoarthritis    Osteoporosis    GERD (gastroesophageal reflux disease)    Headache    Mixed hyperlipidemia    Gout    Vitamin D deficiency    Migraine    Chronic headache    Anemia    Lymphadenopathy    Degenerative arthritis of knee, bilateral    Primary osteoarthritis of left knee    Primary osteoarthritis of right knee    Cholecystitis    Elevated LFTs    Abnormal MRI of the abdomen    Weight loss    Abdominal pain, right upper quadrant    Abdominal cramping    Diarrhea    Abdominal pain, generalized    Abdominal bloating    Irritable bowel syndrome with constipation    Status post endoscopic retrograde cholangiopancreatography     - Preop cardiovascular exam  - Prelim Echo- LVEF > 55%, mild AI/MR/TR  - DL  - Chronic Cholelithiasis   - Very good functional capacity- walks 20 minutes on treadmill regularly    RECOMMENDATIONS:  - intermediate risk based on functional status and echo  - can follow up in 4 weeks to continue monitoring her valve disease    Discussed with patient and nursing. Thank you for allowing me to participate in the care of this patient, please do not hesitate to call if you have any questions. Saw Casas DO, [facility] [place], [address], [place] [address] [facility]  [facility] [employer]  [phone]

## 2020-11-24 NOTE — ANESTHESIA PRE PROCEDURE
Department of Anesthesiology  Preprocedure Note       Name:  Danna Kenny   Age:  67 y.o.  :  1945                                          MRN:  [id number]         Date:  2020      Surgeon: Delia March):  Eliot South MD    Procedure: Procedure(s):  CHOLECYSTECTOMY LAPAROSCOPIC ROBOTIC XI MULTIPORT    Medications prior to admission:   Prior to Admission medications    Medication Sig Start Date End Date Taking?  Authorizing Provider   famotidine (PEPCID) 20 MG tablet TAKE ONE TABLET BY MOUTH TWICE A DAY 20   PASCUAL Israel CNP   allopurinol (ZYLOPRIM) 100 MG tablet TAKE ONE TABLET BY MOUTH DAILY 20   PASCUAL Israel CNP   psyllium (METAMUCIL) 28.3 % POWD powder Take 3.4 g by mouth daily    Historical Provider, MD   Bacillus Coagulans-Inulin (ALIGN PREBIOTIC-PROBIOTIC PO) Take by mouth daily     Historical Provider, MD   cetirizine (ZYRTEC) 10 MG tablet TAKE ONE TABLET BY MOUTH DAILY 20   PASCUAL Israel CNP   simvastatin (ZOCOR) 20 MG tablet TAKE ONE TABLET BY MOUTH DAILY 20   PASCUAL Israel CNP   alendronate (FOSAMAX) 70 MG tablet TAKE 1 TABLET BY MOUTH ONCE WEEKLY BEFORE BREAKFAST, ON AN EMPTY STOMACH: REMAIN UPRIGHT FOR 30 MINUTES:TAKE WITH 8 OUNCES OF WATER 20   PASCUAL Israel CNP   vitamin D (ERGOCALCIFEROL) 1.25 MG (11991 UT) CAPS capsule TAKE ONE CAPSULE BY MOUTH EVERY 2 WEEKS 20   PASCUAL Israel CNP   verapamil (CALAN SR) 240 MG extended release tablet TAKE ONE TABLET BY MOUTH ONCE NIGHTLY FOR MIGRAINE HEADACHE 20   PASCUAL Israel CNP   fluticasone (FLONASE) 50 MCG/ACT nasal spray SPRAY TWO SPRAYS IN EACH NOSTRIL ONCE DAILY 20   PASCUAL Israel CNP   aspirin 81 MG tablet Take 81 mg by mouth daily    Historical Provider, MD   naproxen (NAPROSYN) 250 MG tablet TAKE ONE TABLET BY MOUTH THREE TIMES A DAY WITH FOOD AS NEEDED FOR GOUT FLARE UP UNTIL ATTACK SUBSIDES 19   Vianey Aguilar MD   Aspirin-Acetaminophen-Caffeine [facility] MIGRAINE PO) Take by mouth as needed     Historical Provider, MD       Current medications:    No current facility-administered medications for this visit. No current outpatient medications on file.      Facility-Administered Medications Ordered in Other Visits   Medication Dose Route Frequency Provider Last Rate Last Dose    perflutren lipid microspheres (DEFINITY) injection 2.2 mg  2 mL Intravenous ONCE PRN Claude Colorado, MD        0.9 % sodium chloride infusion   Intravenous Continuous Rosalva Hamilton MD 50 mL/hr at 11/24/20 0724      famotidine (PEPCID) injection 20 mg  20 mg Intravenous BID Rosalva Hamilton MD   20 mg at 11/23/20 2203    ondansetron (ZOFRAN) injection 4 mg  4 mg Intravenous Q6H PRN Rosalva Hamilton MD        fentaNYL (SUBLIMAZE) injection 25 mcg  25 mcg Intravenous Q2H PRN Rosalva Hamilton MD        Or    fentaNYL (SUBLIMAZE) injection 50 mcg  50 mcg Intravenous Q2H PRN Rosalva Hamilton MD           Allergies:  No Known Allergies    Problem List:    Patient Active Problem List   Diagnosis Code    Allergic rhinitis J30.9    Osteoarthritis M19.90    Osteoporosis M81.0    GERD (gastroesophageal reflux disease) K21.9    Headache R51.9    Mixed hyperlipidemia E78.2    Gout M10.9    Vitamin D deficiency E55.9    Migraine G43.909    Chronic headache R51.9, G89.29    Anemia D64.9    Lymphadenopathy R59.1    Degenerative arthritis of knee, bilateral M17.0    Primary osteoarthritis of left knee M17.12    Primary osteoarthritis of right knee M17.11    Cholecystitis K81.9    Elevated LFTs R79.89    Abnormal MRI of the abdomen R93.5    Weight loss R63.4    Abdominal pain, right upper quadrant R10.11    Abdominal cramping R10.9    Diarrhea R19.7    Abdominal pain, generalized R10.84    Abdominal bloating R14.0    Irritable bowel syndrome with constipation K58.1    Status post endoscopic retrograde cholangiopancreatography M49.656       Past Medical History:        Diagnosis Date    Allergic rhinitis     Closed tibia fracture     GERD (gastroesophageal reflux disease)     Gout     Headache(784.0)     h/o migraines    Hyperlipidemia     Osteoarthritis     DJD Lt knee    Osteoporosis     Posterior tibial tendon dysfunction     right, wears braces on both legs    Vitamin D deficient rickets     Wears glasses     Wears partial dentures     lower       Past Surgical History:        Procedure Laterality Date    APPENDECTOMY      CARPAL TUNNEL RELEASE      bilateral    [facility] IMPLANT [facility] 2019    [facility]    CATARACT REMOVAL WITH IMPLANT Right 2019    [facility]    COLONOSCOPY      2007? per pt wtih polyps    ERCP N/A 10/14/2020    ERCP WITH BILE DUCT BRUSHING performed by Deja Hein MD at [facility]      left tibia    HYSTERECTOMY      INTRACAPSULAR CATARACT EXTRACTION Left 3/7/2019    EYE CATARACT EMULSIFICATION IOL IMPLANT - TOPICAL performed by Yogesh Quintana MD at [address] [facility] Right 2019    EYE CATARACT EMULSIFICATION IOL IMPLANT performed by Yogesh Quintana MD at [id number] [place] [address] Left 2018    hardware removal and then TKA    KNEE ARTHROSCOPY Left ?     DC TOTAL KNEE ARTHROPLASTY Left 2018    KNEE TOTAL ARTHROPLASTY W/REMOVAL HARDWARE PLATE & SCREWS performed by Rebekah Alex MD at [address] [facility] [address] Left     TONSILLECTOMY AND ADENOIDECTOMY      TOTAL KNEE ARTHROPLASTY Right 12/10/2019    KNEE TOTAL ARTHROPLASTY performed by Rebekah Alex MD at [address] [place] [address] EXTRACTION         Social History:    Social History     Tobacco Use    Smoking status: Former Smoker     Packs/day: 0.50     Years: 10.00     Pack years: 5.00     Types: Cigarettes     Last attempt to quit: 1987     Years since quittin.8    Smokeless tobacco: Never Used   Substance Use Topics    Alcohol use: Yes     Alcohol/week: 0.0 standard drinks     Comment: rare                                Counseling given: Not Answered      Vital Signs (Current): There were no vitals filed for this visit. BP Readings from Last 3 Encounters:   11/24/20 133/60   11/19/20 115/71   11/19/20 126/70       NPO Status:                                                                                 BMI:   Wt Readings from Last 3 Encounters:   11/23/20 128 lb 15.5 oz (58.5 kg)   11/19/20 127 lb (57.6 kg)   11/19/20 127 lb 3.2 oz (57.7 kg)     There is no height or weight on file to calculate BMI.    CBC:   Lab Results   Component Value Date    WBC 4.0 11/24/2020    RBC 3.79 11/24/2020    RBC 2.91 04/20/2012    HGB 11.2 11/24/2020    HCT 33.8 11/24/2020    MCV 89.3 11/24/2020    RDW 14.8 11/24/2020     11/24/2020     04/20/2012     HB 10.3, K 3.7    CMP:   Lab Results   Component Value Date     11/24/2020    K 3.9 11/24/2020     11/24/2020    CO2 25 11/24/2020    BUN 14 11/24/2020    CREATININE 0.75 11/24/2020    GFRAA >60 11/24/2020    LABGLOM >60 11/24/2020    GLUCOSE 83 11/24/2020    GLUCOSE 84 04/09/2012    PROT 6.9 11/19/2020    CALCIUM 9.1 11/24/2020    BILITOT 0.48 11/19/2020    ALKPHOS 75 11/19/2020    AST 24 11/19/2020    ALT 16 11/19/2020       POC Tests: No results for input(s): POCGLU, POCNA, POCK, POCCL, POCBUN, POCHEMO, POCHCT in the last 72 hours.     Coags:   Lab Results   Component Value Date    PROTIME 13.1 11/23/2020    INR 1.0 11/23/2020    APTT 26.0 10/12/2020       HCG (If Applicable): No results found for: PREGTESTUR, PREGSERUM, HCG, HCGQUANT     ABGs: No results found for: PHART, PO2ART, HZJ2ZHO, ACP4ANS, BEART, U2YQIOIT     Type & Screen (If Applicable):  No results found for: LABABO, LABRH    Drug/Infectious Status (If Applicable):  No results found for: HIV, HEPCAB    COVID-19 Screening (If Applicable):   Lab Results   Component Value Date    COVID19 Not Detected 11/20/2020         Anesthesia Evaluation  Patient summary reviewed and Nursing notes reviewed no history of anesthetic complications:   Airway: Mallampati: III  TM distance: <3 FB   Neck ROM: full  Mouth opening: > = 3 FB Dental:    (+) partials      Pulmonary:normal exam  breath sounds clear to auscultation      (-) pneumonia, COPD, asthma, shortness of breath, recent URI, sleep apnea, rhonchi, wheezes, rales, stridor and not a current smoker          Patient did not smoke on day of surgery. ROS comment: Allergic rhinitis    Cardiovascular:  Exercise tolerance: good (>4 METS),   (+) hyperlipidemia    (-) pacemaker, hypertension, valvular problems/murmurs, past MI, CAD, CABG/stent, dysrhythmias,  angina,  CHF, orthopnea, PND,  SELLERS, murmur, weak pulses,  friction rub, systolic click, carotid bruit,  JVD and peripheral edema    ECG reviewed  Rhythm: regular  Rate: normal  Echocardiogram reviewed    Cleared by cardiology     Beta Blocker:  Not on Beta Blocker      ROS comment: Echo 11/24/20- prelim- LVEF > 55%,     Neuro/Psych:   (+) headaches: migraine headaches,    (-) seizures, neuromuscular disease, TIA, CVA, psychiatric history and depression/anxiety            GI/Hepatic/Renal:   (+) GERD: no interval change, liver disease (Abnormal LFTs):,      (-) hiatal hernia, PUD, hepatitis, no renal disease, bowel prep and no morbid obesity      ROS comment: Choledocholithiasis   RUQ pain. Endo/Other:    (+) : arthritis: OA., no malignancy/cancer. (-) diabetes mellitus, hypothyroidism, hyperthyroidism, blood dyscrasia, no electrolyte abnormalities, no malignancy/cancer                ROS comment: Gout  Osteoporosis Abdominal:           Vascular: negative vascular ROS. - PVD, DVT and PE. Anesthesia Plan      general     ASA 3       Induction: intravenous.     MIPS: Postoperative opioids intended and Prophylactic antiemetics administered. Anesthetic plan and risks discussed with patient. Plan discussed with CRNA. The patient was counseled at length about the risks of anita Covid-19 during their perioperative period and any recovery window from their procedure. The patient was made aware that anita Covid-19  may worsen their prognosis for recovering from their procedure  and lend to a higher morbidity and/or mortality risk. All material risks, benefits, and reasonable alternatives including postponing the procedure were discussed. The patient DOES wish to proceed with the procedure at this time. Medical and Cardiac Clearance on chart.     Charles Hanks MD   11/24/2020

## 2020-11-24 NOTE — PROGRESS NOTES
Patient transported to surgery.     Electronically signed by Blanca Allen RN on 11/24/20 at 9:02 AM EST

## 2020-11-25 ENCOUNTER — TELEPHONE (OUTPATIENT)
Dept: FAMILY MEDICINE CLINIC | Age: 75
End: 2020-11-25

## 2020-11-25 LAB — SURGICAL PATHOLOGY REPORT: NORMAL

## 2020-11-25 NOTE — PROGRESS NOTES
Writer gave pt discharge instructions. Writer went through paperwork and pt states an understanding with no questions. Pt is being brought down to ER entrance where her sister is picking up her via wheelchair per Rogers Shipman, CONSUELO. Pt has all her belongings with her, IV was taken out per day shift RN, Niki Huynh.

## 2020-11-25 NOTE — TELEPHONE ENCOUNTER
Renetta [facility] Transitions Initial Follow Up Call    Call within 2 business days of discharge: Yes     Patient: Umu Jo Patient : 1945 MRN: [id number]    [unfilled]    RARS: Readmission Risk Score: 7       Spoke with: left a message for the patient to call the office. Discharge department/facility: D/C [facility]  BILIARY COLIC    Non-face-to-face services provided:  Scheduled appointment with PCP-left a message for the patient to call the office. Scheduled appointment with Specialist-she has an appointment to see Dr Akira Chiu on 20.     Follow Up  Future Appointments   Date Time Provider Anabel Jacksonisti   2020  1:00 PM Peace Harris MD [facility]   2021  1:00 PM PASCUAL Franz - Doctor [facility] [facility]

## 2020-11-25 NOTE — DISCHARGE INSTR - DIET

## 2020-11-27 ENCOUNTER — HOSPITAL ENCOUNTER (INPATIENT)
Age: 75
LOS: 6 days | Discharge: HOME | DRG: 372 | End: 2020-12-03
Attending: STUDENT IN AN ORGANIZED HEALTH CARE EDUCATION/TRAINING PROGRAM | Admitting: STUDENT IN AN ORGANIZED HEALTH CARE EDUCATION/TRAINING PROGRAM
Payer: MEDICARE

## 2020-11-27 ENCOUNTER — APPOINTMENT (OUTPATIENT)
Dept: CT IMAGING | Age: 75
DRG: 372 | End: 2020-11-27
Payer: MEDICARE

## 2020-11-27 PROBLEM — A04.72 C. DIFFICILE COLITIS: Status: ACTIVE | Noted: 2020-11-27

## 2020-11-27 LAB
-: ABNORMAL
ABSOLUTE EOS #: 0 K/UL (ref 0–0.4)
ABSOLUTE IMMATURE GRANULOCYTE: ABNORMAL K/UL (ref 0–0.3)
ABSOLUTE LYMPH #: 1 K/UL (ref 1–4.8)
ABSOLUTE MONO #: 1 K/UL (ref 0.1–1.3)
ALBUMIN SERPL-MCNC: 3.8 G/DL (ref 3.5–5.2)
ALBUMIN/GLOBULIN RATIO: ABNORMAL (ref 1–2.5)
ALP BLD-CCNC: 66 U/L (ref 35–104)
ALT SERPL-CCNC: 22 U/L (ref 5–33)
AMORPHOUS: ABNORMAL
ANION GAP SERPL CALCULATED.3IONS-SCNC: 13 MMOL/L (ref 9–17)
AST SERPL-CCNC: 23 U/L
BACTERIA: ABNORMAL
BASOPHILS # BLD: 0 % (ref 0–2)
BASOPHILS ABSOLUTE: 0 K/UL (ref 0–0.2)
BILIRUB SERPL-MCNC: 0.77 MG/DL (ref 0.3–1.2)
BILIRUBIN URINE: ABNORMAL
BUN BLDV-MCNC: 28 MG/DL (ref 8–23)
BUN/CREAT BLD: ABNORMAL (ref 9–20)
C DIFF AG + TOXIN: ABNORMAL
CALCIUM SERPL-MCNC: 9.1 MG/DL (ref 8.6–10.4)
CASTS UA: ABNORMAL /LPF
CHLORIDE BLD-SCNC: 96 MMOL/L (ref 98–107)
CO2: 26 MMOL/L (ref 20–31)
COLOR: YELLOW
COMMENT UA: ABNORMAL
CREAT SERPL-MCNC: 1.11 MG/DL (ref 0.5–0.9)
CRYSTALS, UA: ABNORMAL /HPF
CRYSTALS, UA: ABNORMAL /HPF
DIFFERENTIAL TYPE: ABNORMAL
EOSINOPHILS RELATIVE PERCENT: 1 % (ref 0–4)
EPITHELIAL CELLS UA: ABNORMAL /HPF
GFR AFRICAN AMERICAN: 58 ML/MIN
GFR NON-AFRICAN AMERICAN: 48 ML/MIN
GFR SERPL CREATININE-BSD FRML MDRD: ABNORMAL ML/MIN/{1.73_M2}
GFR SERPL CREATININE-BSD FRML MDRD: ABNORMAL ML/MIN/{1.73_M2}
GLUCOSE BLD-MCNC: 126 MG/DL (ref 70–99)
GLUCOSE URINE: NEGATIVE
HCT VFR BLD CALC: 44.2 % (ref 36–46)
HEMOGLOBIN: 14.8 G/DL (ref 12–16)
IMMATURE GRANULOCYTES: ABNORMAL %
KETONES, URINE: ABNORMAL
LACTIC ACID, WHOLE BLOOD: ABNORMAL MMOL/L (ref 0.7–2.1)
LACTIC ACID, WHOLE BLOOD: ABNORMAL MMOL/L (ref 0.7–2.1)
LACTIC ACID: 2.3 MMOL/L (ref 0.5–2.2)
LACTIC ACID: 2.7 MMOL/L (ref 0.5–2.2)
LEUKOCYTE ESTERASE, URINE: NEGATIVE
LIPASE: 7 U/L (ref 13–60)
LYMPHOCYTES # BLD: 14 % (ref 24–44)
MCH RBC QN AUTO: 29.4 PG (ref 26–34)
MCHC RBC AUTO-ENTMCNC: 33.4 G/DL (ref 31–37)
MCV RBC AUTO: 88 FL (ref 80–100)
MONOCYTES # BLD: 14 % (ref 1–7)
MUCUS: ABNORMAL
NITRITE, URINE: NEGATIVE
NRBC AUTOMATED: ABNORMAL PER 100 WBC
OTHER OBSERVATIONS UA: ABNORMAL
PDW BLD-RTO: 14.6 % (ref 11.5–14.9)
PH UA: 5 (ref 5–8)
PLATELET # BLD: 250 K/UL (ref 150–450)
PLATELET ESTIMATE: ABNORMAL
PMV BLD AUTO: 8.4 FL (ref 6–12)
POTASSIUM SERPL-SCNC: 3.6 MMOL/L (ref 3.7–5.3)
PROTEIN UA: NEGATIVE
RBC # BLD: 5.02 M/UL (ref 4–5.2)
RBC # BLD: ABNORMAL 10*6/UL
RBC UA: ABNORMAL /HPF
RENAL EPITHELIAL, UA: ABNORMAL /HPF
SEG NEUTROPHILS: 71 % (ref 36–66)
SEGMENTED NEUTROPHILS ABSOLUTE COUNT: 5 K/UL (ref 1.3–9.1)
SODIUM BLD-SCNC: 135 MMOL/L (ref 135–144)
SPECIFIC GRAVITY UA: 1.02 (ref 1–1.03)
SPECIMEN DESCRIPTION: ABNORMAL
TOTAL PROTEIN: 7 G/DL (ref 6.4–8.3)
TRICHOMONAS: ABNORMAL
TURBIDITY: ABNORMAL
URINE HGB: ABNORMAL
UROBILINOGEN, URINE: NORMAL
WBC # BLD: 7.1 K/UL (ref 3.5–11)
WBC # BLD: ABNORMAL 10*3/UL
WBC UA: ABNORMAL /HPF
YEAST: ABNORMAL

## 2020-11-27 PROCEDURE — 83690 ASSAY OF LIPASE: CPT

## 2020-11-27 PROCEDURE — 6360000004 HC RX CONTRAST MEDICATION: Performed by: EMERGENCY MEDICINE

## 2020-11-27 PROCEDURE — 99284 EMERGENCY DEPT VISIT MOD MDM: CPT

## 2020-11-27 PROCEDURE — 2580000003 HC RX 258: Performed by: STUDENT IN AN ORGANIZED HEALTH CARE EDUCATION/TRAINING PROGRAM

## 2020-11-27 PROCEDURE — 83605 ASSAY OF LACTIC ACID: CPT

## 2020-11-27 PROCEDURE — 2060000000 HC ICU INTERMEDIATE R&B

## 2020-11-27 PROCEDURE — 2580000003 HC RX 258: Performed by: EMERGENCY MEDICINE

## 2020-11-27 PROCEDURE — 87449 NOS EACH ORGANISM AG IA: CPT

## 2020-11-27 PROCEDURE — 80053 COMPREHEN METABOLIC PANEL: CPT

## 2020-11-27 PROCEDURE — 81001 URINALYSIS AUTO W/SCOPE: CPT

## 2020-11-27 PROCEDURE — 74177 CT ABD & PELVIS W/CONTRAST: CPT

## 2020-11-27 PROCEDURE — 85025 COMPLETE CBC W/AUTO DIFF WBC: CPT

## 2020-11-27 PROCEDURE — 87040 BLOOD CULTURE FOR BACTERIA: CPT

## 2020-11-27 PROCEDURE — 87324 CLOSTRIDIUM AG IA: CPT

## 2020-11-27 PROCEDURE — 36415 COLL VENOUS BLD VENIPUNCTURE: CPT

## 2020-11-27 RX ORDER — ACETAMINOPHEN 650 MG/1
650 SUPPOSITORY RECTAL EVERY 6 HOURS PRN
Status: DISCONTINUED | OUTPATIENT
Start: 2020-11-27 | End: 2020-12-03 | Stop reason: HOSPADM

## 2020-11-27 RX ORDER — PROMETHAZINE HYDROCHLORIDE 25 MG/1
12.5 TABLET ORAL EVERY 6 HOURS PRN
Status: DISCONTINUED | OUTPATIENT
Start: 2020-11-27 | End: 2020-12-03 | Stop reason: HOSPADM

## 2020-11-27 RX ORDER — ONDANSETRON 2 MG/ML
4 INJECTION INTRAMUSCULAR; INTRAVENOUS EVERY 6 HOURS PRN
Status: DISCONTINUED | OUTPATIENT
Start: 2020-11-27 | End: 2020-12-03 | Stop reason: HOSPADM

## 2020-11-27 RX ORDER — ALLOPURINOL 100 MG/1
100 TABLET ORAL DAILY
Status: DISCONTINUED | OUTPATIENT
Start: 2020-11-28 | End: 2020-12-03 | Stop reason: HOSPADM

## 2020-11-27 RX ORDER — SODIUM CHLORIDE 0.9 % (FLUSH) 0.9 %
10 SYRINGE (ML) INJECTION PRN
Status: DISCONTINUED | OUTPATIENT
Start: 2020-11-27 | End: 2020-11-27

## 2020-11-27 RX ORDER — 0.9 % SODIUM CHLORIDE 0.9 %
80 INTRAVENOUS SOLUTION INTRAVENOUS ONCE
Status: COMPLETED | OUTPATIENT
Start: 2020-11-27 | End: 2020-11-27

## 2020-11-27 RX ORDER — HEPARIN SODIUM 5000 [USP'U]/ML
5000 INJECTION, SOLUTION INTRAVENOUS; SUBCUTANEOUS EVERY 8 HOURS SCHEDULED
Status: DISCONTINUED | OUTPATIENT
Start: 2020-11-27 | End: 2020-12-03 | Stop reason: HOSPADM

## 2020-11-27 RX ORDER — SODIUM CHLORIDE 0.9 % (FLUSH) 0.9 %
10 SYRINGE (ML) INJECTION EVERY 12 HOURS SCHEDULED
Status: DISCONTINUED | OUTPATIENT
Start: 2020-11-27 | End: 2020-11-27 | Stop reason: SDUPTHER

## 2020-11-27 RX ORDER — ATORVASTATIN CALCIUM 20 MG/1
20 TABLET, FILM COATED ORAL DAILY
Status: DISCONTINUED | OUTPATIENT
Start: 2020-11-28 | End: 2020-12-03 | Stop reason: HOSPADM

## 2020-11-27 RX ORDER — SODIUM CHLORIDE 0.9 % (FLUSH) 0.9 %
10 SYRINGE (ML) INJECTION EVERY 12 HOURS SCHEDULED
Status: DISCONTINUED | OUTPATIENT
Start: 2020-11-27 | End: 2020-12-03 | Stop reason: HOSPADM

## 2020-11-27 RX ORDER — ACETAMINOPHEN 325 MG/1
650 TABLET ORAL EVERY 6 HOURS PRN
Status: DISCONTINUED | OUTPATIENT
Start: 2020-11-27 | End: 2020-11-27 | Stop reason: SDUPTHER

## 2020-11-27 RX ORDER — PROMETHAZINE HYDROCHLORIDE 25 MG/1
12.5 TABLET ORAL EVERY 6 HOURS PRN
Status: DISCONTINUED | OUTPATIENT
Start: 2020-11-27 | End: 2020-11-27 | Stop reason: SDUPTHER

## 2020-11-27 RX ORDER — ACETAMINOPHEN 325 MG/1
650 TABLET ORAL EVERY 6 HOURS PRN
Status: DISCONTINUED | OUTPATIENT
Start: 2020-11-27 | End: 2020-12-03 | Stop reason: HOSPADM

## 2020-11-27 RX ORDER — SODIUM CHLORIDE 0.9 % (FLUSH) 0.9 %
10 SYRINGE (ML) INJECTION PRN
Status: DISCONTINUED | OUTPATIENT
Start: 2020-11-27 | End: 2020-12-03 | Stop reason: HOSPADM

## 2020-11-27 RX ORDER — ACETAMINOPHEN 650 MG/1
650 SUPPOSITORY RECTAL EVERY 6 HOURS PRN
Status: DISCONTINUED | OUTPATIENT
Start: 2020-11-27 | End: 2020-11-27 | Stop reason: SDUPTHER

## 2020-11-27 RX ORDER — POLYETHYLENE GLYCOL 3350 17 G/17G
17 POWDER, FOR SOLUTION ORAL DAILY PRN
Status: DISCONTINUED | OUTPATIENT
Start: 2020-11-27 | End: 2020-12-03 | Stop reason: HOSPADM

## 2020-11-27 RX ORDER — ONDANSETRON 2 MG/ML
4 INJECTION INTRAMUSCULAR; INTRAVENOUS EVERY 6 HOURS PRN
Status: DISCONTINUED | OUTPATIENT
Start: 2020-11-27 | End: 2020-11-27 | Stop reason: SDUPTHER

## 2020-11-27 RX ORDER — SODIUM CHLORIDE 9 MG/ML
INJECTION, SOLUTION INTRAVENOUS CONTINUOUS
Status: DISCONTINUED | OUTPATIENT
Start: 2020-11-27 | End: 2020-12-03 | Stop reason: HOSPADM

## 2020-11-27 RX ORDER — POLYETHYLENE GLYCOL 3350 17 G/17G
17 POWDER, FOR SOLUTION ORAL DAILY PRN
Status: DISCONTINUED | OUTPATIENT
Start: 2020-11-27 | End: 2020-11-27 | Stop reason: SDUPTHER

## 2020-11-27 RX ORDER — SODIUM CHLORIDE 0.9 % (FLUSH) 0.9 %
10 SYRINGE (ML) INJECTION PRN
Status: DISCONTINUED | OUTPATIENT
Start: 2020-11-27 | End: 2020-11-27 | Stop reason: SDUPTHER

## 2020-11-27 RX ORDER — 0.9 % SODIUM CHLORIDE 0.9 %
30 INTRAVENOUS SOLUTION INTRAVENOUS ONCE
Status: COMPLETED | OUTPATIENT
Start: 2020-11-27 | End: 2020-11-27

## 2020-11-27 RX ADMIN — Medication 10 ML: at 19:20

## 2020-11-27 RX ADMIN — SODIUM CHLORIDE: 9 INJECTION, SOLUTION INTRAVENOUS at 21:58

## 2020-11-27 RX ADMIN — IOPAMIDOL 75 ML: 755 INJECTION, SOLUTION INTRAVENOUS at 19:19

## 2020-11-27 RX ADMIN — SODIUM CHLORIDE 1728 ML: 9 INJECTION, SOLUTION INTRAVENOUS at 18:33

## 2020-11-27 RX ADMIN — SODIUM CHLORIDE 80 ML: 9 INJECTION, SOLUTION INTRAVENOUS at 19:20

## 2020-11-27 ASSESSMENT — ENCOUNTER SYMPTOMS
COLOR CHANGE: 0
FACIAL SWELLING: 0
VOMITING: 0
DIARRHEA: 1
EYE ITCHING: 0
COUGH: 0
NAUSEA: 0
RHINORRHEA: 0
ABDOMINAL PAIN: 1
PHOTOPHOBIA: 0
SHORTNESS OF BREATH: 0

## 2020-11-27 ASSESSMENT — PAIN SCALES - GENERAL: PAINLEVEL_OUTOF10: 0

## 2020-11-27 NOTE — ED PROVIDER NOTES
EMERGENCY DEPARTMENT ENCOUNTER    Pt Name: Anthony Ratliff  MRN: [id number]  [place] 1945  Date of evaluation: 11/27/20  CHIEF COMPLAINT       Chief Complaint   Patient presents with    Diarrhea     HISTORY OF PRESENT ILLNESS   HPI  68-year-old female who is 3 days postop from a laparoscopic cholecystectomy currently taking Keflex presents with chief complaint of diarrhea. Symptoms been present for 1 day. Progressing over the course of the day today. Described as brown watery diarrhea. No fever chills. Mild associated abdominal discomfort. Mild generalized weakness. No cramping. No nausea or vomiting. No chest pain or shortness of breath. No cough. symptoms are moderate and intermittent. No home treatment prior to arrival.      REVIEW OF SYSTEMS     Review of Systems   Constitutional: Negative for chills and fatigue. HENT: Negative for facial swelling, postnasal drip and rhinorrhea. Eyes: Negative for photophobia and itching. Respiratory: Negative for cough and shortness of breath. Cardiovascular: Negative for chest pain and leg swelling. Gastrointestinal: Positive for abdominal pain and diarrhea. Negative for nausea and vomiting. Genitourinary: Negative for dysuria, flank pain and hematuria. Musculoskeletal: Negative for arthralgias and joint swelling. Skin: Negative for color change and rash. Neurological: Negative for dizziness, numbness and headaches.      PASTMEDICAL HISTORY     Past Medical History:   Diagnosis Date    Allergic rhinitis     Closed tibia fracture     GERD (gastroesophageal reflux disease)     Gout     Headache(784.0)     h/o migraines    Hyperlipidemia     Osteoarthritis     DJD Lt knee    Osteoporosis     Posterior tibial tendon dysfunction     right, wears braces on both legs    Vitamin D deficient rickets     Wears glasses     Wears partial dentures     lower     Past Problem List  Patient Active Problem List   Diagnosis Code    Allergic rhinitis J30.9    Osteoarthritis M19.90    Osteoporosis M81.0    GERD (gastroesophageal reflux disease) K21.9    Headache R51.9    Mixed hyperlipidemia E78.2    Gout M10.9    Vitamin D deficiency E55.9    Migraine G43.909    Chronic headache R51.9, G89.29    Anemia D64.9    Lymphadenopathy R59.1    Degenerative arthritis of knee, bilateral M17.0    Primary osteoarthritis of left knee M17.12    Primary osteoarthritis of right knee M17.11    Cholecystitis K81.9    Elevated LFTs R79.89    Abnormal MRI of the abdomen R93.5    Weight loss R63.4    Abdominal pain, right upper quadrant R10.11    Abdominal cramping R10.9    Diarrhea R19.7    Abdominal pain, generalized R10.84    Abdominal bloating R14.0    Irritable bowel syndrome with constipation K58.1    Status post endoscopic retrograde cholangiopancreatography Q73.003    Biliary colic D02.41    C. difficile colitis A04.72    S/P cholecystectomy Z90.49     SURGICAL HISTORY       Past Surgical History:   Procedure Laterality Date    APPENDECTOMY      CARPAL TUNNEL RELEASE      bilateral    CATARACT REMOVAL WITH IMPLANT [facility]/07/2019    [facility]    CATARACT REMOVAL WITH IMPLANT Right 04/04/2019    [facility]    CHOLECYSTECTOMY, LAPAROSCOPIC N/A 11/24/2020    CHOLECYSTECTOMY LAPAROSCOPIC ROBOTIC XI MULTIPORT performed by Christal Carlson MD at [address] [place] [address]      2007?  per pt wtih polyps    ERCP N/A 10/14/2020    ERCP WITH BILE DUCT BRUSHING performed by Brian Mitchell MD at [address] [place] [address]      left tibia    HYSTERECTOMY  4/12    INTRACAPSULAR CATARACT EXTRACTION Left 3/7/2019    EYE CATARACT EMULSIFICATION IOL IMPLANT - TOPICAL performed by Leandro Mata MD at [address] [place] [address] Right 4/4/2019    EYE CATARACT EMULSIFICATION IOL IMPLANT performed by Leandro Mata MD at [place] [address] Left 11/06/2018    hardware removal doses taken as pain becomes manageable  Associated Diagnoses: Biliary colic      alendronate (FOSAMAX) 70 MG tablet TAKE 1 TABLET BY MOUTH ONCE WEEKLY BEFORE BREAKFAST, ON AN EMPTY STOMACH: REMAIN UPRIGHT FOR 30 MINUTES:TAKE WITH 8 OUNCES OF WATER  Qty: 4 tablet, Refills: 3      naproxen (NAPROSYN) 250 MG tablet TAKE ONE TABLET BY MOUTH THREE TIMES A DAY WITH FOOD AS NEEDED FOR GOUT FLARE UP UNTIL ATTACK SUBSIDES  Qty: 60 tablet, Refills: 0           ALLERGIES     has No Known Allergies. FAMILY HISTORY     She indicated that her mother is . She indicated that her father is . She indicated that the status of her other is unknown. SOCIAL HISTORY       Social History     Tobacco Use    Smoking status: Former Smoker     Packs/day: 0.50     Years: 10.00     Pack years: 5.00     Types: Cigarettes     Last attempt to quit: 1987     Years since quittin.8    Smokeless tobacco: Never Used   Substance Use Topics    Alcohol use: Yes     Alcohol/week: 0.0 standard drinks     Comment: rare    Drug use: No     PHYSICAL EXAM     INITIAL VITALS: BP (!) 96/57   Pulse 60   Temp 98.1 °F (36.7 °C) (Oral)   Resp 16   Ht 5' 3\" (1.6 m)   Wt 122 lb 9.2 oz (55.6 kg)   LMP 2004   SpO2 97%   BMI 21.71 kg/m²    Physical Exam  Constitutional:       Appearance: She is normal weight. HENT:      Head: Normocephalic and atraumatic. Eyes:      Extraocular Movements: Extraocular movements intact. Pupils: Pupils are equal, round, and reactive to light. Neck:      Musculoskeletal: Normal range of motion and neck supple. Cardiovascular:      Rate and Rhythm: Regular rhythm. Tachycardia present. Pulmonary:      Effort: Pulmonary effort is normal.      Breath sounds: Normal breath sounds. Abdominal:      General: Abdomen is flat. There is no distension. Palpations: There is no mass. Musculoskeletal: Normal range of motion. General: No swelling.    Skin:     General: Skin is Potassium 3.6 (*)     Chloride 96 (*)     GFR Non- 48 (*)     GFR  58 (*)     All other components within normal limits   LIPASE - Abnormal; Notable for the following components:    Lipase 7 (*)     All other components within normal limits   URINALYSIS - Abnormal; Notable for the following components:    Turbidity UA CLOUDY (*)     Bilirubin Urine SMALL (*)     Ketones, Urine TRACE (*)     Urine Hgb TRACE (*)     All other components within normal limits   LACTIC ACID, PLASMA - Abnormal; Notable for the following components:    Lactic Acid 2.3 (*)     All other components within normal limits   MICROSCOPIC URINALYSIS - Abnormal; Notable for the following components:    Crystals, UA URIC ACID (*)     Crystals, UA FEW (*)     Bacteria, UA MANY (*)     All other components within normal limits   LACTIC ACID, PLASMA - Abnormal; Notable for the following components:    Lactic Acid 2.7 (*)     All other components within normal limits   BASIC METABOLIC PANEL W/ REFLEX TO MG FOR LOW K - Abnormal; Notable for the following components:    Calcium 7.7 (*)     Potassium 2.9 (*)     All other components within normal limits   CBC WITH AUTO DIFFERENTIAL - Abnormal; Notable for the following components:    RBC 3.86 (*)     Hemoglobin 11.5 (*)     Hematocrit 34.1 (*)     Seg Neutrophils 70 (*)     Lymphocytes 18 (*)     Monocytes 11 (*)     All other components within normal limits   POTASSIUM - Abnormal; Notable for the following components:    Potassium 3.2 (*)     All other components within normal limits   BASIC METABOLIC PANEL W/ REFLEX TO MG FOR LOW K - Abnormal; Notable for the following components:    CREATININE 0.45 (*)     Calcium 7.4 (*)     Potassium 3.2 (*)     Chloride 108 (*)     Anion Gap 7 (*)     All other components within normal limits   CBC WITH AUTO DIFFERENTIAL - Abnormal; Notable for the following components:    RBC 3.21 (*)     Hemoglobin 9.5 (*)     Hematocrit 28.1 (*) acetaminophen (TYLENOL) tablet 650 mg     acetaminophen (TYLENOL) suppository 650 mg    polyethylene glycol (GLYCOLAX) packet 17 g    OR Linked Order Group     promethazine (PHENERGAN) tablet 12.5 mg     ondansetron (ZOFRAN) injection 4 mg    DISCONTD: enoxaparin (LOVENOX) injection 40 mg    allopurinol (ZYLOPRIM) tablet 100 mg    atorvastatin (LIPITOR) tablet 20 mg    DISCONTD: potassium chloride 10 mEq/100 mL IVPB (Peripheral Line)    potassium chloride (KLOR-CON M) extended release tablet 40 mEq    OR Linked Order Group     potassium chloride (KLOR-CON M) extended release tablet 40 mEq     potassium bicarb-citric acid (EFFER-K) effervescent tablet 40 mEq     potassium chloride 10 mEq/100 mL IVPB (Peripheral Line)    DISCONTD: potassium chloride 40 mEq in dextrose 5 % 500 mL infusion    potassium chloride 40 mEq in dextrose 5 % 500 mL infusion    verapamil (CALAN SR) extended release tablet 240 mg    vancomycin (FIRVANQ) 50 MG/ML oral solution 250 mg     Order Specific Question:   Antimicrobial Indications     Answer:   Infectious Diarrhea    lactobacillus (CULTURELLE) capsule 1 capsule     CONSULTS:  IP CONSULT TO GENERAL SURGERY  IP CONSULT TO PRIMARY CARE PROVIDER    FINAL IMPRESSION      1. C. difficile colitis          DISPOSITION/PLAN   DISPOSITION        PATIENT REFERRED TO:  Ro Lucas MD  [facility] [place] [phone]    On 12/9/2020  You have an appointment scheduled for 12/9/2020 at 9:45am.    DISCHARGE MEDICATIONS:  Current Discharge Medication List        Rachelle Babcock MD  Attending Emergency Physician                    Rachelle Babcock MD  11/29/20 [id number]

## 2020-11-27 NOTE — FLOWSHEET NOTE
provided listening presence, words of comfort,as she is waiting for word from staff on her condition.      11/27/20 3334   Encounter Summary   Services provided to: Patient   Referral/Consult From: [facility]   (11/27/2020)   Complexity of Encounter Moderate   Length of Encounter 15 minutes   Spiritual Assessment Completed Yes   Routine   Type Initial   Assessment Coping   Intervention Active listening;Nurtured hope;[place]   Outcome Expressed gratitude

## 2020-11-27 NOTE — ED NOTES
Pt comes to this ER with c/o having approximately 30 episodes of diarrhea since yesterday. Pt states she had her gallbladder removed here at [facility] this past Tuesday. Pt arrives A+O x 4, GCS = 15, PMS x 4 intact, eupneic, and PWD. Pt's pulse is slightly tachycardic and regular. Abdomen is soft et nondistended, and she denies pain except for cramping during her episodes of diarrhea. Pt also denies any fevers.      Stephanie Jurado RN  11/27/20 Fariba Ofelia

## 2020-11-28 PROBLEM — Z90.49 S/P CHOLECYSTECTOMY: Status: ACTIVE | Noted: 2020-11-28

## 2020-11-28 LAB
ABSOLUTE EOS #: 0.06 K/UL (ref 0–0.4)
ABSOLUTE IMMATURE GRANULOCYTE: ABNORMAL K/UL (ref 0–0.3)
ABSOLUTE LYMPH #: 1.04 K/UL (ref 1–4.8)
ABSOLUTE MONO #: 0.64 K/UL (ref 0.1–1.3)
ANION GAP SERPL CALCULATED.3IONS-SCNC: 12 MMOL/L (ref 9–17)
BASOPHILS # BLD: 0 % (ref 0–2)
BASOPHILS ABSOLUTE: 0 K/UL (ref 0–0.2)
BUN BLDV-MCNC: 20 MG/DL (ref 8–23)
BUN/CREAT BLD: ABNORMAL (ref 9–20)
CALCIUM SERPL-MCNC: 7.7 MG/DL (ref 8.6–10.4)
CHLORIDE BLD-SCNC: 102 MMOL/L (ref 98–107)
CO2: 23 MMOL/L (ref 20–31)
CREAT SERPL-MCNC: 0.76 MG/DL (ref 0.5–0.9)
DIFFERENTIAL TYPE: ABNORMAL
EOSINOPHILS RELATIVE PERCENT: 1 % (ref 0–4)
GFR AFRICAN AMERICAN: >60 ML/MIN
GFR NON-AFRICAN AMERICAN: >60 ML/MIN
GFR SERPL CREATININE-BSD FRML MDRD: ABNORMAL ML/MIN/{1.73_M2}
GFR SERPL CREATININE-BSD FRML MDRD: ABNORMAL ML/MIN/{1.73_M2}
GLUCOSE BLD-MCNC: 96 MG/DL (ref 70–99)
HCT VFR BLD CALC: 34.1 % (ref 36–46)
HEMOGLOBIN: 11.5 G/DL (ref 12–16)
IMMATURE GRANULOCYTES: ABNORMAL %
LACTIC ACID: 0.7 MMOL/L (ref 0.5–2.2)
LACTIC ACID: 0.8 MMOL/L (ref 0.5–2.2)
LACTIC ACID: 2.1 MMOL/L (ref 0.5–2.2)
LYMPHOCYTES # BLD: 18 % (ref 24–44)
MAGNESIUM: 1.8 MG/DL (ref 1.6–2.6)
MCH RBC QN AUTO: 29.8 PG (ref 26–34)
MCHC RBC AUTO-ENTMCNC: 33.7 G/DL (ref 31–37)
MCV RBC AUTO: 88.4 FL (ref 80–100)
MONOCYTES # BLD: 11 % (ref 1–7)
MORPHOLOGY: NORMAL
NRBC AUTOMATED: ABNORMAL PER 100 WBC
PDW BLD-RTO: 14.5 % (ref 11.5–14.9)
PLATELET # BLD: 199 K/UL (ref 150–450)
PLATELET ESTIMATE: ABNORMAL
PMV BLD AUTO: 8.1 FL (ref 6–12)
POTASSIUM SERPL-SCNC: 2.9 MMOL/L (ref 3.7–5.3)
POTASSIUM SERPL-SCNC: 3.2 MMOL/L (ref 3.7–5.3)
RBC # BLD: 3.86 M/UL (ref 4–5.2)
RBC # BLD: ABNORMAL 10*6/UL
SEG NEUTROPHILS: 70 % (ref 36–66)
SEGMENTED NEUTROPHILS ABSOLUTE COUNT: 4.06 K/UL (ref 1.3–9.1)
SODIUM BLD-SCNC: 137 MMOL/L (ref 135–144)
WBC # BLD: 5.8 K/UL (ref 3.5–11)
WBC # BLD: ABNORMAL 10*3/UL

## 2020-11-28 PROCEDURE — 6370000000 HC RX 637 (ALT 250 FOR IP): Performed by: STUDENT IN AN ORGANIZED HEALTH CARE EDUCATION/TRAINING PROGRAM

## 2020-11-28 PROCEDURE — 83605 ASSAY OF LACTIC ACID: CPT

## 2020-11-28 PROCEDURE — 6370000000 HC RX 637 (ALT 250 FOR IP): Performed by: SURGERY

## 2020-11-28 PROCEDURE — 2580000003 HC RX 258: Performed by: STUDENT IN AN ORGANIZED HEALTH CARE EDUCATION/TRAINING PROGRAM

## 2020-11-28 PROCEDURE — 6360000002 HC RX W HCPCS: Performed by: STUDENT IN AN ORGANIZED HEALTH CARE EDUCATION/TRAINING PROGRAM

## 2020-11-28 PROCEDURE — 2500000003 HC RX 250 WO HCPCS: Performed by: STUDENT IN AN ORGANIZED HEALTH CARE EDUCATION/TRAINING PROGRAM

## 2020-11-28 PROCEDURE — 80048 BASIC METABOLIC PNL TOTAL CA: CPT

## 2020-11-28 PROCEDURE — 2060000000 HC ICU INTERMEDIATE R&B

## 2020-11-28 PROCEDURE — 36415 COLL VENOUS BLD VENIPUNCTURE: CPT

## 2020-11-28 PROCEDURE — 84132 ASSAY OF SERUM POTASSIUM: CPT

## 2020-11-28 PROCEDURE — 83735 ASSAY OF MAGNESIUM: CPT

## 2020-11-28 PROCEDURE — 85025 COMPLETE CBC W/AUTO DIFF WBC: CPT

## 2020-11-28 RX ORDER — LACTOBACILLUS RHAMNOSUS GG 10B CELL
1 CAPSULE ORAL
Status: DISCONTINUED | OUTPATIENT
Start: 2020-11-29 | End: 2020-12-03 | Stop reason: HOSPADM

## 2020-11-28 RX ORDER — VERAPAMIL HYDROCHLORIDE 240 MG/1
240 TABLET, FILM COATED, EXTENDED RELEASE ORAL NIGHTLY
Status: DISCONTINUED | OUTPATIENT
Start: 2020-11-28 | End: 2020-12-01

## 2020-11-28 RX ORDER — POTASSIUM CHLORIDE 20 MEQ/1
40 TABLET, EXTENDED RELEASE ORAL ONCE
Status: COMPLETED | OUTPATIENT
Start: 2020-11-28 | End: 2020-11-28

## 2020-11-28 RX ORDER — POTASSIUM CHLORIDE 7.45 MG/ML
10 INJECTION INTRAVENOUS PRN
Status: DISCONTINUED | OUTPATIENT
Start: 2020-11-28 | End: 2020-12-03 | Stop reason: HOSPADM

## 2020-11-28 RX ORDER — POTASSIUM CHLORIDE 7.45 MG/ML
40 INJECTION INTRAVENOUS ONCE
Status: DISCONTINUED | OUTPATIENT
Start: 2020-11-28 | End: 2020-11-28 | Stop reason: CLARIF

## 2020-11-28 RX ORDER — POTASSIUM CHLORIDE 20 MEQ/1
40 TABLET, EXTENDED RELEASE ORAL PRN
Status: DISCONTINUED | OUTPATIENT
Start: 2020-11-28 | End: 2020-12-03 | Stop reason: HOSPADM

## 2020-11-28 RX ADMIN — POTASSIUM CHLORIDE 40 MEQ: 1500 TABLET, EXTENDED RELEASE ORAL at 07:37

## 2020-11-28 RX ADMIN — VANCOMYCIN HYDROCHLORIDE 250 MG: KIT at 17:09

## 2020-11-28 RX ADMIN — SODIUM CHLORIDE: 9 INJECTION, SOLUTION INTRAVENOUS at 17:48

## 2020-11-28 RX ADMIN — HEPARIN SODIUM 5000 UNITS: 5000 INJECTION INTRAVENOUS; SUBCUTANEOUS at 00:33

## 2020-11-28 RX ADMIN — HEPARIN SODIUM 5000 UNITS: 5000 INJECTION INTRAVENOUS; SUBCUTANEOUS at 20:42

## 2020-11-28 RX ADMIN — METRONIDAZOLE 500 MG: 500 INJECTION, SOLUTION INTRAVENOUS at 00:34

## 2020-11-28 RX ADMIN — METRONIDAZOLE 500 MG: 500 INJECTION, SOLUTION INTRAVENOUS at 06:45

## 2020-11-28 RX ADMIN — VANCOMYCIN HYDROCHLORIDE 500 MG: KIT at 00:33

## 2020-11-28 RX ADMIN — ATORVASTATIN CALCIUM 20 MG: 20 TABLET, FILM COATED ORAL at 14:31

## 2020-11-28 RX ADMIN — VANCOMYCIN HYDROCHLORIDE 500 MG: KIT at 06:45

## 2020-11-28 RX ADMIN — POTASSIUM CHLORIDE: 2 INJECTION, SOLUTION, CONCENTRATE INTRAVENOUS at 08:48

## 2020-11-28 RX ADMIN — SODIUM CHLORIDE: 9 INJECTION, SOLUTION INTRAVENOUS at 07:54

## 2020-11-28 RX ADMIN — POTASSIUM CHLORIDE 40 MEQ: 1500 TABLET, EXTENDED RELEASE ORAL at 17:19

## 2020-11-28 RX ADMIN — HEPARIN SODIUM 5000 UNITS: 5000 INJECTION INTRAVENOUS; SUBCUTANEOUS at 06:45

## 2020-11-28 RX ADMIN — HEPARIN SODIUM 5000 UNITS: 5000 INJECTION INTRAVENOUS; SUBCUTANEOUS at 14:31

## 2020-11-28 RX ADMIN — ALLOPURINOL 100 MG: 100 TABLET ORAL at 14:30

## 2020-11-28 ASSESSMENT — PAIN SCALES - GENERAL
PAINLEVEL_OUTOF10: 0
PAINLEVEL_OUTOF10: 0

## 2020-11-28 NOTE — CONSULTS
General Surgery Consult      Pt Name: Leonora Hampton  MRN: [id number]  YOB: 1945  Date of evaluation: 11/28/2020  Primary Care Physician: Nabeel Colmenares MD   Patient evaluated at the request of  Dr. Cleveland Kruger  Reason for evaluation: Diarrhea    SUBJECTIVE:   History of Chief Complaint:    Leonora Hampton is a 67 y.o. female who presents with 58-year-old pleasant female recently underwent robotic cholecystectomy this week Tuesday. Patient was discharged on an outpatient basis. She had significant bowel movements almost 30 of them and came in with some abdominal discomfort and significant diarrhea. No rectal bleeding. Patient is feeling better since admission. No nausea vomiting. She is n.p.o. No distention no fever chills. Patient was found to have C. difficile colitis. CT scan showed nonspecific pancolitis consistent with pseudomembranous colitis. Symptom onset has been acute for a time period of few hour(s). Severity is described as moderate to severe. Course of her symptoms over time is acute. Past Medical History   has a past medical history of Allergic rhinitis, Closed tibia fracture, GERD (gastroesophageal reflux disease), Gout, Headache(784.0), Hyperlipidemia, Osteoarthritis, Osteoporosis, Posterior tibial tendon dysfunction, Vitamin D deficient rickets, Wears glasses, and Wears partial dentures. Past Surgical History   has a past surgical history that includes Carpal tunnel release; Hysterectomy (4/12); Knee arthroscopy (Left, 2002?); Tibia fracture surgery (Left); Appendectomy; Tonsillectomy and adenoidectomy; Colonoscopy; fracture surgery; pr total knee arthroplasty (Left, 11/6/2018); McDavid tooth extraction; Cataract removal with implant (Left, 03/07/2019); Intracapsular cataract extraction (Left, 3/7/2019); eye surgery; Cataract removal with implant (Right, 04/04/2019); Intracapsular cataract extraction (Right, 4/4/2019); joint replacement (Left, 11/06/2018);  Total knee arthroplasty (Right, 12/10/2019); ERCP (N/A, 10/14/2020); and Cholecystectomy, laparoscopic (N/A, 11/24/2020). Medications  Prior to Admission medications    Medication Sig Start Date End Date Taking? Authorizing Provider   ondansetron (ZOFRAN) 4 MG tablet Take every six hours as needed 11/24/20  Yes Carissa Caballero MD   famotidine (PEPCID) 20 MG tablet TAKE ONE TABLET BY MOUTH TWICE A DAY 11/20/20  Yes PASCUAL Oconnor CNP   allopurinol (ZYLOPRIM) 100 MG tablet TAKE ONE TABLET BY MOUTH DAILY 11/20/20  Yes PASCUAL Oconnor CNP   psyllium (METAMUCIL) 28.3 % POWD powder Take 3.4 g by mouth daily   Yes Historical Provider, MD   Bacillus Coagulans-Inulin (ALIGN PREBIOTIC-PROBIOTIC PO) Take by mouth daily    Yes Historical Provider, MD   cetirizine (ZYRTEC) 10 MG tablet TAKE ONE TABLET BY MOUTH DAILY 9/18/20  Yes PASCUAL Oconnor CNP   simvastatin (ZOCOR) 20 MG tablet TAKE ONE TABLET BY MOUTH DAILY 9/18/20  Yes PASCUAL Oconnor CNP   vitamin D (ERGOCALCIFEROL) 1.25 MG (11792 UT) CAPS capsule TAKE ONE CAPSULE BY MOUTH EVERY 2 WEEKS 7/20/20  Yes PASCUAL Oconnor CNP   verapamil (CALAN SR) 240 MG extended release tablet TAKE ONE TABLET BY MOUTH ONCE NIGHTLY FOR MIGRAINE HEADACHE 6/12/20  Yes PASCUAL Oconnor CNP   fluticasone (FLONASE) 50 MCG/ACT nasal spray SPRAY TWO SPRAYS IN EACH NOSTRIL ONCE DAILY 5/1/20  Yes PASCUAL Oconnor CNP   aspirin 81 MG tablet Take 81 mg by mouth daily   Yes Historical Provider, MD   Aspirin-Acetaminophen-Caffeine (EXCEDRIN MIGRAINE PO) Take by mouth as needed    Yes Historical Provider, MD   oxyCODONE-acetaminophen (PERCOCET) 5-325 MG per tablet Take 1 tablet by mouth every 6 hours as needed for Pain for up to 7 days.  . Take lowest dose possible to manage pain 11/24/20 12/1/20  Carissa Caballero MD   alendronate (FOSAMAX) 70 MG tablet TAKE 1 TABLET BY MOUTH ONCE WEEKLY BEFORE BREAKFAST, ON AN EMPTY STOMACH: REMAIN UPRIGHT FOR 30 MINUTES:TAKE WITH 8 OUNCES OF WATER 9/2/20   Jennifer Kaur, APRN - CNP   naproxen (NAPROSYN) 250 MG tablet TAKE ONE TABLET BY MOUTH THREE TIMES A DAY WITH FOOD AS NEEDED FOR GOUT FLARE UP UNTIL ATTACK SUBSIDES 11/25/19   Ovi Stover MD     Allergies  has No Known Allergies. Family History  family history includes Arthritis in an other family member; COPD in an other family member; Stroke in her father; Thyroid Disease in an other family member. Social History   reports that she quit smoking about 33 years ago. Her smoking use included cigarettes. She has a 5.00 pack-year smoking history. She has never used smokeless tobacco. She reports current alcohol use. She reports that she does not use drugs. Review of Systems:  General Denies any fever or chills  HEENT Denies any diplopia, tinnitus or vertigo  Resp Denies any shortness of breath, cough or wheezing  Cardiac Denies any chest pain, palpitations, claudication or edema  GI Denies any melena, hematochezia, hematemesis or pyrosis   Denies any frequency, urgency, hesitancy or incontinence  Heme Denies bruising or bleeding easily  Endocrine Denies any history of diabetes or thyroid disease  Neuro Denies any focal motor or sensory deficits    OBJECTIVE:   VITALS:  height is 5' 3\" (1.6 m) and weight is 115 lb 11.9 oz (52.5 kg). Her oral temperature is 97.5 °F (36.4 °C). Her blood pressure is 133/74 and her pulse is 76. Her respiration is 16 and oxygen saturation is 98%. CONSTITUTIONAL: Alert and oriented times 3, no acute distress and cooperative to examination with proper mood and affect. SKIN: Skin color, texture, turgor normal. No rashes or lesions. LYMPH: no cervical nodes, no inguinal nodes  HEENT: Head is normocephalic, atraumatic.  EOMI, PERRLA  NECK: Supple, symmetrical, trachea midline, no adenopathy, thyroid symmetric, not enlarged and no tenderness, skin normal  CHEST/LUNGS: chest symmetric with normal A/P diameter, normal respiratory rate and rhythm, lungs clear to auscultation without wheezes, rales or rhonchi. No accessory muscle use. Scars None   CARDIOVASCULAR: Heart regular rate and rhythm Normal S1 and S2. . Carotid and femoral pulses 2+/4 and equal bilaterally  ABDOMEN: Soft abdomen nondistended surgical scars clean dry intact. Nonspecific mild tenderness. No peritoneal signs. RECTAL: deferred, not clinically indicated  NEUROLOGIC: There are no focalizing motor or sensory deficits. CN II-XII are grossly intact.   EXTREMITIES: no cyanosis, no clubbing and no edema    LABS:   CBC with Differential:    Lab Results   Component Value Date    WBC 5.8 11/28/2020    RBC 3.86 11/28/2020    RBC 2.91 04/20/2012    HGB 11.5 11/28/2020    HCT 34.1 11/28/2020     11/28/2020     04/20/2012    MCV 88.4 11/28/2020    MCH 29.8 11/28/2020    MCHC 33.7 11/28/2020    RDW 14.5 11/28/2020    METASPCT 2 10/14/2020    LYMPHOPCT 18 11/28/2020    MONOPCT 11 11/28/2020    MYELOPCT 1 10/14/2020    BASOPCT 0 11/28/2020    MONOSABS 0.64 11/28/2020    LYMPHSABS 1.04 11/28/2020    EOSABS 0.06 11/28/2020    BASOSABS 0.00 11/28/2020    DIFFTYPE NOT REPORTED 11/28/2020     BMP:    Lab Results   Component Value Date     11/28/2020    K 2.9 11/28/2020     11/28/2020    CO2 23 11/28/2020    BUN 20 11/28/2020    LABALBU 3.8 11/27/2020    LABALBU 4.2 04/05/2012    CREATININE 0.76 11/28/2020    CALCIUM 7.7 11/28/2020    GFRAA >60 11/28/2020    LABGLOM >60 11/28/2020    GLUCOSE 96 11/28/2020    GLUCOSE 84 04/09/2012     Hepatic Function Panel:    Lab Results   Component Value Date    ALKPHOS 66 11/27/2020    ALT 22 11/27/2020    AST 23 11/27/2020    PROT 7.0 11/27/2020    BILITOT 0.77 11/27/2020    BILIDIR 0.16 11/24/2020    IBILI 0.25 11/24/2020    LABALBU 3.8 11/27/2020    LABALBU 4.2 04/05/2012     Calcium:    Lab Results   Component Value Date    CALCIUM 7.7 11/28/2020     Magnesium:    Lab Results   Component Value Date    MG 1.8 11/28/2020     Phosphorus:  No results found for: PHOS  PT/INR:    Lab Results   Component Value Date    PROTIME 13.1 11/23/2020    INR 1.0 11/23/2020     ABG:  No results found for: PHART, PH, DZT8FGX, PCO2, PO2ART, PO2, DAH8XKY, HCO3, BEART, BE, THGBART, THB, RIG4KJH, I5UYFVNQ, O2SAT  Urine Culture:  No components found for: CURINE  Blood Culture:  No components found for: CBLOOD, CFUNGUSBL  Stool Culture:  No components found for: CSTOOL    RADIOLOGY:   I have personally reviewed the following films:  Ct Abdomen Pelvis W Iv Contrast Additional Contrast? None    Result Date: 11/27/2020  EXAMINATION: CT OF THE ABDOMEN AND PELVIS WITH CONTRAST 11/27/2020 7:26 pm TECHNIQUE: CT of the abdomen and pelvis was performed with the administration of intravenous contrast. Multiplanar reformatted images are provided for review. Dose modulation, iterative reconstruction, and/or weight based adjustment of the mA/kV was utilized to reduce the radiation dose to as low as reasonably achievable. COMPARISON: CT abdomen and pelvis October 27, 2020 HISTORY: ORDERING SYSTEM PROVIDED HISTORY: post op pain diarrhea TECHNOLOGIST PROVIDED HISTORY: post op pain diarrhea Reason for Exam: Diarrhea, pt had cholecystectomy 11/24, c-diff positive Acuity: Acute FINDINGS: Lower Chest: Clear Organs: The abdominal wall appears normal. The liver, spleen, pancreas, and adrenals appear normal.  Gallbladder not identified. Subcentimeter hypodensities left kidney too small to characterize. Right kidney normal. The bladder appears normal. GI/Bowel: Circumferential thickening of the colon throughout the colon. No pneumatosis. Stomach and small bowel normal.  Appendix is not identified. Pelvis: Normal Peritoneum/Retroperitoneum: The abdominal aorta and iliac arteries are normal in caliber. There is no pathologic adenopathy. Bones/Soft Tissues: Compression fracture T12 unchanged. Slight anterior subluxation L4-5 unchanged. Nonspecific pancolitis consistent with possible pseudomembranous colitis.  This appears somewhat more conspicuous than on the previous exam.         IMPRESSION:   1. Recent robotic cholecystectomy. 2. C. difficile colitis/pancolitis on CT scan. 3. No evidence of acute surgical abdomen. does not have any pertinent problems on file. PLAN:   1. Start liquid diet. Oral vancomycin. Repeat blood work in the morning. IV hydration. Conservative management. Thank you for this interesting consult and for allowing us to participate in the care of this patient. If you have any questions please don't hesitate to call.           Electronically signed by Zee Castillo MD  on 11/28/2020 at 1:17 PM

## 2020-11-28 NOTE — PROGRESS NOTES
Page sent out by this RN to Dr. Lane Boas regarding patient's critical low potassium value of 2.6. Awaiting response.

## 2020-11-28 NOTE — ED PROVIDER NOTES
following components:       Result Value    C DIFF AG + TOXIN POSITIVE: C. difficile antigen and Toxin Detected (*)     All other components within normal limits   CBC WITH AUTO DIFFERENTIAL - Abnormal; Notable for the following components:    Seg Neutrophils 71 (*)     Lymphocytes 14 (*)     Monocytes 14 (*)     All other components within normal limits   COMPREHENSIVE METABOLIC PANEL W/ REFLEX TO MG FOR LOW K - Abnormal; Notable for the following components:    Glucose 126 (*)     BUN 28 (*)     CREATININE 1.11 (*)     Potassium 3.6 (*)     Chloride 96 (*)     GFR Non- 48 (*)     GFR  58 (*)     All other components within normal limits   LIPASE - Abnormal; Notable for the following components:    Lipase 7 (*)     All other components within normal limits   URINALYSIS - Abnormal; Notable for the following components:    Turbidity UA CLOUDY (*)     Bilirubin Urine SMALL (*)     Ketones, Urine TRACE (*)     Urine Hgb TRACE (*)     All other components within normal limits   LACTIC ACID, PLASMA - Abnormal; Notable for the following components:    Lactic Acid 2.3 (*)     All other components within normal limits   MICROSCOPIC URINALYSIS - Abnormal; Notable for the following components:    Crystals, UA URIC ACID (*)     Crystals, UA FEW (*)     Bacteria, UA MANY (*)     All other components within normal limits   LACTIC ACID, PLASMA - Abnormal; Notable for the following components:    Lactic Acid 2.7 (*)     All other components within normal limits     9:02 PM EST  C. difficile is positive. CT scan shows pancolitis likely consistent with her C. difficile infection. Lab work is mostly unremarkable otherwise other than elevated lactic acid 2.3. Her blood pressure has normalized. She was initially slightly hypotensive at 90s over 60s but now blood pressure is 119/76. On reexamination she feels moderately improved and wants to eat and drink. Spoke with patient's surgeon Dr. Akira Chiu. He is recommending medical admission overnight for IV fluids. We will start oral vancomycin. 9:26 PM EST  Spoke with Dr. Valentin Tejeda who accepted patient for admission. Disposition     DISPOSITION:    DISPOSITION Decision To Admit 11/27/2020 08:49:41 PM      CLINICAL IMPRESSION:  1. C. difficile colitis        PATIENT REFERRED TO:  No follow-up provider specified.     DISCHARGE MEDICATIONS:  New Prescriptions    No medications on file           DO Nima Donovan DO  11/27/20 [id number]

## 2020-11-28 NOTE — PROGRESS NOTES
This RN spoke to Dr. Valentin Tejeda and updated him on patient's increase in Lactic Acid. Dr. Valentin Tejeda stated to continue IV fluids and recheck Lactic Level around 4am. Patient already has orders for Lactic Acid q6 check with next check occurring at 345am to which Dr. Valentin Tejeda was ok with. Dr. Valentin Tejeda to restart a couple of home medications and stated he would place those orders himself.

## 2020-11-28 NOTE — CARE COORDINATION
CASE MANAGEMENT NOTE:    Admission Date:  11/27/2020 Gadiel Goodwin is a 67 y.o.  female    Admitted for : C. difficile colitis [A04.72]    Met with:  Patient    PCP:  Dr Tremaine Mendoza:  Jaziel Cortes:  independently at home             Current Services PTA:  No    Is patient agreeable to VNS: No    Freedom of choice provided:  Yes    List of [address] provided: NA    VNS chosen:  NA    DME:  cane, walker, shower chair, transport chair and grab bars    Home Oxygen: No    Nebulizer: No    CPAP/BIPAP: No    Supplier: N/A    Potential Assistance Needed: Will follow for needs    SNF needed: No    Freedom of choice and list provided: NA    Ph[facility]acy:  [facility] [address]       Does Patient want to use MEDS to BEDS? No    Is patient currently receiving oral anticoagulation therapy? No    Is the Patient an [facility] with Readmission Risk Score greater than 14%? No  If yes, pt needs a follow up appointment made within 7 days. Family Members/Caregivers that pt would like involved in their care:    Yes    If yes, list name here:  Kulwinder Starks    Transportation Provider:  Family             Is patient in Isolation/One on One/Altered Mental Status? Yes  If yes, skip next question. If no, would they like an I-Pad to  use? NA  If yes, call [phone]. Discharge Plan:  11/28/2020 [facility]- Medicare; Readmit Tool Completed; Independent from home alone; DME- cane, walker, shower chair, transport chair and grab bars; Declines VNS; 11/24/2020 had a vazquez and Pat that day- F/U appt with Dr Nowak March 12/9 at 9:45am; Oral vanco and IV flagyl; NPO; General Surgery consulted//MIGDALIA                  Electronically signed by:  Desirae Randle RN on 11/28/2020 at 10:15 AM

## 2020-11-28 NOTE — PROGRESS NOTES
Patient arrived to unit. Assessment complete, vitals taken, telemetry applied. Admission paperwork completed. Patient resting comfortably in bed. Will continue to monitor.

## 2020-11-28 NOTE — H&P
N/A 11/24/2020    CHOLECYSTECTOMY LAPAROSCOPIC ROBOTIC XI MULTIPORT performed by Lizette Santiago MD at [address]      2007? per pt wtih polyps    ERCP N/A 10/14/2020    ERCP WITH BILE DUCT BRUSHING performed by Hal Romberg, MD at [address] [facility]      left tibia    HYSTERECTOMY  4/12    INTRACAPSULAR CATARACT EXTRACTION Left 3/7/2019    EYE CATARACT EMULSIFICATION IOL IMPLANT - TOPICAL performed by Cherylene Funk, MD at [address] [facility] Right 4/4/2019    EYE CATARACT EMULSIFICATION IOL IMPLANT performed by Cherylene Funk, MD at [facility] Left 11/06/2018    hardware removal and then TKA    KNEE ARTHROSCOPY Left 2002?  [place] TOTAL KNEE ARTHROPLASTY Left 11/6/2018    KNEE TOTAL ARTHROPLASTY W/REMOVAL HARDWARE PLATE & SCREWS performed by Oliver Harrell MD at [address] [facility] Left     TONSILLECTOMY AND ADENOIDECTOMY      TOTAL KNEE ARTHROPLASTY Right 12/10/2019    KNEE TOTAL ARTHROPLASTY performed by Oliver Harrell MD at [address] [place] [address]          Medications Prior to Admission:     Prior to Admission medications    Medication Sig Start Date End Date Taking?  Authorizing Provider   ondansetron (ZOFRAN) 4 MG tablet Take every six hours as needed 11/24/20  Yes Lizette Santiago MD   famotidine (PEPCID) 20 MG tablet TAKE ONE TABLET BY MOUTH TWICE A DAY 11/20/20  Yes PASCUAL Diaz CNP   allopurinol (ZYLOPRIM) 100 MG tablet TAKE ONE TABLET BY MOUTH DAILY 11/20/20  Yes PASCUAL Diaz CNP   psyllium (METAMUCIL) 28.3 % POWD powder Take 3.4 g by mouth daily   Yes Historical Provider, MD   Bacillus Coagulans-Inulin (ALIGN PREBIOTIC-PROBIOTIC PO) Take by mouth daily    Yes Historical Provider, MD   cetirizine (ZYRTEC) 10 MG tablet TAKE ONE TABLET BY MOUTH DAILY 9/18/20  Yes PASCUAL Diaz CNP   simvastatin (ZOCOR) 20 MG tablet TAKE ONE TABLET BY MOUTH DAILY 9/18/20  Yes Brigid Thompson PASCUAL - CNP   vitamin D (ERGOCALCIFEROL) 1.25 MG (16796 UT) CAPS capsule TAKE ONE CAPSULE BY MOUTH EVERY 2 WEEKS 7/20/20  Yes PASCUAL Burleson CNP   verapamil (CALAN SR) 240 MG extended release tablet TAKE ONE TABLET BY MOUTH ONCE NIGHTLY FOR MIGRAINE HEADACHE 6/12/20  Yes PASCUAL Burleson CNP   fluticasone (FLONASE) 50 MCG/ACT nasal spray SPRAY TWO SPRAYS IN EACH NOSTRIL ONCE DAILY 5/1/20  Yes PASCUAL Burleson CNP   aspirin 81 MG tablet Take 81 mg by mouth daily   Yes Historical Provider, MD   Aspirin-Acetaminophen-Caffeine (EXCEDRIN MIGRAINE PO) Take by mouth as needed    Yes Historical Provider, MD   oxyCODONE-acetaminophen (PERCOCET) 5-325 MG per tablet Take 1 tablet by mouth every 6 hours as needed for Pain for up to 7 days. . Take lowest dose possible to manage pain 11/24/20 12/1/20  Lynn Mayberry MD   alendronate (FOSAMAX) 70 MG tablet TAKE 1 TABLET BY MOUTH ONCE WEEKLY BEFORE BREAKFAST, ON AN EMPTY STOMACH: REMAIN UPRIGHT FOR 30 MINUTES:TAKE WITH 8 OUNCES OF WATER 9/2/20   PASCUAL Burleson CNP   naproxen (NAPROSYN) 250 MG tablet TAKE ONE TABLET BY MOUTH THREE TIMES A DAY WITH FOOD AS NEEDED FOR GOUT FLARE UP UNTIL ATTACK SUBSIDES 11/25/19   Jeffrey Molina MD        Allergies:     Patient has no known allergies. Social History:     Tobacco:    reports that she quit smoking about 33 years ago. Her smoking use included cigarettes. She has a 5.00 pack-year smoking history. She has never used smokeless tobacco.  Alcohol:      reports current alcohol use. Drug Use:  reports no history of drug use. Family History:     Family History   Problem Relation Age of Onset    Arthritis Other     Thyroid Disease Other     COPD Other     Stroke Father        Review of Systems:     Positive and Negative as described in HPI.     CONSTITUTIONAL:  negative for fevers, chills, sweats, fatigue, weight loss  HEENT:  negative for vision, hearing changes, runny nose, throat pain  RESPIRATORY:  negative for shortness of breath, cough, congestion, wheezing  CARDIOVASCULAR:  negative for chest pain, palpitations  GASTROINTESTINAL:  negative for nausea, vomiting,   GENITOURINARY:  negative for difficulty of urination, burning with urination, frequency   INTEGUMENT:  negative for rash, skin lesions, easy bruising   HEMATOLOGIC/LYMPHATIC:  negative for swelling/edema   ALLERGIC/IMMUNOLOGIC:  negative for urticaria , itching  NEUROLOGICAL:  negative for dizziness, lightheadedness, numbness, pain, tingling extremities  BEHAVIOR/PSYCH:  negative for depression, anxiety    Physical Exam:   BP 98/63   Pulse 65   Temp 98.2 °F (36.8 °C) (Oral)   Resp 16   Ht 5' 3\" (1.6 m)   Wt 115 lb 11.9 oz (52.5 kg)   LMP 2004   SpO2 96%   BMI 20.50 kg/m²   Temp (24hrs), Av.7 °F (36.5 °C), Min:97.3 °F (36.3 °C), Max:98.2 °F (36.8 °C)    No results for input(s): POCGLU in the last 72 hours.     Intake/Output Summary (Last 24 hours) at 2020 2346  Last data filed at 2020 1750  Gross per 24 hour   Intake 2634 ml   Output --   Net 2634 ml       General Appearance: alert, well appearing, and in no acute distress  Mental status: oriented to person, place, and time  Head: normocephalic, atraumatic  Eye: no icterus, redness, pupils equal and reactive, extraocular eye movements intact, conjunctiva clear  Ear: normal external ear, no discharge, hearing intact  Nose: no drainage noted  Mouth: mucous membranes moist  Neck: supple, no carotid bruits, thyroid not palpable  Lungs: Bilateral equal air entry, clear to ausculation, no wheezing, rales or rhonchi, normal effort  Cardiovascular: normal rate, regular rhythm,   Abdomen: Soft,  nondistended, normal bowel sounds  Neurologic: There are no new focal motor or sensory deficits  Extremities: peripheral pulses palpable, no pedal edema or calf pain with palpation  Psych: normal affect    Investigations:      Laboratory Testing:  Recent Results (from the past 24 hour(s))   Basic Metabolic Panel w/ Reflex to MG    Collection Time: 11/28/20  4:07 AM   Result Value Ref Range    Glucose 96 70 - 99 mg/dL    BUN 20 8 - 23 mg/dL    CREATININE 0.76 0.50 - 0.90 mg/dL    Bun/Cre Ratio NOT REPORTED 9 - 20    Calcium 7.7 (L) 8.6 - 10.4 mg/dL    Sodium 137 135 - 144 mmol/L    Potassium 2.9 (LL) 3.7 - 5.3 mmol/L    Chloride 102 98 - 107 mmol/L    CO2 23 20 - 31 mmol/L    Anion Gap 12 9 - 17 mmol/L    GFR Non-African American >60 >60 mL/min    GFR African American >60 >60 mL/min    GFR Comment          GFR Staging NOT REPORTED    CBC Auto Differential    Collection Time: 11/28/20  4:07 AM   Result Value Ref Range    WBC 5.8 3.5 - 11.0 k/uL    RBC 3.86 (L) 4.0 - 5.2 m/uL    Hemoglobin 11.5 (L) 12.0 - 16.0 g/dL    Hematocrit 34.1 (L) 36 - 46 %    MCV 88.4 80 - 100 fL    MCH 29.8 26 - 34 pg    MCHC 33.7 31 - 37 g/dL    RDW 14.5 11.5 - 14.9 %    Platelets 893 607 - 705 k/uL    MPV 8.1 6.0 - 12.0 fL    NRBC Automated NOT REPORTED per 100 WBC    Differential Type NOT REPORTED     Immature Granulocytes NOT REPORTED 0 %    Absolute Immature Granulocyte NOT REPORTED 0.00 - 0.30 k/uL    WBC Morphology NOT REPORTED     RBC Morphology NOT REPORTED     Platelet Estimate NOT REPORTED     Seg Neutrophils 70 (H) 36 - 66 %    Lymphocytes 18 (L) 24 - 44 %    Monocytes 11 (H) 1 - 7 %    Eosinophils % 1 0 - 4 %    Basophils 0 0 - 2 %    Segs Absolute 4.06 1.3 - 9.1 k/uL    Absolute Lymph # 1.04 1.0 - 4.8 k/uL    Absolute Mono # 0.64 0.1 - 1.3 k/uL    Absolute Eos # 0.06 0.0 - 0.4 k/uL    Basophils Absolute 0.00 0.0 - 0.2 k/uL    Morphology Normal    Lactic Acid    Collection Time: 11/28/20  4:07 AM   Result Value Ref Range    Lactic Acid 0.8 0.5 - 2.2 mmol/L   Magnesium    Collection Time: 11/28/20  4:07 AM   Result Value Ref Range    Magnesium 1.8 1.6 - 2.6 mg/dL   Lactic Acid    Collection Time: 11/28/20  8:53 AM   Result Value Ref Range    Lactic Acid 0.7 0.5 - 2.2 mmol/L   Lactic Acid    Collection Time: 11/28/20  3:36 PM   Result Value Ref Range    Lactic Acid 2.1 0.5 - 2.2 mmol/L   K (Potassium)    Collection Time: 11/28/20  3:36 PM   Result Value Ref Range    Potassium 3.2 (L) 3.7 - 5.3 mmol/L       Imaging/Diagnostics:  Ct Abdomen Pelvis W Iv Contrast Additional Contrast? None    Result Date: 11/27/2020  Nonspecific pancolitis consistent with possible pseudomembranous colitis. This appears somewhat more conspicuous than on the previous exam.       Assessment :      Hospital Problems           Last Modified POA    * (Principal) C. difficile colitis 11/28/2020 Yes    Mixed hyperlipidemia 11/28/2020 Yes    Gout 11/28/2020 Yes    S/P cholecystectomy 11/28/2020 Yes          Plan:     Patient status inpatient in the Progressive Unit/Step down    1. Oral Vanco/IV flagyl. IVF. Diet per gen surg, probiotic  2. Lipitor  3. Verapamil for headache  4. Allopurinol for gout  5. Heparin for dvt prophylaxis    Consultations:   IP CONSULT TO GENERAL SURGERY  IP CONSULT TO PRIMARY CARE PROVIDER     Patient is admitted as inpatient status because of co-morbidities listed above, severity of signs and symptoms as outlined, requirement for current medical therapies and most importantly because of direct risk to patient if care not provided in a hospital setting. Expected length of stay > 48 hours.     Leeanne Calvert MD  11/28/2020  11:46 PM    Copy sent to Dr. Dede Costello MD

## 2020-11-29 PROBLEM — I50.32 CHRONIC DIASTOLIC CONGESTIVE HEART FAILURE (HCC): Status: ACTIVE | Noted: 2020-11-29

## 2020-11-29 LAB
ABSOLUTE EOS #: 0.3 K/UL (ref 0–0.4)
ABSOLUTE IMMATURE GRANULOCYTE: ABNORMAL K/UL (ref 0–0.3)
ABSOLUTE LYMPH #: 1 K/UL (ref 1–4.8)
ABSOLUTE MONO #: 0.6 K/UL (ref 0.1–1.3)
ANION GAP SERPL CALCULATED.3IONS-SCNC: 7 MMOL/L (ref 9–17)
BASOPHILS # BLD: 1 % (ref 0–2)
BASOPHILS ABSOLUTE: 0 K/UL (ref 0–0.2)
BUN BLDV-MCNC: 8 MG/DL (ref 8–23)
BUN/CREAT BLD: ABNORMAL (ref 9–20)
CALCIUM SERPL-MCNC: 7.4 MG/DL (ref 8.6–10.4)
CHLORIDE BLD-SCNC: 108 MMOL/L (ref 98–107)
CO2: 21 MMOL/L (ref 20–31)
CREAT SERPL-MCNC: 0.45 MG/DL (ref 0.5–0.9)
DIFFERENTIAL TYPE: ABNORMAL
EKG ATRIAL RATE: 99 BPM
EKG P AXIS: 74 DEGREES
EKG P-R INTERVAL: 134 MS
EKG Q-T INTERVAL: 366 MS
EKG QRS DURATION: 82 MS
EKG QTC CALCULATION (BAZETT): 469 MS
EKG R AXIS: -33 DEGREES
EKG T AXIS: 65 DEGREES
EKG VENTRICULAR RATE: 99 BPM
EOSINOPHILS RELATIVE PERCENT: 8 % (ref 0–4)
GFR AFRICAN AMERICAN: >60 ML/MIN
GFR NON-AFRICAN AMERICAN: >60 ML/MIN
GFR SERPL CREATININE-BSD FRML MDRD: ABNORMAL ML/MIN/{1.73_M2}
GFR SERPL CREATININE-BSD FRML MDRD: ABNORMAL ML/MIN/{1.73_M2}
GLUCOSE BLD-MCNC: 96 MG/DL (ref 70–99)
HCT VFR BLD CALC: 28.1 % (ref 36–46)
HEMOGLOBIN: 9.5 G/DL (ref 12–16)
IMMATURE GRANULOCYTES: ABNORMAL %
LYMPHOCYTES # BLD: 25 % (ref 24–44)
MAGNESIUM: 1.9 MG/DL (ref 1.6–2.6)
MCH RBC QN AUTO: 29.7 PG (ref 26–34)
MCHC RBC AUTO-ENTMCNC: 33.8 G/DL (ref 31–37)
MCV RBC AUTO: 87.7 FL (ref 80–100)
MONOCYTES # BLD: 14 % (ref 1–7)
NRBC AUTOMATED: ABNORMAL PER 100 WBC
PDW BLD-RTO: 14.3 % (ref 11.5–14.9)
PLATELET # BLD: 162 K/UL (ref 150–450)
PLATELET ESTIMATE: ABNORMAL
PMV BLD AUTO: 8.5 FL (ref 6–12)
POTASSIUM SERPL-SCNC: 3.2 MMOL/L (ref 3.7–5.3)
RBC # BLD: 3.21 M/UL (ref 4–5.2)
RBC # BLD: ABNORMAL 10*6/UL
SEG NEUTROPHILS: 52 % (ref 36–66)
SEGMENTED NEUTROPHILS ABSOLUTE COUNT: 2.1 K/UL (ref 1.3–9.1)
SODIUM BLD-SCNC: 136 MMOL/L (ref 135–144)
WBC # BLD: 4 K/UL (ref 3.5–11)
WBC # BLD: ABNORMAL 10*3/UL

## 2020-11-29 PROCEDURE — 6360000002 HC RX W HCPCS: Performed by: STUDENT IN AN ORGANIZED HEALTH CARE EDUCATION/TRAINING PROGRAM

## 2020-11-29 PROCEDURE — 2580000003 HC RX 258: Performed by: STUDENT IN AN ORGANIZED HEALTH CARE EDUCATION/TRAINING PROGRAM

## 2020-11-29 PROCEDURE — 6370000000 HC RX 637 (ALT 250 FOR IP): Performed by: SURGERY

## 2020-11-29 PROCEDURE — 6370000000 HC RX 637 (ALT 250 FOR IP): Performed by: STUDENT IN AN ORGANIZED HEALTH CARE EDUCATION/TRAINING PROGRAM

## 2020-11-29 PROCEDURE — 85025 COMPLETE CBC W/AUTO DIFF WBC: CPT

## 2020-11-29 PROCEDURE — 83735 ASSAY OF MAGNESIUM: CPT

## 2020-11-29 PROCEDURE — 36415 COLL VENOUS BLD VENIPUNCTURE: CPT

## 2020-11-29 PROCEDURE — 2060000000 HC ICU INTERMEDIATE R&B

## 2020-11-29 PROCEDURE — 80048 BASIC METABOLIC PNL TOTAL CA: CPT

## 2020-11-29 RX ADMIN — HEPARIN SODIUM 5000 UNITS: 5000 INJECTION INTRAVENOUS; SUBCUTANEOUS at 21:24

## 2020-11-29 RX ADMIN — ALLOPURINOL 100 MG: 100 TABLET ORAL at 07:45

## 2020-11-29 RX ADMIN — POTASSIUM CHLORIDE 40 MEQ: 1500 TABLET, EXTENDED RELEASE ORAL at 06:34

## 2020-11-29 RX ADMIN — VANCOMYCIN HYDROCHLORIDE 250 MG: KIT at 00:52

## 2020-11-29 RX ADMIN — SODIUM CHLORIDE: 9 INJECTION, SOLUTION INTRAVENOUS at 17:04

## 2020-11-29 RX ADMIN — VANCOMYCIN HYDROCHLORIDE 250 MG: KIT at 17:04

## 2020-11-29 RX ADMIN — HEPARIN SODIUM 5000 UNITS: 5000 INJECTION INTRAVENOUS; SUBCUTANEOUS at 06:30

## 2020-11-29 RX ADMIN — ATORVASTATIN CALCIUM 20 MG: 20 TABLET, FILM COATED ORAL at 07:45

## 2020-11-29 RX ADMIN — VANCOMYCIN HYDROCHLORIDE 250 MG: KIT at 06:34

## 2020-11-29 RX ADMIN — Medication 1 CAPSULE: at 07:45

## 2020-11-29 RX ADMIN — HEPARIN SODIUM 5000 UNITS: 5000 INJECTION INTRAVENOUS; SUBCUTANEOUS at 14:57

## 2020-11-29 RX ADMIN — VANCOMYCIN HYDROCHLORIDE 250 MG: KIT at 13:25

## 2020-11-29 NOTE — PROGRESS NOTES
dysfunction, Vitamin D deficient rickets, Wears glasses, and Wears partial dentures. Social History:   reports that she quit smoking about 33 years ago. Her smoking use included cigarettes. She has a 5.00 pack-year smoking history. She has never used smokeless tobacco. She reports current alcohol use. She reports that she does not use drugs. Family History:   Family History   Problem Relation Age of Onset    Arthritis Other     Thyroid Disease Other     COPD Other     Stroke Father        Vitals:  /69   Pulse 64   Temp 98.1 °F (36.7 °C) (Oral)   Resp 16   Ht 5' 3\" (1.6 m)   Wt 122 lb 9.2 oz (55.6 kg)   LMP 2004   SpO2 98%   BMI 21.71 kg/m²   Temp (24hrs), Av °F (36.7 °C), Min:97.5 °F (36.4 °C), Max:98.2 °F (36.8 °C)    No results for input(s): POCGLU in the last 72 hours. I/O (24Hr):     Intake/Output Summary (Last 24 hours) at 2020 0845  Last data filed at 2020 1064  Gross per 24 hour   Intake 1820 ml   Output --   Net 1820 ml       Labs:  Hematology:  Recent Labs     207 20  0533   WBC 7.1 5.8 4.0   RBC 5.02 3.86* 3.21*   HGB 14.8 11.5* 9.5*   HCT 44.2 34.1* 28.1*   MCV 88.0 88.4 87.7   MCH 29.4 29.8 29.7   MCHC 33.4 33.7 33.8   RDW 14.6 14.5 14.3    199 162   MPV 8.4 8.1 8.5     Chemistry:  Recent Labs     20  0407 20  1536 20  0533     --  137  --  136   K 3.6*  --  2.9* 3.2* 3.2*   CL 96*  --  102  --  108*   CO2 26  --  23  --  21   GLUCOSE 126*  --  96  --  96   BUN 28*  --  20  --  8   CREATININE 1.11*  --  0.76  --  0.45*   MG  --   --  1.8  --  1.9   ANIONGAP 13  --  12  --  7*   LABGLOM 48*  --  >60  --  >60   GFRAA 58*  --  >60  --  >60   CALCIUM 9.1  --  7.7*  --  7.4*   LACTACIDWB NOT REPORTED NOT REPORTED  --   --   --      Recent Labs     20  1826   PROT 7.0   LABALBU 3.8   AST 23   ALT 22   ALKPHOS 66   BILITOT 0.77   LIPASE 7*     ABG:No results found

## 2020-11-29 NOTE — PROGRESS NOTES
Patient was seen and examined. Complaining of cramps. Still having diarrhea. Afebrile vital signs are stable. Tolerating clears. Abdomen is soft. Nondistended. Mild nonspecific tenderness. Extremity nontender. Blood work was reviewed. Potassium was replaced. Creatinine is normal.  WBC count is normal.  Hemoglobin is 9.5. Continue clear liquids. Oral vancomycin. IV hydration. Conservative management.

## 2020-11-29 NOTE — CARE COORDINATION
ONGOING DISCHARGE PLAN:    Discharge plan plan for the patient is home alone with declined discharge needs. Positive C. Difficile. Active orders for oral vanco and a clear liquid diet. Will continue to follow for additional discharge needs.     Electronically signed by Eliud Manzo RN on 11/29/2020 at 8:35 AM

## 2020-11-30 LAB
ABSOLUTE EOS #: 0.25 K/UL (ref 0–0.4)
ABSOLUTE IMMATURE GRANULOCYTE: ABNORMAL K/UL (ref 0–0.3)
ABSOLUTE LYMPH #: 1.15 K/UL (ref 1–4.8)
ABSOLUTE MONO #: 0.43 K/UL (ref 0.1–1.3)
ANION GAP SERPL CALCULATED.3IONS-SCNC: 8 MMOL/L (ref 9–17)
BASOPHILS # BLD: 0 % (ref 0–2)
BASOPHILS ABSOLUTE: 0 K/UL (ref 0–0.2)
BUN BLDV-MCNC: 5 MG/DL (ref 8–23)
BUN/CREAT BLD: ABNORMAL (ref 9–20)
CALCIUM SERPL-MCNC: 7.6 MG/DL (ref 8.6–10.4)
CHLORIDE BLD-SCNC: 113 MMOL/L (ref 98–107)
CO2: 20 MMOL/L (ref 20–31)
CREAT SERPL-MCNC: 0.53 MG/DL (ref 0.5–0.9)
DIFFERENTIAL TYPE: ABNORMAL
EOSINOPHILS RELATIVE PERCENT: 7 % (ref 0–4)
GFR AFRICAN AMERICAN: >60 ML/MIN
GFR NON-AFRICAN AMERICAN: >60 ML/MIN
GFR SERPL CREATININE-BSD FRML MDRD: ABNORMAL ML/MIN/{1.73_M2}
GFR SERPL CREATININE-BSD FRML MDRD: ABNORMAL ML/MIN/{1.73_M2}
GLUCOSE BLD-MCNC: 89 MG/DL (ref 70–99)
HCT VFR BLD CALC: 28.1 % (ref 36–46)
HEMOGLOBIN: 9.2 G/DL (ref 12–16)
IMMATURE GRANULOCYTES: ABNORMAL %
LYMPHOCYTES # BLD: 32 % (ref 24–44)
MAGNESIUM: 1.7 MG/DL (ref 1.6–2.6)
MCH RBC QN AUTO: 29.2 PG (ref 26–34)
MCHC RBC AUTO-ENTMCNC: 32.6 G/DL (ref 31–37)
MCV RBC AUTO: 89.5 FL (ref 80–100)
MONOCYTES # BLD: 12 % (ref 1–7)
MORPHOLOGY: ABNORMAL
NRBC AUTOMATED: ABNORMAL PER 100 WBC
PDW BLD-RTO: 14.2 % (ref 11.5–14.9)
PLATELET # BLD: 146 K/UL (ref 150–450)
PLATELET ESTIMATE: ABNORMAL
PMV BLD AUTO: 8.2 FL (ref 6–12)
POTASSIUM SERPL-SCNC: 3.1 MMOL/L (ref 3.7–5.3)
RBC # BLD: 3.13 M/UL (ref 4–5.2)
RBC # BLD: ABNORMAL 10*6/UL
SEG NEUTROPHILS: 49 % (ref 36–66)
SEGMENTED NEUTROPHILS ABSOLUTE COUNT: 1.77 K/UL (ref 1.3–9.1)
SODIUM BLD-SCNC: 141 MMOL/L (ref 135–144)
WBC # BLD: 3.6 K/UL (ref 3.5–11)
WBC # BLD: ABNORMAL 10*3/UL

## 2020-11-30 PROCEDURE — 6370000000 HC RX 637 (ALT 250 FOR IP): Performed by: STUDENT IN AN ORGANIZED HEALTH CARE EDUCATION/TRAINING PROGRAM

## 2020-11-30 PROCEDURE — 83735 ASSAY OF MAGNESIUM: CPT

## 2020-11-30 PROCEDURE — 85025 COMPLETE CBC W/AUTO DIFF WBC: CPT

## 2020-11-30 PROCEDURE — 2060000000 HC ICU INTERMEDIATE R&B

## 2020-11-30 PROCEDURE — 80048 BASIC METABOLIC PNL TOTAL CA: CPT

## 2020-11-30 PROCEDURE — 6370000000 HC RX 637 (ALT 250 FOR IP): Performed by: SURGERY

## 2020-11-30 PROCEDURE — 6370000000 HC RX 637 (ALT 250 FOR IP): Performed by: FAMILY MEDICINE

## 2020-11-30 PROCEDURE — 93005 ELECTROCARDIOGRAM TRACING: CPT | Performed by: FAMILY MEDICINE

## 2020-11-30 PROCEDURE — 6360000002 HC RX W HCPCS: Performed by: STUDENT IN AN ORGANIZED HEALTH CARE EDUCATION/TRAINING PROGRAM

## 2020-11-30 PROCEDURE — 36415 COLL VENOUS BLD VENIPUNCTURE: CPT

## 2020-11-30 PROCEDURE — 2580000003 HC RX 258: Performed by: STUDENT IN AN ORGANIZED HEALTH CARE EDUCATION/TRAINING PROGRAM

## 2020-11-30 RX ORDER — CHOLESTYRAMINE LIGHT 4 G/5.7G
4 POWDER, FOR SUSPENSION ORAL 2 TIMES DAILY
Status: DISCONTINUED | OUTPATIENT
Start: 2020-11-30 | End: 2020-12-03 | Stop reason: HOSPADM

## 2020-11-30 RX ADMIN — CHOLESTYRAMINE 4 G: 4 POWDER, FOR SUSPENSION ORAL at 19:36

## 2020-11-30 RX ADMIN — VANCOMYCIN HYDROCHLORIDE 250 MG: KIT at 06:14

## 2020-11-30 RX ADMIN — VANCOMYCIN HYDROCHLORIDE 250 MG: KIT at 14:05

## 2020-11-30 RX ADMIN — VANCOMYCIN HYDROCHLORIDE 250 MG: KIT at 01:48

## 2020-11-30 RX ADMIN — POTASSIUM CHLORIDE 40 MEQ: 1500 TABLET, EXTENDED RELEASE ORAL at 08:35

## 2020-11-30 RX ADMIN — PROMETHAZINE HYDROCHLORIDE 12.5 MG: 25 TABLET ORAL at 08:35

## 2020-11-30 RX ADMIN — SODIUM CHLORIDE: 9 INJECTION, SOLUTION INTRAVENOUS at 08:38

## 2020-11-30 RX ADMIN — HEPARIN SODIUM 5000 UNITS: 5000 INJECTION INTRAVENOUS; SUBCUTANEOUS at 14:07

## 2020-11-30 RX ADMIN — Medication 1 CAPSULE: at 08:36

## 2020-11-30 RX ADMIN — SODIUM CHLORIDE: 9 INJECTION, SOLUTION INTRAVENOUS at 19:29

## 2020-11-30 RX ADMIN — VANCOMYCIN HYDROCHLORIDE 250 MG: KIT at 18:16

## 2020-11-30 RX ADMIN — CHOLESTYRAMINE 4 G: 4 POWDER, FOR SUSPENSION ORAL at 14:05

## 2020-11-30 RX ADMIN — ATORVASTATIN CALCIUM 20 MG: 20 TABLET, FILM COATED ORAL at 08:36

## 2020-11-30 RX ADMIN — ALLOPURINOL 100 MG: 100 TABLET ORAL at 08:36

## 2020-11-30 NOTE — PROGRESS NOTES
[facility]                 PATIENT NAME: Gadiel Goodwin     TODAY'S DATE: 11/30/2020, 10:26 AM    SUBJECTIVE:    Pt seen and examined. Afebrile, VSS. Hemoglobin stable. Patient states she feels about the same as yesterday. C/o lower abdominal cramping. Diarrhea is slowing down; had two watery BM so far today. Tolerating clear liquids, no N/V.      OBJECTIVE:   VITALS:  /65   Pulse 62   Temp 98.2 °F (36.8 °C) (Oral)   Resp 18   Ht 5' 3\" (1.6 m)   Wt 123 lb 0.3 oz (55.8 kg)   LMP 01/01/2004   SpO2 100%   BMI 21.79 kg/m²      INTAKE/OUTPUT:      Intake/Output Summary (Last 24 hours) at 11/30/2020 1026  Last data filed at 11/30/2020 0540  Gross per 24 hour   Intake 2927 ml   Output --   Net 2927 ml                 CONSTITUTIONAL:  awake and alert.   No acute distress  HEART:   RRR  LUNGS:   CTA  ABDOMEN:   Abdomen soft, lower abdomen tender, non-distended  EXTREMITIES:   No pedal edema    Data:  CBC:   Lab Results   Component Value Date    WBC 3.6 11/30/2020    RBC 3.13 11/30/2020    RBC 2.91 04/20/2012    HGB 9.2 11/30/2020    HCT 28.1 11/30/2020    MCV 89.5 11/30/2020    MCH 29.2 11/30/2020    MCHC 32.6 11/30/2020    RDW 14.2 11/30/2020     11/30/2020     04/20/2012    MPV 8.2 11/30/2020     BMP:    Lab Results   Component Value Date     11/30/2020    K 3.1 11/30/2020     11/30/2020    CO2 20 11/30/2020    BUN 5 11/30/2020    LABALBU 3.8 11/27/2020    LABALBU 4.2 04/05/2012    CREATININE 0.53 11/30/2020    CALCIUM 7.6 11/30/2020    GFRAA >60 11/30/2020    LABGLOM >60 11/30/2020    GLUCOSE 89 11/30/2020    GLUCOSE 84 04/09/2012       Radiology Review:  No new images to review      ASSESSMENT     Principal Problem:    C. difficile colitis  Active Problems:    GERD (gastroesophageal reflux disease)    Mixed hyperlipidemia    Gout    S/P cholecystectomy    Chronic diastolic congestive heart failure ([address]  Resolved Problems:    * No resolved hospital problems. *      Plan  1. Clear liquid diet  2. Hypokalemia, potassium replaced  3. PO Vanco for C. Diff  4. Surgically stable, conservative measures  5. Continue medical management   6. Patient was seen and examined. Still having diarrhea. On oral vancomycin. Tolerating clears. Getting IV hydration. Blood work was reviewed. Continue same management. If things do not improve I will consult ID to get a recommendation on a different treatment. 7. Patient states stools are getting a little bit more formed today than they have been in the past. Potassium was replaced.       Electronically signed by Amber Aparicio PA-C  [address] [address]

## 2020-11-30 NOTE — PROGRESS NOTES
Called Dr. Shyanne Alvarez for cardiology consult regarding bradycardia. Dr. Shyanne Alvarez aware of consult and will see patient today during rounds.

## 2020-11-30 NOTE — CARE COORDINATION
ONGOING DISCHARGE PLAN:    Reviewed patients chart regarding discharge plan and chart still confirms the plan is to discharge to home with no needs pt refused vns at this time  Patient remains on a clear diet will have K+ replaced  Consult to ID  On PO vanco   HR in the 50's   Will review plan with patient and or family      Will continue to follow for additional discharge needs.      Electronically signed by Divine Patel RN on 11/30/2020 at 1:53 PM

## 2020-11-30 NOTE — DISCHARGE INSTR - COC
diastolic congestive heart failure (HCC) I50.32       Isolation/Infection:   Isolation          C Diff Contact        Patient Infection Status     Infection Onset Added Last Indicated Last Indicated By Review Planned Expiration Resolved Resolved By    C-diff (Clostridium difficile) 20 C DIFF TOXIN/ANTIGEN 20       Resolved    C-diff Rule Out 20 C DIFF TOXIN/ANTIGEN (Ordered)   20 Rule-Out Test Resulted    COVID-19 Rule Out 20 Covid-19 Ambulatory (Ordered)   20 Rule-Out Test Resulted    C-diff Rule Out 10/18/20 10/18/20 10/18/20 C DIFF TOXIN/ANTIGEN (Ordered)   20 Zay Fletcher RN    COVID-19 Rule Out 10/12/20 10/12/20 10/12/20 COVID-19 (Ordered)   10/12/20 Rule-Out Test Resulted          Nurse Assessment:  Last Vital Signs: BP 99/63   Pulse 51   Temp 97.9 °F (36.6 °C) (Oral)   Resp 16   Ht 5' 3\" (1.6 m)   Wt 123 lb 0.3 oz (55.8 kg)   LMP 2004   SpO2 97%   BMI 21.79 kg/m²     Last documented pain score (0-10 scale): Pain Level: 0  Last Weight:   Wt Readings from Last 1 Encounters:   20 123 lb 0.3 oz (55.8 kg)     Mental Status:  {IP PT MENTAL STATUS:78122}    IV Access:  { HORACE IV ACCESS:553536823}    Nursing Mobility/ADLs:  Walking   {[facility] DME OHGM:185558048}  Transfer  {[facility] DME ZKPN:744749777}  Bathing  {[facility] DME UGQK:705511448}  Dressing  {[facility] DME GQJ}  Toileting  {[facility] DME RRUK:748337484}  Feeding  {[facility] DME YQR}  Med Admin  {[facility] DME NIJW:205044235}  Med Delivery   {[facility] MED Delivery:219209326}    Wound Care Documentation and Therapy:        Elimination:  Continence:   · Bowel: {YES / BA:59560}  · Bladder: {YES / HR:79441}  Urinary Catheter: {Urinary Catheter:093033514}   Colostomy/Ileostomy/Ileal Conduit: {YES / AN:94881}       Date of Last BM: ***    Intake/Output Summary (Last 24 hours) at 2020 1012  Last data filed at 2020 0540  Gross per 24 hour   Intake 1412 ml   Output --   Net 2927 ml     I/O last 3 completed shifts: In: 2927 [P.O.:200;  I.V.:2727]  Out: -     Safety Concerns:     [address] Safety Concerns:712333843}    Impairments/Disabilities:      {[facility] Impairments/Disabilities:088184378}    Nutrition Therapy:  Current Nutrition Therapy:   [address] HORACE Diet List:977645354}    Routes of Feeding: {[facility] DME Other Feedings:982761128}  Liquids: {Slp liquid thickness:59867}  Daily Fluid Restriction: {CHP DME Yes amt example:609823218}  Last Modified Barium Swallow with Video (Video Swallowing Test): {Done Not Done UZ:427641202}    Treatments at the Time of Hospital Discharge:   Respiratory Treatments: ***  Oxygen Therapy:  {Therapy; copd oxygen:66912}  Ventilator:    { CC Vent RSKU:875917464}    Rehab Therapies: {THERAPEUTIC INTERVENTION:1308189603}  Weight Bearing Status/Restrictions: [address]  Weight Bearin}  Other Medical Equipment (for information only, NOT a DME order):  {EQUIPMENT:033370156}  Other Treatments: ***    Patient's personal belongings (please select all that are sent with patient):  {[facility] DME Belongings:903558543}    RN SIGNATURE:  {Esignature:371051563}    CASE MANAGEMENT/SOCIAL WORK SECTION    Inpatient Status Date: ***    Readmission Risk Assessment Score:  Readmission Risk              Risk of Unplanned Readmission:        13           Discharging to Facility/ Agency   · Name:   · Address:  · Phone:  · Fax:    Dialysis Facility (if applicable)   · Name:  · Address:  · Dialysis Schedule:  · Phone:  · Fax:    / signature: {Esignature:444992948}    PHYSICIAN SECTION    Prognosis: {Prognosis:0982432862}    Condition at Discharge: [address] Patient Condition:071950853}    Rehab Potential (if transferring to Rehab): {Prognosis:2284275032}    Recommended Labs or Other Treatments After Discharge: ***    Physician Certification: I certify the above information and transfer of Be Fall  is necessary for the continuing treatment of the diagnosis listed and that she requires {Admit to Appropriate Level of Care:50325} for {GREATER/LESS:785218033} 30 days.      Update Admission H&P: {CHP DME Changes in [facility]:036747479}    PHYSICIAN SIGNATURE:  {Esignature:976851762}

## 2020-11-30 NOTE — PROGRESS NOTES
Dr Sania Steiner present, notified of pt's heart rate being in the 50's , with the HR down to 48 today, new orders received

## 2020-12-01 LAB
ABSOLUTE EOS #: 0.3 K/UL (ref 0–0.4)
ABSOLUTE IMMATURE GRANULOCYTE: ABNORMAL K/UL (ref 0–0.3)
ABSOLUTE LYMPH #: 1.3 K/UL (ref 1–4.8)
ABSOLUTE MONO #: 0.6 K/UL (ref 0.1–1.3)
ANION GAP SERPL CALCULATED.3IONS-SCNC: 9 MMOL/L (ref 9–17)
BASOPHILS # BLD: 1 % (ref 0–2)
BASOPHILS ABSOLUTE: 0 K/UL (ref 0–0.2)
BUN BLDV-MCNC: 2 MG/DL (ref 8–23)
BUN/CREAT BLD: ABNORMAL (ref 9–20)
CALCIUM SERPL-MCNC: 7.6 MG/DL (ref 8.6–10.4)
CHLORIDE BLD-SCNC: 115 MMOL/L (ref 98–107)
CO2: 18 MMOL/L (ref 20–31)
CREAT SERPL-MCNC: 0.5 MG/DL (ref 0.5–0.9)
DIFFERENTIAL TYPE: ABNORMAL
EKG ATRIAL RATE: 53 BPM
EKG P AXIS: 63 DEGREES
EKG P-R INTERVAL: 152 MS
EKG Q-T INTERVAL: 430 MS
EKG QRS DURATION: 84 MS
EKG QTC CALCULATION (BAZETT): 403 MS
EKG R AXIS: -21 DEGREES
EKG T AXIS: 21 DEGREES
EKG VENTRICULAR RATE: 53 BPM
EOSINOPHILS RELATIVE PERCENT: 6 % (ref 0–4)
GFR AFRICAN AMERICAN: >60 ML/MIN
GFR NON-AFRICAN AMERICAN: >60 ML/MIN
GFR SERPL CREATININE-BSD FRML MDRD: ABNORMAL ML/MIN/{1.73_M2}
GFR SERPL CREATININE-BSD FRML MDRD: ABNORMAL ML/MIN/{1.73_M2}
GLUCOSE BLD-MCNC: 89 MG/DL (ref 70–99)
HCT VFR BLD CALC: 27.8 % (ref 36–46)
HEMOGLOBIN: 9.3 G/DL (ref 12–16)
IMMATURE GRANULOCYTES: ABNORMAL %
LYMPHOCYTES # BLD: 30 % (ref 24–44)
MAGNESIUM: 1.5 MG/DL (ref 1.6–2.6)
MCH RBC QN AUTO: 29.8 PG (ref 26–34)
MCHC RBC AUTO-ENTMCNC: 33.6 G/DL (ref 31–37)
MCV RBC AUTO: 88.8 FL (ref 80–100)
MONOCYTES # BLD: 14 % (ref 1–7)
NRBC AUTOMATED: ABNORMAL PER 100 WBC
PDW BLD-RTO: 14.2 % (ref 11.5–14.9)
PLATELET # BLD: 155 K/UL (ref 150–450)
PLATELET ESTIMATE: ABNORMAL
PMV BLD AUTO: 8.4 FL (ref 6–12)
POTASSIUM SERPL-SCNC: 2.8 MMOL/L (ref 3.7–5.3)
POTASSIUM SERPL-SCNC: 3.2 MMOL/L (ref 3.7–5.3)
RBC # BLD: 3.13 M/UL (ref 4–5.2)
RBC # BLD: ABNORMAL 10*6/UL
SEG NEUTROPHILS: 49 % (ref 36–66)
SEGMENTED NEUTROPHILS ABSOLUTE COUNT: 2.2 K/UL (ref 1.3–9.1)
SODIUM BLD-SCNC: 142 MMOL/L (ref 135–144)
T3 FREE: 2.66 PG/ML (ref 2.02–4.43)
TSH SERPL DL<=0.05 MIU/L-ACNC: 2 MIU/L (ref 0.3–5)
WBC # BLD: 4.4 K/UL (ref 3.5–11)
WBC # BLD: ABNORMAL 10*3/UL

## 2020-12-01 PROCEDURE — 6370000000 HC RX 637 (ALT 250 FOR IP): Performed by: STUDENT IN AN ORGANIZED HEALTH CARE EDUCATION/TRAINING PROGRAM

## 2020-12-01 PROCEDURE — 93010 ELECTROCARDIOGRAM REPORT: CPT | Performed by: INTERNAL MEDICINE

## 2020-12-01 PROCEDURE — 6370000000 HC RX 637 (ALT 250 FOR IP): Performed by: SURGERY

## 2020-12-01 PROCEDURE — 80048 BASIC METABOLIC PNL TOTAL CA: CPT

## 2020-12-01 PROCEDURE — 85025 COMPLETE CBC W/AUTO DIFF WBC: CPT

## 2020-12-01 PROCEDURE — 6370000000 HC RX 637 (ALT 250 FOR IP): Performed by: INTERNAL MEDICINE

## 2020-12-01 PROCEDURE — 84132 ASSAY OF SERUM POTASSIUM: CPT

## 2020-12-01 PROCEDURE — 6360000002 HC RX W HCPCS: Performed by: STUDENT IN AN ORGANIZED HEALTH CARE EDUCATION/TRAINING PROGRAM

## 2020-12-01 PROCEDURE — 36415 COLL VENOUS BLD VENIPUNCTURE: CPT

## 2020-12-01 PROCEDURE — 2060000000 HC ICU INTERMEDIATE R&B

## 2020-12-01 PROCEDURE — 84481 FREE ASSAY (FT-3): CPT

## 2020-12-01 PROCEDURE — 84443 ASSAY THYROID STIM HORMONE: CPT

## 2020-12-01 PROCEDURE — 6370000000 HC RX 637 (ALT 250 FOR IP): Performed by: FAMILY MEDICINE

## 2020-12-01 PROCEDURE — 2580000003 HC RX 258: Performed by: STUDENT IN AN ORGANIZED HEALTH CARE EDUCATION/TRAINING PROGRAM

## 2020-12-01 PROCEDURE — 83735 ASSAY OF MAGNESIUM: CPT

## 2020-12-01 RX ORDER — SPIRONOLACTONE 25 MG/1
25 TABLET ORAL DAILY
Status: DISCONTINUED | OUTPATIENT
Start: 2020-12-01 | End: 2020-12-03 | Stop reason: HOSPADM

## 2020-12-01 RX ADMIN — SODIUM CHLORIDE: 9 INJECTION, SOLUTION INTRAVENOUS at 19:54

## 2020-12-01 RX ADMIN — VANCOMYCIN HYDROCHLORIDE 250 MG: KIT at 12:51

## 2020-12-01 RX ADMIN — SPIRONOLACTONE 25 MG: 25 TABLET, FILM COATED ORAL at 18:13

## 2020-12-01 RX ADMIN — VANCOMYCIN HYDROCHLORIDE 250 MG: KIT at 06:28

## 2020-12-01 RX ADMIN — ALLOPURINOL 100 MG: 100 TABLET ORAL at 08:48

## 2020-12-01 RX ADMIN — ATORVASTATIN CALCIUM 20 MG: 20 TABLET, FILM COATED ORAL at 08:48

## 2020-12-01 RX ADMIN — HEPARIN SODIUM 5000 UNITS: 5000 INJECTION INTRAVENOUS; SUBCUTANEOUS at 06:28

## 2020-12-01 RX ADMIN — Medication 1 CAPSULE: at 08:48

## 2020-12-01 RX ADMIN — CHOLESTYRAMINE 4 G: 4 POWDER, FOR SUSPENSION ORAL at 19:55

## 2020-12-01 RX ADMIN — VANCOMYCIN HYDROCHLORIDE 250 MG: KIT at 23:13

## 2020-12-01 RX ADMIN — POTASSIUM CHLORIDE 10 MEQ: 7.46 INJECTION, SOLUTION INTRAVENOUS at 08:11

## 2020-12-01 RX ADMIN — POTASSIUM CHLORIDE 10 MEQ: 7.46 INJECTION, SOLUTION INTRAVENOUS at 13:55

## 2020-12-01 RX ADMIN — VANCOMYCIN HYDROCHLORIDE 250 MG: KIT at 18:33

## 2020-12-01 RX ADMIN — POTASSIUM CHLORIDE 40 MEQ: 1500 TABLET, EXTENDED RELEASE ORAL at 18:13

## 2020-12-01 RX ADMIN — POTASSIUM CHLORIDE 10 MEQ: 7.46 INJECTION, SOLUTION INTRAVENOUS at 11:42

## 2020-12-01 RX ADMIN — CHOLESTYRAMINE 4 G: 4 POWDER, FOR SUSPENSION ORAL at 08:49

## 2020-12-01 RX ADMIN — POTASSIUM CHLORIDE 10 MEQ: 7.46 INJECTION, SOLUTION INTRAVENOUS at 16:24

## 2020-12-01 RX ADMIN — POTASSIUM CHLORIDE 10 MEQ: 7.46 INJECTION, SOLUTION INTRAVENOUS at 09:46

## 2020-12-01 RX ADMIN — HEPARIN SODIUM 5000 UNITS: 5000 INJECTION INTRAVENOUS; SUBCUTANEOUS at 01:18

## 2020-12-01 RX ADMIN — POTASSIUM CHLORIDE 10 MEQ: 7.46 INJECTION, SOLUTION INTRAVENOUS at 06:28

## 2020-12-01 RX ADMIN — VANCOMYCIN HYDROCHLORIDE 250 MG: KIT at 01:13

## 2020-12-01 RX ADMIN — HEPARIN SODIUM 5000 UNITS: 5000 INJECTION INTRAVENOUS; SUBCUTANEOUS at 14:17

## 2020-12-01 RX ADMIN — HEPARIN SODIUM 5000 UNITS: 5000 INJECTION INTRAVENOUS; SUBCUTANEOUS at 23:03

## 2020-12-01 RX ADMIN — VERAPAMIL HYDROCHLORIDE 120 MG: 120 TABLET, FILM COATED, EXTENDED RELEASE ORAL at 19:55

## 2020-12-01 ASSESSMENT — PAIN SCALES - GENERAL: PAINLEVEL_OUTOF10: 0

## 2020-12-01 NOTE — CONSULTS
Date:   12/1/2020  Patient name: Brunilda Jerez  Date of admission:  11/27/2020  5:11 PM  MRN:   [id number]  YOB: 1945  PCP: Fermin Atkinson MD    Reason for Admission: C. difficile colitis [A04.72]    Cardiology consult: Bradycardia       Impression    Sinus bradycardia heart rate 52  Recent cholecystectomy 11/24/2020  C. difficile colitis postop  History of gout  History of migraine headache, patient has been on verapamil 240 mg with  No history of arrhythmias no history of CAD    ECG 11/30/2020  Sinus bradycardia heart rate 53, low voltage    2D echo 7/24/2020   Normal LV size ejection fraction more than 55 normal wall motion mild LVH mild AR mild MR RVSP 17 no significant pericardial effusion    CT abdomen 11/27/2020  Nonspecific pancolitis consistent with a possible pseudomembranous colitis, appendix is not identified, gallbladder not identified     Lab work 12/1/2020  Sodium 142, potassium 2.8, CO2 18, chloride 115, calcium 7.6, magnesium 1.5  WBC 4.4, hemoglobin 9.3, MCV 88.8     History of present illness     74-year-old female who is 3 days postop from laparoscopic cholecystectomy currently taking Keflex presented with chief complaint of diarrhea. Diarrhea started a day before admission and it has progressed. No fever no chills. Mild abdominal discomfort. Mild generalized weakness. On admission her blood pressure was 96/57 heart rate 60 temperature 98.1, oxygen saturation 97%. On admission electrocardiogram showed sinus rhythm, heart rate 97. Since admission she has had multiple bowel movements every day and has been running very low serum potassium and she has been on replacement. She has been also taking verapamil 240 mg a day . Over the last few days she has developed sinus bradycardia. She has been taking verapamil 250 mg a day for her migraine.     She is a still having loose motion  She is started on oral feedings  Serum potassium is still low 2.8 magnesium is also low 1.5      Medications:   Scheduled Meds:   cholestyramine light  4 g Oral BID    verapamil  240 mg Oral Nightly    vancomycin  250 mg Oral 4 times per day    lactobacillus  1 capsule Oral Daily with breakfast    heparin (porcine)  5,000 Units Subcutaneous 3 times per day    sodium chloride flush  10 mL Intravenous 2 times per day    allopurinol  100 mg Oral Daily    atorvastatin  20 mg Oral Daily     Continuous Infusions:   sodium chloride 125 mL/hr at 11/30/20 1929     CBC:   Recent Labs     11/29/20 0533 11/30/20  0516 12/01/20  0522   WBC 4.0 3.6 4.4   HGB 9.5* 9.2* 9.3*    146* 155     BMP:    Recent Labs     11/29/20  0533 11/30/20  0516 12/01/20  0522    141 142   K 3.2* 3.1* 2.8*   * 113* 115*   CO2 21 20 18*   BUN 8 5* 2*   CREATININE 0.45* 0.53 0.50   GLUCOSE 96 89 89     Hepatic: No results for input(s): AST, ALT, ALB, BILITOT, ALKPHOS in the last 72 hours. Troponin: No results for input(s): TROPONINI in the last 72 hours. BNP: No results for input(s): BNP in the last 72 hours. Lipids: No results for input(s): CHOL, HDL in the last 72 hours. Invalid input(s): LDLCALCU  INR: No results for input(s): INR in the last 72 hours. Objective:   Vitals: /67   Pulse 72   Temp 97.4 °F (36.3 °C) (Oral)   Resp 16   Ht 5' 3\" (1.6 m)   Wt 133 lb 13.1 oz (60.7 kg)   LMP 01/01/2004   SpO2 96%   BMI 23.71 kg/m²   General appearance: alert and cooperative with exam  HEENT: Head: Normal, normocephalic, atraumatic. Neck: JVD +  Lungs: diminished breath sounds bibasilar  Heart: regular rate and rhythm  Abdomen: Soft bowel sounds present, bilateral flank edema  Extremities: Mild bilateral ankle edema  Neurologic: Mental status: Alert, oriented, thought content appropriate    EKG: sinus tachycardia. ECHO: reviewed.    Ejection fraction: 55%, mild LVH, mild AR and MR ,normal RVSP    Assessment / Acute Cardiac Problems:   Sinus bradycardia most likely secondary to verapamil  Severe hypokalemia secondary to potassium loss secondary to diarrhea  C. difficile diarrhea secondary to antibiotic  History of gout  Bilateral flank edema and mild bilateral ankle edema  History of migraine headache  History of gout    Patient Active Problem List:     Allergic rhinitis     Osteoarthritis     Osteoporosis     GERD (gastroesophageal reflux disease)     Headache     Mixed hyperlipidemia     Gout     Vitamin D deficiency     Migraine     Chronic headache     Anemia     Lymphadenopathy     Degenerative arthritis of knee, bilateral     Primary osteoarthritis of left knee     Primary osteoarthritis of right knee     Cholecystitis     Elevated LFTs     Abnormal MRI of the abdomen     Weight loss     Abdominal pain, right upper quadrant     Abdominal cramping     Diarrhea     Abdominal pain, generalized     Abdominal bloating     Irritable bowel syndrome with constipation     Status post endoscopic retrograde cholangiopancreatography     Biliary colic     C. difficile colitis     S/P cholecystectomy     Chronic diastolic congestive heart failure ([facility] [address]      Plan of Treatment:   Add Aldactone 25 mg a day  Replace potassium and magnesium  Reduce verapamil 120 mg a day    Electronically signed by Yoselin Diaz MD on 12/1/2020 at 1:34 PMBrief Postoperative Note

## 2020-12-01 NOTE — CARE COORDINATION
ONGOING DISCHARGE PLAN:    Reviewed patients chart regarding discharge plan and chart still confirms the plan is to discharge to home with no needs  Patient remains on the progressive care unit   Cardio consult   Patient tayler calderón    Pt remains on po vanco     Will review plan with patient and or family      Will continue to follow for additional discharge needs.      Electronically signed by King Oviedo RN on 12/1/2020 at 2:19 PM

## 2020-12-01 NOTE — FLOWSHEET NOTE
12/01/20 1749   Provider Notification   Reason for Communication New orders   Provider Name Dr Yodit Parnell   Provider Notification Physician   Method of Communication Face to face   Response See orders   Notification Time 60 [phone]       Rounded with Dr Yodit Parnell. Discussed that potassium recheck was drawn prior to completion of previous replacement order. Recheck resulted at 3.2 after receiving only 50 mEq iv, still ok to administer 40 meq po per sliding scale despite receiving a total of 60mEq IV. Ordered 25mg po aldactone daily, first dose now. Decreased verapamil to 120 mg.  Order received to recheck mag in am.

## 2020-12-02 LAB
ABSOLUTE EOS #: 0.3 K/UL (ref 0–0.4)
ABSOLUTE IMMATURE GRANULOCYTE: ABNORMAL K/UL (ref 0–0.3)
ABSOLUTE LYMPH #: 1.3 K/UL (ref 1–4.8)
ABSOLUTE MONO #: 0.8 K/UL (ref 0.1–1.3)
ALBUMIN SERPL-MCNC: 3.2 G/DL (ref 3.5–5.2)
ALBUMIN/GLOBULIN RATIO: ABNORMAL (ref 1–2.5)
ALP BLD-CCNC: 51 U/L (ref 35–104)
ALT SERPL-CCNC: 19 U/L (ref 5–33)
ANION GAP SERPL CALCULATED.3IONS-SCNC: 9 MMOL/L (ref 9–17)
AST SERPL-CCNC: 26 U/L
BASOPHILS # BLD: 1 % (ref 0–2)
BASOPHILS ABSOLUTE: 0 K/UL (ref 0–0.2)
BILIRUB SERPL-MCNC: 0.28 MG/DL (ref 0.3–1.2)
BUN BLDV-MCNC: <2 MG/DL (ref 8–23)
BUN/CREAT BLD: ABNORMAL (ref 9–20)
CALCIUM SERPL-MCNC: 8.3 MG/DL (ref 8.6–10.4)
CHLORIDE BLD-SCNC: 112 MMOL/L (ref 98–107)
CO2: 21 MMOL/L (ref 20–31)
CREAT SERPL-MCNC: 0.53 MG/DL (ref 0.5–0.9)
DIFFERENTIAL TYPE: ABNORMAL
EOSINOPHILS RELATIVE PERCENT: 5 % (ref 0–4)
GFR AFRICAN AMERICAN: >60 ML/MIN
GFR NON-AFRICAN AMERICAN: >60 ML/MIN
GFR SERPL CREATININE-BSD FRML MDRD: ABNORMAL ML/MIN/{1.73_M2}
GFR SERPL CREATININE-BSD FRML MDRD: ABNORMAL ML/MIN/{1.73_M2}
GLUCOSE BLD-MCNC: 88 MG/DL (ref 70–99)
HCT VFR BLD CALC: 30.2 % (ref 36–46)
HEMOGLOBIN: 10.2 G/DL (ref 12–16)
IMMATURE GRANULOCYTES: ABNORMAL %
LYMPHOCYTES # BLD: 23 % (ref 24–44)
MAGNESIUM: 1.5 MG/DL (ref 1.6–2.6)
MCH RBC QN AUTO: 29.8 PG (ref 26–34)
MCHC RBC AUTO-ENTMCNC: 33.6 G/DL (ref 31–37)
MCV RBC AUTO: 88.5 FL (ref 80–100)
MONOCYTES # BLD: 15 % (ref 1–7)
NRBC AUTOMATED: ABNORMAL PER 100 WBC
PDW BLD-RTO: 14.5 % (ref 11.5–14.9)
PLATELET # BLD: 166 K/UL (ref 150–450)
PLATELET ESTIMATE: ABNORMAL
PMV BLD AUTO: 8.8 FL (ref 6–12)
POTASSIUM SERPL-SCNC: 3.8 MMOL/L (ref 3.7–5.3)
RBC # BLD: 3.41 M/UL (ref 4–5.2)
RBC # BLD: ABNORMAL 10*6/UL
SEG NEUTROPHILS: 56 % (ref 36–66)
SEGMENTED NEUTROPHILS ABSOLUTE COUNT: 3 K/UL (ref 1.3–9.1)
SODIUM BLD-SCNC: 142 MMOL/L (ref 135–144)
TOTAL PROTEIN: 5.3 G/DL (ref 6.4–8.3)
WBC # BLD: 5.4 K/UL (ref 3.5–11)
WBC # BLD: ABNORMAL 10*3/UL

## 2020-12-02 PROCEDURE — 6370000000 HC RX 637 (ALT 250 FOR IP): Performed by: STUDENT IN AN ORGANIZED HEALTH CARE EDUCATION/TRAINING PROGRAM

## 2020-12-02 PROCEDURE — 6370000000 HC RX 637 (ALT 250 FOR IP): Performed by: FAMILY MEDICINE

## 2020-12-02 PROCEDURE — 6370000000 HC RX 637 (ALT 250 FOR IP): Performed by: SURGERY

## 2020-12-02 PROCEDURE — 80053 COMPREHEN METABOLIC PANEL: CPT

## 2020-12-02 PROCEDURE — 85025 COMPLETE CBC W/AUTO DIFF WBC: CPT

## 2020-12-02 PROCEDURE — 83735 ASSAY OF MAGNESIUM: CPT

## 2020-12-02 PROCEDURE — 6360000002 HC RX W HCPCS: Performed by: STUDENT IN AN ORGANIZED HEALTH CARE EDUCATION/TRAINING PROGRAM

## 2020-12-02 PROCEDURE — 2580000003 HC RX 258: Performed by: STUDENT IN AN ORGANIZED HEALTH CARE EDUCATION/TRAINING PROGRAM

## 2020-12-02 PROCEDURE — 1200000000 HC SEMI PRIVATE

## 2020-12-02 PROCEDURE — 6360000002 HC RX W HCPCS: Performed by: FAMILY MEDICINE

## 2020-12-02 PROCEDURE — 6370000000 HC RX 637 (ALT 250 FOR IP): Performed by: INTERNAL MEDICINE

## 2020-12-02 PROCEDURE — 36415 COLL VENOUS BLD VENIPUNCTURE: CPT

## 2020-12-02 RX ORDER — CALCIUM CARBONATE 200(500)MG
500 TABLET,CHEWABLE ORAL 3 TIMES DAILY PRN
Status: DISCONTINUED | OUTPATIENT
Start: 2020-12-02 | End: 2020-12-03 | Stop reason: HOSPADM

## 2020-12-02 RX ORDER — PANTOPRAZOLE SODIUM 40 MG/1
40 TABLET, DELAYED RELEASE ORAL
Status: DISCONTINUED | OUTPATIENT
Start: 2020-12-03 | End: 2020-12-02

## 2020-12-02 RX ORDER — GREEN TEA/HOODIA GORDONII 315-12.5MG
1 CAPSULE ORAL 2 TIMES DAILY
Qty: 60 TABLET | Refills: 0 | Status: SHIPPED | OUTPATIENT
Start: 2020-12-02 | End: 2021-01-01

## 2020-12-02 RX ORDER — MAGNESIUM SULFATE 1 G/100ML
1 INJECTION INTRAVENOUS PRN
Status: DISCONTINUED | OUTPATIENT
Start: 2020-12-02 | End: 2020-12-03 | Stop reason: HOSPADM

## 2020-12-02 RX ORDER — PANTOPRAZOLE SODIUM 40 MG/1
40 TABLET, DELAYED RELEASE ORAL
Status: DISCONTINUED | OUTPATIENT
Start: 2020-12-02 | End: 2020-12-03 | Stop reason: HOSPADM

## 2020-12-02 RX ORDER — VANCOMYCIN HYDROCHLORIDE 125 MG/1
250 CAPSULE ORAL 4 TIMES DAILY
Qty: 112 CAPSULE | Refills: 0 | Status: SHIPPED | OUTPATIENT
Start: 2020-12-02 | End: 2020-12-03 | Stop reason: SDUPTHER

## 2020-12-02 RX ADMIN — ACETAMINOPHEN 650 MG: 325 TABLET, FILM COATED ORAL at 15:34

## 2020-12-02 RX ADMIN — SPIRONOLACTONE 25 MG: 25 TABLET, FILM COATED ORAL at 09:54

## 2020-12-02 RX ADMIN — ATORVASTATIN CALCIUM 20 MG: 20 TABLET, FILM COATED ORAL at 09:54

## 2020-12-02 RX ADMIN — VERAPAMIL HYDROCHLORIDE 120 MG: 120 TABLET, FILM COATED, EXTENDED RELEASE ORAL at 21:11

## 2020-12-02 RX ADMIN — ONDANSETRON 4 MG: 2 INJECTION INTRAMUSCULAR; INTRAVENOUS at 12:00

## 2020-12-02 RX ADMIN — HEPARIN SODIUM 5000 UNITS: 5000 INJECTION INTRAVENOUS; SUBCUTANEOUS at 21:01

## 2020-12-02 RX ADMIN — Medication 1 CAPSULE: at 09:55

## 2020-12-02 RX ADMIN — VANCOMYCIN HYDROCHLORIDE 250 MG: KIT at 12:59

## 2020-12-02 RX ADMIN — SODIUM CHLORIDE: 9 INJECTION, SOLUTION INTRAVENOUS at 11:57

## 2020-12-02 RX ADMIN — SODIUM CHLORIDE: 9 INJECTION, SOLUTION INTRAVENOUS at 21:03

## 2020-12-02 RX ADMIN — VANCOMYCIN HYDROCHLORIDE 250 MG: KIT at 06:46

## 2020-12-02 RX ADMIN — VANCOMYCIN HYDROCHLORIDE 250 MG: KIT at 18:35

## 2020-12-02 RX ADMIN — MAGNESIUM SULFATE HEPTAHYDRATE 1 G: 1 INJECTION, SOLUTION INTRAVENOUS at 13:01

## 2020-12-02 RX ADMIN — PANTOPRAZOLE SODIUM 40 MG: 40 TABLET, DELAYED RELEASE ORAL at 18:35

## 2020-12-02 RX ADMIN — MAGNESIUM SULFATE HEPTAHYDRATE 1 G: 1 INJECTION, SOLUTION INTRAVENOUS at 11:52

## 2020-12-02 RX ADMIN — ALLOPURINOL 100 MG: 100 TABLET ORAL at 09:54

## 2020-12-02 RX ADMIN — SODIUM CHLORIDE: 9 INJECTION, SOLUTION INTRAVENOUS at 11:54

## 2020-12-02 RX ADMIN — HEPARIN SODIUM 5000 UNITS: 5000 INJECTION INTRAVENOUS; SUBCUTANEOUS at 12:59

## 2020-12-02 RX ADMIN — VANCOMYCIN HYDROCHLORIDE 250 MG: KIT at 15:32

## 2020-12-02 RX ADMIN — CHOLESTYRAMINE 4 G: 4 POWDER, FOR SUSPENSION ORAL at 11:54

## 2020-12-02 RX ADMIN — ANTACID TABLETS 500 MG: 500 TABLET, CHEWABLE ORAL at 18:35

## 2020-12-02 RX ADMIN — ANTACID TABLETS 500 MG: 500 TABLET, CHEWABLE ORAL at 21:03

## 2020-12-02 RX ADMIN — CHOLESTYRAMINE 4 G: 4 POWDER, FOR SUSPENSION ORAL at 21:01

## 2020-12-02 RX ADMIN — HEPARIN SODIUM 5000 UNITS: 5000 INJECTION INTRAVENOUS; SUBCUTANEOUS at 06:46

## 2020-12-02 ASSESSMENT — PAIN SCALES - GENERAL: PAINLEVEL_OUTOF10: 8

## 2020-12-02 NOTE — CARE COORDINATION
ONGOING DISCHARGE PLAN:    Reviewed patients chart regarding discharge plan and chart still confirms the plan is to discharge to home with no needs   Patient remains on aldactone   Ok to transfer to [facility]     Will review plan with patient and or family      Will continue to follow for additional discharge needs.      Electronically signed by Clarence Jasso RN on 12/2/2020 at 12:14 PM

## 2020-12-02 NOTE — PROGRESS NOTES
Hospitalist Progress Note  12/1/2020 9:10 PM  Subjective:   Admit Date: 11/27/2020  PCP: Juan Bach MD     Full Code      C/c:  Chief Complaint   Patient presents with    Diarrhea         Interval History: doing slightly better,still on oral vanco    Diet: DIET LOW FIBER;                                ip days:4  Medications:   Scheduled Meds:   verapamil  120 mg Oral Nightly    spironolactone  25 mg Oral Daily    cholestyramine light  4 g Oral BID    vancomycin  250 mg Oral 4 times per day    lactobacillus  1 capsule Oral Daily with breakfast    heparin (porcine)  5,000 Units Subcutaneous 3 times per day    sodium chloride flush  10 mL Intravenous 2 times per day    allopurinol  100 mg Oral Daily    atorvastatin  20 mg Oral Daily     Continuous Infusions:   sodium chloride 125 mL/hr at 12/01/20 1954     PRN Meds:.potassium chloride **OR** potassium alternative oral replacement **OR** potassium chloride, sodium chloride flush, acetaminophen **OR** acetaminophen, polyethylene glycol, promethazine **OR** ondansetron     CBC:   Recent Labs     11/29/20 0533 11/30/20 0516 12/01/20 0522   WBC 4.0 3.6 4.4   HGB 9.5* 9.2* 9.3*    146* 155     BMP:    Recent Labs     11/29/20 0533 11/30/20  0516 12/01/20 0522 12/01/20  1602    141 142  --    K 3.2* 3.1* 2.8* 3.2*   * 113* 115*  --    CO2 21 20 18*  --    BUN 8 5* 2*  --    CREATININE 0.45* 0.53 0.50  --    GLUCOSE 96 89 89  --      Hepatic: No results for input(s): AST, ALT, ALB, BILITOT, ALKPHOS in the last 72 hours. Troponin: No results for input(s): TROPONINI in the last 72 hours. BNP: No results for input(s): BNP in the last 72 hours. Lipids: No results for input(s): CHOL, HDL in the last 72 hours. Invalid input(s): LDLCALCU  INR: No results for input(s): INR in the last 72 hours.     Objective:   Vitals: /70   Pulse 62   Temp 98.6 °F (37 °C) (Axillary)   Resp 18   Ht 5' 3\" (1.6 m)   Wt 133 lb 13.1 oz (60.7 kg) LMP 01/01/2004   SpO2 97%   BMI 23.71 kg/m²   General appearance: alert, appears stated age and cooperative  Skin: Skin color, texture, turgor normal. No rashes or lesions  Lungs: clear to auscultation bilaterally  Heart: regular rate and rhythm, S1, S2 normal, no murmur, click, rub or gallop  Abdomen: soft, non-tender; bowel sounds normal; no masses,  no organomegaly  Extremities: extremities normal, atraumatic, no cyanosis or edema  Neurologic: Mental status: Alert, oriented, thought content appropriate    Prophylaxis:   DVT with  [] lovenox        [x] heparin        [] Scd        [] none:     Radiology:  Ct Abdomen Pelvis W Iv Contrast Additional Contrast? None    Result Date: 11/27/2020  EXAMINATION: CT OF THE ABDOMEN AND PELVIS WITH CONTRAST 11/27/2020 7:26 pm TECHNIQUE: CT of the abdomen and pelvis was performed with the administration of intravenous contrast. Multiplanar reformatted images are provided for review. Dose modulation, iterative reconstruction, and/or weight based adjustment of the mA/kV was utilized to reduce the radiation dose to as low as reasonably achievable. COMPARISON: CT abdomen and pelvis October 27, 2020 HISTORY: ORDERING SYSTEM PROVIDED HISTORY: post op pain diarrhea TECHNOLOGIST PROVIDED HISTORY: post op pain diarrhea Reason for Exam: Diarrhea, pt had cholecystectomy 11/24, c-diff positive Acuity: Acute FINDINGS: Lower Chest: Clear Organs: The abdominal wall appears normal. The liver, spleen, pancreas, and adrenals appear normal.  Gallbladder not identified. Subcentimeter hypodensities left kidney too small to characterize. Right kidney normal. The bladder appears normal. GI/Bowel: Circumferential thickening of the colon throughout the colon. No pneumatosis. Stomach and small bowel normal.  Appendix is not identified. Pelvis: Normal Peritoneum/Retroperitoneum: The abdominal aorta and iliac arteries are normal in caliber. There is no pathologic adenopathy.  Bones/Soft Tissues: Compression fracture T12 unchanged. Slight anterior subluxation L4-5 unchanged. Nonspecific pancolitis consistent with possible pseudomembranous colitis. This appears somewhat more conspicuous than on the previous exam.       Assessment :   1. Pancolitis/oral vanco  2. Hypokalemia/sliding scale k     Plan:   1. Full liquid diet  2. See order    Patient Active Problem List:     Allergic rhinitis     Osteoarthritis     Osteoporosis     GERD (gastroesophageal reflux disease)     Headache     Mixed hyperlipidemia     Gout     Vitamin D deficiency     Migraine     Chronic headache     Anemia     Lymphadenopathy     Degenerative arthritis of knee, bilateral     Primary osteoarthritis of left knee     Primary osteoarthritis of right knee     Cholecystitis     Elevated LFTs     Abnormal MRI of the abdomen     Weight loss     Abdominal pain, right upper quadrant     Abdominal cramping     Diarrhea     Abdominal pain, generalized     Abdominal bloating     Irritable bowel syndrome with constipation     Status post endoscopic retrograde cholangiopancreatography     Biliary colic     C. difficile colitis     S/P cholecystectomy     Chronic diastolic congestive heart failure ([facility] [address]      Anticipated Disposition upon discharge: [] Home                                                                         [] Home with Home Health                                                                         [] [facility]                                                                         [] [address] [facility] [address]      Patient is admitted as inpatient status because of co-morbidities listed above, severity of signs and symptoms as outlined, requirement for current medical therapies and most importantly because of direct risk to patient if care not provided in a hospital setting.           Yasmine Burnett MD  Rounding Hospitalist

## 2020-12-02 NOTE — PROGRESS NOTES
Patient was seen and examined. Complaining of heartburn. Afebrile vital signs are stable. Diarrhea is pretty much resolved. Tolerating diet. Abdomen is benign. Extremity nontender. Blood work was reviewed. BMP is unremarkable. WBC count is normal.  Hemoglobin is 10.2. Surgically stable. Oral Protonix tonight. Hopefully discharge to home tomorrow on oral vancomycin.

## 2020-12-03 VITALS
DIASTOLIC BLOOD PRESSURE: 64 MMHG | TEMPERATURE: 98.1 F | RESPIRATION RATE: 18 BRPM | HEIGHT: 63 IN | WEIGHT: 139.77 LBS | SYSTOLIC BLOOD PRESSURE: 127 MMHG | HEART RATE: 52 BPM | BODY MASS INDEX: 24.77 KG/M2 | OXYGEN SATURATION: 96 %

## 2020-12-03 LAB
ABSOLUTE EOS #: 0.09 K/UL (ref 0–0.4)
ABSOLUTE IMMATURE GRANULOCYTE: ABNORMAL K/UL (ref 0–0.3)
ABSOLUTE LYMPH #: 1.06 K/UL (ref 1–4.8)
ABSOLUTE MONO #: 0.88 K/UL (ref 0.1–1.3)
ANION GAP SERPL CALCULATED.3IONS-SCNC: 10 MMOL/L (ref 9–17)
BASOPHILS # BLD: 1 % (ref 0–2)
BASOPHILS ABSOLUTE: 0.04 K/UL (ref 0–0.2)
BUN BLDV-MCNC: 2 MG/DL (ref 8–23)
BUN/CREAT BLD: ABNORMAL (ref 9–20)
CALCIUM SERPL-MCNC: 8.5 MG/DL (ref 8.6–10.4)
CHLORIDE BLD-SCNC: 110 MMOL/L (ref 98–107)
CO2: 21 MMOL/L (ref 20–31)
CREAT SERPL-MCNC: 0.54 MG/DL (ref 0.5–0.9)
DIFFERENTIAL TYPE: ABNORMAL
EOSINOPHILS RELATIVE PERCENT: 2 % (ref 0–4)
GFR AFRICAN AMERICAN: >60 ML/MIN
GFR NON-AFRICAN AMERICAN: >60 ML/MIN
GFR SERPL CREATININE-BSD FRML MDRD: ABNORMAL ML/MIN/{1.73_M2}
GFR SERPL CREATININE-BSD FRML MDRD: ABNORMAL ML/MIN/{1.73_M2}
GLUCOSE BLD-MCNC: 87 MG/DL (ref 70–99)
HCT VFR BLD CALC: 29.8 % (ref 36–46)
HEMOGLOBIN: 10 G/DL (ref 12–16)
IMMATURE GRANULOCYTES: ABNORMAL %
LYMPHOCYTES # BLD: 24 % (ref 24–44)
MAGNESIUM: 1.7 MG/DL (ref 1.6–2.6)
MCH RBC QN AUTO: 29.5 PG (ref 26–34)
MCHC RBC AUTO-ENTMCNC: 33.6 G/DL (ref 31–37)
MCV RBC AUTO: 87.8 FL (ref 80–100)
MONOCYTES # BLD: 20 % (ref 1–7)
MORPHOLOGY: ABNORMAL
MORPHOLOGY: ABNORMAL
NRBC AUTOMATED: ABNORMAL PER 100 WBC
PDW BLD-RTO: 14.7 % (ref 11.5–14.9)
PLATELET # BLD: 167 K/UL (ref 150–450)
PLATELET ESTIMATE: ABNORMAL
PMV BLD AUTO: 8.4 FL (ref 6–12)
POTASSIUM SERPL-SCNC: 3.4 MMOL/L (ref 3.7–5.3)
RBC # BLD: 3.39 M/UL (ref 4–5.2)
RBC # BLD: ABNORMAL 10*6/UL
SEG NEUTROPHILS: 53 % (ref 36–66)
SEGMENTED NEUTROPHILS ABSOLUTE COUNT: 2.33 K/UL (ref 1.3–9.1)
SODIUM BLD-SCNC: 141 MMOL/L (ref 135–144)
WBC # BLD: 4.4 K/UL (ref 3.5–11)
WBC # BLD: ABNORMAL 10*3/UL

## 2020-12-03 PROCEDURE — 80048 BASIC METABOLIC PNL TOTAL CA: CPT

## 2020-12-03 PROCEDURE — 83735 ASSAY OF MAGNESIUM: CPT

## 2020-12-03 PROCEDURE — 6370000000 HC RX 637 (ALT 250 FOR IP): Performed by: INTERNAL MEDICINE

## 2020-12-03 PROCEDURE — 6370000000 HC RX 637 (ALT 250 FOR IP): Performed by: SURGERY

## 2020-12-03 PROCEDURE — 6370000000 HC RX 637 (ALT 250 FOR IP): Performed by: FAMILY MEDICINE

## 2020-12-03 PROCEDURE — 2580000003 HC RX 258: Performed by: STUDENT IN AN ORGANIZED HEALTH CARE EDUCATION/TRAINING PROGRAM

## 2020-12-03 PROCEDURE — 85025 COMPLETE CBC W/AUTO DIFF WBC: CPT

## 2020-12-03 PROCEDURE — 36415 COLL VENOUS BLD VENIPUNCTURE: CPT

## 2020-12-03 PROCEDURE — 6360000002 HC RX W HCPCS: Performed by: STUDENT IN AN ORGANIZED HEALTH CARE EDUCATION/TRAINING PROGRAM

## 2020-12-03 PROCEDURE — 6370000000 HC RX 637 (ALT 250 FOR IP): Performed by: STUDENT IN AN ORGANIZED HEALTH CARE EDUCATION/TRAINING PROGRAM

## 2020-12-03 RX ORDER — VANCOMYCIN HYDROCHLORIDE 125 MG/1
250 CAPSULE ORAL 4 TIMES DAILY
Qty: 112 CAPSULE | Refills: 0 | Status: SHIPPED | OUTPATIENT
Start: 2020-12-03 | End: 2020-12-17

## 2020-12-03 RX ORDER — OXYCODONE HYDROCHLORIDE AND ACETAMINOPHEN 5; 325 MG/1; MG/1
1 TABLET ORAL EVERY 6 HOURS PRN
Qty: 12 TABLET | Refills: 0 | Status: SHIPPED | OUTPATIENT
Start: 2020-12-03 | End: 2020-12-06

## 2020-12-03 RX ORDER — SPIRONOLACTONE 25 MG/1
25 TABLET ORAL DAILY
Qty: 10 TABLET | Refills: 0 | Status: SHIPPED | OUTPATIENT
Start: 2020-12-04 | End: 2020-12-15 | Stop reason: ALTCHOICE

## 2020-12-03 RX ORDER — CHOLESTYRAMINE LIGHT 4 G/5.7G
4 POWDER, FOR SUSPENSION ORAL 2 TIMES DAILY
Qty: 30 PACKET | Refills: 0 | Status: SHIPPED | OUTPATIENT
Start: 2020-12-03 | End: 2021-01-04

## 2020-12-03 RX ADMIN — VANCOMYCIN HYDROCHLORIDE 250 MG: KIT at 00:41

## 2020-12-03 RX ADMIN — PANTOPRAZOLE SODIUM 40 MG: 40 TABLET, DELAYED RELEASE ORAL at 06:33

## 2020-12-03 RX ADMIN — VANCOMYCIN HYDROCHLORIDE 250 MG: KIT at 06:34

## 2020-12-03 RX ADMIN — ACETAMINOPHEN 650 MG: 325 TABLET, FILM COATED ORAL at 04:44

## 2020-12-03 RX ADMIN — ALLOPURINOL 100 MG: 100 TABLET ORAL at 09:23

## 2020-12-03 RX ADMIN — CHOLESTYRAMINE 4 G: 4 POWDER, FOR SUSPENSION ORAL at 09:23

## 2020-12-03 RX ADMIN — ATORVASTATIN CALCIUM 20 MG: 20 TABLET, FILM COATED ORAL at 09:23

## 2020-12-03 RX ADMIN — Medication 1 CAPSULE: at 09:23

## 2020-12-03 RX ADMIN — HEPARIN SODIUM 5000 UNITS: 5000 INJECTION INTRAVENOUS; SUBCUTANEOUS at 06:33

## 2020-12-03 RX ADMIN — Medication 10 ML: at 09:23

## 2020-12-03 RX ADMIN — ACETAMINOPHEN 650 MG: 325 TABLET, FILM COATED ORAL at 10:52

## 2020-12-03 RX ADMIN — SPIRONOLACTONE 25 MG: 25 TABLET, FILM COATED ORAL at 09:23

## 2020-12-03 RX ADMIN — VANCOMYCIN HYDROCHLORIDE 250 MG: KIT at 11:46

## 2020-12-03 ASSESSMENT — PAIN SCALES - GENERAL
PAINLEVEL_OUTOF10: 4
PAINLEVEL_OUTOF10: 4
PAINLEVEL_OUTOF10: 7
PAINLEVEL_OUTOF10: 8

## 2020-12-03 ASSESSMENT — PAIN DESCRIPTION - LOCATION
LOCATION: HEAD
LOCATION: HEAD

## 2020-12-03 ASSESSMENT — PAIN DESCRIPTION - PAIN TYPE
TYPE: ACUTE PAIN
TYPE: ACUTE PAIN

## 2020-12-03 NOTE — CARE COORDINATION
DISCHARGE PLANNING NOTE:    OOP cost of PO Vancocin is $15.00    Electronically signed by Tania Tucker RN on 12/3/2020 at 12:29 PM

## 2020-12-03 NOTE — PROGRESS NOTES
Discharge instructions, prescriptions, and follow up appts reviewed with patient. All questions answered to patient satisfaction. Patient taken via w/c to lobby where her ride waited.

## 2020-12-03 NOTE — DISCHARGE SUMMARY
Hospitalist Discharge Summary    Opal Jones  :  1945  MRN:  [id number]    Admit date:  2020  Discharge date:  12/3/20    Admitting Physician:  Junior Augustine MD    Discharge Diagnoses:   Patient Active Problem List   Diagnosis    Allergic rhinitis    Osteoarthritis    Osteoporosis    GERD (gastroesophageal reflux disease)    Headache    Mixed hyperlipidemia    Gout    Vitamin D deficiency    Migraine    Chronic headache    Anemia    Lymphadenopathy    Degenerative arthritis of knee, bilateral    Primary osteoarthritis of left knee    Primary osteoarthritis of right knee    Cholecystitis    Elevated LFTs    Abnormal MRI of the abdomen    Weight loss    Abdominal pain, right upper quadrant    Abdominal cramping    Diarrhea    Abdominal pain, generalized    Abdominal bloating    Irritable bowel syndrome with constipation    Status post endoscopic retrograde cholangiopancreatography    Biliary colic    C. difficile colitis    S/P cholecystectomy    Chronic diastolic congestive heart failure ([facility] [address]        Admission Condition:  fair      Discharged Condition:  good    Hospital Course/Treatments   Admitted with diarrhea, pt was seen in [facility] and admitted and pt was positive c diff, pt was placed on oral vanco and pt did well, pt will be d/c home with following meds    Discharge Medications:        Alexandr Velazco   Home Medication Instructions ZIP:[id number]    Printed on:20 1224   Medication Information                      alendronate (FOSAMAX) 70 MG tablet  TAKE 1 TABLET BY MOUTH ONCE WEEKLY BEFORE BREAKFAST, ON AN EMPTY STOMACH: REMAIN UPRIGHT FOR 30 MINUTES:TAKE WITH 8 OUNCES OF WATER             allopurinol (ZYLOPRIM) 100 MG tablet  TAKE ONE TABLET BY MOUTH DAILY             aspirin 81 MG tablet  Take 81 mg by mouth daily             Aspirin-Acetaminophen-Caffeine (EXCEDRIN MIGRAINE PO)  Take by mouth as needed              Bacillus Coagulans-Inulin (ALIGN PREBIOTIC-PROBIOTIC PO)  Take by mouth daily              cetirizine (ZYRTEC) 10 MG tablet  TAKE ONE TABLET BY MOUTH DAILY             cholestyramine light 4 g packet  Take 1 packet by mouth 2 times daily for 15 days             famotidine (PEPCID) 20 MG tablet  TAKE ONE TABLET BY MOUTH TWICE A DAY             fluticasone (FLONASE) 50 MCG/ACT nasal spray  SPRAY TWO SPRAYS IN EACH NOSTRIL ONCE DAILY             naproxen (NAPROSYN) 250 MG tablet  TAKE ONE TABLET BY MOUTH THREE TIMES A DAY WITH FOOD AS NEEDED FOR GOUT FLARE UP UNTIL ATTACK SUBSIDES             ondansetron (ZOFRAN) 4 MG tablet  Take every six hours as needed             oxyCODONE-acetaminophen (PERCOCET) 5-325 MG per tablet  Take 1 tablet by mouth every 6 hours as needed for Pain for up to 3 days.  . Take lowest dose possible to manage pain             Probiotic Acidophilus (FLORANEX) TABS  Take 1 tablet by mouth 2 times daily             psyllium (METAMUCIL) 28.3 % POWD powder  Take 3.4 g by mouth daily             simvastatin (ZOCOR) 20 MG tablet  TAKE ONE TABLET BY MOUTH DAILY             spironolactone (ALDACTONE) 25 MG tablet  Take 1 tablet by mouth daily for 10 days             vancomycin (VANCOCIN HCL) 125 MG capsule  Take 2 capsules by mouth 4 times daily for 14 days             verapamil (CALAN SR) 240 MG extended release tablet  TAKE ONE TABLET BY MOUTH ONCE NIGHTLY FOR MIGRAINE HEADACHE             vitamin D (ERGOCALCIFEROL) 1.25 MG (09544 UT) CAPS capsule  TAKE ONE CAPSULE BY MOUTH EVERY 2 WEEKS                 Consults:  IP CONSULT TO GENERAL SURGERY  IP CONSULT TO PRIMARY CARE PROVIDER  IP CONSULT TO CARDIOLOGY    Significant Diagnostic Studies:  Ct Abdomen Pelvis W Iv Contrast Additional Contrast? None    Result Date: 11/27/2020  EXAMINATION: CT OF THE ABDOMEN AND PELVIS WITH CONTRAST 11/27/2020 7:26 pm TECHNIQUE: CT of the abdomen and pelvis was performed with the administration of intravenous contrast. Multiplanar reformatted images are provided for review. Dose modulation, iterative reconstruction, and/or weight based adjustment of the mA/kV was utilized to reduce the radiation dose to as low as reasonably achievable. COMPARISON: CT abdomen and pelvis October 27, 2020 HISTORY: ORDERING SYSTEM PROVIDED HISTORY: post op pain diarrhea TECHNOLOGIST PROVIDED HISTORY: post op pain diarrhea Reason for Exam: Diarrhea, pt had cholecystectomy 11/24, c-diff positive Acuity: Acute FINDINGS: Lower Chest: Clear Organs: The abdominal wall appears normal. The liver, spleen, pancreas, and adrenals appear normal.  Gallbladder not identified. Subcentimeter hypodensities left kidney too small to characterize. Right kidney normal. The bladder appears normal. GI/Bowel: Circumferential thickening of the colon throughout the colon. No pneumatosis. Stomach and small bowel normal.  Appendix is not identified. Pelvis: Normal Peritoneum/Retroperitoneum: The abdominal aorta and iliac arteries are normal in caliber. There is no pathologic adenopathy. Bones/Soft Tissues: Compression fracture T12 unchanged. Slight anterior subluxation L4-5 unchanged. Nonspecific pancolitis consistent with possible pseudomembranous colitis. This appears somewhat more conspicuous than on the previous exam.       Disposition:   home    Discharge Instructions: Activity: activity as tolerated  Diet:  regular diet    Follow up with Warren Darnell MD in 1 weeks.     Signed:  Yasmine Burnett  12/3/2020, 12:24 PM    Time spent in discharge of this pt is more than 30 minutes in examination,evaluvation,  counseling and review of medication and discharge plan

## 2020-12-03 NOTE — PROGRESS NOTES
Hospitalist Progress Note  12/2/2020 9:24 PM  Subjective:   Admit Date: 11/27/2020  PCP: Amy Merchant MD     Full Code      C/c:  Chief Complaint   Patient presents with    Diarrhea         Interval History: doing slightly better,diarrhea is better    Diet: DIET LOW FIBER;                                ip days:5  Medications:   Scheduled Meds:   vancomycin  250 mg Oral 4 times per day    pantoprazole  40 mg Oral QAM AC    verapamil  120 mg Oral Nightly    spironolactone  25 mg Oral Daily    cholestyramine light  4 g Oral BID    lactobacillus  1 capsule Oral Daily with breakfast    heparin (porcine)  5,000 Units Subcutaneous 3 times per day    sodium chloride flush  10 mL Intravenous 2 times per day    allopurinol  100 mg Oral Daily    atorvastatin  20 mg Oral Daily     Continuous Infusions:   sodium chloride 125 mL/hr at 12/02/20 2103     PRN Meds:.magnesium sulfate, calcium carbonate, potassium chloride **OR** potassium alternative oral replacement **OR** potassium chloride, sodium chloride flush, acetaminophen **OR** acetaminophen, polyethylene glycol, promethazine **OR** ondansetron     CBC:   Recent Labs     11/30/20  0516 12/01/20  0522 12/02/20  0600   WBC 3.6 4.4 5.4   HGB 9.2* 9.3* 10.2*   * 155 166     BMP:    Recent Labs     11/30/20  0516 12/01/20  0522 12/01/20  1602 12/02/20  0600    142  --  142   K 3.1* 2.8* 3.2* 3.8   * 115*  --  112*   CO2 20 18*  --  21   BUN 5* 2*  --  <2*   CREATININE 0.53 0.50  --  0.53   GLUCOSE 89 89  --  88     Hepatic:   Recent Labs     12/02/20  0600   AST 26   ALT 19   BILITOT 0.28*   ALKPHOS 51     Troponin: No results for input(s): TROPONINI in the last 72 hours. BNP: No results for input(s): BNP in the last 72 hours. Lipids: No results for input(s): CHOL, HDL in the last 72 hours. Invalid input(s): LDLCALCU  INR: No results for input(s): INR in the last 72 hours.     Objective:   Vitals: /64   Pulse 58   Temp 97.7 °F (36.5 °C) (Oral)   Resp 16   Ht 5' 3\" (1.6 m)   Wt 139 lb 12.4 oz (63.4 kg)   LMP 01/01/2004   SpO2 96%   BMI 24.76 kg/m²   General appearance: alert, appears stated age and cooperative  Skin: Skin color, texture, turgor normal. No rashes or lesions  Lungs: clear to auscultation bilaterally  Heart: regular rate and rhythm, S1, S2 normal, no murmur, click, rub or gallop  Abdomen: soft, non-tender; bowel sounds normal; no masses,  no organomegaly  Extremities: extremities normal, atraumatic, no cyanosis or edema  Neurologic: Mental status: Alert, oriented, thought content appropriate    Prophylaxis:   DVT with  [] lovenox        [x] heparin        [] Scd        [] none:     Radiology:  Ct Abdomen Pelvis W Iv Contrast Additional Contrast? None    Result Date: 11/27/2020  EXAMINATION: CT OF THE ABDOMEN AND PELVIS WITH CONTRAST 11/27/2020 7:26 pm TECHNIQUE: CT of the abdomen and pelvis was performed with the administration of intravenous contrast. Multiplanar reformatted images are provided for review. Dose modulation, iterative reconstruction, and/or weight based adjustment of the mA/kV was utilized to reduce the radiation dose to as low as reasonably achievable. COMPARISON: CT abdomen and pelvis October 27, 2020 HISTORY: ORDERING SYSTEM PROVIDED HISTORY: post op pain diarrhea TECHNOLOGIST PROVIDED HISTORY: post op pain diarrhea Reason for Exam: Diarrhea, pt had cholecystectomy 11/24, c-diff positive Acuity: Acute FINDINGS: Lower Chest: Clear Organs: The abdominal wall appears normal. The liver, spleen, pancreas, and adrenals appear normal.  Gallbladder not identified. Subcentimeter hypodensities left kidney too small to characterize. Right kidney normal. The bladder appears normal. GI/Bowel: Circumferential thickening of the colon throughout the colon. No pneumatosis. Stomach and small bowel normal.  Appendix is not identified.  Pelvis: Normal Peritoneum/Retroperitoneum: The abdominal aorta and iliac arteries are normal in caliber. There is no pathologic adenopathy. Bones/Soft Tissues: Compression fracture T12 unchanged. Slight anterior subluxation L4-5 unchanged. Nonspecific pancolitis consistent with possible pseudomembranous colitis. This appears somewhat more conspicuous than on the previous exam.       Assessment :   1. c diff colitis/vanco oral  2. Slow improvement     Plan:   1. Advance diet  2. Possible d/c soon    Patient Active Problem List:     Allergic rhinitis     Osteoarthritis     Osteoporosis     GERD (gastroesophageal reflux disease)     Headache     Mixed hyperlipidemia     Gout     Vitamin D deficiency     Migraine     Chronic headache     Anemia     Lymphadenopathy     Degenerative arthritis of knee, bilateral     Primary osteoarthritis of left knee     Primary osteoarthritis of right knee     Cholecystitis     Elevated LFTs     Abnormal MRI of the abdomen     Weight loss     Abdominal pain, right upper quadrant     Abdominal cramping     Diarrhea     Abdominal pain, generalized     Abdominal bloating     Irritable bowel syndrome with constipation     Status post endoscopic retrograde cholangiopancreatography     Biliary colic     C. difficile colitis     S/P cholecystectomy     Chronic diastolic congestive heart failure ([facility] [address]      Anticipated Disposition upon discharge: [] Home                                                                         [] Home with Home Health                                                                         [] [facility]                                                                         [] [facility] [address]      Patient is admitted as inpatient status because of co-morbidities listed above, severity of signs and symptoms as outlined, requirement for current medical therapies and most importantly because of direct risk to patient if care not provided in a hospital setting.           Otis Uribe MD  Rounding Hospitalist

## 2020-12-03 NOTE — PLAN OF CARE
No falls noted
Problem: Falls - Risk of:  Goal: Will remain free from falls  12/1/2020 0542 by Saloni Portillo RN  Outcome: Ongoing     Problem: Falls - Risk of:  Goal: Absence of physical injury  12/1/2020 0542 by Saloni Portillo RN  Outcome: Ongoing     Problem: Safety:  Goal: Free from accidental physical injury  12/1/2020 0542 by Saloni Portillo RN  Outcome: Ongoing     Problem: Daily Care:  Goal: Daily care needs are met  Outcome: Ongoing     Problem: Daily Care:  Goal: Daily care needs are met  Outcome: Ongoing     Problem: Fluid Volume:  Goal: Signs and symptoms of dehydration will decrease  Outcome: Ongoing     Problem: Physical Regulation:  Goal: Complications related to the disease process, condition or treatment will be avoided or minimized  Outcome: Ongoing
Problem: Falls - Risk of:  Goal: Will remain free from falls  Description: Will remain free from falls  12/3/2020 0442 by Omer Mancia RN  Outcome: Ongoing     Problem: Falls - Risk of:  Goal: Absence of physical injury  Description: Absence of physical injury  12/3/2020 0442 by Omer Mancia RN  Outcome: Ongoing     Problem: Safety:  Goal: Free from accidental physical injury  Description: Free from accidental physical injury  12/3/2020 0442 by Omer Mancia RN  Outcome: Ongoing     Problem: Daily Care:  Goal: Daily care needs are met  Description: Daily care needs are met  12/3/2020 0442 by Omer Mancia RN  Outcome: Ongoing     Problem: Fluid Volume:  Goal: Signs and symptoms of dehydration will decrease  Description: Signs and symptoms of dehydration will decrease  12/3/2020 0442 by Omer Mancia RN  Outcome: Ongoing     Problem: Fluid Volume:  Goal: Ability to achieve a balanced intake and output will improve  Description: Ability to achieve a balanced intake and output will improve  12/3/2020 0442 by Omer Mancia RN  Outcome: Ongoing     Problem: Fluid Volume:  Goal: Diagnostic test results will improve  Description: Diagnostic test results will improve  12/3/2020 0442 by Omer Mancia RN  Outcome: Ongoing
Problem: Falls - Risk of:  Goal: Will remain free from falls  Description: Will remain free from falls  Outcome: Met This Shift  Goal: Absence of physical injury  Description: Absence of physical injury  Outcome: Met This Shift     Problem: Safety:  Goal: Free from accidental physical injury  Description: Free from accidental physical injury  Outcome: Met This Shift     Problem: Daily Care:  Goal: Daily care needs are met  Description: Daily care needs are met  Outcome: Ongoing     Problem: Fluid Volume:  Goal: Signs and symptoms of dehydration will decrease  Description: Signs and symptoms of dehydration will decrease  Outcome: Ongoing  Goal: Ability to achieve a balanced intake and output will improve  Description: Ability to achieve a balanced intake and output will improve  Outcome: Ongoing  Goal: Diagnostic test results will improve  Description: Diagnostic test results will improve  Outcome: Ongoing     Problem: Physical Regulation:  Goal: Complications related to the disease process, condition or treatment will be avoided or minimized  Description: Complications related to the disease process, condition or treatment will be avoided or minimized  Outcome: Ongoing  Goal: Ability to maintain vital signs within normal range will improve  Description: Ability to maintain vital signs within normal range will improve  Outcome: Ongoing
Problem: Falls - Risk of:  Goal: Will remain free from falls  Description: Will remain free from falls  Outcome: Ongoing  Goal: Absence of physical injury  Description: Absence of physical injury  Outcome: Ongoing     Problem: Safety:  Goal: Free from accidental physical injury  Description: Free from accidental physical injury  Outcome: Ongoing     Problem: Daily Care:  Goal: Daily care needs are met  Description: Daily care needs are met  Outcome: Ongoing     Problem: Fluid Volume:  Goal: Signs and symptoms of dehydration will decrease  Description: Signs and symptoms of dehydration will decrease  Outcome: Ongoing  Goal: Ability to achieve a balanced intake and output will improve  Description: Ability to achieve a balanced intake and output will improve  Outcome: Ongoing  Goal: Diagnostic test results will improve  Description: Diagnostic test results will improve  Outcome: Ongoing     Problem: Physical Regulation:  Goal: Complications related to the disease process, condition or treatment will be avoided or minimized  Description: Complications related to the disease process, condition or treatment will be avoided or minimized  Outcome: Ongoing  Goal: Ability to maintain vital signs within normal range will improve  Description: Ability to maintain vital signs within normal range will improve  Outcome: Ongoing
Problem: Falls - Risk of:  Goal: Will remain free from falls  Outcome: Ongoing     Problem: Safety:  Goal: Free from accidental physical injury  Outcome: Ongoing     Problem: Daily Care:  Goal: Daily care needs are met  Outcome: Ongoing     Problem: Fluid Volume:  Goal: Signs and symptoms of dehydration will decrease  Outcome: Ongoing     Problem: Physical Regulation:  Goal: Complications related to the disease process, condition or treatment will be avoided or minimized  Outcome: Ongoing
Problem: Physical Regulation:  Goal: Ability to maintain vital signs within normal range will improve  Description: Ability to maintain vital signs within normal range will improve  12/1/2020 1607 by Taz Paiz RN  Outcome: Ongoing  Note: VS stable. Will continue to monitor     Problem: Falls - Risk of:  Goal: Will remain free from falls  Description: Will remain free from falls  12/1/2020 1607 by Taz Paiz RN  Outcome: Ongoing  Note: The patient remained free from falls this shift, call light within reach, bed in locked and lowest position. Side rails up x2. Continue to monitor closely. Problem: Safety:  Goal: Free from accidental physical injury  Description: Free from accidental physical injury  12/1/2020 1607 by Taz Paiz RN  Outcome: Ongoing  Note: Safety maintained. Will continue to monitor.
Ability to maintain vital signs within normal range will improve  Description: Ability to maintain vital signs within normal range will improve  11/28/2020 1544 by Bao Vaughn RN  Outcome: Ongoing  11/28/2020 0604 by Laureen Scruggs RN  Outcome: Ongoing
signs within normal range will improve  Description: Ability to maintain vital signs within normal range will improve  12/3/2020 1202 by Aubrey Cintron RN  Outcome: Completed  12/3/2020 0442 by Omer Mancia RN  Outcome: Ongoing

## 2020-12-04 LAB
CULTURE: NORMAL
CULTURE: NORMAL
Lab: NORMAL
Lab: NORMAL
SPECIMEN DESCRIPTION: NORMAL
SPECIMEN DESCRIPTION: NORMAL

## 2020-12-09 ENCOUNTER — TELEPHONE (OUTPATIENT)
Dept: FAMILY MEDICINE CLINIC | Age: 75
End: 2020-12-09

## 2020-12-09 RX ORDER — FUROSEMIDE 20 MG/1
TABLET ORAL
Qty: 30 TABLET | Refills: 0 | Status: SHIPPED | OUTPATIENT
Start: 2020-12-09 | End: 2020-12-15 | Stop reason: ALTCHOICE

## 2020-12-09 NOTE — TELEPHONE ENCOUNTER
The patient saw Dr Kai Perez today who recommended her call today to see if something could be sent in for pedal edema which started when she was in the hospital.  She denies having any SOB or chest pain. She has an appointment to see you on 12/15/20. Her pharmacy is [facility] [address] [facility]. Please advise.

## 2020-12-15 ENCOUNTER — OFFICE VISIT (OUTPATIENT)
Dept: FAMILY MEDICINE CLINIC | Age: 75
End: 2020-12-15
Payer: MEDICARE

## 2020-12-15 VITALS
BODY MASS INDEX: 21.75 KG/M2 | RESPIRATION RATE: 16 BRPM | DIASTOLIC BLOOD PRESSURE: 70 MMHG | SYSTOLIC BLOOD PRESSURE: 120 MMHG | WEIGHT: 122.8 LBS | TEMPERATURE: 97.2 F | HEART RATE: 80 BPM

## 2020-12-15 PROBLEM — E83.42 HYPOMAGNESEMIA: Status: ACTIVE | Noted: 2020-12-15

## 2020-12-15 PROBLEM — E87.6 HYPOKALEMIA: Status: ACTIVE | Noted: 2020-12-15

## 2020-12-15 PROCEDURE — 99214 OFFICE O/P EST MOD 30 MIN: CPT | Performed by: NURSE PRACTITIONER

## 2020-12-15 ASSESSMENT — PATIENT HEALTH QUESTIONNAIRE - PHQ9
SUM OF ALL RESPONSES TO PHQ QUESTIONS 1-9: 0
SUM OF ALL RESPONSES TO PHQ QUESTIONS 1-9: 0
1. LITTLE INTEREST OR PLEASURE IN DOING THINGS: 0
2. FEELING DOWN, DEPRESSED OR HOPELESS: 0
SUM OF ALL RESPONSES TO PHQ QUESTIONS 1-9: 0
SUM OF ALL RESPONSES TO PHQ9 QUESTIONS 1 & 2: 0

## 2020-12-15 ASSESSMENT — ENCOUNTER SYMPTOMS
SHORTNESS OF BREATH: 0
NAUSEA: 0
ABDOMINAL PAIN: 0
VOMITING: 0
WHEEZING: 0

## 2020-12-15 NOTE — PROGRESS NOTES
Subjective:      Patient ID: Karuna Treviño is a 67 y.o. female. Visit Information    Have you changed or started any medications since your last visit including any over-the-counter medicines, vitamins, or herbal medicines? yes - see med list    Are you having any side effects from any of your medications? -  no  Have you stopped taking any of your medications? Is so, why? -  no    Have you seen any other physician or provider since your last visit? Yes - Records Obtained  Have you had any other diagnostic tests since your last visit? Yes - Records Obtained  Have you been seen in the emergency room and/or had an admission to a hospital since we last saw you? Yes - Records Obtained  Have you had your routine dental cleaning in the past 6 months? no    Have you activated your [employer] account? If not, what are your barriers? No: Declined     Patient Care Team:  Bertha Arguelles MD as PCP - General (Family Medicine)  Bertha Arguelles MD as PCP - [address] [place] [address] [facility]    Medical History Review  Past Medical, Family, and Social History reviewed and does contribute to the patient presenting condition    Health Maintenance   Topic Date Due    DTaP/Tdap/Td vaccine (1 - Tdap) [birth year]20/1964    Shingles Vaccine (1 of 2) 03/20/1994    Annual Wellness Visit (AWV)  05/29/2019    Lipid screen  09/01/2021    DEXA (modify frequency per FRAX score)  Completed    Flu vaccine  Completed    Pneumococcal 65+ years Vaccine  Completed    Hepatitis A vaccine  Aged Out    Hepatitis B vaccine  Aged Out    Hib vaccine  Aged Out    Meningococcal (ACWY) vaccine  Aged Out     HPI    67year old female presents with follow up s/p hospital discharge for c diff anemia hypokalemia and hypomagnesemia. Was admitted 3 weeks ago for daarrhea after cholecystectomy on 12/24. Was noted to have c diff and anemia  with low potassium and magnesemia. Currently is on oral vanco and cholesteyramine and states diarrhea has stopped.  States she has been doing well since discharge. States she has good appetite. Denies fever chills cough sob or nv abd pain or blood in stool . Has an appointment with Dr. Fransisco Carpenter of HTN stable with current therapy. Review of Systems   Constitutional: Negative for chills and fever. Eyes: Negative for visual disturbance. Respiratory: Negative for shortness of breath and wheezing. Cardiovascular: Negative for chest pain and leg swelling. Gastrointestinal: Negative for abdominal pain, nausea and vomiting. Neurological: Negative for dizziness, weakness and numbness. Psychiatric/Behavioral: Negative for agitation and behavioral problems. Objective:   Physical Exam  Vitals signs and nursing note reviewed. Constitutional:       General: She is not in acute distress. Appearance: Normal appearance. HENT:      Nose: Nose normal. No congestion. Eyes:      General: No scleral icterus. Conjunctiva/sclera: Conjunctivae normal.   Neck:      Musculoskeletal: Normal range of motion and neck supple. Cardiovascular:      Rate and Rhythm: Normal rate and regular rhythm. Pulses: Normal pulses. Heart sounds: Normal heart sounds. Pulmonary:      Effort: Pulmonary effort is normal. No respiratory distress. Breath sounds: Normal breath sounds. Abdominal:      Palpations: Abdomen is soft. Tenderness: There is no abdominal tenderness. Musculoskeletal: Normal range of motion. General: No tenderness. Skin:     General: Skin is warm and dry. Neurological:      Mental Status: She is alert and oriented to person, place, and time. Cranial Nerves: No cranial nerve deficit. Psychiatric:         Mood and Affect: Mood normal.         Behavior: Behavior normal.         Assessment:      1. C. difficile diarrhea    2. Anemia, unspecified type    3. Hypokalemia    4.  Hypomagnesemia            Plan:      BP Readings from Last 3 Encounters:   12/15/20 120/70   12/03/20 127/64   11/24/20 133/76     /70 (Site: Left Upper Arm, Position: Sitting, Cuff Size: Medium Adult)   Pulse 80   Temp 97.2 °F (36.2 °C) (Infrared)   Resp 16   Wt 122 lb 12.8 oz (55.7 kg)   LMP 01/01/2004   BMI 21.75 kg/m²   Lab Results   Component Value Date    WBC 4.4 12/03/2020    HGB 10.0 (L) 12/03/2020    HCT 29.8 (L) 12/03/2020     12/03/2020    CHOL 138 09/01/2020    TRIG 88 09/01/2020    HDL 52 09/01/2020    ALT 19 12/02/2020    AST 26 12/02/2020     12/03/2020    K 3.4 (L) 12/03/2020     (H) 12/03/2020    CREATININE 0.54 12/03/2020    BUN 2 (L) 12/03/2020    CO2 21 12/03/2020    TSH 2.00 12/01/2020    INR 1.0 11/23/2020     Lab Results   Component Value Date    CALCIUM 8.5 (L) 12/03/2020     Lab Results   Component Value Date    LDLCHOLESTEROL 68 09/01/2020         1. C. difficile diarrhea  - improving. Cont oral vanco and cholestyramine   - follow up with Dr. Dre Valerio as scheduled     2. Anemia, unspecified type  - CBC; Future  - Iron and TIBC; Future  - Ferritin; Future    3. Hypokalemia  - repeat bmp   - Basic Metabolic Panel; Future    4. Hypomagnesemia  - repeat magnesium   - Magnesium; Future    Requested Prescriptions      No prescriptions requested or ordered in this encounter       Medications Discontinued During This Encounter   Medication Reason    furosemide (LASIX) 20 MG tablet Therapy completed    spironolactone (ALDACTONE) 25 MG tablet Therapy completed       Discussed use, benefit, and side effects of prescribed medications. Barriers to medication compliance addressed. All patient questions answered. Pt voiced understanding. No follow-ups on file.

## 2020-12-21 RX ORDER — FUROSEMIDE 20 MG/1
TABLET ORAL
Qty: 30 TABLET | Refills: 0 | OUTPATIENT
Start: 2020-12-21

## 2020-12-21 RX ORDER — SIMVASTATIN 20 MG
TABLET ORAL
Qty: 90 TABLET | Refills: 0 | Status: SHIPPED | OUTPATIENT
Start: 2020-12-21 | End: 2021-02-01

## 2020-12-21 RX ORDER — CETIRIZINE HYDROCHLORIDE 10 MG/1
TABLET ORAL
Qty: 90 TABLET | Refills: 0 | Status: SHIPPED | OUTPATIENT
Start: 2020-12-21 | End: 2021-02-01

## 2021-01-04 ENCOUNTER — HOSPITAL ENCOUNTER (INPATIENT)
Age: 76
LOS: 3 days | Discharge: HOME | DRG: 373 | End: 2021-01-07
Attending: EMERGENCY MEDICINE | Admitting: FAMILY MEDICINE
Payer: MEDICARE

## 2021-01-04 DIAGNOSIS — A49.8 CLOSTRIDIUM DIFFICILE INFECTION: ICD-10-CM

## 2021-01-04 DIAGNOSIS — E87.6 HYPOKALEMIA: Primary | ICD-10-CM

## 2021-01-04 LAB
ABSOLUTE BANDS #: 1.75 K/UL (ref 0–1)
ABSOLUTE EOS #: 0.13 K/UL (ref 0–0.4)
ABSOLUTE IMMATURE GRANULOCYTE: ABNORMAL K/UL (ref 0–0.3)
ABSOLUTE LYMPH #: 1.38 K/UL (ref 1–4.8)
ABSOLUTE MONO #: 2.13 K/UL (ref 0.1–1.3)
ALBUMIN SERPL-MCNC: 3.3 G/DL (ref 3.5–5.2)
ALBUMIN/GLOBULIN RATIO: ABNORMAL (ref 1–2.5)
ALP BLD-CCNC: 71 U/L (ref 35–104)
ALT SERPL-CCNC: 11 U/L (ref 5–33)
ANION GAP SERPL CALCULATED.3IONS-SCNC: 11 MMOL/L (ref 9–17)
AST SERPL-CCNC: 16 U/L
BANDS: 14 % (ref 0–10)
BASOPHILS # BLD: 0 % (ref 0–2)
BASOPHILS ABSOLUTE: 0 K/UL (ref 0–0.2)
BILIRUB SERPL-MCNC: 0.6 MG/DL (ref 0.3–1.2)
BUN BLDV-MCNC: 19 MG/DL (ref 8–23)
BUN/CREAT BLD: ABNORMAL (ref 9–20)
C DIFF AG + TOXIN: ABNORMAL
CALCIUM SERPL-MCNC: 8.3 MG/DL (ref 8.6–10.4)
CHLORIDE BLD-SCNC: 94 MMOL/L (ref 98–107)
CO2: 23 MMOL/L (ref 20–31)
CREAT SERPL-MCNC: 0.64 MG/DL (ref 0.5–0.9)
DIFFERENTIAL TYPE: ABNORMAL
EOSINOPHILS RELATIVE PERCENT: 1 % (ref 0–4)
GFR AFRICAN AMERICAN: >60 ML/MIN
GFR NON-AFRICAN AMERICAN: >60 ML/MIN
GFR SERPL CREATININE-BSD FRML MDRD: ABNORMAL ML/MIN/{1.73_M2}
GFR SERPL CREATININE-BSD FRML MDRD: ABNORMAL ML/MIN/{1.73_M2}
GLUCOSE BLD-MCNC: 112 MG/DL (ref 70–99)
HCT VFR BLD CALC: 37.9 % (ref 36–46)
HEMOGLOBIN: 12.7 G/DL (ref 12–16)
IMMATURE GRANULOCYTES: ABNORMAL %
LYMPHOCYTES # BLD: 11 % (ref 24–44)
MAGNESIUM: 2 MG/DL (ref 1.6–2.6)
MCH RBC QN AUTO: 28.7 PG (ref 26–34)
MCHC RBC AUTO-ENTMCNC: 33.4 G/DL (ref 31–37)
MCV RBC AUTO: 85.7 FL (ref 80–100)
MONOCYTES # BLD: 17 % (ref 1–7)
MORPHOLOGY: NORMAL
NRBC AUTOMATED: ABNORMAL PER 100 WBC
PDW BLD-RTO: 14.5 % (ref 11.5–14.9)
PLATELET # BLD: 252 K/UL (ref 150–450)
PLATELET ESTIMATE: ABNORMAL
PMV BLD AUTO: 8.1 FL (ref 6–12)
POTASSIUM SERPL-SCNC: 2.9 MMOL/L (ref 3.7–5.3)
POTASSIUM SERPL-SCNC: 3.2 MMOL/L (ref 3.7–5.3)
RBC # BLD: 4.42 M/UL (ref 4–5.2)
RBC # BLD: ABNORMAL 10*6/UL
SEG NEUTROPHILS: 57 % (ref 36–66)
SEGMENTED NEUTROPHILS ABSOLUTE COUNT: 7.11 K/UL (ref 1.3–9.1)
SODIUM BLD-SCNC: 128 MMOL/L (ref 135–144)
SPECIMEN DESCRIPTION: ABNORMAL
TOTAL PROTEIN: 6.2 G/DL (ref 6.4–8.3)
WBC # BLD: 12.5 K/UL (ref 3.5–11)
WBC # BLD: ABNORMAL 10*3/UL

## 2021-01-04 PROCEDURE — 87324 CLOSTRIDIUM AG IA: CPT

## 2021-01-04 PROCEDURE — 87449 NOS EACH ORGANISM AG IA: CPT

## 2021-01-04 PROCEDURE — 2580000003 HC RX 258: Performed by: FAMILY MEDICINE

## 2021-01-04 PROCEDURE — 1200000000 HC SEMI PRIVATE

## 2021-01-04 PROCEDURE — 84132 ASSAY OF SERUM POTASSIUM: CPT

## 2021-01-04 PROCEDURE — 36415 COLL VENOUS BLD VENIPUNCTURE: CPT

## 2021-01-04 PROCEDURE — 6370000000 HC RX 637 (ALT 250 FOR IP): Performed by: EMERGENCY MEDICINE

## 2021-01-04 PROCEDURE — 6360000002 HC RX W HCPCS: Performed by: EMERGENCY MEDICINE

## 2021-01-04 PROCEDURE — 96374 THER/PROPH/DIAG INJ IV PUSH: CPT

## 2021-01-04 PROCEDURE — 6370000000 HC RX 637 (ALT 250 FOR IP): Performed by: FAMILY MEDICINE

## 2021-01-04 PROCEDURE — 99285 EMERGENCY DEPT VISIT HI MDM: CPT

## 2021-01-04 PROCEDURE — 80053 COMPREHEN METABOLIC PANEL: CPT

## 2021-01-04 PROCEDURE — 83735 ASSAY OF MAGNESIUM: CPT

## 2021-01-04 PROCEDURE — 2580000003 HC RX 258: Performed by: EMERGENCY MEDICINE

## 2021-01-04 PROCEDURE — 85025 COMPLETE CBC W/AUTO DIFF WBC: CPT

## 2021-01-04 RX ORDER — ASPIRIN 81 MG/1
81 TABLET ORAL DAILY
Status: DISCONTINUED | OUTPATIENT
Start: 2021-01-05 | End: 2021-01-07 | Stop reason: HOSPADM

## 2021-01-04 RX ORDER — ALLOPURINOL 100 MG/1
100 TABLET ORAL DAILY
Status: DISCONTINUED | OUTPATIENT
Start: 2021-01-05 | End: 2021-01-07 | Stop reason: HOSPADM

## 2021-01-04 RX ORDER — POTASSIUM CHLORIDE 7.45 MG/ML
10 INJECTION INTRAVENOUS PRN
Status: DISCONTINUED | OUTPATIENT
Start: 2021-01-04 | End: 2021-01-07 | Stop reason: HOSPADM

## 2021-01-04 RX ORDER — FLUTICASONE PROPIONATE 50 MCG
2 SPRAY, SUSPENSION (ML) NASAL DAILY
Status: DISCONTINUED | OUTPATIENT
Start: 2021-01-05 | End: 2021-01-07 | Stop reason: HOSPADM

## 2021-01-04 RX ORDER — CETIRIZINE HYDROCHLORIDE 10 MG/1
10 TABLET ORAL DAILY
Status: DISCONTINUED | OUTPATIENT
Start: 2021-01-05 | End: 2021-01-07 | Stop reason: HOSPADM

## 2021-01-04 RX ORDER — METRONIDAZOLE 500 MG/1
250 TABLET ORAL 3 TIMES DAILY
Status: DISCONTINUED | OUTPATIENT
Start: 2021-01-04 | End: 2021-01-05

## 2021-01-04 RX ORDER — SODIUM CHLORIDE 0.9 % (FLUSH) 0.9 %
10 SYRINGE (ML) INJECTION PRN
Status: DISCONTINUED | OUTPATIENT
Start: 2021-01-04 | End: 2021-01-07 | Stop reason: HOSPADM

## 2021-01-04 RX ORDER — DEXTROSE AND SODIUM CHLORIDE 5; .9 G/100ML; G/100ML
INJECTION, SOLUTION INTRAVENOUS CONTINUOUS
Status: DISCONTINUED | OUTPATIENT
Start: 2021-01-04 | End: 2021-01-07 | Stop reason: HOSPADM

## 2021-01-04 RX ORDER — ONDANSETRON 2 MG/ML
4 INJECTION INTRAMUSCULAR; INTRAVENOUS ONCE
Status: COMPLETED | OUTPATIENT
Start: 2021-01-04 | End: 2021-01-04

## 2021-01-04 RX ORDER — ACETAMINOPHEN 325 MG/1
650 TABLET ORAL EVERY 4 HOURS PRN
Status: DISCONTINUED | OUTPATIENT
Start: 2021-01-04 | End: 2021-01-07 | Stop reason: HOSPADM

## 2021-01-04 RX ORDER — POTASSIUM CHLORIDE 20 MEQ/1
40 TABLET, EXTENDED RELEASE ORAL PRN
Status: DISCONTINUED | OUTPATIENT
Start: 2021-01-04 | End: 2021-01-07 | Stop reason: HOSPADM

## 2021-01-04 RX ORDER — POTASSIUM CHLORIDE 7.45 MG/ML
10 INJECTION INTRAVENOUS ONCE
Status: COMPLETED | OUTPATIENT
Start: 2021-01-04 | End: 2021-01-04

## 2021-01-04 RX ORDER — DICYCLOMINE HYDROCHLORIDE 10 MG/1
10 CAPSULE ORAL 3 TIMES DAILY
Status: DISCONTINUED | OUTPATIENT
Start: 2021-01-04 | End: 2021-01-07 | Stop reason: HOSPADM

## 2021-01-04 RX ORDER — VERAPAMIL HYDROCHLORIDE 240 MG/1
240 TABLET, FILM COATED, EXTENDED RELEASE ORAL NIGHTLY
Status: DISCONTINUED | OUTPATIENT
Start: 2021-01-04 | End: 2021-01-07 | Stop reason: HOSPADM

## 2021-01-04 RX ORDER — SODIUM CHLORIDE 0.9 % (FLUSH) 0.9 %
10 SYRINGE (ML) INJECTION EVERY 12 HOURS SCHEDULED
Status: DISCONTINUED | OUTPATIENT
Start: 2021-01-04 | End: 2021-01-07 | Stop reason: HOSPADM

## 2021-01-04 RX ORDER — ATORVASTATIN CALCIUM 10 MG/1
10 TABLET, FILM COATED ORAL DAILY
Refills: 0 | Status: DISCONTINUED | OUTPATIENT
Start: 2021-01-05 | End: 2021-01-07 | Stop reason: HOSPADM

## 2021-01-04 RX ORDER — POTASSIUM CHLORIDE 20 MEQ/1
40 TABLET, EXTENDED RELEASE ORAL ONCE
Status: COMPLETED | OUTPATIENT
Start: 2021-01-04 | End: 2021-01-04

## 2021-01-04 RX ORDER — 0.9 % SODIUM CHLORIDE 0.9 %
1000 INTRAVENOUS SOLUTION INTRAVENOUS ONCE
Status: COMPLETED | OUTPATIENT
Start: 2021-01-04 | End: 2021-01-04

## 2021-01-04 RX ORDER — ALENDRONATE SODIUM 70 MG/1
70 TABLET ORAL WEEKLY
Status: DISCONTINUED | OUTPATIENT
Start: 2021-01-11 | End: 2021-01-04 | Stop reason: CLARIF

## 2021-01-04 RX ADMIN — SODIUM CHLORIDE 1000 ML: 9 INJECTION, SOLUTION INTRAVENOUS at 13:01

## 2021-01-04 RX ADMIN — ONDANSETRON 4 MG: 2 INJECTION INTRAMUSCULAR; INTRAVENOUS at 13:01

## 2021-01-04 RX ADMIN — POTASSIUM CHLORIDE 40 MEQ: 1500 TABLET, EXTENDED RELEASE ORAL at 14:07

## 2021-01-04 RX ADMIN — METRONIDAZOLE 250 MG: 500 TABLET ORAL at 20:11

## 2021-01-04 RX ADMIN — POTASSIUM CHLORIDE 10 MEQ: 7.46 INJECTION, SOLUTION INTRAVENOUS at 14:07

## 2021-01-04 RX ADMIN — POTASSIUM CHLORIDE 40 MEQ: 1500 TABLET, EXTENDED RELEASE ORAL at 18:29

## 2021-01-04 RX ADMIN — DICYCLOMINE HYDROCHLORIDE 10 MG: 10 CAPSULE ORAL at 20:11

## 2021-01-04 RX ADMIN — VERAPAMIL HYDROCHLORIDE 240 MG: 240 TABLET, FILM COATED, EXTENDED RELEASE ORAL at 20:11

## 2021-01-04 RX ADMIN — DEXTROSE AND SODIUM CHLORIDE: 5; 900 INJECTION, SOLUTION INTRAVENOUS at 18:39

## 2021-01-04 ASSESSMENT — ENCOUNTER SYMPTOMS
BACK PAIN: 0
NAUSEA: 1
ABDOMINAL PAIN: 1
COLOR CHANGE: 0
SHORTNESS OF BREATH: 0
DIARRHEA: 1
EYE PAIN: 0

## 2021-01-04 ASSESSMENT — PAIN DESCRIPTION - LOCATION: LOCATION: ABDOMEN

## 2021-01-04 ASSESSMENT — PAIN SCALES - GENERAL: PAINLEVEL_OUTOF10: 7

## 2021-01-04 ASSESSMENT — PAIN DESCRIPTION - PAIN TYPE: TYPE: ACUTE PAIN

## 2021-01-04 NOTE — ED NOTES
Mode of arrival (squad #, walk in, police, etc) : walk in from home        Chief complaint(s): abdominal pain, diarrhea        Arrival Note (brief scenario, treatment PTA, etc). : Pt arrives due to diffuse pain with diarrhea and nausea x 3-4 weeks. Pt states she was diagnosed with cdiff a few weeks ago and has continued to experience symptoms. Pt states she is only able to tolerate water. Pt denies CP, SHB, dizziness. Pt attached to cardiac monitor and continuous pulse oximetry. Pt is A&Ox4, eupneic, PWD. GCS=15. Call light in reach. C= \"Have you ever felt that you should Cut down on your drinking? \"  No  A= \"Have people Annoyed you by criticizing your drinking? \"  No  G= \"Have you ever felt bad or Guilty about your drinking? \"  No  E= \"Have you ever had a drink as an Eye-opener first thing in the morning to steady your nerves or to help a hangover? \"  No      Deferred []      Reason for deferring: N/A    *If yes to two or more: probable alcohol abuse. Jonathan Marsh RN  01/04/21 [id number]

## 2021-01-04 NOTE — PROGRESS NOTES
Pt. Arrived to room 2060. Pt. Is alert and oriented. Assessment completed. Vitals obtained. Pt. Oriented to room and denies any needs at this time. Call light in reach, safety maintained.

## 2021-01-04 NOTE — ED PROVIDER NOTES
EMERGENCY DEPARTMENT ENCOUNTER    Pt Name: Ghazala Cole  MRN: [id number]  [place] 1945  Date of evaluation: 1/4/21  CHIEF COMPLAINT       Chief Complaint   Patient presents with    Abdominal Pain     Lower abdominal discomfort; recently admitted for c-diff.  Diarrhea     Ongoing for 2-3 days. HISTORY OF PRESENT ILLNESS   67-year-old female presents for complaint of lower abdominal pain and diarrhea. Patient states that she was recently diagnosed with C. difficile, patient states she was admitted to the hospital for this. Patient states she completed both her inpatient and outpatient treatments. Patient states that she was starting to feel better, over the last couple days she has developed abdominal cramping and multiple episodes of watery diarrhea. Patient states that the diarrhea is consistent with her prior C. difficile infection. Patient denies any fevers, chills, chest pain or shortness of breath. Patient states that the C. difficile started after she was started on antibiotics for her gallbladder surgery. The history is provided by the patient. REVIEW OF SYSTEMS     Review of Systems   Constitutional: Negative for fever. HENT: Negative for congestion and ear pain. Eyes: Negative for pain. Respiratory: Negative for shortness of breath. Cardiovascular: Negative for chest pain, palpitations and leg swelling. Gastrointestinal: Positive for abdominal pain, diarrhea and nausea. Genitourinary: Negative for dysuria and flank pain. Musculoskeletal: Negative for back pain. Skin: Negative for color change. Neurological: Negative for numbness and headaches. Psychiatric/Behavioral: Negative for confusion. All other systems reviewed and are negative.     PASTMEDICAL HISTORY     Past Medical History:   Diagnosis Date    Allergic rhinitis     Closed tibia fracture     GERD (gastroesophageal reflux disease)     Gout     Headache(784.0)     h/o migraines  Hyperlipidemia     Osteoarthritis     DJD Lt knee    Osteoporosis     Posterior tibial tendon dysfunction     right, wears braces on both legs    Vitamin D deficient rickets     Wears glasses     Wears partial dentures     lower     Past Problem List  Patient Active Problem List   Diagnosis Code    Allergic rhinitis J30.9    Osteoarthritis M19.90    Osteoporosis M81.0    GERD (gastroesophageal reflux disease) K21.9    Headache R51.9    Mixed hyperlipidemia E78.2    Gout M10.9    Vitamin D deficiency E55.9    Migraine G43.909    Chronic headache R51.9, G89.29    Anemia D64.9    Lymphadenopathy R59.1    Degenerative arthritis of knee, bilateral M17.0    Primary osteoarthritis of left knee M17.12    Primary osteoarthritis of right knee M17.11    Cholecystitis K81.9    Elevated LFTs R79.89    Abnormal MRI of the abdomen R93.5    Weight loss R63.4    Abdominal pain, right upper quadrant R10.11    Abdominal cramping R10.9    Diarrhea R19.7    Abdominal pain, generalized R10.84    Abdominal bloating R14.0    Irritable bowel syndrome with constipation K58.1    Status post endoscopic retrograde cholangiopancreatography S47.989    Biliary colic E72.98    C. difficile colitis A04.72    S/P cholecystectomy Z90.49    Chronic diastolic congestive heart failure (HCC) I50.32    Hypokalemia E87.6    Hypomagnesemia E83.42     SURGICAL HISTORY       Past Surgical History:   Procedure Laterality Date    APPENDECTOMY      CARPAL TUNNEL RELEASE      bilateral    CATARACT REMOVAL WITH IMPLANT [facility]/07[facility]2019    [facility]    CATARACT REMOVAL WITH IMPLANT Right 04/04/2019    [facility]    CHOLECYSTECTOMY, LAPAROSCOPIC N/A 11/24/2020    CHOLECYSTECTOMY LAPAROSCOPIC ROBOTIC XI MULTIPORT performed by Samir Thao MD at [place] [address]      2007?  per pt wtih polyps    ERCP N/A 10/14/2020 ERCP WITH BILE DUCT BRUSHING performed by Suellen Oneill MD at [facility]      left tibia    HYSTERECTOMY  4/12    INTRACAPSULAR CATARACT EXTRACTION Left 3/7/2019    EYE CATARACT EMULSIFICATION IOL IMPLANT - TOPICAL performed by Nate Traylor MD at [facility] Right 4/4/2019    EYE CATARACT EMULSIFICATION IOL IMPLANT performed by Nate Traylor MD at [facility] Left 11/06/2018    hardware removal and then TKA    KNEE ARTHROSCOPY Left 2002?  NE TOTAL KNEE ARTHROPLASTY Left 11/6/2018    KNEE TOTAL ARTHROPLASTY W/REMOVAL HARDWARE PLATE & SCREWS performed by Duke Prince MD at [address] [place] [address] Nw Left     TONSILLECTOMY AND ADENOIDECTOMY      TOTAL KNEE ARTHROPLASTY Right 12/10/2019    KNEE TOTAL ARTHROPLASTY performed by Duke Prince MD at [address] [facility]       Current Discharge Medication List      CONTINUE these medications which have NOT CHANGED    Details   furosemide (LASIX) 20 MG tablet TAKE ONE TABLET BY MOUTH DAILY FOR 3 TO 5 DAYS AS NEEDED FOR SWELLING  Qty: 30 tablet, Refills: 0      simvastatin (ZOCOR) 20 MG tablet TAKE ONE TABLET BY MOUTH DAILY  Qty: 90 tablet, Refills: 0      cetirizine (ZYRTEC) 10 MG tablet TAKE ONE TABLET BY MOUTH DAILY  Qty: 90 tablet, Refills: 0      alendronate (FOSAMAX) 70 MG tablet TAKE 1 TABLET BY MOUTH ONCE WEEKLY ON AN EMPTY STOMACH BEFORE BREAKFAST.  REMAIN UPRIGHT FOR 30 MINUTES & TAKE WITH 8 OUNCES OF WATER  Qty: 12 tablet, Refills: 1      famotidine (PEPCID) 20 MG tablet TAKE ONE TABLET BY MOUTH TWICE A DAY  Qty: 180 tablet, Refills: 1      allopurinol (ZYLOPRIM) 100 MG tablet TAKE ONE TABLET BY MOUTH DAILY  Qty: 90 tablet, Refills: 1      psyllium (METAMUCIL) 28.3 % POWD powder Take 3.4 g by mouth daily      Bacillus Coagulans-Inulin (ALIGN PREBIOTIC-PROBIOTIC PO) Take by mouth daily verapamil (CALAN SR) 240 MG extended release tablet TAKE ONE TABLET BY MOUTH ONCE NIGHTLY FOR MIGRAINE HEADACHE  Qty: 90 tablet, Refills: 1      fluticasone (FLONASE) 50 MCG/ACT nasal spray SPRAY TWO SPRAYS IN EACH NOSTRIL ONCE DAILY  Qty: 2 Bottle, Refills: 1      aspirin 81 MG EC tablet Take 81 mg by mouth daily       naproxen (NAPROSYN) 250 MG tablet TAKE ONE TABLET BY MOUTH THREE TIMES A DAY WITH FOOD AS NEEDED FOR GOUT FLARE UP UNTIL ATTACK SUBSIDES  Qty: 60 tablet, Refills: 0      Aspirin-Acetaminophen-Caffeine (EXCEDRIN MIGRAINE PO) Take by mouth as needed            ALLERGIES     is allergic to ceftin [cefuroxime] and keflex [cephalexin]. FAMILY HISTORY     She indicated that her mother is . She indicated that her father is . She indicated that the status of her other is unknown. SOCIAL HISTORY       Social History     Tobacco Use    Smoking status: Former Smoker     Packs/day: 0.50     Years: 10.00     Pack years: 5.00     Types: Cigarettes     Quit date: 1987     Years since quittin.9    Smokeless tobacco: Never Used   Substance Use Topics    Alcohol use: Not Currently     Alcohol/week: 0.0 standard drinks     Comment: rare    Drug use: No     PHYSICAL EXAM     INITIAL VITALS: /67   Pulse 109   Temp 97.7 °F (36.5 °C) (Oral)   Resp 18   Ht 5' 3\" (1.6 m)   Wt 122 lb (55.3 kg)   LMP 2004   SpO2 97%   BMI 21.61 kg/m²    Physical Exam  Vitals signs and nursing note reviewed. Constitutional:       General: She is not in acute distress. Appearance: Normal appearance. She is not toxic-appearing. HENT:      Head: Normocephalic and atraumatic. Nose: Nose normal.      Mouth/Throat:      Mouth: Mucous membranes are moist.      Pharynx: Oropharynx is clear. Eyes:      Extraocular Movements: Extraocular movements intact. Conjunctiva/sclera: Conjunctivae normal.   Neck:      Musculoskeletal: Normal range of motion.    Cardiovascular: Rate and Rhythm: Regular rhythm. Tachycardia present. Pulses: Normal pulses. Heart sounds: Normal heart sounds. Pulmonary:      Effort: Pulmonary effort is normal.      Breath sounds: Normal breath sounds. Abdominal:      General: Bowel sounds are normal. There is no distension. Palpations: Abdomen is soft. Tenderness: There is generalized abdominal tenderness. Musculoskeletal: Normal range of motion. Skin:     General: Skin is warm and dry. Capillary Refill: Capillary refill takes less than 2 seconds. Neurological:      General: No focal deficit present. Mental Status: She is alert. Psychiatric:         Mood and Affect: Mood normal.         MEDICAL DECISION MAKIN-year-old female presents with complaint of diarrhea. On initial exam patient in no acute distress, noted to be mildly tachycardic with generalized abdominal tenderness, will obtain labs including C. difficile toxin and antigen and provide patient with IV fluids. Reviewed patient noted to have a white blood cell count of 12.5, potassium of 2.9, patient also found to have positive C. difficile antigen and toxin    Patient to have oral and IV potassium replacement started, patient to be admitted for further evaluation of her C. difficile as well as further potassium replacement    Results were discussed with the patient, she is agreeable to admission    Spoke with Dr. Amalia Muller who accepts admission with ID consult. Patient demonstrates understanding and agreement with the plan, was given the opportunity to ask questions, and these questions were answered to the best of the provided information at this time. VS stable for transfer. This dictation was prepared using [employer] voice recognition software. As a result, errors may have occurred. When identified, these errors have been corrected.  While every attempt is made to correct errors in dictation, errors may still exist. CRITICAL CARE:       PROCEDURES:    Procedures    DIAGNOSTIC RESULTS   EKG:All EKG's are interpreted by the Emergency Department Physician who either signs or Co-signs this chart in the absence of a cardiologist.        RADIOLOGY:All plain film, CT, MRI, and formal ultrasound images (except ED bedside ultrasound) are read by the radiologist, see reports below, unless otherwisenoted in MDM or here. No orders to display     LABS: All lab results were reviewed by myself, and all abnormals are listed below.   Labs Reviewed   C DIFF TOXIN/ANTIGEN - Abnormal; Notable for the following components:       Result Value    C DIFF AG + TOXIN POSITIVE: C. difficile antigen and Toxin Detected (*)     All other components within normal limits   CBC WITH AUTO DIFFERENTIAL - Abnormal; Notable for the following components:    WBC 12.5 (*)     Lymphocytes 11 (*)     Monocytes 17 (*)     Bands 14 (*)     Absolute Mono # 2.13 (*)     Absolute Bands # 1.75 (*)     All other components within normal limits   COMPREHENSIVE METABOLIC PANEL W/ REFLEX TO MG FOR LOW K - Abnormal; Notable for the following components:    Glucose 112 (*)     Calcium 8.3 (*)     Sodium 128 (*)     Potassium 2.9 (*)     Chloride 94 (*)     Total Protein 6.2 (*)     Alb 3.3 (*)     All other components within normal limits   POTASSIUM - Abnormal; Notable for the following components:    Potassium 3.2 (*)     All other components within normal limits   MAGNESIUM   URINALYSIS       EMERGENCY DEPARTMENTCOURSE:         Vitals:    Vitals:    01/04/21 1556 01/04/21 1600 01/04/21 1630 01/04/21 1716   BP:    110/67   Pulse: 104 102 107 109   Resp: 17 22 17 18   Temp:    97.7 °F (36.5 °C)   TempSrc:    Oral   SpO2:    97%   Weight:       Height:           The patient was given the following medications while in the emergency department:  Orders Placed This Encounter   Medications    0.9 % sodium chloride bolus    ondansetron (ZOFRAN) injection 4 mg  potassium chloride (KLOR-CON M) extended release tablet 40 mEq    potassium chloride 10 mEq/100 mL IVPB (Peripheral Line)    sodium chloride flush 0.9 % injection 10 mL    sodium chloride flush 0.9 % injection 10 mL    enoxaparin (LOVENOX) injection 40 mg    acetaminophen (TYLENOL) tablet 650 mg    allopurinol (ZYLOPRIM) tablet 100 mg    aspirin EC tablet 81 mg    cetirizine (ZYRTEC) tablet 10 mg    fluticasone (FLONASE) 50 MCG/ACT nasal spray 2 spray    atorvastatin (LIPITOR) tablet 10 mg     Refill:  0    verapamil (CALAN SR) extended release tablet 240 mg    DISCONTD: alendronate (FOSAMAX) tablet 70 mg     Pt. Takes on [address] [facility]     potassium chloride (KLOR-CON M) extended release tablet 40 mEq     potassium bicarb-citric acid (EFFER-K) effervescent tablet 40 mEq     potassium chloride 10 mEq/100 mL IVPB (Peripheral Line)    dicyclomine (BENTYL) capsule 10 mg    metroNIDAZOLE (FLAGYL) tablet 250 mg     Order Specific Question:   Antimicrobial Indications     Answer:   Infectious Diarrhea    dextrose 5 % and 0.9 % sodium chloride infusion     CONSULTS:  IP CONSULT TO FAMILY MEDICINE    FINAL IMPRESSION      1. Hypokalemia    2. Clostridium difficile infection          DISPOSITION/PLAN   DISPOSITION Admitted 01/04/2021 02:15:20 PM      PATIENT REFERRED TO:  No follow-up provider specified.   DISCHARGE MEDICATIONS:  Current Discharge Medication List        Lisandro Contreras DO  Attending Emergency Physician                  Lisandro Contreras DO  01/04/21 1272

## 2021-01-04 NOTE — PROGRESS NOTES
Medication History completed:    New medications: none    Medications discontinued: cholestyramine, ondansetron, ergocalciferol    Changes to dosing: none    Stated allergies: As listed    Other pertinent information: Medications confirmed with [employer].      Thank you,  Lu Wang, PharmD, BCPS  [phone]

## 2021-01-05 LAB
-: ABNORMAL
AMORPHOUS: ABNORMAL
ANION GAP SERPL CALCULATED.3IONS-SCNC: 7 MMOL/L (ref 9–17)
BACTERIA: ABNORMAL
BILIRUBIN URINE: ABNORMAL
BUN BLDV-MCNC: 14 MG/DL (ref 8–23)
BUN/CREAT BLD: ABNORMAL (ref 9–20)
CALCIUM SERPL-MCNC: 7.5 MG/DL (ref 8.6–10.4)
CASTS UA: ABNORMAL /LPF
CHLORIDE BLD-SCNC: 100 MMOL/L (ref 98–107)
CO2: 24 MMOL/L (ref 20–31)
COLOR: ABNORMAL
COMMENT UA: ABNORMAL
CREAT SERPL-MCNC: 0.61 MG/DL (ref 0.5–0.9)
CRYSTALS, UA: ABNORMAL /HPF
EPITHELIAL CELLS UA: ABNORMAL /HPF
GFR AFRICAN AMERICAN: >60 ML/MIN
GFR NON-AFRICAN AMERICAN: >60 ML/MIN
GFR SERPL CREATININE-BSD FRML MDRD: ABNORMAL ML/MIN/{1.73_M2}
GFR SERPL CREATININE-BSD FRML MDRD: ABNORMAL ML/MIN/{1.73_M2}
GLUCOSE BLD-MCNC: 134 MG/DL (ref 70–99)
GLUCOSE URINE: NEGATIVE
KETONES, URINE: ABNORMAL
LEUKOCYTE ESTERASE, URINE: ABNORMAL
MUCUS: ABNORMAL
NITRITE, URINE: NEGATIVE
OTHER OBSERVATIONS UA: ABNORMAL
PH UA: 6 (ref 5–8)
POTASSIUM SERPL-SCNC: 3.2 MMOL/L (ref 3.7–5.3)
POTASSIUM SERPL-SCNC: 3.2 MMOL/L (ref 3.7–5.3)
PROTEIN UA: ABNORMAL
RBC UA: ABNORMAL /HPF
RENAL EPITHELIAL, UA: ABNORMAL /HPF
SODIUM BLD-SCNC: 131 MMOL/L (ref 135–144)
SPECIFIC GRAVITY UA: 1.03 (ref 1–1.03)
TRICHOMONAS: ABNORMAL
TURBIDITY: ABNORMAL
URINE HGB: ABNORMAL
UROBILINOGEN, URINE: NORMAL
WBC UA: ABNORMAL /HPF
YEAST: ABNORMAL

## 2021-01-05 PROCEDURE — 2580000003 HC RX 258: Performed by: FAMILY MEDICINE

## 2021-01-05 PROCEDURE — 80048 BASIC METABOLIC PNL TOTAL CA: CPT

## 2021-01-05 PROCEDURE — 36415 COLL VENOUS BLD VENIPUNCTURE: CPT

## 2021-01-05 PROCEDURE — 99222 1ST HOSP IP/OBS MODERATE 55: CPT | Performed by: INTERNAL MEDICINE

## 2021-01-05 PROCEDURE — 6370000000 HC RX 637 (ALT 250 FOR IP): Performed by: FAMILY MEDICINE

## 2021-01-05 PROCEDURE — 6360000002 HC RX W HCPCS: Performed by: FAMILY MEDICINE

## 2021-01-05 PROCEDURE — 81001 URINALYSIS AUTO W/SCOPE: CPT

## 2021-01-05 PROCEDURE — 84132 ASSAY OF SERUM POTASSIUM: CPT

## 2021-01-05 PROCEDURE — 1200000000 HC SEMI PRIVATE

## 2021-01-05 RX ADMIN — VANCOMYCIN HYDROCHLORIDE 125 MG: KIT at 13:55

## 2021-01-05 RX ADMIN — DICYCLOMINE HYDROCHLORIDE 10 MG: 10 CAPSULE ORAL at 13:55

## 2021-01-05 RX ADMIN — DEXTROSE AND SODIUM CHLORIDE: 5; 900 INJECTION, SOLUTION INTRAVENOUS at 21:27

## 2021-01-05 RX ADMIN — DICYCLOMINE HYDROCHLORIDE 10 MG: 10 CAPSULE ORAL at 08:58

## 2021-01-05 RX ADMIN — METRONIDAZOLE 250 MG: 500 TABLET ORAL at 08:58

## 2021-01-05 RX ADMIN — VANCOMYCIN HYDROCHLORIDE 125 MG: KIT at 23:59

## 2021-01-05 RX ADMIN — ALLOPURINOL 100 MG: 100 TABLET ORAL at 08:58

## 2021-01-05 RX ADMIN — ASPIRIN 81 MG: 81 TABLET, COATED ORAL at 08:58

## 2021-01-05 RX ADMIN — CETIRIZINE HYDROCHLORIDE 10 MG: 10 TABLET, FILM COATED ORAL at 08:58

## 2021-01-05 RX ADMIN — ENOXAPARIN SODIUM 40 MG: 100 INJECTION SUBCUTANEOUS at 08:58

## 2021-01-05 RX ADMIN — POTASSIUM CHLORIDE 40 MEQ: 1500 TABLET, EXTENDED RELEASE ORAL at 13:55

## 2021-01-05 RX ADMIN — ATORVASTATIN CALCIUM 10 MG: 10 TABLET, FILM COATED ORAL at 08:58

## 2021-01-05 RX ADMIN — VERAPAMIL HYDROCHLORIDE 240 MG: 240 TABLET, FILM COATED, EXTENDED RELEASE ORAL at 20:18

## 2021-01-05 RX ADMIN — DICYCLOMINE HYDROCHLORIDE 10 MG: 10 CAPSULE ORAL at 20:18

## 2021-01-05 RX ADMIN — VANCOMYCIN HYDROCHLORIDE 125 MG: KIT at 17:41

## 2021-01-05 ASSESSMENT — PAIN DESCRIPTION - PAIN TYPE: TYPE: ACUTE PAIN

## 2021-01-05 ASSESSMENT — PAIN DESCRIPTION - LOCATION: LOCATION: ABDOMEN

## 2021-01-05 NOTE — CARE COORDINATION
CASE MANAGEMENT NOTE:    Admission Date:  1/4/2021 Marley Desir is a 67 y.o.  female    Admitted for : Hypokalemia [E87.6]    Met with:  Patient    PCP:  Ebony Beasley:  Hilaria      Current Residence/ Living Arrangements:  independently at home             Current Services PTA:  No    Is patient agreeable to VNS: No    Freedom of choice provided:  NA    List of [address] provided: NA    VNS chosen:  No    DME:  none    Home Oxygen: No    Nebulizer: No    CPAP/BIPAP: No    Supplier: N/A    Potential Assistance Needed: No    SNF needed: No    Freedom of choice and list provided: NA    Pharmacy:  [facility]      Does Patient want to use MEDS to BEDS? No    Is patient currently receiving oral anticoagulation therapy? No    Is the Patient an KAND[facility] VICTOR[facility] with Readmission Risk Score greater than 14%? No  If yes, pt needs a follow up appointment made within 7 days. Family Members/Caregivers that pt would like involved in their care:    Yes    If yes, list name here:  Benny Avalos     Transportation Provider:  Family             Is patient in Isolation/One on One/Altered Mental Status? No  If yes, skip next question. If no, would they like an I-Pad to  use? No  If yes, call [phone]. Discharge Plan:  1/5/20 Hilaria Pt is from home in a one story home he uses no dme and denies need for vns plan is to discharge to home with no needs will continue to follow for needs . //tv                  Electronically signed by:  Americo Davis RN on 1/5/2021 at 1:39 PM

## 2021-01-05 NOTE — PLAN OF CARE
Problem: Falls - Risk of:  Goal: Will remain free from falls  Description: Will remain free from falls  Outcome: Ongoing  Note: Patient remains free of falls and injuries throughout shift. Bed remains in the lowest position, wheels locked, call light and bedside table are within reach. Goal: Absence of physical injury  Description: Absence of physical injury  Outcome: Ongoing     Problem: Pain:  Goal: Pain level will decrease  Description: Pain level will decrease  Outcome: Ongoing  Goal: Control of acute pain  Description: Control of acute pain  Outcome: Ongoing  Note: Assess the location, characteristics, onset, duration, frequency, quality, and severity of pain. Encourage immediate report of pain. Use appropriate pain scale to rate pain. Manage pain using nonpharmacologic/pharmacologic interventions.    Goal: Control of chronic pain  Description: Control of chronic pain  Outcome: Ongoing

## 2021-01-05 NOTE — CONSULTS
[facility] -   Infectious diseases evaluation  admission date 1/4/2021    reason for consultation:   C. difficile colitis    Impression :   Current:  · Recurrent C. difficile colitis second episode  · Pyuria, asymptomatic  · History of GERD  · Gout  · Hyperlipidemia  · Osteoporosis      Recommendations   · P.o. vancomycin tapering dose  · Discussed with Dr. Cinthya Christensen  · Follow CBC and renal function  · Supportive care        History of Present Illness:   Initial history:  Charmaine Cha is a 67y.o.-year-old female presented to hospital with lower abdominal pain, mild, no radiation associated with diarrhea with the several bowel movement of watery stool daily for 4 days. She also had nausea with no vomiting. She denied any recent antibiotics. Stool for C. difficile was positive. She denied any fever or chills, denied nausea or vomiting, denied cough or shortness of breath, no other complaints. This is her second episode of C. difficile, her first episode was February 27, 2020 was treated with oral vancomycin for 2 weeks  Patient had robotic cholecystectomy done 11/24/2020 received preoperative prophylaxis and was placed on oral Keflex postop. Interval changes  1/5/2021   Patient Vitals for the past 8 hrs:   BP Temp Temp src Pulse Resp SpO2   01/05/21 0706 (!) 92/47 98.6 °F (37 °C) Oral 73 16 97 %           I have personally reviewed the past medical history, past surgical history, medications, social history, and family history, and I haveupdated the database accordingly. Allergies:   Ceftin [cefuroxime] and Keflex [cephalexin]     Review of Systems:     Review of Systems  As per history present illness, other than above 14 system reviewed were negative  Physical Examination :       Physical Exam  Constitutional:       General: She is not in acute distress. HENT:      Head: Normocephalic and atraumatic.       Right Ear: External ear normal. Left Ear: External ear normal.      Mouth/Throat:      Pharynx: Oropharynx is clear. No oropharyngeal exudate. Eyes:      General: No scleral icterus. Conjunctiva/sclera: Conjunctivae normal.   Neck:      Musculoskeletal: Neck supple. No neck rigidity. Cardiovascular:      Rate and Rhythm: Normal rate and regular rhythm. Heart sounds: No murmur. Pulmonary:      Effort: Pulmonary effort is normal. No respiratory distress. Breath sounds: No wheezing. Abdominal:      General: There is no distension. Palpations: Abdomen is soft. Comments: Mild suprapubic tenderness   Musculoskeletal:      Right lower leg: No edema. Left lower leg: No edema. Skin:     General: Skin is warm. Coloration: Skin is not jaundiced. Neurological:      General: No focal deficit present. Mental Status: She is alert and oriented to person, place, and time. Psychiatric:         Mood and Affect: Mood normal.         Behavior: Behavior normal.         Past Medical History:     Past Medical History:   Diagnosis Date    Allergic rhinitis     Closed tibia fracture     GERD (gastroesophageal reflux disease)     Gout     Headache(784.0)     h/o migraines    Hyperlipidemia     Osteoarthritis     DJD Lt knee    Osteoporosis     Posterior tibial tendon dysfunction     right, wears braces on both legs    Vitamin D deficient rickets     Wears glasses     Wears partial dentures     lower       Past Surgical  History:     Past Surgical History:   Procedure Laterality Date    APPENDECTOMY      CARPAL TUNNEL RELEASE      bilateral    [facility]ARACT [facility]AL [facility] IMPLANT [facility]    [facility]    CATARACT REMOVAL WITH IMPLANT Right [facility]/04/2019    [facility]    CHOLECYSTECTOMY, LAPAROSCOPIC N/A 11/24/2020    CHOLECYSTECTOMY LAPAROSCOPIC ROBOTIC XI MULTIPORT performed by Kiersten Medeiros MD at [address] [place] [address] Rd      2007?  per pt wtih polyps    ERCP N/A 10/14/2020 ERCP WITH BILE DUCT BRUSHING performed by Jamaal Sierra MD at [facility]      left tibia    HYSTERECTOMY  4/12    INTRACAPSULAR CATARACT EXTRACTION Left 3/7/2019    EYE CATARACT EMULSIFICATION IOL IMPLANT - TOPICAL performed by Vishnu Young MD at [address] [place] [address] Right 4/4/2019    EYE CATARACT EMULSIFICATION IOL IMPLANT performed by Vishnu Young MD at [place] [facility] Left 11/06/2018    hardware removal and then TKA    KNEE ARTHROSCOPY Left 2002?  ME TOTAL KNEE ARTHROPLASTY Left 11/6/2018    KNEE TOTAL ARTHROPLASTY W/REMOVAL HARDWARE PLATE & SCREWS performed by Lance Enriquez MD at [address] [place] [address] Nw Left     TONSILLECTOMY AND ADENOIDECTOMY      TOTAL KNEE ARTHROPLASTY Right 12/10/2019    KNEE TOTAL ARTHROPLASTY performed by Lance Enriquez MD at [address] [place] [address]         Medications:      vancomycin  125 mg Oral 4 times per day    sodium chloride flush  10 mL Intravenous 2 times per day    enoxaparin  40 mg Subcutaneous Daily    allopurinol  100 mg Oral Daily    aspirin  81 mg Oral Daily    cetirizine  10 mg Oral Daily    fluticasone  2 spray Each Nostril Daily    atorvastatin  10 mg Oral Daily    verapamil  240 mg Oral Nightly    dicyclomine  10 mg Oral TID       Social History:     Social History     Socioeconomic History    Marital status:       Spouse name: Not on file    Number of children: Not on file    Years of education: Not on file    Highest education level: Not on file   Occupational History    Not on file   Social Needs    Financial resource strain: Not hard at all    Food insecurity     Worry: Never true     Inability: Never true   [employer] needs     Medical: Not on file     Non-medical: Not on file   Tobacco Use    Smoking status: Former Smoker     Packs/day: 0.50     Years: 10.00     Pack years: 5.00     Types: Cigarettes Quit date: 1987     Years since quittin.9    Smokeless tobacco: Never Used   Substance and Sexual Activity    Alcohol use: Not Currently     Alcohol/week: 0.0 standard drinks     Comment: rare    Drug use: No    Sexual activity: Not Currently   Lifestyle    Physical activity     Days per week: Not on file     Minutes per session: Not on file    Stress: Not on file   Relationships    Social connections     Talks on phone: Not on file     Gets together: Not on file     Attends Christianity service: Not on file     Active member of club or organization: Not on file     Attends meetings of clubs or organizations: Not on file     Relationship status: Not on file    Intimate partner violence     Fear of current or ex partner: Not on file     Emotionally abused: Not on file     Physically abused: Not on file     Forced sexual activity: Not on file   Other Topics Concern    Not on file   Social History Narrative    Not on file       Family History:     Family History   Problem Relation Age of Onset    Arthritis Other     Thyroid Disease Other     COPD Other     Stroke Father       Medical Decision Making:   I have independently reviewed/ordered the following labs:    CBC with Differential:   Recent Labs     21  1255   WBC 12.5*   HGB 12.7   HCT 37.9      LYMPHOPCT 11*   MONOPCT 17*     BMP:  Recent Labs     21  1255 21  1255 21  0535 21  1207   *  --   --  131*   K 2.9*   < > 3.2* 3.2*   CL 94*  --   --  100   CO2 23  --   --  24   BUN 19  --   --  14   CREATININE 0.64  --   --  0.61   MG 2.0  --   --   --     < > = values in this interval not displayed. Hepatic Function Panel:   Recent Labs     21  1255   PROT 6.2*   LABALBU 3.3*   BILITOT 0.60   ALKPHOS 71   ALT 11   AST 16     No results for input(s): RPR in the last 72 hours. No results for input(s): HIV in the last 72 hours. No results for input(s): BC in the last 72 hours.   Lab Results

## 2021-01-05 NOTE — H&P
Hospital Medicine  History and Physical                                                                                                                                                 Full Code    Patient:  Rachell Smart  MRN: [id number]                                                                                                                                                                     CHIEF COMPLAINT:  c diff colitis    History Obtained From:  patient  PCP: Dl Boyle MD    HISTORY OF PRESENT ILLNESS:   The patient is a 67 y.o. female who presents with h/o of having diarrhea, pt was recently in hospital and was rx with oral vanco, pt went home and came back with c diff positive. Pt had some nausea and no vomitting, c diff positive again during this admission    Past Medical History:        Diagnosis Date    Allergic rhinitis     Closed tibia fracture     GERD (gastroesophageal reflux disease)     Gout     Headache(784.0)     h/o migraines    Hyperlipidemia     Osteoarthritis     DJD Lt knee    Osteoporosis     Posterior tibial tendon dysfunction     right, wears braces on both legs    Vitamin D deficient rickets     Wears glasses     Wears partial dentures     lower       Past Surgical History:        Procedure Laterality Date    APPENDECTOMY      CARPAL TUNNEL RELEASE      bilateral    CATARACT REMOVAL WITH IMPLANT Left 03/07/2019    [facility]    CATARACT REMOVAL WITH IMPLANT Right 04/04/2019    [facility]    CHOLECYSTECTOMY, LAPAROSCOPIC N/A 11/24/2020    CHOLECYSTECTOMY LAPAROSCOPIC ROBOTIC XI MULTIPORT performed by Aleksandra Espinal MD at [address] [place] [address]      2007?  per pt wtih polyps    ERCP N/A 10/14/2020    ERCP WITH BILE DUCT BRUSHING performed by Chelsy Winslow MD at [address] [place] [address]      left tibia    HYSTERECTOMY  4/12    INTRACAPSULAR CATARACT EXTRACTION Left 3/7/2019 EYE CATARACT EMULSIFICATION IOL IMPLANT - TOPICAL performed by Yasmany Gil MD at [address] [place] [address] Right 4/4/2019    EYE CATARACT EMULSIFICATION IOL IMPLANT performed by Yasmany Gil MD at [facility] Left 11/06/2018    hardware removal and then TKA    KNEE ARTHROSCOPY Left 2002?  LA TOTAL KNEE ARTHROPLASTY Left 11/6/2018    KNEE TOTAL ARTHROPLASTY W/REMOVAL HARDWARE PLATE & SCREWS performed by Araseli Vargas MD at [address] [place] [address] Nw Left     TONSILLECTOMY AND ADENOIDECTOMY      TOTAL KNEE ARTHROPLASTY Right 12/10/2019    KNEE TOTAL ARTHROPLASTY performed by Araseli Vargas MD at [address] [place] [address]         Medications Prior to Admission:    Prior to Admission medications    Medication Sig Start Date End Date Taking? Authorizing Provider   furosemide (LASIX) 20 MG tablet TAKE ONE TABLET BY MOUTH DAILY FOR 3 TO 5 DAYS AS NEEDED FOR SWELLING 12/28/20  Yes PASCUAL Dolan CNP   simvastatin (ZOCOR) 20 MG tablet TAKE ONE TABLET BY MOUTH DAILY 12/21/20  Yes PASCUAL Dolan CNP   cetirizine (ZYRTEC) 10 MG tablet TAKE ONE TABLET BY MOUTH DAILY 12/21/20  Yes PASCUAL Dolan CNP   alendronate (FOSAMAX) 70 MG tablet TAKE 1 TABLET BY MOUTH ONCE WEEKLY ON AN EMPTY STOMACH BEFORE BREAKFAST.  REMAIN UPRIGHT FOR 30 MINUTES & TAKE WITH 8 OUNCES OF WATER 12/14/20  Yes PASCUAL Dolan CNP   famotidine (PEPCID) 20 MG tablet TAKE ONE TABLET BY MOUTH TWICE A DAY 11/20/20  Yes PASCUAL Dolan CNP   allopurinol (ZYLOPRIM) 100 MG tablet TAKE ONE TABLET BY MOUTH DAILY 11/20/20  Yes PASCUAL Dolan CNP   psyllium (METAMUCIL) 28.3 % POWD powder Take 3.4 g by mouth daily   Yes Historical Provider, MD   Bacillus Coagulans-Inulin (ALIGN PREBIOTIC-PROBIOTIC PO) Take by mouth daily    Yes Historical Provider, MD verapamil (CALAN SR) 240 MG extended release tablet TAKE ONE TABLET BY MOUTH ONCE NIGHTLY FOR MIGRAINE HEADACHE 6/12/20  Yes Wagner Wilkinson APRN - CNP   fluticasone (FLONASE) 50 MCG/ACT nasal spray SPRAY TWO SPRAYS IN EACH NOSTRIL ONCE DAILY 5/1/20  Yes Wagner Wilkinson APRN - CNP   aspirin 81 MG EC tablet Take 81 mg by mouth daily    Yes Historical Provider, MD   naproxen (NAPROSYN) 250 MG tablet TAKE ONE TABLET BY MOUTH THREE TIMES A DAY WITH FOOD AS NEEDED FOR GOUT FLARE UP UNTIL ATTACK SUBSIDES 11/25/19  Yes Marzena Liang MD   Aspirin-Acetaminophen-Caffeine (EXCEDRIN MIGRAINE PO) Take by mouth as needed    Yes Historical Provider, MD       Current meds  Scheduled Meds:   vancomycin  125 mg Oral 4 times per day    sodium chloride flush  10 mL Intravenous 2 times per day    enoxaparin  40 mg Subcutaneous Daily    allopurinol  100 mg Oral Daily    aspirin  81 mg Oral Daily    cetirizine  10 mg Oral Daily    fluticasone  2 spray Each Nostril Daily    atorvastatin  10 mg Oral Daily    verapamil  240 mg Oral Nightly    dicyclomine  10 mg Oral TID     Continuous Infusions:   dextrose 5 % and 0.9 % NaCl 75 mL/hr at 01/04/21 1839     PRN Meds:.sodium chloride flush, acetaminophen, potassium chloride **OR** potassium alternative oral replacement **OR** potassium chloride    Allergies:  Ceftin [cefuroxime] and Keflex [cephalexin]    Social History:   TOBACCO:   reports that she quit smoking about 33 years ago. Her smoking use included cigarettes. She has a 5.00 pack-year smoking history. She has never used smokeless tobacco.  ETOH:   reports previous alcohol use.   OCCUPATION:      Family History:       Problem Relation Age of Onset    Arthritis Other     Thyroid Disease Other     COPD Other     Stroke Father        REVIEW OF SYSTEMS:  Constitutional:  negative for  fevers, chills, sweats and weight loss  HEENT:  negative for  hearing loss, ear drainage, nasal congestion, snoring, hoarseness and voice change Neck:  No swollen glands and No h/o goiter or thyroid disease  Cardiac:  negative for  chest pain, dyspnea, palpitations, orthopnea, PND, edema  Respiratory:  negative for  dry cough, cough with sputum, dyspnea, wheezing and hemoptysis  Gastrointestinal:  See hpi  :  negative for frequency, dysuria, urinary incontinence, hesitancy, decreased stream and hematuria  Musculoskeletal:  negative for  myalgias, arthralgias, joint swelling and stiff joints  Neuro:  negative for headaches, dizziness, seizures, memory problems, visual disturbance, weakness and syncope      Physical Exam:    Vitals: BP (!) 92/47   Pulse 73   Temp 98.6 °F (37 °C) (Oral)   Resp 16   Ht 5' 3\" (1.6 m)   Wt 122 lb (55.3 kg)   LMP 01/01/2004   SpO2 97%   BMI 21.61 kg/m²   General appearance: alert, appears stated age and cooperative  Skin: Skin color, texture, turgor normal. No rashes or lesions  HEENT: Head: Normocephalic, no lesions, without obvious abnormality.   Eye: Normal external eye, conjunctiva, lids cornea, RICHARD  Neck: no adenopathy, no carotid bruit, no JVD, supple, symmetrical, trachea midline and thyroid not enlarged, symmetric, no tenderness/mass/nodules  Lungs: clear to auscultation bilaterally  Heart: regular rate and rhythm, S1, S2 normal, no murmur, click, rub or gallop  Abdomen: soft, non-tender; bowel sounds normal; no masses,  no organomegaly  Extremities: extremities normal, atraumatic, no cyanosis or edema  Neurologic: Mental status: Alert, oriented, thought content appropriate    CBC:   Recent Labs     01/04/21  1255   WBC 12.5*   HGB 12.7        BMP:    Recent Labs     01/04/21  1255 01/04/21  1545 01/05/21  0535   *  --   --    K 2.9* 3.2* 3.2*   CL 94*  --   --    CO2 23  --   --    BUN 19  --   --    CREATININE 0.64  --   --    GLUCOSE 112*  --   --      Hepatic:   Recent Labs     01/04/21  1255   AST 16   ALT 11   BILITOT 0.60   ALKPHOS 71 Troponin: No results for input(s): TROPONINI in the last 72 hours. BNP: No results for input(s): BNP in the last 72 hours. Lipids: No results for input(s): CHOL, HDL in the last 72 hours. Invalid input(s): LDLCALCU  ABGs: No results found for: PHART, PO2ART, ISC3JYU  INR: No results for input(s): INR in the last 72 hours. -----------------------------------------------------------------  PA/lat CXR: No results found. EKG: no acute changes     Prophylaxis:   DVT with  [x] lovenox        [] heparin        [] Scd        [] none:     Assessment and Plan   1. c diff colitis/taper vanco   2.  Hypokalemia/replace    Patient Active Problem List   Diagnosis Code    Allergic rhinitis J30.9    Osteoarthritis M19.90    Osteoporosis M81.0    GERD (gastroesophageal reflux disease) K21.9    Headache R51.9    Mixed hyperlipidemia E78.2    Gout M10.9    Vitamin D deficiency E55.9    Migraine G43.909    Chronic headache R51.9, G89.29    Anemia D64.9    Lymphadenopathy R59.1    Degenerative arthritis of knee, bilateral M17.0    Primary osteoarthritis of left knee M17.12    Primary osteoarthritis of right knee M17.11    Cholecystitis K81.9    Elevated LFTs R79.89    Abnormal MRI of the abdomen R93.5    Weight loss R63.4    Abdominal pain, right upper quadrant R10.11    Abdominal cramping R10.9    Diarrhea R19.7    Abdominal pain, generalized R10.84    Abdominal bloating R14.0    Irritable bowel syndrome with constipation K58.1    Status post endoscopic retrograde cholangiopancreatography H04.491    Biliary colic O52.03    C. difficile colitis A04.72    S/P cholecystectomy Z90.49    Chronic diastolic congestive heart failure (HCC) I50.32    Hypokalemia E87.6    Hypomagnesemia E83.42       Anticipated Disposition upon discharge: [] Home                                                                         [] Home with Home Health [] Paul Randy                                                                         [] [address] [facility] [address]          Patient is admitted as inpatient status because of co-morbidities listed above, severity of signs and symptoms as outlined, requirement for current medical therapies and most importantly because of direct risk to patient if care not provided in a hospital setting.     Janette Manriquez MD  Admitting Hospitalist

## 2021-01-05 NOTE — PROGRESS NOTES
Comprehensive Nutrition Assessment    Type and Reason for Visit:  Initial, Positive Nutrition Screen(diarrhea/N/V)    Nutrition Recommendations/Plan: Agree with General diet. Will add Ensure Clear supplements twice daily to increase protein/calorie intake. Nutrition Assessment:  Pt admitted due to low K+ of 2.9 (now improved to 3.2). She was admitted from 11/27 to 12/3 due to c. dif and continues to have diarrhea. N/V resolved. Observed pt with lunch tray consumed more than 75%. Pt is down 13# over the past 2.5 months based on stated wt. Malnutrition Assessment:  Malnutrition Status: At risk for malnutrition (Comment)    Context:  Acute Illness     Findings of the 6 clinical characteristics of malnutrition:  Energy Intake:  7 - 50% or less of estimated energy requirements for 5 or more days  Weight Loss:  Unable to assess(current wt is stated)     Body Fat Loss:  No significant body fat loss     Muscle Mass Loss:  No significant muscle mass loss    Fluid Accumulation:  No significant fluid accumulation     Strength:  Not Performed    Estimated Daily Nutrient Needs:  Energy (kcal):  25 kcal/kg= 1400 kcal; Weight Used for Energy Requirements:  Current     Protein (g):  1.4g/kg= 70-75 g; Weight Used for Protein Requirements:  Ideal          Nutrition Related Findings:  no edema, Labs/Meds: Reviewed      Wounds:  None       Current Nutrition Therapies:    DIET GENERAL;     Anthropometric Measures:  · Height: 5' 3\" (160 cm)  · Current Body Weight: 122 lb (55.3 kg)   · Admission Body Weight: 122 lb (55.3 kg)    · Usual Body Weight: 135 lb (61.2 kg)     · Ideal Body Weight: 115 lbs; BMI: 21.6  · BMI Categories: Underweight (BMI less than 22) age over 72       Nutrition Diagnosis:   · Inadequate oral intake related to (current medical condition) as evidenced by weight loss, poor intake prior to admission, diarrhea    Nutrition Interventions:   Food and/or Nutrient Delivery:  Continue Current Diet Nutrition Education/Counseling:  No recommendation at this time   Coordination of Nutrition Care:  Continue to monitor while inpatient    Goals:  po intake greater than 75%       Nutrition Monitoring and Evaluation:   Food/Nutrient Intake Outcomes:  Food and Nutrient Intake, Supplement Intake  Physical Signs/Symptoms Outcomes:  Biochemical Data, Skin, Weight, Fluid Status or Edema, Diarrhea     Discharge Planning:    Continue current diet     Electronically signed by Yocasta Krishnamurthy RD, LD on 1/5/21 at 1:37 PM EST    Contact: [phone]

## 2021-01-05 NOTE — PLAN OF CARE
Nutrition Problem #1: Inadequate oral intake  Intervention: Food and/or Nutrient Delivery: Continue Current Diet  Nutritional Goals: po intake greater than 75%

## 2021-01-06 LAB
ANION GAP SERPL CALCULATED.3IONS-SCNC: 6 MMOL/L (ref 9–17)
BUN BLDV-MCNC: 10 MG/DL (ref 8–23)
BUN/CREAT BLD: ABNORMAL (ref 9–20)
CALCIUM SERPL-MCNC: 7.5 MG/DL (ref 8.6–10.4)
CHLORIDE BLD-SCNC: 103 MMOL/L (ref 98–107)
CO2: 24 MMOL/L (ref 20–31)
CREAT SERPL-MCNC: 0.55 MG/DL (ref 0.5–0.9)
GFR AFRICAN AMERICAN: >60 ML/MIN
GFR NON-AFRICAN AMERICAN: >60 ML/MIN
GFR SERPL CREATININE-BSD FRML MDRD: ABNORMAL ML/MIN/{1.73_M2}
GFR SERPL CREATININE-BSD FRML MDRD: ABNORMAL ML/MIN/{1.73_M2}
GLUCOSE BLD-MCNC: 98 MG/DL (ref 70–99)
POTASSIUM SERPL-SCNC: 3.7 MMOL/L (ref 3.7–5.3)
SODIUM BLD-SCNC: 133 MMOL/L (ref 135–144)

## 2021-01-06 PROCEDURE — 6370000000 HC RX 637 (ALT 250 FOR IP): Performed by: FAMILY MEDICINE

## 2021-01-06 PROCEDURE — 99232 SBSQ HOSP IP/OBS MODERATE 35: CPT | Performed by: INTERNAL MEDICINE

## 2021-01-06 PROCEDURE — 6360000002 HC RX W HCPCS: Performed by: FAMILY MEDICINE

## 2021-01-06 PROCEDURE — 36415 COLL VENOUS BLD VENIPUNCTURE: CPT

## 2021-01-06 PROCEDURE — 80048 BASIC METABOLIC PNL TOTAL CA: CPT

## 2021-01-06 PROCEDURE — 2580000003 HC RX 258: Performed by: FAMILY MEDICINE

## 2021-01-06 PROCEDURE — 1200000000 HC SEMI PRIVATE

## 2021-01-06 PROCEDURE — 6370000000 HC RX 637 (ALT 250 FOR IP): Performed by: EMERGENCY MEDICINE

## 2021-01-06 RX ORDER — CALCIUM CARBONATE 200(500)MG
500 TABLET,CHEWABLE ORAL 3 TIMES DAILY PRN
Status: DISCONTINUED | OUTPATIENT
Start: 2021-01-06 | End: 2021-01-07 | Stop reason: HOSPADM

## 2021-01-06 RX ADMIN — VANCOMYCIN HYDROCHLORIDE 125 MG: KIT at 06:00

## 2021-01-06 RX ADMIN — ENOXAPARIN SODIUM 40 MG: 100 INJECTION SUBCUTANEOUS at 08:24

## 2021-01-06 RX ADMIN — DICYCLOMINE HYDROCHLORIDE 10 MG: 10 CAPSULE ORAL at 20:26

## 2021-01-06 RX ADMIN — ANTACID TABLETS 500 MG: 500 TABLET, CHEWABLE ORAL at 14:16

## 2021-01-06 RX ADMIN — DICYCLOMINE HYDROCHLORIDE 10 MG: 10 CAPSULE ORAL at 14:11

## 2021-01-06 RX ADMIN — FLUTICASONE PROPIONATE 2 SPRAY: 50 SPRAY, METERED NASAL at 08:26

## 2021-01-06 RX ADMIN — VANCOMYCIN HYDROCHLORIDE 125 MG: KIT at 17:38

## 2021-01-06 RX ADMIN — ATORVASTATIN CALCIUM 10 MG: 10 TABLET, FILM COATED ORAL at 08:25

## 2021-01-06 RX ADMIN — ASPIRIN 81 MG: 81 TABLET, COATED ORAL at 08:25

## 2021-01-06 RX ADMIN — CETIRIZINE HYDROCHLORIDE 10 MG: 10 TABLET, FILM COATED ORAL at 08:25

## 2021-01-06 RX ADMIN — VERAPAMIL HYDROCHLORIDE 240 MG: 240 TABLET, FILM COATED, EXTENDED RELEASE ORAL at 20:26

## 2021-01-06 RX ADMIN — DICYCLOMINE HYDROCHLORIDE 10 MG: 10 CAPSULE ORAL at 08:25

## 2021-01-06 RX ADMIN — VANCOMYCIN HYDROCHLORIDE 125 MG: KIT at 12:41

## 2021-01-06 RX ADMIN — ALLOPURINOL 100 MG: 100 TABLET ORAL at 08:25

## 2021-01-06 RX ADMIN — DEXTROSE AND SODIUM CHLORIDE: 5; 900 INJECTION, SOLUTION INTRAVENOUS at 10:53

## 2021-01-06 NOTE — CARE COORDINATION
DISCHARGE PLANNING NOTE:    Plan is for this patient to return to home, no needs. + Cdiff - Has robotic vazquez in NOvember and was started on antibiotics    Had Cdiff earlier this year - Started on PO Vanco here and will need to ensure it is affordable for the patient.      Electronically signed by Annie Montero RN on 1/6/2021 at 2:36 PM

## 2021-01-06 NOTE — PLAN OF CARE
Problem: Falls - Risk of:  Goal: Will remain free from falls  Description: Will remain free from falls  1/6/2021 1458 by Keri Galeazzi, RN  Outcome: Ongoing  Note: Pt has been free from falls this shift. Bed is in lowest position, brake is locked, call light is within reach. Will continue to monitor. Problem: Pain:  Goal: Pain level will decrease  Description: Pain level will decrease  1/6/2021 1458 by Keri Galeazzi, RN  Outcome: Ongoing  Note: Pain assessed with each interaction. Patient denies pain throughout shift. Will continue to monitor.       Problem: Nutrition  Goal: Optimal nutrition therapy  Description: Nutrition Problem #1: Inadequate oral intake  Intervention: Food and/or Nutrient Delivery: Continue Current Diet  Nutritional Goals: po intake greater than 75%     1/6/2021 1458 by Keri Galeazzi, RN  Outcome: Ongoing  Note: Patient has adequate intake, consuming % of every meal.

## 2021-01-06 NOTE — PLAN OF CARE
Problem: Falls - Risk of:  Goal: Will remain free from falls  Description: Will remain free from falls  Outcome: Ongoing  Note: Patient remains free of falls and injuries throughout shift. Bed remains in the lowest position, wheels locked, call light and bedside table are within reach. Goal: Absence of physical injury  Description: Absence of physical injury  Outcome: Ongoing     Problem: Pain:  Goal: Pain level will decrease  Description: Pain level will decrease  Outcome: Ongoing  Goal: Control of acute pain  Description: Control of acute pain  Outcome: Ongoing  Note: Assess the location, characteristics, onset, duration, frequency, quality, and severity of pain. Encourage immediate report of pain. Use appropriate pain scale to rate pain. Manage pain using nonpharmacologic/pharmacologic interventions.    Goal: Control of chronic pain  Description: Control of chronic pain  Outcome: Ongoing     Problem: Nutrition  Goal: Optimal nutrition therapy  Description: Nutrition Problem #1: Inadequate oral intake  Intervention: Food and/or Nutrient Delivery: Continue Current Diet  Nutritional Goals: po intake greater than 75%     Outcome: Ongoing

## 2021-01-07 VITALS
HEART RATE: 68 BPM | WEIGHT: 134.26 LBS | DIASTOLIC BLOOD PRESSURE: 61 MMHG | SYSTOLIC BLOOD PRESSURE: 104 MMHG | RESPIRATION RATE: 16 BRPM | BODY MASS INDEX: 23.79 KG/M2 | HEIGHT: 63 IN | TEMPERATURE: 97.7 F | OXYGEN SATURATION: 99 %

## 2021-01-07 LAB
ANION GAP SERPL CALCULATED.3IONS-SCNC: 8 MMOL/L (ref 9–17)
BUN BLDV-MCNC: 6 MG/DL (ref 8–23)
BUN/CREAT BLD: ABNORMAL (ref 9–20)
CALCIUM SERPL-MCNC: 8.2 MG/DL (ref 8.6–10.4)
CHLORIDE BLD-SCNC: 105 MMOL/L (ref 98–107)
CO2: 24 MMOL/L (ref 20–31)
CREAT SERPL-MCNC: 0.54 MG/DL (ref 0.5–0.9)
GFR AFRICAN AMERICAN: >60 ML/MIN
GFR NON-AFRICAN AMERICAN: >60 ML/MIN
GFR SERPL CREATININE-BSD FRML MDRD: ABNORMAL ML/MIN/{1.73_M2}
GFR SERPL CREATININE-BSD FRML MDRD: ABNORMAL ML/MIN/{1.73_M2}
GLUCOSE BLD-MCNC: 100 MG/DL (ref 70–99)
POTASSIUM SERPL-SCNC: 3.3 MMOL/L (ref 3.7–5.3)
POTASSIUM SERPL-SCNC: 3.8 MMOL/L (ref 3.7–5.3)
SODIUM BLD-SCNC: 137 MMOL/L (ref 135–144)

## 2021-01-07 PROCEDURE — 84132 ASSAY OF SERUM POTASSIUM: CPT

## 2021-01-07 PROCEDURE — 80048 BASIC METABOLIC PNL TOTAL CA: CPT

## 2021-01-07 PROCEDURE — 6360000002 HC RX W HCPCS: Performed by: FAMILY MEDICINE

## 2021-01-07 PROCEDURE — 6370000000 HC RX 637 (ALT 250 FOR IP): Performed by: EMERGENCY MEDICINE

## 2021-01-07 PROCEDURE — 6370000000 HC RX 637 (ALT 250 FOR IP): Performed by: FAMILY MEDICINE

## 2021-01-07 PROCEDURE — 2580000003 HC RX 258: Performed by: FAMILY MEDICINE

## 2021-01-07 PROCEDURE — 99231 SBSQ HOSP IP/OBS SF/LOW 25: CPT | Performed by: INTERNAL MEDICINE

## 2021-01-07 PROCEDURE — 36415 COLL VENOUS BLD VENIPUNCTURE: CPT

## 2021-01-07 RX ORDER — DICYCLOMINE HYDROCHLORIDE 10 MG/1
10 CAPSULE ORAL 3 TIMES DAILY
Qty: 120 CAPSULE | Refills: 3 | Status: SHIPPED | OUTPATIENT
Start: 2021-01-07 | End: 2021-05-19 | Stop reason: ALTCHOICE

## 2021-01-07 RX ADMIN — VANCOMYCIN HYDROCHLORIDE 125 MG: KIT at 06:01

## 2021-01-07 RX ADMIN — VANCOMYCIN HYDROCHLORIDE 125 MG: KIT at 11:25

## 2021-01-07 RX ADMIN — DICYCLOMINE HYDROCHLORIDE 10 MG: 10 CAPSULE ORAL at 08:04

## 2021-01-07 RX ADMIN — ASPIRIN 81 MG: 81 TABLET, COATED ORAL at 08:04

## 2021-01-07 RX ADMIN — ALLOPURINOL 100 MG: 100 TABLET ORAL at 08:04

## 2021-01-07 RX ADMIN — DEXTROSE AND SODIUM CHLORIDE: 5; 900 INJECTION, SOLUTION INTRAVENOUS at 13:15

## 2021-01-07 RX ADMIN — DICYCLOMINE HYDROCHLORIDE 10 MG: 10 CAPSULE ORAL at 14:01

## 2021-01-07 RX ADMIN — CETIRIZINE HYDROCHLORIDE 10 MG: 10 TABLET, FILM COATED ORAL at 08:04

## 2021-01-07 RX ADMIN — POTASSIUM CHLORIDE 40 MEQ: 1500 TABLET, EXTENDED RELEASE ORAL at 09:12

## 2021-01-07 RX ADMIN — VANCOMYCIN HYDROCHLORIDE 125 MG: KIT at 00:09

## 2021-01-07 RX ADMIN — ATORVASTATIN CALCIUM 10 MG: 10 TABLET, FILM COATED ORAL at 08:04

## 2021-01-07 RX ADMIN — ENOXAPARIN SODIUM 40 MG: 100 INJECTION SUBCUTANEOUS at 08:05

## 2021-01-07 RX ADMIN — DEXTROSE AND SODIUM CHLORIDE: 5; 900 INJECTION, SOLUTION INTRAVENOUS at 00:13

## 2021-01-07 NOTE — CARE COORDINATION
DISCHARGE PLANNING NOTE:    OOP cost of PO vanco prescription is $32.00    Electronically signed by Tomy Elizalde RN on 1/7/2021 at 3:32 PM

## 2021-01-07 NOTE — DISCHARGE SUMMARY
Hospitalist Discharge Summary    Ghazala Cole  :  1945  MRN:  [id number]    Admit date:  2021  Discharge date: 21     Admitting Physician:  Cristel Winters MD    Discharge Diagnoses:   Patient Active Problem List   Diagnosis    Allergic rhinitis    Osteoarthritis    Osteoporosis    GERD (gastroesophageal reflux disease)    Headache    Mixed hyperlipidemia    Gout    Vitamin D deficiency    Migraine    Chronic headache    Anemia    Lymphadenopathy    Degenerative arthritis of knee, bilateral    Primary osteoarthritis of left knee    Primary osteoarthritis of right knee    Cholecystitis    Elevated LFTs    Abnormal MRI of the abdomen    Weight loss    Abdominal pain, right upper quadrant    Abdominal cramping    Diarrhea    Abdominal pain, generalized    Abdominal bloating    Irritable bowel syndrome with constipation    Status post endoscopic retrograde cholangiopancreatography    Biliary colic    C. difficile colitis    S/P cholecystectomy    Chronic diastolic congestive heart failure (HCC)    Hypokalemia    Hypomagnesemia        Admission Condition:  fair      Discharged Condition:  good    Hospital Course/Treatments   Admitted with c diff infection, this pt who has had c diff recently was sent home with oral vanco,pt continued to hv diarrhea,pt e lytes were followed, pt was placed on longer period of vanco    Discharge Medications: Florence Perez   Home Medication Instructions PTH:[id number]    Printed on:21 [id number]   Medication Information                      alendronate (FOSAMAX) 70 MG tablet  TAKE 1 TABLET BY MOUTH ONCE WEEKLY ON AN EMPTY STOMACH BEFORE BREAKFAST.  REMAIN UPRIGHT FOR 30 MINUTES & TAKE WITH 8 OUNCES OF WATER             allopurinol (ZYLOPRIM) 100 MG tablet  TAKE ONE TABLET BY MOUTH DAILY             aspirin 81 MG EC tablet  Take 81 mg by mouth daily              cetirizine (ZYRTEC) 10 MG tablet  TAKE ONE TABLET BY MOUTH DAILY dicyclomine (BENTYL) 10 MG capsule  Take 1 capsule by mouth 3 times daily             fluticasone (FLONASE) 50 MCG/ACT nasal spray  SPRAY TWO SPRAYS IN EACH NOSTRIL ONCE DAILY             simvastatin (ZOCOR) 20 MG tablet  TAKE ONE TABLET BY MOUTH DAILY             vancomycin (FIRVANQ) 50 MG/ML SOLR oral solution  Take 2.5 mLs by mouth 4 times daily for 13 days             vancomycin (FIRVANQ) 50 MG/ML SOLR oral solution  Take 2.5 mLs by mouth daily for 7 days             vancomycin (FIRVANQ) 50 MG/ML SOLR oral solution  Take 2.5 mLs by mouth every 48 hours for 27 days             vancomycin (FIRVANQ) 50 MG/ML SOLR oral solution  Take 2.5 mLs by mouth 2 times daily for 7 days             verapamil (CALAN SR) 240 MG extended release tablet  TAKE ONE TABLET BY MOUTH ONCE NIGHTLY FOR MIGRAINE HEADACHE                 Consults:  IP CONSULT TO FAMILY MEDICINE  IP CONSULT TO INFECTIOUS DISEASES  IP CONSULT TO PHARMACY    Significant Diagnostic Studies:  No results found. Disposition:   home    Discharge Instructions: Activity: activity as tolerated  Diet:  regular diet    Follow up with Jeanann Nageotte, MD in 2 weeks.     Signed:  Aleksandra Shannon  1/7/2021, 2:45 PM    Time spent in discharge of this pt is more than 30 minutes in examination,evaluvation,  counseling and review of medication and discharge plan

## 2021-01-07 NOTE — DISCHARGE INSTR - DIET
? Good nutrition is important when healing from an illness, injury, or surgery. Follow any nutrition recommendations given to you during your hospital stay. ? If you were given an oral nutrition supplement while in the hospital, continue to take this supplement at home. You can take it with meals, in-between meals, and/or before bedtime. These supplements can be purchased at most local grocery stores, pharmacies, and chain [employer]stores. ? If you have any questions about your diet or nutrition, call the hospital and ask for the dietitian.     General diet

## 2021-01-07 NOTE — PROGRESS NOTES
[facility] -   Infectious diseases evaluation  admission date 1/4/2021    reason for consultation:   C. difficile colitis    Impression :   Current:  · Recurrent C. difficile colitis second episode  · Pyuria, asymptomatic  · History of GERD  · Gout  · Hyperlipidemia  · Osteoporosis      Recommendations   · P.o. vancomycin tapering dose  · Follow CBC and renal function  · Supportive care        History of Present Illness:   Initial history:  Remington Amador is a 67y.o.-year-old female presented to hospital with lower abdominal pain, mild, no radiation associated with diarrhea with the several bowel movement of watery stool daily for 4 days. She also had nausea with no vomiting. She denied any recent antibiotics. Stool for C. difficile was positive. She denied any fever or chills, denied nausea or vomiting, denied cough or shortness of breath, no other complaints. This is her second episode of C. difficile, her first episode was February 27, 2020 was treated with oral vancomycin for 2 weeks  Patient had robotic cholecystectomy done 11/24/2020 received preoperative prophylaxis and was placed on oral Keflex postop. Interval changes  1/6/2021   She still complaining of diarrhea, less abdominal discomfort, denied any vomiting, denied fever, no new complaints  Patient Vitals for the past 8 hrs:   BP Temp Temp src Pulse Resp SpO2   01/06/21 1330 (!) 106/58 97.5 °F (36.4 °C) Oral 70 12 97 %           I have personally reviewed the past medical history, past surgical history, medications, social history, and family history, and I haveupdated the database accordingly. Allergies:   Ceftin [cefuroxime] and Keflex [cephalexin]     Review of Systems:     Review of Systems  As per history present illness, other than above 14 system reviewed were negative  Physical Examination :       Physical Exam  Constitutional:       General: She is not in acute distress.   HENT: Head: Normocephalic and atraumatic. Right Ear: External ear normal.      Left Ear: External ear normal.      Mouth/Throat:      Pharynx: Oropharynx is clear. No oropharyngeal exudate. Eyes:      General: No scleral icterus. Conjunctiva/sclera: Conjunctivae normal.   Neck:      Musculoskeletal: Neck supple. No neck rigidity. Cardiovascular:      Rate and Rhythm: Normal rate and regular rhythm. Heart sounds: No murmur. Pulmonary:      Effort: Pulmonary effort is normal. No respiratory distress. Breath sounds: No wheezing. Abdominal:      General: There is no distension. Palpations: Abdomen is soft. Comments: Mild suprapubic tenderness   Musculoskeletal:      Right lower leg: No edema. Left lower leg: No edema. Skin:     General: Skin is warm. Coloration: Skin is not jaundiced. Neurological:      General: No focal deficit present. Mental Status: She is alert and oriented to person, place, and time.    Psychiatric:         Mood and Affect: Mood normal.         Behavior: Behavior normal.         Past Medical History:     Past Medical History:   Diagnosis Date    Allergic rhinitis     Closed tibia fracture     GERD (gastroesophageal reflux disease)     Gout     Headache(784.0)     h/o migraines    Hyperlipidemia     Osteoarthritis     DJD Lt knee    Osteoporosis     Posterior tibial tendon dysfunction     right, wears braces on both legs    Vitamin D deficient rickets     Wears glasses     Wears partial dentures     lower       Past Surgical  History:     Past Surgical History:   Procedure Laterality Date    APPENDECTOMY      CARPAL TUNNEL RELEASE      bilateral    CATARACT REMOVAL WITH IMPLANT Left 03/07/2019    [facility]    CATARACT REMOVAL WITH IMPLANT Right 04/04/2019    Michael/Key    CHOLECYSTECTOMY, LAPAROSCOPIC N/A 11/24/2020 CHOLECYSTECTOMY LAPAROSCOPIC ROBOTIC XI MULTIPORT performed by Cortez Cespedes MD at [address] [place] [address] Rd      2007? per pt wtih polyps    ERCP N/A 10/14/2020    ERCP WITH BILE DUCT BRUSHING performed by Isaias Reardon MD at [facility]      left tibia    HYSTERECTOMY  4/12    INTRACAPSULAR CATARACT EXTRACTION Left 3/7/2019    EYE CATARACT EMULSIFICATION IOL IMPLANT - TOPICAL performed by Jeny Huff MD at [address] [place] [address] Right 4/4/2019    EYE CATARACT EMULSIFICATION IOL IMPLANT performed by Jeny Huff MD at [facility] Left 11/06/2018    hardware removal and then TKA    KNEE ARTHROSCOPY Left 2002?  NM TOTAL KNEE ARTHROPLASTY Left 11/6/2018    KNEE TOTAL ARTHROPLASTY W/REMOVAL HARDWARE PLATE & SCREWS performed by Bertha Duvall MD at [address] [place] [address] Nw Left     TONSILLECTOMY AND ADENOIDECTOMY      TOTAL KNEE ARTHROPLASTY Right 12/10/2019    KNEE TOTAL ARTHROPLASTY performed by Bertha Duvall MD at [address] [place] [address] EXTRACTION         Medications:      vancomycin  125 mg Oral 4 times per day    [START ON 1/27/2021] vancomycin  125 mg Oral Daily    [START ON 2/4/2021] vancomycin  125 mg Oral Q48H    [START ON 1/19/2021] vancomycin  125 mg Oral BID    sodium chloride flush  10 mL Intravenous 2 times per day    enoxaparin  40 mg Subcutaneous Daily    allopurinol  100 mg Oral Daily    aspirin  81 mg Oral Daily    cetirizine  10 mg Oral Daily    fluticasone  2 spray Each Nostril Daily    atorvastatin  10 mg Oral Daily    verapamil  240 mg Oral Nightly    dicyclomine  10 mg Oral TID       Social History:     Social History     Socioeconomic History    Marital status:       Spouse name: Not on file    Number of children: Not on file    Years of education: Not on file    Highest education level: Not on file   Occupational History    Not on file   Social Needs  Financial resource strain: Not hard at all   [employer] insecurity     Worry: Never true     Inability: Never true   [employer] needs     Medical: Not on file     Non-medical: Not on file   Tobacco Use    Smoking status: Former Smoker     Packs/day: 0.50     Years: 10.00     Pack years: 5.00     Types: Cigarettes     Quit date: 1987     Years since quittin.9    Smokeless tobacco: Never Used   Substance and Sexual Activity    Alcohol use: Not Currently     Alcohol/week: 0.0 standard drinks     Comment: rare    Drug use: No    Sexual activity: Not Currently   Lifestyle    Physical activity     Days per week: Not on file     Minutes per session: Not on file    Stress: Not on file   Relationships    Social connections     Talks on phone: Not on file     Gets together: Not on file     Attends Jainism service: Not on file     Active member of club or organization: Not on file     Attends meetings of clubs or organizations: Not on file     Relationship status: Not on file    Intimate partner violence     Fear of current or ex partner: Not on file     Emotionally abused: Not on file     Physically abused: Not on file     Forced sexual activity: Not on file   Other Topics Concern    Not on file   Social History Narrative    Not on file       Family History:     Family History   Problem Relation Age of Onset    Arthritis Other     Thyroid Disease Other     COPD Other     Stroke Father       Medical Decision Making:   I have independently reviewed/ordered the following labs:    CBC with Differential:   Recent Labs     21  1255   WBC 12.5*   HGB 12.7   HCT 37.9      LYMPHOPCT 11*   MONOPCT 17*     BMP:  Recent Labs     21  1255 21  1255 21  1207 21  0659   *  --  131* 133*   K 2.9*   < > 3.2* 3.7   CL 94*  --  100 103   CO2 23  --  24 24   BUN 19  --  14 10   CREATININE 0.64  --  0.61 0.55   MG 2.0  --   --   -- < > = values in this interval not displayed. Hepatic Function Panel:   Recent Labs     01/04/21  1255   PROT 6.2*   LABALBU 3.3*   BILITOT 0.60   ALKPHOS 71   ALT 11   AST 16     No results for input(s): RPR in the last 72 hours. No results for input(s): HIV in the last 72 hours. No results for input(s): BC in the last 72 hours. Lab Results   Component Value Date    CREATININE 0.55 01/06/2021    GLUCOSE 98 01/06/2021    GLUCOSE 84 04/09/2012       Detailed results: Thank you for allowing us to participate in the care of this patient. Please call with questions. This note is created with the assistance of a speech recognition program.  While intending to generate adocument that actually reflects the content of the visit, the document can still have some errors including those of syntax and sound a like substitutions which may escape proof reading. It such instances, actual meaningcan be extrapolated by contextual diversion.     Sanjuana Nicole MD  Office: [phone]  Perfect serve / office [phone]

## 2021-01-07 NOTE — PROGRESS NOTES
Lipids: No results for input(s): CHOL, HDL in the last 72 hours. Invalid input(s): LDLCALCU  INR: No results for input(s): INR in the last 72 hours. Objective:   Vitals: BP (!) 112/55   Pulse 73   Temp 97.3 °F (36.3 °C) (Oral)   Resp 16   Ht 5' 3\" (1.6 m)   Wt 122 lb (55.3 kg)   LMP 01/01/2004   SpO2 100%   BMI 21.61 kg/m²   General appearance: alert, appears stated age and cooperative  Skin: Skin color, texture, turgor normal. No rashes or lesions  Lungs: clear to auscultation bilaterally  Heart: regular rate and rhythm, S1, S2 normal, no murmur, click, rub or gallop  Abdomen: soft, non-tender; bowel sounds normal; no masses,  no organomegaly  Extremities: extremities normal, atraumatic, no cyanosis or edema  Neurologic: Mental status: Alert, oriented, thought content appropriate    Prophylaxis:   DVT with  [x] lovenox        [] heparin        [] Scd        [] none:     Radiology:  No results found. Assessment :   1.  c diff colitis  2. H/o of gerd     Plan:   1. Po vanco taper dose  2.   Correct K    Patient Active Problem List:     Allergic rhinitis     Osteoarthritis     Osteoporosis     GERD (gastroesophageal reflux disease)     Headache     Mixed hyperlipidemia     Gout     Vitamin D deficiency     Migraine     Chronic headache     Anemia     Lymphadenopathy     Degenerative arthritis of knee, bilateral     Primary osteoarthritis of left knee     Primary osteoarthritis of right knee     Cholecystitis     Elevated LFTs     Abnormal MRI of the abdomen     Weight loss     Abdominal pain, right upper quadrant     Abdominal cramping     Diarrhea     Abdominal pain, generalized     Abdominal bloating     Irritable bowel syndrome with constipation     Status post endoscopic retrograde cholangiopancreatography     Biliary colic     C. difficile colitis     S/P cholecystectomy     Chronic diastolic congestive heart failure (HCC)     Hypokalemia     Hypomagnesemia Anticipated Disposition upon discharge: [] Home                                                                         [] Home with Home Health                                                                         [] Paul Padilla                                                                         [] [address] ,[address]      Patient is admitted as inpatient status because of co-morbidities listed above, severity of signs and symptoms as outlined, requirement for current medical therapies and most importantly because of direct risk to patient if care not provided in a hospital setting.           Prudencio Saleem MD  Rounding Hospitalist

## 2021-01-07 NOTE — PLAN OF CARE
Problem: Falls - Risk of:  Goal: Will remain free from falls  Description: Will remain free from falls  1/7/2021 1608 by Niles Resendiz RN  Outcome: Completed  1/7/2021 0456 by Le Pino RN  Outcome: Ongoing  Note: Patient remains free of falls and injuries throughout shift. Bed remains in the lowest position, wheels locked, call light and bedside table are within reach.    Goal: Absence of physical injury  Description: Absence of physical injury  1/7/2021 1608 by Niles Resendiz RN  Outcome: Completed  1/7/2021 0456 by Le Pino RN  Outcome: Ongoing

## 2021-01-07 NOTE — DISCHARGE INSTR - COC
Continuity of Care Form    Patient Name: Kolby Zaidi   :  1945  MRN:  [id number]    Admit date:  2021  Discharge date:  ***    Code Status Order: Full Code   Advance Directives:   Advance Care Flowsheet Documentation     Date/Time Healthcare Directive Type of Healthcare Directive Copy in [address] Agent's Name Healthcare Agent's Phone Number    21 [phone]  No, patient does not have an advance directive for healthcare treatment -- -- -- -- --          Admitting Physician:  Elicia Trevino MD  PCP: Deanne Thayer MD    Discharging Nurse: [facility] Unit/Room#:   Discharging Unit Phone Number: ***    Emergency Contact:   Extended Emergency Contact Information  [facility] Phone: [phone]  Relation: Brother/Sister    Past Surgical History:  Past Surgical History:   Procedure Laterality Date    APPENDECTOMY      CARPAL TUNNEL RELEASE      bilateral    CATARACT REMOVAL WITH IMPLANT [facility] 2019    [facility]    CATARACT REMOVAL WITH IMPLANT Right 2019    [facility]    CHOLECYSTECTOMY, LAPAROSCOPIC N/A 2020    CHOLECYSTECTOMY LAPAROSCOPIC ROBOTIC XI MULTIPORT performed by Maryanne Quintana MD at [address] [place] [address] Rd      ? per pt wtih polyps    ERCP N/A 10/14/2020    ERCP WITH BILE DUCT BRUSHING performed by Delilah Perdomo MD at [address] [place] [address]      left tibia    HYSTERECTOMY      INTRACAPSULAR CATARACT EXTRACTION Left 3/7/2019    EYE CATARACT EMULSIFICATION IOL IMPLANT - TOPICAL performed by Page Puga MD at [address] [place] [address] Right 2019    EYE CATARACT EMULSIFICATION IOL IMPLANT performed by Page Puga MD at [place] [address] Left 2018    hardware removal and then TKA    KNEE ARTHROSCOPY Left ?     GA TOTAL KNEE ARTHROPLASTY Left 2018 KNEE TOTAL ARTHROPLASTY W/REMOVAL HARDWARE PLATE & SCREWS performed by Benny Perea MD at [address] [facility] Left     TONSILLECTOMY AND ADENOIDECTOMY      TOTAL KNEE ARTHROPLASTY Right 12/10/2019    KNEE TOTAL ARTHROPLASTY performed by Benny Perea MD at [facility] EXTRACTION         Immunization History:   Immunization History   Administered Date(s) Administered    Influenza Vaccine, unspecified formulation 10/17/2018    Influenza Virus Vaccine 11/11/2012, 10/07/2015    Influenza, Cierra Kalata, IM, PF (6 mo and older Fluzone, Flulaval, Fluarix, and 3 yrs and older Afluria) 09/30/2016, 10/12/2017    Influenza, Cierra Kalata, adjuvanted, 65 yrs +, IM, PF (Fluad) 10/02/2020    Influenza, Triv, inactivated, subunit, adjuvanted, IM (Fluad 65 yrs and older) 10/23/2019    Pneumococcal Conjugate 13-valent ([id number]) 10/12/2015    Pneumococcal Polysaccharide ([id number]) 11/18/2010       Active Problems:  Patient Active Problem List   Diagnosis Code    Allergic rhinitis J30.9    Osteoarthritis M19.90    Osteoporosis M81.0    GERD (gastroesophageal reflux disease) K21.9    Headache R51.9    Mixed hyperlipidemia E78.2    Gout M10.9    Vitamin D deficiency E55.9    Migraine G43.909    Chronic headache R51.9, G89.29    Anemia D64.9    Lymphadenopathy R59.1    Degenerative arthritis of knee, bilateral M17.0    Primary osteoarthritis of left knee M17.12    Primary osteoarthritis of right knee M17.11    Cholecystitis K81.9    Elevated LFTs R79.89    Abnormal MRI of the abdomen R93.5    Weight loss R63.4    Abdominal pain, right upper quadrant R10.11    Abdominal cramping R10.9    Diarrhea R19.7    Abdominal pain, generalized R10.84    Abdominal bloating R14.0    Irritable bowel syndrome with constipation K58.1    Status post endoscopic retrograde cholangiopancreatography V18.201    Biliary colic I32.62    C. difficile colitis A04.72    S/P cholecystectomy Z90.49  Chronic diastolic congestive heart failure (HCC) I50.32    Hypokalemia E87.6    Hypomagnesemia E83.42       Isolation/Infection:   Isolation          C Diff Contact        Patient Infection Status     Infection Onset Added Last Indicated Last Indicated By Review Planned Expiration Resolved Resolved By    C-diff (Clostridium difficile) 01/04/21 01/04/21 01/04/21 C DIFF TOXIN/ANTIGEN 01/11/21       Resolved    C-diff Rule Out 01/04/21 01/04/21 01/04/21 C DIFF TOXIN/ANTIGEN (Ordered)   01/04/21 Rule-Out Test Resulted    C-diff Rule Out 11/27/20 11/27/20 11/27/20 C DIFF TOXIN/ANTIGEN (Ordered)   11/27/20 Rule-Out Test Resulted    COVID-19 Rule Out 11/20/20 11/21/20 11/21/20 Covid-19 Ambulatory (Ordered)   11/21/20 Rule-Out Test Resulted    C-diff Rule Out 10/18/20 10/18/20 10/18/20 C DIFF TOXIN/ANTIGEN (Ordered)   11/24/20 Joy Padilla RN    COVID-19 Rule Out 10/12/20 10/12/20 10/12/20 COVID-19 (Ordered)   10/12/20 Rule-Out Test Resulted          Nurse Assessment:  Last Vital Signs: /61   Pulse 68   Temp 97.7 °F (36.5 °C) (Oral)   Resp 16   Ht 5' 3\" (1.6 m)   Wt 134 lb 4.2 oz (60.9 kg)   LMP 01/01/2004   SpO2 99%   BMI 23.78 kg/m²     Last documented pain score (0-10 scale): Pain Level: 0  Last Weight:   Wt Readings from Last 1 Encounters:   01/07/21 134 lb 4.2 oz (60.9 kg)     Mental Status:  {IP PT MENTAL STATUS:20030}    IV Access:  {[facility] IV ACCESS:576314151}    Nursing Mobility/ADLs:  Walking   {P DME SZKJ:845040702}  Transfer  {CHP DME MNNK:353957023}  Bathing  {P DME RAJT:839717613}  Dressing  {P DME QRBU:446976570}  Toileting  {P DME PWQF:195831222}  Feeding  {P DME PLUQ:710645843}  Med Admin  {CHP DME QVMQ:635848238}  Med Delivery   { HORACE MED Delivery:799547600}    Wound Care Documentation and Therapy:        Elimination:  Continence:   · Bowel: {YES / YB:04887}  · Bladder: {YES / AI:16808}  Urinary Catheter: {Urinary Catheter:735871625} Colostomy/Ileostomy/Ileal Conduit: {YES / BS:59928}       Date of Last BM: ***    Intake/Output Summary (Last 24 hours) at 2021 1502  Last data filed at 2021 1449  Gross per 24 hour   Intake 3566 ml   Output 1450 ml   Net 2116 ml     I/O last 3 completed shifts: In: 8075 [P.O.:300;  I.V.:3266]  Out: 1450 [Urine:1450]    Safety Concerns:     { HORACE Safety Concerns:740797747}    Impairments/Disabilities:      { HORACE Impairments/Disabilities:303480484}    Nutrition Therapy:  Current Nutrition Therapy:   { HORACE Diet List:585666959}    Routes of Feeding: {[facility] DME Other Feedings:901454433}  Liquids: {Slp liquid thickness:65844}  Daily Fluid Restriction: {[facility] DME Yes amt example:782531282}  Last Modified Barium Swallow with Video (Video Swallowing Test): {Done Not Done IWEV:121142819}    Treatments at the Time of Hospital Discharge:   Respiratory Treatments: ***  Oxygen Therapy:  {Therapy; copd oxygen:14529}  Ventilator:    { CC Vent BCCW:778157073}    Rehab Therapies: {THERAPEUTIC INTERVENTION:4341556362}  Weight Bearing Status/Restrictions: [address] CC Weight Bearin}  Other Medical Equipment (for information only, NOT a DME order):  {EQUIPMENT:287497453}  Other Treatments: ***    Patient's personal belongings (please select all that are sent with patient):  {[facility] DME Belongings:558203671}    RN SIGNATURE:  {Esignature:365096976}    CASE MANAGEMENT/SOCIAL WORK SECTION    Inpatient Status Date: ***    Readmission Risk Assessment Score:  Readmission Risk              Risk of Unplanned Readmission:        16           Discharging to Facility/ Agency   · Name:   · Address:  · Phone:  · Fax:    Dialysis Facility (if applicable)   · Name:  · Address:  · Dialysis Schedule:  · Phone:  · Fax:    / signature: {Esignature:906539563}    PHYSICIAN SECTION    Prognosis: {Prognosis:7470455759}    Condition at Discharge: [address] Patient Condition:381672081} Rehab Potential (if transferring to Rehab): {Prognosis:2108586735}    Recommended Labs or Other Treatments After Discharge: ***    Physician Certification: I certify the above information and transfer of Thomas Gee  is necessary for the continuing treatment of the diagnosis listed and that she requires {Admit to Appropriate Level of Care:90599} for {GREATER/LESS:575464421} 30 days.      Update Admission H&P: {CHP DME Changes in QPJML:047835843}    PHYSICIAN SIGNATURE:  {Esignature:373276888}

## 2021-01-07 NOTE — PROGRESS NOTES
[facility] -   Infectious diseases evaluation  admission date 1/4/2021    reason for consultation:   C. difficile colitis    Impression :   Current:  · Recurrent C. difficile colitis second episode  · Pyuria, asymptomatic  · History of GERD  · Gout  · Hyperlipidemia  · Osteoporosis      Recommendations   · P.o. vancomycin tapering dose  · Potassium will be rechecked later today  · Patient may be discharged from infectious disease point of view        History of Present Illness:   Initial history:  Aravind Weber is a 67y.o.-year-old female presented to hospital with lower abdominal pain, mild, no radiation associated with diarrhea with the several bowel movement of watery stool daily for 4 days. She also had nausea with no vomiting. She denied any recent antibiotics. Stool for C. difficile was positive. This is her second episode of C. difficile, her first episode was February 27, 2020 was treated with oral vancomycin for 2 weeks  Patient had robotic cholecystectomy done 11/24/2020 received preoperative prophylaxis and was placed on oral Keflex postop. Interval changes  1/7/2021   She is feeling better, less diarrhea, no bowel movement today, denied any abdominal pain, denied nausea or vomiting, no other complaint  Patient Vitals for the past 8 hrs:   BP Temp Temp src Pulse Resp SpO2   01/07/21 1240 104/61 97.7 °F (36.5 °C) Oral 68 16 99 %   01/07/21 0742 (!) 101/47 97.7 °F (36.5 °C) Oral 61 16 98 %           I have personally reviewed the past medical history, past surgical history, medications, social history, and family history, and I haveupdated the database accordingly.       Allergies:   Ceftin [cefuroxime] and Keflex [cephalexin]     Review of Systems:     Review of Systems  As per history present illness, other than above 12 system reviewed were negative  Physical Examination :       Physical Exam  Constitutional: CHOLECYSTECTOMY LAPAROSCOPIC ROBOTIC XI MULTIPORT performed by Gia Bernardo MD at [place] [address]      2007? per pt wtih polyps    ERCP N/A 10/14/2020    ERCP WITH BILE DUCT BRUSHING performed by Suellen Oneill MD at [address] [place] [address]      left tibia    HYSTERECTOMY  4/12    INTRACAPSULAR CATARACT EXTRACTION Left 3/7/2019    EYE CATARACT EMULSIFICATION IOL IMPLANT - TOPICAL performed by Nate Traylor MD at [address] [place] [address] Right 4/4/2019    EYE CATARACT EMULSIFICATION IOL IMPLANT performed by Nate Traylor MD at [facility] Left 11/06/2018    hardware removal and then TKA    KNEE ARTHROSCOPY Left 2002?  [place] TOTAL KNEE ARTHROPLASTY Left 11/6/2018    KNEE TOTAL ARTHROPLASTY W/REMOVAL HARDWARE PLATE & SCREWS performed by Duke Prince MD at [address] [place] [address] Nw Left     TONSILLECTOMY AND ADENOIDECTOMY      TOTAL KNEE ARTHROPLASTY Right 12/10/2019    KNEE TOTAL ARTHROPLASTY performed by Duke Prince MD at [facility]         Medications:      vancomycin  125 mg Oral 4 times per day    [START ON 1/27/2021] vancomycin  125 mg Oral Daily    [START ON 2/4/2021] vancomycin  125 mg Oral Q48H    [START ON 1/19/2021] vancomycin  125 mg Oral BID    sodium chloride flush  10 mL Intravenous 2 times per day    enoxaparin  40 mg Subcutaneous Daily    allopurinol  100 mg Oral Daily    aspirin  81 mg Oral Daily    cetirizine  10 mg Oral Daily    fluticasone  2 spray Each Nostril Daily    atorvastatin  10 mg Oral Daily    verapamil  240 mg Oral Nightly    dicyclomine  10 mg Oral TID       Social History:     Social History     Socioeconomic History    Marital status:       Spouse name: Not on file    Number of children: Not on file    Years of education: Not on file    Highest education level: Not on file   Occupational History    Not on file   Social Needs  Financial resource strain: Not hard at all   [employer] insecurity     Worry: Never true     Inability: Never true   [employer] needs     Medical: Not on file     Non-medical: Not on file   Tobacco Use    Smoking status: Former Smoker     Packs/day: 0.50     Years: 10.00     Pack years: 5.00     Types: Cigarettes     Quit date: 1987     Years since quittin.9    Smokeless tobacco: Never Used   Substance and Sexual Activity    Alcohol use: Not Currently     Alcohol/week: 0.0 standard drinks     Comment: rare    Drug use: No    Sexual activity: Not Currently   Lifestyle    Physical activity     Days per week: Not on file     Minutes per session: Not on file    Stress: Not on file   Relationships    Social connections     Talks on phone: Not on file     Gets together: Not on file     Attends Rastafarian service: Not on file     Active member of club or organization: Not on file     Attends meetings of clubs or organizations: Not on file     Relationship status: Not on file    Intimate partner violence     Fear of current or ex partner: Not on file     Emotionally abused: Not on file     Physically abused: Not on file     Forced sexual activity: Not on file   Other Topics Concern    Not on file   Social History Narrative    Not on file       Family History:     Family History   Problem Relation Age of Onset    Arthritis Other     Thyroid Disease Other     COPD Other     Stroke Father       Medical Decision Making:   I have independently reviewed/ordered the following labs:    CBC with Differential:   No results for input(s): WBC, HGB, HCT, PLT, SEGSPCT, BANDSPCT, LYMPHOPCT, MONOPCT, EOSPCT in the last 72 hours. BMP:  Recent Labs     21  0659 21  0654   * 137   K 3.7 3.3*    105   CO2 24 24   BUN 10 6*   CREATININE 0.55 0.54     Hepatic Function Panel:   No results for input(s): PROT, LABALBU, BILIDIR, IBILI, BILITOT, ALKPHOS, ALT, AST in the last 72 hours. No results for input(s): RPR in the last 72 hours. No results for input(s): HIV in the last 72 hours. No results for input(s): BC in the last 72 hours. Lab Results   Component Value Date    CREATININE 0.54 01/07/2021    GLUCOSE 100 01/07/2021    GLUCOSE 84 04/09/2012       Detailed results: Thank you for allowing us to participate in the care of this patient. Please call with questions. This note is created with the assistance of a speech recognition program.  While intending to generate adocument that actually reflects the content of the visit, the document can still have some errors including those of syntax and sound a like substitutions which may escape proof reading. It such instances, actual meaningcan be extrapolated by contextual diversion.     Sid Muñoz MD  Office: [phone]  Perfect serve / office [phone]

## 2021-01-08 ENCOUNTER — TELEPHONE (OUTPATIENT)
Dept: FAMILY MEDICINE CLINIC | Age: 76
End: 2021-01-08

## 2021-01-08 NOTE — TELEPHONE ENCOUNTER
[facility] Transitions Initial Follow Up Call    Call within 2 business days of discharge: Yes     Patient: Nakul Mcmillan Patient : 1945 MRN: [id number]    [unfilled]    RARS: Readmission Risk Score: 22       Spoke with: the patient    Discharge department/facility: D/C [facility] 21 HYPOKALEMIA AND C DIFF    Non-face-to-face services provided:  Scheduled appointment with PCP-appointment made with Michelle Villarreal CNP on 21.   Obtained and reviewed discharge summary and/or continuity of care documents    Follow Up  Future Appointments   Date Time Provider Anabel Emerosn   2021  1:00 PM PASCUAL Allan - [address] [place] [address]   2/3/2021  1:00 PM Coty Tejeda MD [facility]   2021  1:00 PM PASCUAL Allan - Doctor Fouzia Castro RN

## 2021-01-08 NOTE — CARE COORDINATION
DISCHARGE PLANNING NOTE:    Discharge planner spoke with [facility] outpatient pharmacy and they informed me that the patient was not upset about the cost, however she was just upset that [facility] was not going to have the medication in stock until Monday. Our [facility] outpatient pharmacy is going to fill the medication for the patient and she is going to pick it up today. Patient is very appreciative of our assistance with obtaining the medication.      Electronically signed by Kirby Thompson RN on 1/8/2021 at 9:26 AM

## 2021-01-11 ENCOUNTER — HOSPITAL ENCOUNTER (OUTPATIENT)
Age: 76
Discharge: HOME | End: 2021-01-11
Payer: MEDICARE

## 2021-01-11 DIAGNOSIS — E83.42 HYPOMAGNESEMIA: ICD-10-CM

## 2021-01-11 DIAGNOSIS — D64.9 ANEMIA, UNSPECIFIED TYPE: ICD-10-CM

## 2021-01-11 DIAGNOSIS — E87.6 HYPOKALEMIA: ICD-10-CM

## 2021-01-11 LAB
ANION GAP SERPL CALCULATED.3IONS-SCNC: 6 MMOL/L (ref 9–17)
ANION GAP SERPL CALCULATED.3IONS-SCNC: 6 MMOL/L (ref 9–17)
BUN BLDV-MCNC: 11 MG/DL (ref 8–23)
BUN BLDV-MCNC: 11 MG/DL (ref 8–23)
BUN/CREAT BLD: ABNORMAL (ref 9–20)
BUN/CREAT BLD: ABNORMAL (ref 9–20)
CALCIUM SERPL-MCNC: 9.6 MG/DL (ref 8.6–10.4)
CALCIUM SERPL-MCNC: 9.6 MG/DL (ref 8.6–10.4)
CHLORIDE BLD-SCNC: 99 MMOL/L (ref 98–107)
CHLORIDE BLD-SCNC: 99 MMOL/L (ref 98–107)
CO2: 31 MMOL/L (ref 20–31)
CO2: 31 MMOL/L (ref 20–31)
CREAT SERPL-MCNC: 0.75 MG/DL (ref 0.5–0.9)
CREAT SERPL-MCNC: 0.75 MG/DL (ref 0.5–0.9)
FERRITIN: 110 UG/L (ref 13–150)
GFR AFRICAN AMERICAN: >60 ML/MIN
GFR AFRICAN AMERICAN: >60 ML/MIN
GFR NON-AFRICAN AMERICAN: >60 ML/MIN
GFR NON-AFRICAN AMERICAN: >60 ML/MIN
GFR SERPL CREATININE-BSD FRML MDRD: ABNORMAL ML/MIN/{1.73_M2}
GLUCOSE BLD-MCNC: 76 MG/DL (ref 70–99)
GLUCOSE BLD-MCNC: 76 MG/DL (ref 70–99)
HCT VFR BLD CALC: 34.7 % (ref 36–46)
HEMOGLOBIN: 11.1 G/DL (ref 12–16)
IRON SATURATION: 27 % (ref 20–55)
IRON: 66 UG/DL (ref 37–145)
MAGNESIUM: 2 MG/DL (ref 1.6–2.6)
MCH RBC QN AUTO: 28.1 PG (ref 26–34)
MCHC RBC AUTO-ENTMCNC: 32.1 G/DL (ref 31–37)
MCV RBC AUTO: 87.6 FL (ref 80–100)
NRBC AUTOMATED: ABNORMAL PER 100 WBC
PDW BLD-RTO: 15.2 % (ref 11.5–14.9)
PLATELET # BLD: 319 K/UL (ref 150–450)
PMV BLD AUTO: 7.3 FL (ref 6–12)
POTASSIUM SERPL-SCNC: 4.7 MMOL/L (ref 3.7–5.3)
POTASSIUM SERPL-SCNC: 4.7 MMOL/L (ref 3.7–5.3)
RBC # BLD: 3.96 M/UL (ref 4–5.2)
SODIUM BLD-SCNC: 136 MMOL/L (ref 135–144)
SODIUM BLD-SCNC: 136 MMOL/L (ref 135–144)
TOTAL IRON BINDING CAPACITY: 245 UG/DL (ref 250–450)
UNSATURATED IRON BINDING CAPACITY: 179 UG/DL (ref 112–347)
WBC # BLD: 7.2 K/UL (ref 3.5–11)

## 2021-01-11 PROCEDURE — 85027 COMPLETE CBC AUTOMATED: CPT

## 2021-01-11 PROCEDURE — 83550 IRON BINDING TEST: CPT

## 2021-01-11 PROCEDURE — 82728 ASSAY OF FERRITIN: CPT

## 2021-01-11 PROCEDURE — 80048 BASIC METABOLIC PNL TOTAL CA: CPT

## 2021-01-11 PROCEDURE — 83540 ASSAY OF IRON: CPT

## 2021-01-11 PROCEDURE — 36415 COLL VENOUS BLD VENIPUNCTURE: CPT

## 2021-01-11 PROCEDURE — 83735 ASSAY OF MAGNESIUM: CPT

## 2021-01-12 RX ORDER — VANCOMYCIN HYDROCHLORIDE 125 MG/1
CAPSULE ORAL
COMMUNITY
Start: 2021-01-08 | End: 2021-02-05 | Stop reason: SDUPTHER

## 2021-01-12 ASSESSMENT — PATIENT HEALTH QUESTIONNAIRE - PHQ9
1. LITTLE INTEREST OR PLEASURE IN DOING THINGS: 0
SUM OF ALL RESPONSES TO PHQ QUESTIONS 1-9: 0
2. FEELING DOWN, DEPRESSED OR HOPELESS: 0
SUM OF ALL RESPONSES TO PHQ QUESTIONS 1-9: 0

## 2021-01-13 ENCOUNTER — VIRTUAL VISIT (OUTPATIENT)
Dept: FAMILY MEDICINE CLINIC | Age: 76
End: 2021-01-13
Payer: MEDICARE

## 2021-01-13 DIAGNOSIS — A04.72 C. DIFFICILE DIARRHEA: Primary | ICD-10-CM

## 2021-01-13 DIAGNOSIS — D64.9 ANEMIA, UNSPECIFIED TYPE: ICD-10-CM

## 2021-01-13 DIAGNOSIS — E87.6 HYPOKALEMIA: ICD-10-CM

## 2021-01-13 PROCEDURE — 99442 PR PHYS/QHP TELEPHONE EVALUATION 11-20 MIN: CPT | Performed by: NURSE PRACTITIONER

## 2021-01-13 NOTE — PROGRESS NOTES
Charmaine Cha is a 67 y.o. female evaluated via telephone on 1/13/2021. Consent:  She and/or health care decision maker is aware that that she may receive a bill for this telephone service, depending on her insurance coverage, and has provided verbal consent to proceed: Yes      Documentation:  I communicated with the patient and/or health care decision maker about c diff and hypokalemia and anemia   Details of this discussion including any medical advice provided:      67 y.o. female presents with follow up s/p hospital discharge for recurrent c diff diarrhea hypokalemia and anemia. Was admitted a week ago for worsening diarrhea and nausea. Stool culture was positive for c diff. Blood tests showed mild anemia and low potassium which was back to normal per recent blood work. Currently is on oral vanco tapering dosing and states she has been doing well since discharge. Denies fever chills nausea vomiting diarrhea or abd pain. Has an appointment with Dr. Ally José in 2 weeks. BP Readings from Last 3 Encounters:   01/07/21 104/61   12/15/20 120/70   12/03/20 127/64     LMP 01/01/2004   Lab Results   Component Value Date    WBC 7.2 01/11/2021    HGB 11.1 (L) 01/11/2021    HCT 34.7 (L) 01/11/2021     01/11/2021    CHOL 138 09/01/2020    TRIG 88 09/01/2020    HDL 52 09/01/2020    ALT 11 01/04/2021    AST 16 01/04/2021     01/11/2021    K 4.7 01/11/2021    CL 99 01/11/2021    CREATININE 0.75 01/11/2021    BUN 11 01/11/2021    CO2 31 01/11/2021    TSH 2.00 12/01/2020    INR 1.0 11/23/2020     Lab Results   Component Value Date    CALCIUM 9.6 01/11/2021     Lab Results   Component Value Date    LDLCHOLESTEROL 68 09/01/2020       1. C. difficile diarrhea  - stable. Cont oral vanco as prescribed  - follow up with Dr. Ally José as scheduled   - Basic Metabolic Panel; Future  - Magnesium; Future    2. Hypokalemia  - repeat bmp     3. Anemia, unspecified type  - CBC;  Future    Requested Prescriptions No prescriptions requested or ordered in this encounter       There are no discontinued medications. Discussed use, benefit, and side effects of prescribed medications. Barriers to medication compliance addressed. All patient questions answered. Pt voiced understanding. I affirm this is a Patient Initiated Episode with a Patient who has not had a related appointment within my department in the past 7 days or scheduled within the next 24 hours.     Patient identification was verified at the start of the visit: Yes    Total Time: minutes: 11-20 minutes    Note: not billable if this call serves to triage the patient into an appointment for the relevant concern      Peace Morris

## 2021-02-01 RX ORDER — CETIRIZINE HYDROCHLORIDE 10 MG/1
TABLET ORAL
Qty: 90 TABLET | Refills: 1 | Status: SHIPPED | OUTPATIENT
Start: 2021-02-01 | End: 2021-04-13

## 2021-02-01 RX ORDER — SIMVASTATIN 20 MG
TABLET ORAL
Qty: 90 TABLET | Refills: 1 | Status: SHIPPED | OUTPATIENT
Start: 2021-02-01 | End: 2021-04-26

## 2021-02-03 ENCOUNTER — OFFICE VISIT (OUTPATIENT)
Dept: GASTROENTEROLOGY | Age: 76
End: 2021-02-03
Payer: MEDICARE

## 2021-02-03 VITALS
TEMPERATURE: 97.2 F | BODY MASS INDEX: 23.38 KG/M2 | DIASTOLIC BLOOD PRESSURE: 78 MMHG | SYSTOLIC BLOOD PRESSURE: 132 MMHG | WEIGHT: 132 LBS

## 2021-02-03 DIAGNOSIS — A04.72 C. DIFFICILE COLITIS: ICD-10-CM

## 2021-02-03 DIAGNOSIS — A09 DIARRHEA OF INFECTIOUS ORIGIN: ICD-10-CM

## 2021-02-03 DIAGNOSIS — K21.9 GASTROESOPHAGEAL REFLUX DISEASE, UNSPECIFIED WHETHER ESOPHAGITIS PRESENT: ICD-10-CM

## 2021-02-03 DIAGNOSIS — Z98.890 STATUS POST ENDOSCOPIC RETROGRADE CHOLANGIOPANCREATOGRAPHY: ICD-10-CM

## 2021-02-03 DIAGNOSIS — A04.72 C. DIFFICILE DIARRHEA: Primary | ICD-10-CM

## 2021-02-03 PROCEDURE — 99213 OFFICE O/P EST LOW 20 MIN: CPT | Performed by: INTERNAL MEDICINE

## 2021-02-03 ASSESSMENT — ENCOUNTER SYMPTOMS
VOMITING: 0
TROUBLE SWALLOWING: 0
ABDOMINAL DISTENTION: 1
BLOOD IN STOOL: 0
RECTAL PAIN: 0
VOICE CHANGE: 0
SORE THROAT: 0
DIARRHEA: 0
CONSTIPATION: 0
ABDOMINAL PAIN: 0
NAUSEA: 0
ANAL BLEEDING: 0
CHOKING: 0
COUGH: 1

## 2021-02-03 NOTE — PROGRESS NOTES
GI OFFICE FOLLOW UP    Shivani Eller is a 67 y.o. female evaluated via on 2/3/2021. Consent:  She and/or health care decision maker is aware that that she may receive a bill for this telephone service, depending on her insurance coverage, and has provided verbal consent to proceed: YES      INTERVAL HISTORY:   No referring provider defined for this encounter. Chief Complaint   Patient presents with    GI Problem     had C Diff, in hosp Dec and [facility] without GI consult, pt states currently on 8 weeks of Vanco       1. C. difficile diarrhea    2. C. difficile colitis    3. Status post endoscopic retrograde cholangiopancreatography    4. Diarrhea of infectious origin    5.  Gastroesophageal reflux disease, unspecified whether esophagitis present      Seen in my office as a follow-up  She had gallbladder surgery done in November    It looks like she received antibiotics after that and developed C. difficile diarrhea    She has been admitted twice because of C. difficile diarrhea lately last month  Has been on intermittent pulse therapy on vancomycin currently feeling better    Currently having 2 bowel movements a day without any rectal bleeding  Some abdominal bloating and gas symptoms denies any melanotic stools      She had colonoscopy done 2014 was recommended to have colonoscopy in 10 years    Has some acid reflux been taking Pepcid  Denies dysphagia nausea vomiting hematemesis    No smoking alcohol abuse illicit drug usage      HISTORY OF PRESENT ILLNESS: Malachi Miles is a 67 y.o. female with a past history remarkable for , referred for evaluation of   Chief Complaint   Patient presents with    GI Problem     had C Diff, in hosp Dec and [facility] without GI consult, pt states currently on 8 weeks of Vanco   .    Past Medical,Family, and Social History reviewed and does contribute to the patient presenting condition. Patient's PMH/PSH,SH,PSYCH Hx, MEDs, ALLERGIES, and ROS were all reviewed and updated in the appropriate sections. PAST MEDICAL HISTORY:  Past Medical History:   Diagnosis Date    Allergic rhinitis     Closed tibia fracture     GERD (gastroesophageal reflux disease)     Gout     Headache(784.0)     h/o migraines    Hyperlipidemia     Osteoarthritis     DJD Lt knee    Osteoporosis     Posterior tibial tendon dysfunction     right, wears braces on both legs    Vitamin D deficient rickets     Wears glasses     Wears partial dentures     lower       Past Surgical History:   Procedure Laterality Date    APPENDECTOMY      CARPAL TUNNEL RELEASE      bilateral    CATARACT REMOVAL WITH IMPLANT [facility]/07/2019    [facility]    CATARACT REMOVAL WITH IMPLANT Right 04/04/2019    [facility]    CHOLECYSTECTOMY, LAPAROSCOPIC N/A 11/24/2020    CHOLECYSTECTOMY LAPAROSCOPIC ROBOTIC XI MULTIPORT performed by Derrell Lefort, MD at [address] [place] [address] Rd      2007? per pt wtih polyps    ERCP N/A 10/14/2020    ERCP WITH BILE DUCT BRUSHING performed by Grey Russell MD at [address] [place] [address]      left tibia    HYSTERECTOMY  4/12    INTRACAPSULAR CATARACT EXTRACTION Left 3/7/2019    EYE CATARACT EMULSIFICATION IOL IMPLANT - TOPICAL performed by Noemy Devries MD at [address] [place] [address] Right 4/4/2019    EYE CATARACT EMULSIFICATION IOL IMPLANT performed by Noemy Devries MD at [facility] Left 11/06/2018    hardware removal and then TKA    KNEE ARTHROSCOPY Left 2002?     [place] TOTAL KNEE ARTHROPLASTY Left 11/6/2018    KNEE TOTAL ARTHROPLASTY W/REMOVAL HARDWARE PLATE & SCREWS performed by Benny Perea MD at [facility] Nw Left     TONSILLECTOMY AND ADENOIDECTOMY      TOTAL KNEE ARTHROPLASTY Right 12/10/2019    KNEE TOTAL ARTHROPLASTY performed by Benny Perea MD at [address] [facility]   WISDOM TOOTH EXTRACTION         CURRENT MEDICATIONS:    Current Outpatient Medications:     Psyllium (METAMUCIL PO), Take by mouth, Disp: , Rfl:     cetirizine (ZYRTEC) 10 MG tablet, TAKE ONE TABLET BY MOUTH DAILY, Disp: 90 tablet, Rfl: 1    simvastatin (ZOCOR) 20 MG tablet, TAKE ONE TABLET BY MOUTH DAILY, Disp: 90 tablet, Rfl: 1    vancomycin (VANCOCIN) 125 MG capsule, , Disp: , Rfl:     vancomycin (FIRVANQ) 50 MG/ML SOLR oral solution, Take 2.5 mLs by mouth daily for 7 days, Disp: 17.5 mL, Rfl: 0    [START ON 2/4/2021] vancomycin (FIRVANQ) 50 MG/ML SOLR oral solution, Take 2.5 mLs by mouth every 48 hours for 27 days, Disp: 32.5 mL, Rfl: 0    dicyclomine (BENTYL) 10 MG capsule, Take 1 capsule by mouth 3 times daily, Disp: 120 capsule, Rfl: 3    alendronate (FOSAMAX) 70 MG tablet, TAKE 1 TABLET BY MOUTH ONCE WEEKLY ON AN EMPTY STOMACH BEFORE BREAKFAST. REMAIN UPRIGHT FOR 30 MINUTES & TAKE WITH 8 OUNCES OF WATER, Disp: 12 tablet, Rfl: 1    allopurinol (ZYLOPRIM) 100 MG tablet, TAKE ONE TABLET BY MOUTH DAILY, Disp: 90 tablet, Rfl: 1    verapamil (CALAN SR) 240 MG extended release tablet, TAKE ONE TABLET BY MOUTH ONCE NIGHTLY FOR MIGRAINE HEADACHE, Disp: 90 tablet, Rfl: 1    fluticasone (FLONASE) 50 MCG/ACT nasal spray, SPRAY TWO SPRAYS IN EACH NOSTRIL ONCE DAILY, Disp: 2 Bottle, Rfl: 1    aspirin 81 MG EC tablet, Take 81 mg by mouth daily , Disp: , Rfl:     ALLERGIES:   Allergies   Allergen Reactions    Ceftin [Cefuroxime] Diarrhea    Keflex [Cephalexin] Diarrhea       FAMILY HISTORY:       Problem Relation Age of Onset    Arthritis Other     Thyroid Disease Other     COPD Other     Stroke Father          SOCIAL HISTORY:   Social History     Socioeconomic History    Marital status:       Spouse name: Not on file    Number of children: Not on file    Years of education: Not on file    Highest education level: Not on file   Occupational History    Not on file   Social Needs    Financial resource strain: Not hard at all   [employer] insecurity     Worry: Never true     Inability: Never true   [employer] needs     Medical: Not on file     Non-medical: Not on file   Tobacco Use    Smoking status: Former Smoker     Packs/day: 0.50     Years: 10.00     Pack years: 5.00     Types: Cigarettes     Quit date: 1987     Years since quittin.9    Smokeless tobacco: Never Used   Substance and Sexual Activity    Alcohol use: Not Currently     Alcohol/week: 0.0 standard drinks     Comment: rare    Drug use: No    Sexual activity: Not Currently   Lifestyle    Physical activity     Days per week: Not on file     Minutes per session: Not on file    Stress: Not on file   Relationships    Social connections     Talks on phone: Not on file     Gets together: Not on file     Attends [employer] service: Not on file     Active member of club or organization: Not on file     Attends meetings of clubs or organizations: Not on file     Relationship status: Not on file    Intimate partner violence     Fear of current or ex partner: Not on file     Emotionally abused: Not on file     Physically abused: Not on file     Forced sexual activity: Not on file   Other Topics Concern    Not on file   Social History Narrative    Not on file         REVIEW OF SYSTEMS:         Review of Systems   Constitutional: Negative for appetite change (afraid to eat d/t diarrhea). HENT: Positive for postnasal drip. Negative for sore throat, trouble swallowing and voice change. Denies   Eyes: Positive for visual disturbance. Respiratory: Positive for cough. Negative for choking. Gastrointestinal: Positive for abdominal distention (at night). Negative for abdominal pain (cramping), anal bleeding, blood in stool, constipation (some off and on), diarrhea (very watery stools), nausea, rectal pain and vomiting. GERD       PHYSICAL EXAMINATION: Vital signs reviewed per the nursing documentation.      /78 Temp 97.2 °F (36.2 °C)   Wt 132 lb (59.9 kg)   LMP 01/01/2004   BMI 23.38 kg/m²   Body mass index is 23.38 kg/m². Physical Exam  Nursing note reviewed. Constitutional:       Appearance: She is well-developed. Comments: Anxious   HENT:      Head: Normocephalic and atraumatic. Eyes:      Conjunctiva/sclera: Conjunctivae normal.      Pupils: Pupils are equal, round, and reactive to light. Neck:      Musculoskeletal: Normal range of motion and neck supple. Cardiovascular:      Heart sounds: Normal heart sounds. Pulmonary:      Effort: Pulmonary effort is normal.      Breath sounds: Normal breath sounds. Abdominal:      General: Bowel sounds are normal.      Palpations: Abdomen is soft. Comments: NON TENDER, NON DISTENTED  LIVER SPLEEN AND HERNIAS ARE NOT  PALPABLE  BOWEL SOUNDS ARE POSITIVE      Musculoskeletal: Normal range of motion. Skin:     General: Skin is warm. Neurological:      Mental Status: She is alert and oriented to person, place, and time.    Psychiatric:         Behavior: Behavior normal.           LABORATORY DATA: Reviewed  Lab Results   Component Value Date    WBC 7.2 01/11/2021    HGB 11.1 (L) 01/11/2021    HCT 34.7 (L) 01/11/2021    MCV 87.6 01/11/2021     01/11/2021     01/11/2021    K 4.7 01/11/2021    CL 99 01/11/2021    CO2 31 01/11/2021    BUN 11 01/11/2021    CREATININE 0.75 01/11/2021    LABPROT 6.9 11/19/2012    LABALBU 3.3 (L) 01/04/2021    BILITOT 0.60 01/04/2021    ALKPHOS 71 01/04/2021    AST 16 01/04/2021    ALT 11 01/04/2021    INR 1.0 11/23/2020         Lab Results   Component Value Date    RBC 3.96 (L) 01/11/2021    HGB 11.1 (L) 01/11/2021    MCV 87.6 01/11/2021    MCH 28.1 01/11/2021    MCHC 32.1 01/11/2021    RDW 15.2 (H) 01/11/2021    MPV 7.3 01/11/2021    BASOPCT 0 01/04/2021    LYMPHSABS 1.38 01/04/2021    MONOSABS 2.13 (H) 01/04/2021    NEUTROABS 7.11 01/04/2021    EOSABS 0.13 01/04/2021    BASOSABS 0.00 01/04/2021         DIAGNOSTIC TESTING:     No results found. Assessment  1. C. difficile diarrhea    2. C. difficile colitis    3. Status post endoscopic retrograde cholangiopancreatography    4. Diarrhea of infectious origin    5. Gastroesophageal reflux disease, unspecified whether esophagitis present        Plan    Cont Pulse Rx for C diff with PO Vancomycin    The patient was instructed to start taking some OTC Probiotics products   These are available over the counter at the Pharmacy stores and Grocery stores  He was explained about the beneficial effects they have in the GI track  They will help to establish the good bacterial paula and will help with the digestion and bowel movements  The patient has verbalized understanding and agreement to this plan      Metamucil    Pt was advised in detail about some life style and dietary modifications. She was advised about avoidance of caffeine, nicotine and chocolate. Pt was also told to stay away from any kind of fast foods, soda pops. She was also advised to avoid lots of spices, grease and fried food etc.     Instructions were also given about trying to arrange the timing, quality and quantity of food. Instructions were given about using ample amount of fiber including dietary and supplemental fiber either metamucil, bennafiber or citrucell etc.  Pt was advised about drinking ample amount of water without any colors or chemicals. Stress was given about regular exercise. Pt has verbalized understanding and agreement to these modifications. Pt seems to have signs and symptoms consistent with GERD, acid indigestion and heartburns. She was discussed  in detail about some possible life style and dietary modifications. She was stressed about the maintenance  of appropriate weight and effect of obesity contributing to reflux symptoms. Routine exercise was streesed. Avoidance of Caffeine, nicotine and chocolate were explained. Pt was asked to avoid spices grease and fried food. Advices were also given about avoidance of any kind of fast foods, soda pops and high energy drinks. Pt was advised to place two small block under the head end of the bed which may help with night time reflux. Was advised not to eat any thin at least 2-3 hrs before going to bed and walk especially after dinner    Pt has verbalized understanding and agreement to this plan. Cont Pepsid      I communicated with the patient and/or health care decision maker about   Details of this discussion including any medical advice provided:YES      I affirm this is a Patient Initiated Episode with an Established Patient who has not had a related appointment within my department in the past 7 days or scheduled within the next 24 hours. Total Time: minutes: 21-30 minutes    Note: not billable if this call serves to triage the patient into an appointment for the relevant concern      Thank you for allowing me to participate in the care of Ms. Darleen Walker. For any further questions please do not hesitate to contact me. I have reviewed and agree with the ROS entered by the MA/LPN.          Governor MD [place], [facility]  Board Certified in Gastroenterology and [address] [place] [address] Gastroenterology  Office #: [phone]

## 2021-02-04 ENCOUNTER — HOSPITAL ENCOUNTER (OUTPATIENT)
Age: 76
Discharge: HOME | End: 2021-02-04
Payer: MEDICARE

## 2021-02-04 DIAGNOSIS — A04.72 C. DIFFICILE DIARRHEA: ICD-10-CM

## 2021-02-04 DIAGNOSIS — D64.9 ANEMIA, UNSPECIFIED TYPE: ICD-10-CM

## 2021-02-04 LAB
ANION GAP SERPL CALCULATED.3IONS-SCNC: 7 MMOL/L (ref 9–17)
BUN BLDV-MCNC: 17 MG/DL (ref 8–23)
BUN/CREAT BLD: ABNORMAL (ref 9–20)
CALCIUM SERPL-MCNC: 9.7 MG/DL (ref 8.6–10.4)
CHLORIDE BLD-SCNC: 104 MMOL/L (ref 98–107)
CO2: 27 MMOL/L (ref 20–31)
CREAT SERPL-MCNC: 0.69 MG/DL (ref 0.5–0.9)
GFR AFRICAN AMERICAN: >60 ML/MIN
GFR NON-AFRICAN AMERICAN: >60 ML/MIN
GFR SERPL CREATININE-BSD FRML MDRD: ABNORMAL ML/MIN/{1.73_M2}
GFR SERPL CREATININE-BSD FRML MDRD: ABNORMAL ML/MIN/{1.73_M2}
GLUCOSE BLD-MCNC: 78 MG/DL (ref 70–99)
HCT VFR BLD CALC: 35.8 % (ref 36–46)
HEMOGLOBIN: 11.7 G/DL (ref 12–16)
MAGNESIUM: 1.9 MG/DL (ref 1.6–2.6)
MCH RBC QN AUTO: 28.8 PG (ref 26–34)
MCHC RBC AUTO-ENTMCNC: 32.7 G/DL (ref 31–37)
MCV RBC AUTO: 88.1 FL (ref 80–100)
NRBC AUTOMATED: ABNORMAL PER 100 WBC
PDW BLD-RTO: 15.9 % (ref 11.5–14.9)
PLATELET # BLD: 252 K/UL (ref 150–450)
PMV BLD AUTO: 8 FL (ref 6–12)
POTASSIUM SERPL-SCNC: 4.5 MMOL/L (ref 3.7–5.3)
RBC # BLD: 4.06 M/UL (ref 4–5.2)
SODIUM BLD-SCNC: 138 MMOL/L (ref 135–144)
WBC # BLD: 4.2 K/UL (ref 3.5–11)

## 2021-02-04 PROCEDURE — 80048 BASIC METABOLIC PNL TOTAL CA: CPT

## 2021-02-04 PROCEDURE — 83735 ASSAY OF MAGNESIUM: CPT

## 2021-02-04 PROCEDURE — 36415 COLL VENOUS BLD VENIPUNCTURE: CPT

## 2021-02-04 PROCEDURE — 85027 COMPLETE CBC AUTOMATED: CPT

## 2021-02-05 ENCOUNTER — HOSPITAL ENCOUNTER (OUTPATIENT)
Age: 76
Setting detail: SPECIMEN
Discharge: HOME | End: 2021-02-05
Payer: MEDICARE

## 2021-02-05 ENCOUNTER — OFFICE VISIT (OUTPATIENT)
Dept: FAMILY MEDICINE CLINIC | Age: 76
End: 2021-02-05
Payer: MEDICARE

## 2021-02-05 VITALS
HEIGHT: 63 IN | SYSTOLIC BLOOD PRESSURE: 112 MMHG | DIASTOLIC BLOOD PRESSURE: 65 MMHG | OXYGEN SATURATION: 98 % | WEIGHT: 132.8 LBS | BODY MASS INDEX: 23.53 KG/M2 | RESPIRATION RATE: 12 BRPM | HEART RATE: 70 BPM | TEMPERATURE: 96.8 F

## 2021-02-05 DIAGNOSIS — R35.0 FREQUENCY OF URINATION: ICD-10-CM

## 2021-02-05 DIAGNOSIS — N30.01 ACUTE CYSTITIS WITH HEMATURIA: Primary | ICD-10-CM

## 2021-02-05 LAB
BILIRUBIN, POC: ABNORMAL
BLOOD URINE, POC: ABNORMAL
CLARITY, POC: ABNORMAL
COLOR, POC: YELLOW
GLUCOSE URINE, POC: ABNORMAL
KETONES, POC: ABNORMAL
LEUKOCYTE EST, POC: ABNORMAL
NITRITE, POC: POSITIVE
PH, POC: 7.5
PROTEIN, POC: ABNORMAL
SPECIFIC GRAVITY, POC: 1.01
UROBILINOGEN, POC: 0.2

## 2021-02-05 PROCEDURE — 99212 OFFICE O/P EST SF 10 MIN: CPT | Performed by: NURSE PRACTITIONER

## 2021-02-05 PROCEDURE — 81003 URINALYSIS AUTO W/O SCOPE: CPT | Performed by: NURSE PRACTITIONER

## 2021-02-05 RX ORDER — VANCOMYCIN HYDROCHLORIDE 125 MG/1
125 CAPSULE ORAL EVERY OTHER DAY
COMMUNITY
End: 2021-05-19

## 2021-02-05 RX ORDER — FUROSEMIDE 20 MG/1
20 TABLET ORAL PRN
COMMUNITY
Start: 2021-01-31

## 2021-02-05 RX ORDER — NITROFURANTOIN 25; 75 MG/1; MG/1
100 CAPSULE ORAL 2 TIMES DAILY
Qty: 14 CAPSULE | Refills: 0 | Status: SHIPPED | OUTPATIENT
Start: 2021-02-05 | End: 2021-02-12

## 2021-02-05 NOTE — PROGRESS NOTES
Subjective:      Patient ID: Marc Burk is a 67 y.o. female. Have you changed or stopped any medications since your last visit including any over-the-counter medicines, vitamins, or herbal medicines? no     Are you taking all your prescribed medications? Yes          If NO, why? -      Have you seen any other physician or provider since your last visit yes     Have you had any other diagnostic tests since your last visit? yes -     Tobacco use:  Patient  reports that she quit smoking about 34 years ago. Her smoking use included cigarettes. She has a 5.00 pack-year smoking history. She has never used smokeless tobacco.   If a smoker, cessation materials provided? No   1-800-QUIT-NOW (3-734-849-0771)     Medical history Review  Past Medical, Family, and Social History reviewed and does contribute to the patient presenting condition    Health Maintenance Due   Topic Date Due    Hepatitis C screen  1945    COVID-19 Vaccine (1 of 2) 03/20/1960    DTaP/Tdap/Td vaccine (1 - Tdap) 03/20/1963    Shingles Vaccine (1 of 2) 03/20/1994    Annual Wellness Visit (AWV)  05/29/2019       SUBJECTIVE: Marc Burk is a 67 y.o. female who complains of urinary frequency, urgency and dysuria x 2 days, without flank pain, fever, chills, or abnormal vaginal discharge or bleeding. OBJECTIVE: Appears well, in no apparent distress. Vital signs are normal. The abdomen is soft without tenderness, guarding, mass, rebound or organomegaly. No CVA tenderness or inguinal adenopathy noted. Urine dipstick shows positive for RBC's, positive for nitrates and positive for leukocytes. ASSESSMENT:      Diagnosis Orders   1. Acute cystitis with hematuria  nitrofurantoin, macrocrystal-monohydrate, (MACROBID) 100 MG capsule   2. Frequency of urination  POCT Urinalysis No Micro (Auto)    Culture, Urine       PLAN: Treatment per orders - also push fluids, may use Pyridium OTC prn.  Call or return to clinic prn if these symptoms worsen or fail to improve as anticipated.

## 2021-02-06 DIAGNOSIS — R35.0 FREQUENCY OF URINATION: ICD-10-CM

## 2021-02-07 LAB
CULTURE: NORMAL
CULTURE: NORMAL
Lab: NORMAL
SPECIMEN DESCRIPTION: NORMAL

## 2021-03-01 ENCOUNTER — HOSPITAL ENCOUNTER (OUTPATIENT)
Dept: WOMENS IMAGING | Age: 76
Discharge: HOME | End: 2021-03-03
Payer: MEDICARE

## 2021-03-01 DIAGNOSIS — Z12.31 BREAST CANCER SCREENING BY MAMMOGRAM: ICD-10-CM

## 2021-03-01 PROCEDURE — 77063 BREAST TOMOSYNTHESIS BI: CPT

## 2021-03-01 RX ORDER — FAMOTIDINE 20 MG/1
TABLET, FILM COATED ORAL
Qty: 180 TABLET | Refills: 0 | Status: SHIPPED | OUTPATIENT
Start: 2021-03-01 | End: 2021-06-09

## 2021-03-01 RX ORDER — VERAPAMIL HYDROCHLORIDE 240 MG/1
TABLET, FILM COATED, EXTENDED RELEASE ORAL
Qty: 90 TABLET | Refills: 0 | Status: SHIPPED | OUTPATIENT
Start: 2021-03-01 | End: 2021-07-27

## 2021-03-01 RX ORDER — ALLOPURINOL 100 MG/1
TABLET ORAL
Qty: 90 TABLET | Refills: 0 | Status: SHIPPED | OUTPATIENT
Start: 2021-03-01 | End: 2021-07-27

## 2021-03-08 RX ORDER — FLUTICASONE PROPIONATE 50 MCG
SPRAY, SUSPENSION (ML) NASAL
Qty: 1 BOTTLE | Refills: 2 | Status: SHIPPED | OUTPATIENT
Start: 2021-03-08 | End: 2021-07-07

## 2021-03-11 ENCOUNTER — TELEPHONE (OUTPATIENT)
Dept: GASTROENTEROLOGY | Age: 76
End: 2021-03-11

## 2021-03-11 DIAGNOSIS — A04.72 C. DIFFICILE DIARRHEA: Primary | ICD-10-CM

## 2021-03-11 NOTE — TELEPHONE ENCOUNTER
Patient called and informed writer that she finished her 125mg pulse therapy of vanco on 3/4. She notes the last two days she has been going to the bathroom frequently again. Denies bleeding, denies smell, notes normal in color. She notes soft stools, denies diarrhea. She notes taking her bentyl and metamucil and still having some cramping. She notes also using imodium, has slowed it down but still continues to be soft. Patient isn't to see Dr. Brody Harkins until 4/28. Writer advised patient to monitor her cramping and watch for diarrhea. Advised to continue Imodium and writer will reach out to Dr. Brody Harkins to see if she should be retested or placed on another tapered dose. Patient aware writer will call her back once writer hears back.

## 2021-03-12 NOTE — TELEPHONE ENCOUNTER
Patient left voice message still c/o abdominal cramping and diarrhea after finishing the vancomycin a week ago Thursday. Writer spoke with Damián Deleon and she will attempt to reach out to Dr. Fior Byrd again today and hopefully get in touch with the patent this afternoon. Damián Deleon also asked if patient can be scheduled to see Dr. Fior Byrd next week to follow up. Writer spoke with patient and informed her of all the information and voiced understanding. Patient also scheduled appointment to see Dr. Fior Byrd on 3/17 at the [facility] office.

## 2021-03-12 NOTE — TELEPHONE ENCOUNTER
Writer placed C diff order for patient to obtain before scheduled appointment with Dr. Oscar Wilson.

## 2021-03-12 NOTE — TELEPHONE ENCOUNTER
Shiela Saucedo spoke with Dr. Mona Schilling and he would like stool C Diff test order. Please call and advise patient.

## 2021-03-13 ENCOUNTER — HOSPITAL ENCOUNTER (EMERGENCY)
Age: 76
Discharge: HOME | End: 2021-03-13
Attending: EMERGENCY MEDICINE
Payer: MEDICARE

## 2021-03-13 VITALS
SYSTOLIC BLOOD PRESSURE: 113 MMHG | HEIGHT: 63 IN | TEMPERATURE: 97.8 F | RESPIRATION RATE: 20 BRPM | DIASTOLIC BLOOD PRESSURE: 62 MMHG | BODY MASS INDEX: 23.53 KG/M2 | OXYGEN SATURATION: 99 % | WEIGHT: 132.8 LBS | HEART RATE: 106 BPM

## 2021-03-13 DIAGNOSIS — A04.72 C. DIFFICILE DIARRHEA: Primary | ICD-10-CM

## 2021-03-13 LAB
ABSOLUTE BANDS #: 3.18 K/UL (ref 0–1)
ABSOLUTE EOS #: 0 K/UL (ref 0–0.4)
ABSOLUTE IMMATURE GRANULOCYTE: ABNORMAL K/UL (ref 0–0.3)
ABSOLUTE LYMPH #: 0.38 K/UL (ref 1–4.8)
ABSOLUTE MONO #: 0.51 K/UL (ref 0.1–1.3)
ALBUMIN SERPL-MCNC: 3.5 G/DL (ref 3.5–5.2)
ALBUMIN/GLOBULIN RATIO: ABNORMAL (ref 1–2.5)
ALP BLD-CCNC: 72 U/L (ref 35–104)
ALT SERPL-CCNC: 18 U/L (ref 5–33)
ANION GAP SERPL CALCULATED.3IONS-SCNC: 15 MMOL/L (ref 9–17)
AST SERPL-CCNC: 28 U/L
BANDS: 25 % (ref 0–10)
BASOPHILS # BLD: 0 % (ref 0–2)
BASOPHILS ABSOLUTE: 0 K/UL (ref 0–0.2)
BILIRUB SERPL-MCNC: 0.88 MG/DL (ref 0.3–1.2)
BUN BLDV-MCNC: 24 MG/DL (ref 8–23)
BUN/CREAT BLD: ABNORMAL (ref 9–20)
C DIFF AG + TOXIN: ABNORMAL
CALCIUM SERPL-MCNC: 8.4 MG/DL (ref 8.6–10.4)
CHLORIDE BLD-SCNC: 92 MMOL/L (ref 98–107)
CO2: 22 MMOL/L (ref 20–31)
CREAT SERPL-MCNC: 0.91 MG/DL (ref 0.5–0.9)
DIFFERENTIAL TYPE: ABNORMAL
EOSINOPHILS RELATIVE PERCENT: 0 % (ref 0–4)
GFR AFRICAN AMERICAN: >60 ML/MIN
GFR NON-AFRICAN AMERICAN: >60 ML/MIN
GFR SERPL CREATININE-BSD FRML MDRD: ABNORMAL ML/MIN/{1.73_M2}
GFR SERPL CREATININE-BSD FRML MDRD: ABNORMAL ML/MIN/{1.73_M2}
GLUCOSE BLD-MCNC: 132 MG/DL (ref 70–99)
HCT VFR BLD CALC: 40.7 % (ref 36–46)
HEMOGLOBIN: 13.7 G/DL (ref 12–16)
IMMATURE GRANULOCYTES: ABNORMAL %
LYMPHOCYTES # BLD: 3 % (ref 24–44)
MCH RBC QN AUTO: 29.2 PG (ref 26–34)
MCHC RBC AUTO-ENTMCNC: 33.6 G/DL (ref 31–37)
MCV RBC AUTO: 87 FL (ref 80–100)
MONOCYTES # BLD: 4 % (ref 1–7)
MORPHOLOGY: ABNORMAL
MORPHOLOGY: ABNORMAL
NRBC AUTOMATED: ABNORMAL PER 100 WBC
PDW BLD-RTO: 14.5 % (ref 11.5–14.9)
PLATELET # BLD: 202 K/UL (ref 150–450)
PLATELET ESTIMATE: ABNORMAL
PMV BLD AUTO: 8.2 FL (ref 6–12)
POTASSIUM SERPL-SCNC: 3.2 MMOL/L (ref 3.7–5.3)
RBC # BLD: 4.67 M/UL (ref 4–5.2)
RBC # BLD: ABNORMAL 10*6/UL
SEG NEUTROPHILS: 68 % (ref 36–66)
SEGMENTED NEUTROPHILS ABSOLUTE COUNT: 8.63 K/UL (ref 1.3–9.1)
SODIUM BLD-SCNC: 129 MMOL/L (ref 135–144)
SPECIMEN DESCRIPTION: ABNORMAL
TOTAL PROTEIN: 6.8 G/DL (ref 6.4–8.3)
WBC # BLD: 12.7 K/UL (ref 3.5–11)
WBC # BLD: ABNORMAL 10*3/UL

## 2021-03-13 PROCEDURE — 87324 CLOSTRIDIUM AG IA: CPT

## 2021-03-13 PROCEDURE — 2580000003 HC RX 258: Performed by: EMERGENCY MEDICINE

## 2021-03-13 PROCEDURE — 36415 COLL VENOUS BLD VENIPUNCTURE: CPT

## 2021-03-13 PROCEDURE — 85025 COMPLETE CBC W/AUTO DIFF WBC: CPT

## 2021-03-13 PROCEDURE — 80053 COMPREHEN METABOLIC PANEL: CPT

## 2021-03-13 PROCEDURE — 87449 NOS EACH ORGANISM AG IA: CPT

## 2021-03-13 PROCEDURE — 6370000000 HC RX 637 (ALT 250 FOR IP): Performed by: EMERGENCY MEDICINE

## 2021-03-13 PROCEDURE — 99285 EMERGENCY DEPT VISIT HI MDM: CPT

## 2021-03-13 RX ORDER — DICYCLOMINE HYDROCHLORIDE 10 MG/1
10 CAPSULE ORAL EVERY 6 HOURS PRN
Qty: 20 CAPSULE | Refills: 0 | Status: SHIPPED | OUTPATIENT
Start: 2021-03-13 | End: 2021-05-19

## 2021-03-13 RX ORDER — 0.9 % SODIUM CHLORIDE 0.9 %
1000 INTRAVENOUS SOLUTION INTRAVENOUS ONCE
Status: COMPLETED | OUTPATIENT
Start: 2021-03-13 | End: 2021-03-13

## 2021-03-13 RX ORDER — VANCOMYCIN HYDROCHLORIDE 125 MG/1
125 CAPSULE ORAL 4 TIMES DAILY
Qty: 40 CAPSULE | Refills: 0 | Status: SHIPPED | OUTPATIENT
Start: 2021-03-13 | End: 2021-03-17

## 2021-03-13 RX ORDER — POTASSIUM CHLORIDE 20 MEQ/1
60 TABLET, EXTENDED RELEASE ORAL ONCE
Status: COMPLETED | OUTPATIENT
Start: 2021-03-13 | End: 2021-03-13

## 2021-03-13 RX ORDER — DICYCLOMINE HYDROCHLORIDE 10 MG/1
10 CAPSULE ORAL ONCE
Status: COMPLETED | OUTPATIENT
Start: 2021-03-13 | End: 2021-03-13

## 2021-03-13 RX ADMIN — DICYCLOMINE HYDROCHLORIDE 10 MG: 10 CAPSULE ORAL at 11:35

## 2021-03-13 RX ADMIN — SODIUM CHLORIDE 1000 ML: 9 INJECTION, SOLUTION INTRAVENOUS at 10:23

## 2021-03-13 RX ADMIN — POTASSIUM CHLORIDE 60 MEQ: 1500 TABLET, EXTENDED RELEASE ORAL at 11:29

## 2021-03-13 ASSESSMENT — ENCOUNTER SYMPTOMS
NAUSEA: 1
ABDOMINAL PAIN: 0
SORE THROAT: 0
COLOR CHANGE: 0
COUGH: 0
SHORTNESS OF BREATH: 0
BLOOD IN STOOL: 0
BACK PAIN: 0
TROUBLE SWALLOWING: 0
VOMITING: 0
CONSTIPATION: 0
DIARRHEA: 1

## 2021-03-13 ASSESSMENT — PAIN DESCRIPTION - ORIENTATION: ORIENTATION: LEFT;RIGHT

## 2021-03-13 ASSESSMENT — PAIN DESCRIPTION - ONSET
ONSET: ON-GOING
ONSET: ON-GOING

## 2021-03-13 ASSESSMENT — PAIN DESCRIPTION - LOCATION: LOCATION: ABDOMEN

## 2021-03-13 ASSESSMENT — PAIN - FUNCTIONAL ASSESSMENT
PAIN_FUNCTIONAL_ASSESSMENT: ACTIVITIES ARE NOT PREVENTED
PAIN_FUNCTIONAL_ASSESSMENT: ACTIVITIES ARE NOT PREVENTED

## 2021-03-13 ASSESSMENT — PAIN DESCRIPTION - PAIN TYPE: TYPE: ACUTE PAIN

## 2021-03-13 ASSESSMENT — PAIN DESCRIPTION - DESCRIPTORS: DESCRIPTORS: CRAMPING

## 2021-03-13 NOTE — ED PROVIDER NOTES
[address]  EMERGENCY DEPARTMENT ENCOUNTER    Pt Name: Thomas Gee  MRN: [id number]  YOB: 1945  Date of evaluation:3/13/21  PCP: Echo Aguilar MD    CHIEF COMPLAINT       Chief Complaint   Patient presents with    Nausea    Diarrhea       HISTORY OF PRESENT ILLNESS    Thomas Gee is a 67 y.o. female who presents with a chief complaint of diarrhea. Diarrhea started on Wednesday. States she has a history of multiple episodes of C. difficile in the past.  She recently stopped taking vancomycin several weeks ago. Diarrhea has been nonbloody. She reports nausea but no vomiting. No real abdominal pain. No fevers or chills. Symptoms are acute. Symptoms are moderate. Nothing make symptoms better or worse. Patient has no other complaints at this time. REVIEW OF SYSTEMS       Review of Systems   Constitutional: Negative for chills, fatigue and fever. HENT: Negative for congestion, ear pain, sore throat and trouble swallowing. Eyes: Negative for visual disturbance. Respiratory: Negative for cough and shortness of breath. Cardiovascular: Negative for chest pain, palpitations and leg swelling. Gastrointestinal: Positive for diarrhea and nausea. Negative for abdominal pain, blood in stool, constipation and vomiting. Genitourinary: Negative for dysuria and flank pain. Musculoskeletal: Negative for arthralgias, back pain, myalgias and neck pain. Skin: Negative for color change, rash and wound. Neurological: Negative for dizziness, weakness, light-headedness, numbness and headaches. Psychiatric/Behavioral: Negative for confusion. All other systems reviewed and are negative. Negative in 10 essential Systems except as mentioned above and in the HPI.         PAST MEDICAL HISTORY     Past Medical History:   Diagnosis Date    Allergic rhinitis     Closed tibia fracture     GERD (gastroesophageal reflux disease)     Gout     Headache(784.0)     h/o migraines    Hyperlipidemia     Osteoarthritis     DJD Lt knee    Osteoporosis     Posterior tibial tendon dysfunction     right, wears braces on both legs    Vitamin D deficient rickets     Wears glasses     Wears partial dentures     lower         SURGICAL HISTORY      has a past surgical history that includes Carpal tunnel release; Hysterectomy (4/12); Knee arthroscopy (Left, 2002?); Tibia fracture surgery (Left); Appendectomy; Tonsillectomy and adenoidectomy; Colonoscopy; fracture surgery; pr total knee arthroplasty (Left, 11/6/2018); [place] tooth extraction; Cataract removal with implant (Left, 03/07/2019); Intracapsular cataract extraction (Left, 3/7/2019); eye surgery; Cataract removal with implant (Right, 04/04/2019); Intracapsular cataract extraction (Right, 4/4/2019); joint replacement (Left, 11/06/2018); Total knee arthroplasty (Right, 12/10/2019); ERCP (N/A, 10/14/2020); and Cholecystectomy, laparoscopic (N/A, 11/24/2020). CURRENT MEDICATIONS       Previous Medications    ALENDRONATE (FOSAMAX) 70 MG TABLET    TAKE 1 TABLET BY MOUTH ONCE WEEKLY ON AN EMPTY STOMACH BEFORE BREAKFAST.  REMAIN UPRIGHT FOR 30 MINUTES & TAKE WITH 8 OUNCES OF WATER    ALLOPURINOL (ZYLOPRIM) 100 MG TABLET    TAKE ONE TABLET BY MOUTH DAILY    ASPIRIN 81 MG EC TABLET    Take 81 mg by mouth daily     CETIRIZINE (ZYRTEC) 10 MG TABLET    TAKE ONE TABLET BY MOUTH DAILY    DICYCLOMINE (BENTYL) 10 MG CAPSULE    Take 1 capsule by mouth 3 times daily    FAMOTIDINE (PEPCID) 20 MG TABLET    TAKE ONE TABLET BY MOUTH TWICE A DAY    FLUTICASONE (FLONASE) 50 MCG/ACT NASAL SPRAY    SPRAY TWO SPRAYS IN EACH NOSTRIL ONCE DAILY    FUROSEMIDE (LASIX) 20 MG TABLET        PSYLLIUM (METAMUCIL PO)    Take by mouth    SIMVASTATIN (ZOCOR) 20 MG TABLET    TAKE ONE TABLET BY MOUTH DAILY    VANCOMYCIN (VANCOCIN) 125 MG CAPSULE    Take 125 mg by mouth every other day    VERAPAMIL (CALAN SR) 240 MG EXTENDED RELEASE TABLET    TAKE ONE TABLET BY MOUTH ONCE NIGHTLY FOR MIGRAINE HEADACHES       ALLERGIES     is allergic to ceftin [cefuroxime] and keflex [cephalexin]. FAMILY HISTORY     She indicated that her mother is . She indicated that her father is . She indicated that the status of her other is unknown.     family history includes Arthritis in an other family member; COPD in an other family member; Stroke in her father; Thyroid Disease in an other family member. SOCIAL HISTORY      reports that she quit smoking about 34 years ago. Her smoking use included cigarettes. She has a 5.00 pack-year smoking history. She has never used smokeless tobacco. She reports previous alcohol use. She reports that she does not use drugs. PHYSICAL EXAM     INITIAL VITALS:  height is 5' 3\" (1.6 m) and weight is 132 lb 12.8 oz (60.2 kg). Her oral temperature is 97.8 °F (36.6 °C). Her blood pressure is 125/75 and her pulse is 109. Her respiration is 20 and oxygen saturation is 97%. Physical Exam  Vitals signs and nursing note reviewed. Constitutional:       General: She is not in acute distress. HENT:      Head: Normocephalic and atraumatic. Eyes:      Conjunctiva/sclera: Conjunctivae normal.      Pupils: Pupils are equal, round, and reactive to light. Neck:      Musculoskeletal: Neck supple. Cardiovascular:      Rate and Rhythm: Normal rate and regular rhythm. Heart sounds: Normal heart sounds. No murmur. Pulmonary:      Effort: Pulmonary effort is normal. No respiratory distress. Breath sounds: Normal breath sounds. Abdominal:      General: Bowel sounds are normal. There is no distension. Palpations: Abdomen is soft. Tenderness: There is no abdominal tenderness. Musculoskeletal:         General: No tenderness. Lymphadenopathy:      Cervical: No cervical adenopathy. Skin:     General: Skin is warm and dry. Findings: No rash. Neurological:      Mental Status: She is alert and oriented to person, place, and time. Psychiatric:         Judgment: Judgment normal.           DIFFERENTIAL DIAGNOSIS/MDM:   77-year-old female presents with diarrhea for the past 3 days. She is afebrile, nontoxic, mildly tachycardic but otherwise vital signs normal.  No acute distress. Suspect C. difficile, viral gastroenteritis, dehydration, metabolic abnormality. We will get lab work, give IV fluids, reassess. DIAGNOSTIC RESULTS     EKG: All EKG's are interpreted by the Emergency Department Physician who either signs or Co-signs this chart in the absence of a cardiologist.        RADIOLOGY:   I directly visualized the following  images and reviewed the radiologist interpretations:  No orders to display           ED BEDSIDE ULTRASOUND:      LABS:  Labs Reviewed   C DIFF TOXIN/ANTIGEN - Abnormal; Notable for the following components:       Result Value    C DIFF AG + TOXIN POSITIVE: C. difficile antigen and Toxin Detected (*)     All other components within normal limits   CBC WITH AUTO DIFFERENTIAL - Abnormal; Notable for the following components:    WBC 12.7 (*)     Seg Neutrophils 68 (*)     Lymphocytes 3 (*)     Bands 25 (*)     Absolute Lymph # 0.38 (*)     Absolute Bands # 3.18 (*)     All other components within normal limits   COMPREHENSIVE METABOLIC PANEL - Abnormal; Notable for the following components:    Glucose 132 (*)     BUN 24 (*)     CREATININE 0.91 (*)     Calcium 8.4 (*)     Sodium 129 (*)     Potassium 3.2 (*)     Chloride 92 (*)     All other components within normal limits         EMERGENCY DEPARTMENT COURSE:   Vitals:    Vitals:    03/13/21 0945   BP: 125/75   Pulse: 109   Resp: 20   Temp: 97.8 °F (36.6 °C)   TempSrc: Oral   SpO2: 97%   Weight: 132 lb 12.8 oz (60.2 kg)   Height: 5' 3\" (1.6 m)     Patient is positive for C. difficile. Will discharge home with Bentyl, oral vancomycin. She has an appointment scheduled with her GI physician this week. Advised to keep her self well-hydrated.   Advised to return if any symptoms worsen. Patient agreeable with plan will be discharged home today.          CRITICALCARE:      CONSULTS:  None      PROCEDURES:      FINAL IMPRESSION      1. C. difficile diarrhea            DISPOSITION/PLAN   DISPOSITION Decision To Discharge 03/13/2021 11:59:30 AM        [facility]:  [facility]  [address] [facility] [phone]    Schedule an appointment as soon as possible for a visit       [facility] ED  [facility] 469 [phone]    As needed, If symptoms worsen      DISCHARGE MEDICATIONS:  New Prescriptions    DICYCLOMINE (BENTYL) 10 MG CAPSULE    Take 1 capsule by mouth every 6 hours as needed (cramps)    VANCOMYCIN (VANCOCIN) 125 MG CAPSULE    Take 1 capsule by mouth 4 times daily for 10 days       (Please note that portions ofthis note were completed with a voice recognition program.  Efforts were made to edit the dictations but occasionally words are mis-transcribed.)    Nereida Atkinson DO  Attending Emergency Physician          Nereida Atkinson DO  03/13/21 2281

## 2021-03-13 NOTE — ED NOTES
Provider made aware that patient's pain rating was an 8 on 0-10 scale. Orders received and administered.       Shan Jones RN  03/13/21 6451

## 2021-03-17 ENCOUNTER — OFFICE VISIT (OUTPATIENT)
Dept: GASTROENTEROLOGY | Age: 76
End: 2021-03-17
Payer: MEDICARE

## 2021-03-17 VITALS
DIASTOLIC BLOOD PRESSURE: 83 MMHG | BODY MASS INDEX: 22.71 KG/M2 | HEIGHT: 63 IN | SYSTOLIC BLOOD PRESSURE: 127 MMHG | WEIGHT: 128.2 LBS | HEART RATE: 79 BPM

## 2021-03-17 DIAGNOSIS — R10.9 ABDOMINAL CRAMPING: ICD-10-CM

## 2021-03-17 DIAGNOSIS — R10.84 ABDOMINAL PAIN, GENERALIZED: ICD-10-CM

## 2021-03-17 DIAGNOSIS — K21.9 GASTROESOPHAGEAL REFLUX DISEASE, UNSPECIFIED WHETHER ESOPHAGITIS PRESENT: Primary | ICD-10-CM

## 2021-03-17 DIAGNOSIS — E87.6 HYPOKALEMIA: ICD-10-CM

## 2021-03-17 DIAGNOSIS — R14.0 ABDOMINAL BLOATING: ICD-10-CM

## 2021-03-17 DIAGNOSIS — R19.7 DIARRHEA, UNSPECIFIED TYPE: ICD-10-CM

## 2021-03-17 DIAGNOSIS — A04.72 C. DIFFICILE COLITIS: ICD-10-CM

## 2021-03-17 PROCEDURE — 99213 OFFICE O/P EST LOW 20 MIN: CPT | Performed by: INTERNAL MEDICINE

## 2021-03-17 RX ORDER — CHOLESTYRAMINE 4 G/9G
1 POWDER, FOR SUSPENSION ORAL
Qty: 90 PACKET | Refills: 3 | Status: SHIPPED | OUTPATIENT
Start: 2021-03-17 | End: 2021-07-27

## 2021-03-17 RX ORDER — VANCOMYCIN HYDROCHLORIDE 125 MG/1
250 CAPSULE ORAL 4 TIMES DAILY
Qty: 40 CAPSULE | Refills: 2 | Status: SHIPPED | OUTPATIENT
Start: 2021-03-17 | End: 2021-03-27

## 2021-03-17 ASSESSMENT — ENCOUNTER SYMPTOMS
ANAL BLEEDING: 0
VOMITING: 0
RESPIRATORY NEGATIVE: 1
BLOOD IN STOOL: 0
ALLERGIC/IMMUNOLOGIC NEGATIVE: 1
RECTAL PAIN: 0
TROUBLE SWALLOWING: 0
ABDOMINAL DISTENTION: 1
CONSTIPATION: 0
DIARRHEA: 1
ABDOMINAL PAIN: 1
NAUSEA: 0

## 2021-03-17 NOTE — PROGRESS NOTES
3/4. She noted that a few days after she started to get abd cramping and loose stools. Patient ended up going to ED after our conversation. Patient currently on a 10 days supply of 125 mg. She notes today is the only day where her cramping has improved. .    Past Medical,Family, and Social History reviewed and does contribute to the patient presenting condition. Patient's PMH/PSH,SH,PSYCH Hx, MEDs, ALLERGIES, and ROS were all reviewed and updated in the appropriate sections. PAST MEDICAL HISTORY:  Past Medical History:   Diagnosis Date    Allergic rhinitis     Closed tibia fracture     GERD (gastroesophageal reflux disease)     Gout     Headache(784.0)     h/o migraines    Hyperlipidemia     Osteoarthritis     DJD Lt knee    Osteoporosis     Posterior tibial tendon dysfunction     right, wears braces on both legs    Vitamin D deficient rickets     Wears glasses     Wears partial dentures     lower       Past Surgical History:   Procedure Laterality Date    APPENDECTOMY      CARPAL TUNNEL RELEASE      bilateral    CATARACT REMOVAL WITH IMPLANT Left 03/07/2019    [facility]    CATARACT REMOVAL WITH IMPLANT Right 04/04/2019    [facility]    CHOLECYSTECTOMY, LAPAROSCOPIC N/A 11/24/2020    CHOLECYSTECTOMY LAPAROSCOPIC ROBOTIC XI MULTIPORT performed by George Walters MD at [place] [address]      2007?  per pt wtih polyps    ERCP N/A 10/14/2020    ERCP WITH BILE DUCT BRUSHING performed by Tiera Lugo MD at [address] [place] [address]      left tibia    HYSTERECTOMY  4/12    INTRACAPSULAR CATARACT EXTRACTION Left 3/7/2019    EYE CATARACT EMULSIFICATION IOL IMPLANT - TOPICAL performed by Pat Villarreal MD at [address] [place] [address] Right 4/4/2019    EYE CATARACT EMULSIFICATION IOL IMPLANT performed by Pat Villarreal MD at [address] [place] [address] Left 11/06/2018    hardware removal and then TKA    KNEE ARTHROSCOPY Left 2002?  MD TOTAL KNEE ARTHROPLASTY Left 11/6/2018    KNEE TOTAL ARTHROPLASTY W/REMOVAL HARDWARE PLATE & SCREWS performed by Haleigh Jacobsen MD at [address] Left     TONSILLECTOMY AND ADENOIDECTOMY      TOTAL KNEE ARTHROPLASTY Right 12/10/2019    KNEE TOTAL ARTHROPLASTY performed by Haleigh Jacobsen MD at [address] [facility] [place]:    Current Outpatient Medications:     vancomycin (VANCOCIN) 125 MG capsule, Take 1 capsule by mouth 4 times daily for 10 days, Disp: 40 capsule, Rfl: 0    dicyclomine (BENTYL) 10 MG capsule, Take 1 capsule by mouth every 6 hours as needed (cramps), Disp: 20 capsule, Rfl: 0    fluticasone (FLONASE) 50 MCG/ACT nasal spray, SPRAY TWO SPRAYS IN EACH NOSTRIL ONCE DAILY, Disp: 1 Bottle, Rfl: 2    famotidine (PEPCID) 20 MG tablet, TAKE ONE TABLET BY MOUTH TWICE A DAY, Disp: 180 tablet, Rfl: 0    allopurinol (ZYLOPRIM) 100 MG tablet, TAKE ONE TABLET BY MOUTH DAILY, Disp: 90 tablet, Rfl: 0    verapamil (CALAN SR) 240 MG extended release tablet, TAKE ONE TABLET BY MOUTH ONCE NIGHTLY FOR MIGRAINE HEADACHES, Disp: 90 tablet, Rfl: 0    furosemide (LASIX) 20 MG tablet, , Disp: , Rfl:     vancomycin (VANCOCIN) 125 MG capsule, Take 125 mg by mouth every other day, Disp: , Rfl:     Psyllium (METAMUCIL PO), Take by mouth, Disp: , Rfl:     cetirizine (ZYRTEC) 10 MG tablet, TAKE ONE TABLET BY MOUTH DAILY, Disp: 90 tablet, Rfl: 1    simvastatin (ZOCOR) 20 MG tablet, TAKE ONE TABLET BY MOUTH DAILY, Disp: 90 tablet, Rfl: 1    dicyclomine (BENTYL) 10 MG capsule, Take 1 capsule by mouth 3 times daily, Disp: 120 capsule, Rfl: 3    alendronate (FOSAMAX) 70 MG tablet, TAKE 1 TABLET BY MOUTH ONCE WEEKLY ON AN EMPTY STOMACH BEFORE BREAKFAST.  REMAIN UPRIGHT FOR 30 MINUTES & TAKE WITH 8 OUNCES OF WATER, Disp: 12 tablet, Rfl: 1    aspirin 81 MG EC tablet, Take 81 mg by mouth daily , Disp: , Rfl:     ALLERGIES:   Allergies Allergen Reactions    Ceftin [Cefuroxime] Diarrhea    Keflex [Cephalexin] Diarrhea       FAMILY HISTORY:       Problem Relation Age of Onset    Arthritis Other     Thyroid Disease Other     COPD Other     Stroke Father          SOCIAL HISTORY:   Social History     Socioeconomic History    Marital status:      Spouse name: Not on file    Number of children: Not on file    Years of education: Not on file    Highest education level: Not on file   Occupational History    Not on file   Social Needs    Financial resource strain: Not hard at all    Food insecurity     Worry: Never true     Inability: Never true   [employer] needs     Medical: Not on file     Non-medical: Not on file   Tobacco Use    Smoking status: Former Smoker     Packs/day: 0.50     Years: 10.00     Pack years: 5.00     Types: Cigarettes     Quit date: 1987     Years since quittin.1    Smokeless tobacco: Never Used   Substance and Sexual Activity    Alcohol use: Not Currently     Alcohol/week: 0.0 standard drinks     Comment: rare    Drug use: No    Sexual activity: Not Currently   Lifestyle    Physical activity     Days per week: Not on file     Minutes per session: Not on file    Stress: Not on file   Relationships    Social connections     Talks on phone: Not on file     Gets together: Not on file     Attends Restorationism service: Not on file     Active member of club or organization: Not on file     Attends meetings of clubs or organizations: Not on file     Relationship status: Not on file    Intimate partner violence     Fear of current or ex partner: Not on file     Emotionally abused: Not on file     Physically abused: Not on file     Forced sexual activity: Not on file   Other Topics Concern    Not on file   Social History Narrative    Not on file         REVIEW OF SYSTEMS:         Review of Systems   Constitutional: Positive for fatigue. Negative for appetite change.    HENT: Negative for trouble swallowing. Eyes: Positive for visual disturbance. Respiratory: Negative. Cardiovascular: Negative. Gastrointestinal: Positive for abdominal distention, abdominal pain and diarrhea. Negative for anal bleeding, blood in stool, constipation, nausea, rectal pain and vomiting. Endocrine: Negative. Genitourinary: Negative. Musculoskeletal: Negative. Skin: Negative. Allergic/Immunologic: Negative. Neurological: Negative. Hematological: Bruises/bleeds easily. Psychiatric/Behavioral: Negative. PHYSICAL EXAMINATION: Vital signs reviewed per the nursing documentation. /83   Pulse 79   Ht 5' 3\" (1.6 m)   Wt 128 lb 3.2 oz (58.2 kg)   LMP 01/01/2004   BMI 22.71 kg/m²   Body mass index is 22.71 kg/m². Physical Exam  Nursing note reviewed. Constitutional:       Appearance: She is well-developed. Comments: Anxious   HENT:      Head: Normocephalic and atraumatic. Eyes:      Conjunctiva/sclera: Conjunctivae normal.      Pupils: Pupils are equal, round, and reactive to light. Neck:      Musculoskeletal: Normal range of motion and neck supple. Cardiovascular:      Heart sounds: Normal heart sounds. Pulmonary:      Effort: Pulmonary effort is normal.      Breath sounds: Normal breath sounds. Abdominal:      General: Bowel sounds are normal.      Palpations: Abdomen is soft. Comments: NON TENDER, NON DISTENTED  LIVER SPLEEN AND HERNIAS ARE NOT  PALPABLE  BOWEL SOUNDS ARE POSITIVE      Musculoskeletal: Normal range of motion. Skin:     General: Skin is warm. Neurological:      Mental Status: She is alert and oriented to person, place, and time.    Psychiatric:         Behavior: Behavior normal.           LABORATORY DATA: Reviewed  Lab Results   Component Value Date    WBC 12.7 (H) 03/13/2021    HGB 13.7 03/13/2021    HCT 40.7 03/13/2021    MCV 87.0 03/13/2021     03/13/2021     (L) 03/13/2021    K 3.2 (L) 03/13/2021    CL 92 (L) 03/13/2021 and older. Assessment  1. Gastroesophageal reflux disease, unspecified whether esophagitis present    2. C. difficile colitis    3. Abdominal bloating    4. Abdominal pain, generalized    5. Diarrhea, unspecified type    6. Abdominal cramping    7. Hypokalemia        Plan    We will increase the dose of p.o. vancomycin 250 mg 4 times a day for 2 weeks    I also given her Questran if she gets liquidy bowel movements she can use 1 to 2 packets a day depending on the frequency and the consistency of the loose stools    I will see her back in 3 to 4 weeks again in the office    Continue taking probiotics    Eat bananas since her potassium was a little bit low    Call for any problem or issues    Pt was advised in detail about some life style and dietary modifications. She was advised about avoidance of caffeine, nicotine and chocolate. Pt was also told to stay away from any kind of fast foods, soda pops. She was also advised to avoid lots of spices, grease and fried food etc.     Instructions were also given about trying to arrange the timing, quality and quantity of food. Instructions were given about using ample amount of fiber including dietary and supplemental fiber either metamucil, bennafiber or citrucell etc.  Pt was advised about drinking ample amount of water without any colors or chemicals. Stress was given about regular exercise. Pt has verbalized understanding and agreement to these modifications.       The patient was instructed to start taking some OTC Probiotics products   These are available over the counter at the Pharmacy stores and Grocery stores  He was explained about the beneficial effects they have in the GI track  They will help to establish the good bacterial paula and will help with the digestion and bowel movements  The patient has verbalized understanding and agreement to this plan        I communicated with the patient and/or health care decision maker about   Details of this discussion including any medical advice provided:YES      I affirm this is a Patient Initiated Episode with an Established Patient who has not had a related appointment within my department in the past 7 days or scheduled within the next 24 hours. Total Time: minutes: 21-30 minutes    Note: not billable if this call serves to triage the patient into an appointment for the relevant concern      Thank you for allowing me to participate in the care of Ms. Ofelia Egan. For any further questions please do not hesitate to contact me. I have reviewed and agree with the ROS entered by the MA/LPN.          Dejuan Ramsey MD, [facility]  Board Certified in Gastroenterology and [address] [place] [address] Gastroenterology  Office #: [phone]

## 2021-03-22 ENCOUNTER — TELEPHONE (OUTPATIENT)
Dept: GASTROENTEROLOGY | Age: 76
End: 2021-03-22

## 2021-04-13 RX ORDER — CETIRIZINE HYDROCHLORIDE 10 MG/1
TABLET ORAL
Qty: 90 TABLET | Refills: 1 | Status: SHIPPED | OUTPATIENT
Start: 2021-04-13 | End: 2021-09-09 | Stop reason: SDUPTHER

## 2021-04-26 RX ORDER — SIMVASTATIN 20 MG
TABLET ORAL
Qty: 90 TABLET | Refills: 1 | Status: SHIPPED | OUTPATIENT
Start: 2021-04-26 | End: 2021-11-15

## 2021-05-19 ENCOUNTER — OFFICE VISIT (OUTPATIENT)
Dept: FAMILY MEDICINE CLINIC | Age: 76
End: 2021-05-19
Payer: MEDICARE

## 2021-05-19 VITALS
HEART RATE: 82 BPM | SYSTOLIC BLOOD PRESSURE: 116 MMHG | DIASTOLIC BLOOD PRESSURE: 64 MMHG | TEMPERATURE: 99.1 F | BODY MASS INDEX: 22.15 KG/M2 | WEIGHT: 125 LBS | HEIGHT: 63 IN

## 2021-05-19 DIAGNOSIS — E78.2 MIXED HYPERLIPIDEMIA: ICD-10-CM

## 2021-05-19 DIAGNOSIS — K21.9 GASTROESOPHAGEAL REFLUX DISEASE WITHOUT ESOPHAGITIS: ICD-10-CM

## 2021-05-19 DIAGNOSIS — I10 ESSENTIAL HYPERTENSION: Primary | ICD-10-CM

## 2021-05-19 PROCEDURE — 99214 OFFICE O/P EST MOD 30 MIN: CPT | Performed by: NURSE PRACTITIONER

## 2021-05-19 RX ORDER — OMEPRAZOLE 20 MG/1
20 CAPSULE, DELAYED RELEASE ORAL DAILY
Qty: 90 CAPSULE | Refills: 0 | Status: SHIPPED | OUTPATIENT
Start: 2021-05-19 | End: 2021-07-27

## 2021-05-19 SDOH — ECONOMIC STABILITY: TRANSPORTATION INSECURITY
IN THE PAST 12 MONTHS, HAS LACK OF TRANSPORTATION KEPT YOU FROM MEETINGS, WORK, OR FROM GETTING THINGS NEEDED FOR DAILY LIVING?: NO

## 2021-05-19 SDOH — ECONOMIC STABILITY: TRANSPORTATION INSECURITY
IN THE PAST 12 MONTHS, HAS THE LACK OF TRANSPORTATION KEPT YOU FROM MEDICAL APPOINTMENTS OR FROM GETTING MEDICATIONS?: NO

## 2021-05-19 NOTE — PROGRESS NOTES
Subjective:      Patient ID: Robbie Delgado is a 67 y.o. female. Visit Information    Have you changed or started any medications since your last visit including any over-the-counter medicines, vitamins, or herbal medicines? no   Are you having any side effects from any of your medications? -  no  Have you stopped taking any of your medications? Is so, why? -  no    Have you seen any other physician or provider since your last visit? Yes - Records Obtained  Have you had any other diagnostic tests since your last visit? No  Have you been seen in the emergency room and/or had an admission to a hospital since we last saw you? No  Have you had your routine dental cleaning in the past 6 months? no    Have you activated your [employer] account? If not, what are your barriers? No:      Patient Care Team:  Shiv Casas MD as PCP - General (Family Medicine)  Shiv Casas MD as PCP - [facility]  Zuleika Calderon MD as Consulting Physician (Gastroenterology)    Medical History Review  Past Medical, Family, and Social History reviewed and does not contribute to the patient presenting condition    Health Maintenance   Topic Date Due    DTaP/Tdap/Td vaccine (1 - Tdap) Never done    Shingles Vaccine (1 of 2) Never done   [employer] Visit (AWV)  Never done    Lipid screen  09/01/2021    Potassium monitoring  03/13/2022    Creatinine monitoring  03/13/2022    DEXA (modify frequency per FRAX score)  Completed    Flu vaccine  Completed    Pneumococcal 65+ years Vaccine  Completed    COVID-19 Vaccine  Completed    Hepatitis A vaccine  Aged Out    Hepatitis B vaccine  Aged Out    Hib vaccine  Aged Out    Meningococcal (ACWY) vaccine  Aged Out    Hepatitis C screen  Discontinued     HPI  67year old female presents with management of HTN HLD and GERD with medication refills. Is on dual therapy for bp and it has been stable.  Is on statin and PPI therapy for HLD and GERD and tolerates them /64   Pulse 82   Temp 99.1 °F (37.3 °C) (Infrared)   Ht 5' 3\" (1.6 m)   Wt 125 lb (56.7 kg)   LMP 01/01/2004   BMI 22.14 kg/m²   Lab Results   Component Value Date    WBC 12.7 (H) 03/13/2021    HGB 13.7 03/13/2021    HCT 40.7 03/13/2021     03/13/2021    CHOL 138 09/01/2020    TRIG 88 09/01/2020    HDL 52 09/01/2020    ALT 18 03/13/2021    AST 28 03/13/2021     (L) 03/13/2021    K 3.2 (L) 03/13/2021    CL 92 (L) 03/13/2021    CREATININE 0.91 (H) 03/13/2021    BUN 24 (H) 03/13/2021    CO2 22 03/13/2021    TSH 2.00 12/01/2020    INR 1.0 11/23/2020     Lab Results   Component Value Date    CALCIUM 8.4 (L) 03/13/2021     Lab Results   Component Value Date    LDLCHOLESTEROL 68 09/01/2020         1. Essential hypertension  - stable. No therapy change   - Basic Metabolic Panel; Future    2. Mixed hyperlipidemia  - cont zocor   - Lipid Panel; Future  - CBC; Future    3. Gastroesophageal reflux disease without esophagitis  - stable. Cont PPI therpay   - omeprazole (PRILOSEC) 20 MG delayed release capsule; Take 1 capsule by mouth daily  Dispense: 90 capsule; Refill: 0        Requested Prescriptions     Signed Prescriptions Disp Refills    omeprazole (PRILOSEC) 20 MG delayed release capsule 90 capsule 0     Sig: Take 1 capsule by mouth daily       Medications Discontinued During This Encounter   Medication Reason    dicyclomine (BENTYL) 10 MG capsule LIST CLEANUP    vancomycin (VANCOCIN) 125 MG capsule LIST CLEANUP    dicyclomine (BENTYL) 10 MG capsule Therapy completed     Discussed use, benefit, and side effects of prescribed medications. Barriers to medication compliance addressed. All patient questions answered. Pt voiced understanding. Return in about 18 weeks (around 9/22/2021) for medicare wellness .

## 2021-05-20 ASSESSMENT — ENCOUNTER SYMPTOMS
VOMITING: 0
ABDOMINAL PAIN: 0
NAUSEA: 0
WHEEZING: 0
SHORTNESS OF BREATH: 0

## 2021-06-09 DIAGNOSIS — E55.9 VITAMIN D DEFICIENCY: Primary | ICD-10-CM

## 2021-06-09 RX ORDER — ERGOCALCIFEROL 1.25 MG/1
CAPSULE ORAL
Qty: 2 CAPSULE | Refills: 0 | OUTPATIENT
Start: 2021-06-09

## 2021-06-09 RX ORDER — FAMOTIDINE 20 MG/1
TABLET, FILM COATED ORAL
Qty: 180 TABLET | Refills: 1 | Status: SHIPPED | OUTPATIENT
Start: 2021-06-09 | End: 2022-02-14 | Stop reason: SDUPTHER

## 2021-06-11 ENCOUNTER — HOSPITAL ENCOUNTER (OUTPATIENT)
Age: 76
Discharge: HOME | End: 2021-06-11
Payer: MEDICARE

## 2021-06-11 DIAGNOSIS — E78.2 MIXED HYPERLIPIDEMIA: ICD-10-CM

## 2021-06-11 DIAGNOSIS — I10 ESSENTIAL HYPERTENSION: ICD-10-CM

## 2021-06-11 DIAGNOSIS — E55.9 VITAMIN D DEFICIENCY: ICD-10-CM

## 2021-06-11 LAB
ANION GAP SERPL CALCULATED.3IONS-SCNC: 6 MMOL/L (ref 9–17)
BUN BLDV-MCNC: 20 MG/DL (ref 8–23)
BUN/CREAT BLD: ABNORMAL (ref 9–20)
CALCIUM SERPL-MCNC: 9.3 MG/DL (ref 8.6–10.4)
CHLORIDE BLD-SCNC: 110 MMOL/L (ref 98–107)
CHOLESTEROL/HDL RATIO: 2.5
CHOLESTEROL: 117 MG/DL
CO2: 26 MMOL/L (ref 20–31)
CREAT SERPL-MCNC: 0.72 MG/DL (ref 0.5–0.9)
GFR AFRICAN AMERICAN: >60 ML/MIN
GFR NON-AFRICAN AMERICAN: >60 ML/MIN
GFR SERPL CREATININE-BSD FRML MDRD: ABNORMAL ML/MIN/{1.73_M2}
GFR SERPL CREATININE-BSD FRML MDRD: ABNORMAL ML/MIN/{1.73_M2}
GLUCOSE BLD-MCNC: 90 MG/DL (ref 70–99)
HCT VFR BLD CALC: 35.9 % (ref 36–46)
HDLC SERPL-MCNC: 46 MG/DL
HEMOGLOBIN: 11.8 G/DL (ref 12–16)
LDL CHOLESTEROL: 45 MG/DL (ref 0–130)
MCH RBC QN AUTO: 29.3 PG (ref 26–34)
MCHC RBC AUTO-ENTMCNC: 32.8 G/DL (ref 31–37)
MCV RBC AUTO: 89.3 FL (ref 80–100)
NRBC AUTOMATED: ABNORMAL PER 100 WBC
PDW BLD-RTO: 15 % (ref 11.5–14.9)
PLATELET # BLD: 227 K/UL (ref 150–450)
PMV BLD AUTO: 8 FL (ref 6–12)
POTASSIUM SERPL-SCNC: 4.7 MMOL/L (ref 3.7–5.3)
RBC # BLD: 4.02 M/UL (ref 4–5.2)
SODIUM BLD-SCNC: 142 MMOL/L (ref 135–144)
TRIGL SERPL-MCNC: 128 MG/DL
VITAMIN D 25-HYDROXY: 20.5 NG/ML (ref 30–100)
VLDLC SERPL CALC-MCNC: NORMAL MG/DL (ref 1–30)
WBC # BLD: 4.2 K/UL (ref 3.5–11)

## 2021-06-11 PROCEDURE — 85027 COMPLETE CBC AUTOMATED: CPT

## 2021-06-11 PROCEDURE — 36415 COLL VENOUS BLD VENIPUNCTURE: CPT

## 2021-06-11 PROCEDURE — 82306 VITAMIN D 25 HYDROXY: CPT

## 2021-06-11 PROCEDURE — 80061 LIPID PANEL: CPT

## 2021-06-11 PROCEDURE — 80048 BASIC METABOLIC PNL TOTAL CA: CPT

## 2021-06-11 RX ORDER — ERGOCALCIFEROL 1.25 MG/1
50000 CAPSULE ORAL WEEKLY
Qty: 12 CAPSULE | Refills: 0 | Status: SHIPPED | OUTPATIENT
Start: 2021-06-11 | End: 2021-09-01

## 2021-07-07 RX ORDER — FLUTICASONE PROPIONATE 50 MCG
SPRAY, SUSPENSION (ML) NASAL
Qty: 1 BOTTLE | Refills: 1 | Status: SHIPPED | OUTPATIENT
Start: 2021-07-07 | End: 2021-09-30

## 2021-07-21 ENCOUNTER — OFFICE VISIT (OUTPATIENT)
Dept: GASTROENTEROLOGY | Age: 76
End: 2021-07-21
Payer: MEDICARE

## 2021-07-21 VITALS
SYSTOLIC BLOOD PRESSURE: 120 MMHG | DIASTOLIC BLOOD PRESSURE: 69 MMHG | BODY MASS INDEX: 23.21 KG/M2 | HEIGHT: 63 IN | WEIGHT: 131 LBS

## 2021-07-21 DIAGNOSIS — A04.72 C. DIFFICILE COLITIS: Primary | ICD-10-CM

## 2021-07-21 DIAGNOSIS — R19.7 DIARRHEA, UNSPECIFIED TYPE: ICD-10-CM

## 2021-07-21 DIAGNOSIS — E87.6 HYPOKALEMIA: ICD-10-CM

## 2021-07-21 DIAGNOSIS — K21.9 GASTROESOPHAGEAL REFLUX DISEASE, UNSPECIFIED WHETHER ESOPHAGITIS PRESENT: ICD-10-CM

## 2021-07-21 PROCEDURE — 99214 OFFICE O/P EST MOD 30 MIN: CPT | Performed by: INTERNAL MEDICINE

## 2021-07-21 ASSESSMENT — ENCOUNTER SYMPTOMS
TROUBLE SWALLOWING: 0
DIARRHEA: 0
ABDOMINAL PAIN: 0
BLOOD IN STOOL: 0
ABDOMINAL DISTENTION: 0
ALLERGIC/IMMUNOLOGIC NEGATIVE: 1
CONSTIPATION: 0
ANAL BLEEDING: 0
GASTROINTESTINAL NEGATIVE: 1
VOMITING: 0
RECTAL PAIN: 0
NAUSEA: 0
RESPIRATORY NEGATIVE: 1

## 2021-07-21 NOTE — PROGRESS NOTES
GI OFFICE FOLLOW UP    Elijah Durbin is a 67 y.o. female evaluated via on 7/21/2021. Consent:  She and/or health care decision maker is aware that that she may receive a bill for this telephone service, depending on her insurance coverage, and has provided verbal consent to proceed: YES      INTERVAL HISTORY:   No referring provider defined for this encounter. Chief Complaint   Patient presents with    Follow-up     Patient notes doing well since her last visit, no new symptoms       1. C. difficile colitis    2. Gastroesophageal reflux disease, unspecified whether esophagitis present    3. Hypokalemia    4. Diarrhea, unspecified type      Seen in my office as a follow-up  Was evaluated 3 4 months ago with history for C. difficile diarrhea  This was recurrent  Dose of vancomycin was increased was given for a longer time  She did well after that  Currently feeling much better  Taking Questran as needed  Also taking probiotics  Has gained 5 6 pounds  Denies any rectal bleeding melanotic stools    She has not had a colonoscopy done in 5 to 6 years wants to wait at this time also has history significant for GERD taking omeprazole and Pepcid  Denies any dysphagia nausea vomiting hematemesis    Has some abdominal bloating gas symptoms    Some anxiety issues  No smoking alcohol abuse illicit drug usage    No known family history for colon cancer        HISTORY OF PRESENT ILLNESS: Deja Alcocer is a 67 y.o. female with a past history remarkable for , referred for evaluation of   Chief Complaint   Patient presents with    Follow-up     Patient notes doing well since her last visit, no new symptoms   . Past Medical,Family, and Social History reviewed and does contribute to the patient presenting condition.     Patient's PMH/PSH,SH,PSYCH Hx, MEDs, ALLERGIES, and ROS were all reviewed and updated in the appropriate sections. PAST MEDICAL HISTORY:  Past Medical History:   Diagnosis Date    Allergic rhinitis     Closed tibia fracture     GERD (gastroesophageal reflux disease)     Gout     Headache(784.0)     h/o migraines    Hyperlipidemia     Osteoarthritis     DJD Lt knee    Osteoporosis     Posterior tibial tendon dysfunction     right, wears braces on both legs    Vitamin D deficient rickets     Wears glasses     Wears partial dentures     lower       Past Surgical History:   Procedure Laterality Date    APPENDECTOMY      CARPAL TUNNEL RELEASE      bilateral    CATARACT REMOVAL WITH IMPLANT [facility] 03/07/2019    [facility]    CATARACT REMOVAL WITH IMPLANT Right 04/04/2019    [facility]    CHOLECYSTECTOMY, LAPAROSCOPIC N/A 11/24/2020    CHOLECYSTECTOMY LAPAROSCOPIC ROBOTIC XI MULTIPORT performed by Jono Jewell MD at [place] [address]      2007? per pt wtih polyps    ERCP N/A 10/14/2020    ERCP WITH BILE DUCT BRUSHING performed by Heather Gee MD at [address] [place] [address]      left tibia    HYSTERECTOMY  4/12    INTRACAPSULAR CATARACT EXTRACTION Left 3/7/2019    EYE CATARACT EMULSIFICATION IOL IMPLANT - TOPICAL performed by Annie Freeman MD at [address] [place] [address] Right 4/4/2019    EYE CATARACT EMULSIFICATION IOL IMPLANT performed by [facility] MD at [facility] Left 11/06/2018    hardware removal and then TKA    KNEE ARTHROSCOPY Left 2002?     PA TOTAL KNEE ARTHROPLASTY Left 11/6/2018    KNEE TOTAL ARTHROPLASTY W/REMOVAL HARDWARE PLATE & SCREWS performed by Lawrence Bai MD at [address] [place] [address] Left     TONSILLECTOMY AND ADENOIDECTOMY      TOTAL KNEE ARTHROPLASTY Right 12/10/2019    KNEE TOTAL ARTHROPLASTY performed by Lawrence Bai MD at [address] [facility]:    Current Outpatient Medications:     fluticasone (FLONASE) 50 MCG/ACT nasal spray, SPRAY TWO SPRAYS IN EACH NOSTRIL ONCE DAILY, Disp: 1 Bottle, Rfl: 1    vitamin D (ERGOCALCIFEROL) 1.25 MG (18353 UT) CAPS capsule, Take 1 capsule by mouth once a week, Disp: 12 capsule, Rfl: 0    famotidine (PEPCID) 20 MG tablet, TAKE ONE TABLET BY MOUTH TWICE A DAY, Disp: 180 tablet, Rfl: 1    omeprazole (PRILOSEC) 20 MG delayed release capsule, Take 1 capsule by mouth daily, Disp: 90 capsule, Rfl: 0    simvastatin (ZOCOR) 20 MG tablet, TAKE ONE TABLET BY MOUTH DAILY, Disp: 90 tablet, Rfl: 1    cetirizine (ZYRTEC) 10 MG tablet, TAKE ONE TABLET BY MOUTH DAILY, Disp: 90 tablet, Rfl: 1    cholestyramine (QUESTRAN) 4 g packet, Take 1 packet by mouth 3 times daily (with meals), Disp: 90 packet, Rfl: 3    allopurinol (ZYLOPRIM) 100 MG tablet, TAKE ONE TABLET BY MOUTH DAILY, Disp: 90 tablet, Rfl: 0    verapamil (CALAN SR) 240 MG extended release tablet, TAKE ONE TABLET BY MOUTH ONCE NIGHTLY FOR MIGRAINE HEADACHES, Disp: 90 tablet, Rfl: 0    furosemide (LASIX) 20 MG tablet, , Disp: , Rfl:     Psyllium (METAMUCIL PO), Take by mouth, Disp: , Rfl:     alendronate (FOSAMAX) 70 MG tablet, TAKE 1 TABLET BY MOUTH ONCE WEEKLY ON AN EMPTY STOMACH BEFORE BREAKFAST. REMAIN UPRIGHT FOR 30 MINUTES & TAKE WITH 8 OUNCES OF WATER, Disp: 12 tablet, Rfl: 1    aspirin 81 MG EC tablet, Take 81 mg by mouth daily , Disp: , Rfl:     ALLERGIES:   Allergies   Allergen Reactions    Ceftin [Cefuroxime] Diarrhea    Keflex [Cephalexin] Diarrhea       FAMILY HISTORY:       Problem Relation Age of Onset    Arthritis Other     Thyroid Disease Other     COPD Other     Stroke Father          SOCIAL HISTORY:   Social History     Socioeconomic History    Marital status:       Spouse name: Not on file    Number of children: Not on file    Years of education: Not on file    Highest education level: Not on file   Occupational History    Not on file   Tobacco Use    Smoking status: Former Smoker Packs/day: 0.50     Years: 10.00     Pack years: 5.00     Types: Cigarettes     Quit date: 1987     Years since quittin.4    Smokeless tobacco: Never Used   Vaping Use    Vaping Use: Never used   Substance and Sexual Activity    Alcohol use: Not Currently     Alcohol/week: 0.0 standard drinks     Comment: rare    Drug use: No    Sexual activity: Not Currently   Other Topics Concern    Not on file   Social History Narrative    Not on file     Social Determinants of Health     Financial Resource Strain: Low Risk     Difficulty of Paying Living Expenses: Not hard at all   Food Insecurity: No Food Insecurity    Worried About Running Out of Food in the Last Year: Never true    Jerry of Food in the Last Year: Never true   Transportation Needs: No Transportation Needs    Lack of Transportation (Medical): No    Lack of Transportation (Non-Medical): No   Physical Activity:     Days of Exercise per Week:     Minutes of Exercise per Session:    Stress:     Feeling of Stress :    Social Connections:     Frequency of Communication with Friends and Family:     Frequency of Social Gatherings with Friends and Family:     Attends [employer] Services:     Active Member of Clubs or Organizations:     Attends Club or Organization Meetings:     Marital Status:    Intimate Partner Violence:     Fear of Current or Ex-Partner:     Emotionally Abused:     Physically Abused:     Sexually Abused:          REVIEW OF SYSTEMS:         Review of Systems   Constitutional: Negative for appetite change and fatigue. HENT: Negative for trouble swallowing. Eyes: Positive for visual disturbance. Respiratory: Negative. Cardiovascular: Negative. Gastrointestinal: Negative. Negative for abdominal distention, abdominal pain, anal bleeding, blood in stool, constipation, diarrhea, nausea, rectal pain and vomiting. Endocrine: Negative. Genitourinary: Negative. Musculoskeletal: Negative.     Skin: Negative. Allergic/Immunologic: Negative. Neurological: Negative. Hematological: Bruises/bleeds easily. Psychiatric/Behavioral: Negative. PHYSICAL EXAMINATION: Vital signs reviewed per the nursing documentation. /69   Ht 5' 3\" (1.6 m)   Wt 131 lb (59.4 kg)   LMP 01/01/2004   BMI 23.21 kg/m²   Body mass index is 23.21 kg/m². Physical Exam  Nursing note reviewed. Constitutional:       Appearance: She is well-developed. Comments: Anxious    HENT:      Head: Normocephalic and atraumatic. Eyes:      Conjunctiva/sclera: Conjunctivae normal.      Pupils: Pupils are equal, round, and reactive to light. Cardiovascular:      Heart sounds: Normal heart sounds. Pulmonary:      Effort: Pulmonary effort is normal.      Breath sounds: Normal breath sounds. Abdominal:      General: Bowel sounds are normal.      Palpations: Abdomen is soft. Comments: NON TENDER, NON DISTENTED  LIVER SPLEEN AND HERNIAS ARE NOT  PALPABLE  BOWEL SOUNDS ARE POSITIVE      Musculoskeletal:         General: Normal range of motion. Cervical back: Normal range of motion and neck supple. Skin:     General: Skin is warm. Neurological:      Mental Status: She is alert and oriented to person, place, and time.    Psychiatric:         Behavior: Behavior normal.           LABORATORY DATA: Reviewed  Lab Results   Component Value Date    WBC 4.2 06/11/2021    HGB 11.8 (L) 06/11/2021    HCT 35.9 (L) 06/11/2021    MCV 89.3 06/11/2021     06/11/2021     06/11/2021    K 4.7 06/11/2021     (H) 06/11/2021    CO2 26 06/11/2021    BUN 20 06/11/2021    CREATININE 0.72 06/11/2021    LABPROT 6.9 11/19/2012    LABALBU 3.5 03/13/2021    BILITOT 0.88 03/13/2021    ALKPHOS 72 03/13/2021    AST 28 03/13/2021    ALT 18 03/13/2021    INR 1.0 11/23/2020         Lab Results   Component Value Date    RBC 4.02 06/11/2021    HGB 11.8 (L) 06/11/2021    MCV 89.3 06/11/2021    MCH 29.3 06/11/2021    MCHC 32.8 06/11/2021    RDW 15.0 (H) 06/11/2021    MPV 8.0 06/11/2021    BASOPCT 0 03/13/2021    LYMPHSABS 0.38 (L) 03/13/2021    MONOSABS 0.51 03/13/2021    NEUTROABS 8.63 03/13/2021    EOSABS 0.00 03/13/2021    BASOSABS 0.00 03/13/2021         DIAGNOSTIC TESTING:     No results found. Assessment  1. C. difficile colitis    2. Gastroesophageal reflux disease, unspecified whether esophagitis present    3. Hypokalemia    4. Diarrhea, unspecified type        Plan    Cont PRN Questran     The patient was instructed to start taking some OTC Probiotics products   These are available over the counter at the Pharmacy stores and Grocery stores  He was explained about the beneficial effects they have in the GI track  They will help to establish the good bacterial paula and will help with the digestion and bowel movements  The patient has verbalized understanding and agreement to this plan      Pt was advised in detail about some life style and dietary modifications. She was advised about avoidance of caffeine, nicotine and chocolate. Pt was also told to stay away from any kind of fast foods, soda pops. She was also advised to avoid lots of spices, grease and fried food etc.     Instructions were also given about trying to arrange the timing, quality and quantity of food. Instructions were given about using ample amount of fiber including dietary and supplemental fiber either metamucil, bennafiber or citrucell etc.  Pt was advised about drinking ample amount of water without any colors or chemicals. Stress was given about regular exercise. Pt has verbalized understanding and agreement to these modifications.       More than half of patient's clinic visit time was spent in counseling about lifestyle and dietary modifications  Patient's  questions were answered in this regard as well  The patient has verbalized understanding and agreement     Pt seems to have signs and symptoms consistent with GERD, acid indigestion and heartburns. She was discussed  in detail about some possible life style and dietary modifications. She was stressed about the maintenance  of appropriate weight and effect of obesity contributing to reflux symptoms. Routine exercise was streesed. Avoidance of Caffeine, nicotine and chocolate were explained. Pt was asked to avoid spices grease and fried food. Advices were also given about avoidance of any kind of fast foods, soda pops and high energy drinks. Pt was advised to place two small block under the head end of the bed which may help with night time reflux. Was advised not to eat any thin at least 2-3 hrs before going to bed and walk especially after dinner    Pt has verbalized understanding and agreement to this plan. Pt was discussed in detail about the possible side effects of proton pump inhibiter therapy. She was explained about the possibility of calcium and magnesium malabsorption and was advised to start taking calcium supplements with Vit D. Some over the counter regimens were explained to patient. Some dietary advices were also given. She has verbalized understanding and agreement to this. I communicated with the patient and/or health care decision maker about   Details of this discussion including any medical advice provided:YES      I affirm this is a Patient Initiated Episode with an Established Patient who has not had a related appointment within my department in the past 7 days or scheduled within the next 24 hours. Total Time: minutes: 21-30 minutes    Note: not billable if this call serves to triage the patient into an appointment for the relevant concern      Thank you for allowing me to participate in the care of Ms. Fazal Nagel. For any further questions please do not hesitate to contact me. I have reviewed and agree with the ROS entered by the MA/LPN.          Abdi Petersen MD, [facility]  Board Certified in Gastroenterology and [facility]  Office #: [phone]

## 2021-07-23 RX ORDER — ALENDRONATE SODIUM 70 MG/1
TABLET ORAL
Qty: 12 TABLET | Refills: 1 | Status: SHIPPED | OUTPATIENT
Start: 2021-07-23 | End: 2021-08-16

## 2021-07-23 NOTE — TELEPHONE ENCOUNTER
----- Message from Jama Madeleine sent at 7/23/2021  9:22 AM EDT -----  Subject: Refill Request    QUESTIONS  Name of Medication? alendronate (FOSAMAX) 70 MG tablet  Patient-reported dosage and instructions? 1 a week  How many days do you have left? 0  Preferred Pharmacy? [id number] [facility] phone number (if available)? [phone]  Additional Information for Provider? Is due to take again on Monday.   ---------------------------------------------------------------------------  --------------  CALL BACK INFO  What is the best way for the office to contact you? OK to leave message on   voicemail  Preferred Call Back Phone Number?  [phone]

## 2021-07-27 DIAGNOSIS — K21.9 GASTROESOPHAGEAL REFLUX DISEASE WITHOUT ESOPHAGITIS: ICD-10-CM

## 2021-07-27 RX ORDER — OMEPRAZOLE 20 MG/1
CAPSULE, DELAYED RELEASE ORAL
Qty: 90 CAPSULE | Refills: 0 | Status: SHIPPED | OUTPATIENT
Start: 2021-07-27 | End: 2021-10-14

## 2021-07-27 RX ORDER — ALLOPURINOL 100 MG/1
TABLET ORAL
Qty: 90 TABLET | Refills: 0 | Status: SHIPPED | OUTPATIENT
Start: 2021-07-27 | End: 2021-10-29

## 2021-07-27 RX ORDER — VERAPAMIL HYDROCHLORIDE 240 MG/1
TABLET, FILM COATED, EXTENDED RELEASE ORAL
Qty: 90 TABLET | Refills: 0 | Status: SHIPPED | OUTPATIENT
Start: 2021-07-27 | End: 2021-10-29

## 2021-07-27 RX ORDER — CHOLESTYRAMINE 4 G/9G
POWDER, FOR SUSPENSION ORAL
Qty: 90 PACKET | Refills: 2 | Status: SHIPPED | OUTPATIENT
Start: 2021-07-27 | End: 2021-10-29

## 2021-09-01 RX ORDER — ERGOCALCIFEROL 1.25 MG/1
CAPSULE ORAL
Qty: 12 CAPSULE | Refills: 0 | Status: SHIPPED | OUTPATIENT
Start: 2021-09-01 | End: 2021-11-22

## 2021-09-09 RX ORDER — CETIRIZINE HYDROCHLORIDE 10 MG/1
TABLET ORAL
Qty: 90 TABLET | Refills: 1 | Status: SHIPPED | OUTPATIENT
Start: 2021-09-09 | End: 2022-01-27 | Stop reason: SDUPTHER

## 2021-09-13 ENCOUNTER — TELEPHONE (OUTPATIENT)
Dept: FAMILY MEDICINE CLINIC | Age: 76
End: 2021-09-13

## 2021-09-13 NOTE — TELEPHONE ENCOUNTER
RENNYI:  Patient called C/O left leg swelling, hurt foot 2 mo ago, no pain or redness or heat. She was told she should go to ER, she wanted to come in tomorrow. I made the appointment, then told her if it gets worse she needs to go to the ER. Patient agreed.

## 2021-09-14 ENCOUNTER — OFFICE VISIT (OUTPATIENT)
Dept: FAMILY MEDICINE CLINIC | Age: 76
End: 2021-09-14
Payer: MEDICARE

## 2021-09-14 VITALS
WEIGHT: 133.2 LBS | TEMPERATURE: 98.2 F | BODY MASS INDEX: 23.6 KG/M2 | SYSTOLIC BLOOD PRESSURE: 110 MMHG | HEART RATE: 80 BPM | HEIGHT: 63 IN | DIASTOLIC BLOOD PRESSURE: 64 MMHG

## 2021-09-14 DIAGNOSIS — S96.912A STRAIN OF LEFT ANKLE, INITIAL ENCOUNTER: ICD-10-CM

## 2021-09-14 DIAGNOSIS — M79.89 LEFT LEG SWELLING: Primary | ICD-10-CM

## 2021-09-14 PROCEDURE — 99214 OFFICE O/P EST MOD 30 MIN: CPT | Performed by: NURSE PRACTITIONER

## 2021-09-14 ASSESSMENT — ENCOUNTER SYMPTOMS
WHEEZING: 0
ABDOMINAL PAIN: 0
VOMITING: 0
NAUSEA: 0
SHORTNESS OF BREATH: 0

## 2021-09-14 NOTE — PROGRESS NOTES
noticed diffuse swelling in left leg from left ankle up to below left knee. There is no redness or pain. Hx of left knee replacement. Review of Systems   Constitutional: Negative for chills and fever. Respiratory: Negative for shortness of breath and wheezing. Cardiovascular: Positive for leg swelling. Negative for chest pain. Gastrointestinal: Negative for abdominal pain, nausea and vomiting. Neurological: Negative for dizziness, weakness and numbness. Psychiatric/Behavioral: Negative for agitation and behavioral problems. Objective:   Physical Exam  Vitals and nursing note reviewed. Constitutional:       General: She is not in acute distress. Appearance: Normal appearance. HENT:      Nose: Nose normal. No congestion. Eyes:      General: No scleral icterus. Conjunctiva/sclera: Conjunctivae normal.   Cardiovascular:      Rate and Rhythm: Normal rate and regular rhythm. Pulses: Normal pulses. Heart sounds: Normal heart sounds. Pulmonary:      Effort: Pulmonary effort is normal. No respiratory distress. Breath sounds: Normal breath sounds. Abdominal:      Palpations: Abdomen is soft. Tenderness: There is no abdominal tenderness. Musculoskeletal:         General: Swelling present. No tenderness. Normal range of motion. Cervical back: Normal range of motion and neck supple. Legs:    Skin:     General: Skin is warm and dry. Neurological:      Mental Status: She is alert and oriented to person, place, and time. Cranial Nerves: No cranial nerve deficit. Psychiatric:         Mood and Affect: Mood normal.         Behavior: Behavior normal.         Assessment:      1. Left leg swelling    2.  Strain of left ankle, initial encounter            Plan:      BP Readings from Last 3 Encounters:   09/14/21 110/64   07/21/21 120/69   05/19/21 116/64     /64   Pulse 80   Temp 98.2 °F (36.8 °C) (Infrared)   Ht 5' 3\" (1.6 m)   Wt 133 lb 3.2 oz (60.4 kg)   LMP 01/01/2004   BMI 23.60 kg/m²   Lab Results   Component Value Date    WBC 4.2 06/11/2021    HGB 11.8 (L) 06/11/2021    HCT 35.9 (L) 06/11/2021     06/11/2021    CHOL 117 06/11/2021    TRIG 128 06/11/2021    HDL 46 06/11/2021    ALT 18 03/13/2021    AST 28 03/13/2021     06/11/2021    K 4.7 06/11/2021     (H) 06/11/2021    CREATININE 0.72 06/11/2021    BUN 20 06/11/2021    CO2 26 06/11/2021    TSH 2.00 12/01/2020    INR 1.0 11/23/2020     Lab Results   Component Value Date    CALCIUM 9.3 06/11/2021     Lab Results   Component Value Date    LDLCHOLESTEROL 45 06/11/2021         1. Left leg swelling  - US DUP LOWER EXTREMITY LEFT MARICARMEN; Future  - advised to keep LLE elevated at HS and wear compression stocking during the day    2. Strain of left ankle, initial encounter  - cont to monitor   - wear compression stockings and keep left foot elevated     Requested Prescriptions      No prescriptions requested or ordered in this encounter       There are no discontinued medications. Barriers to medication compliance addressed. All patient questions answered. Pt voiced understanding. Return for as sceduled medicare wellness .

## 2021-09-17 ENCOUNTER — HOSPITAL ENCOUNTER (OUTPATIENT)
Dept: VASCULAR LAB | Age: 76
Discharge: HOME | End: 2021-09-17
Payer: MEDICARE

## 2021-09-17 DIAGNOSIS — M79.89 LEFT LEG SWELLING: ICD-10-CM

## 2021-09-17 PROCEDURE — 93971 EXTREMITY STUDY: CPT

## 2021-09-22 ENCOUNTER — OFFICE VISIT (OUTPATIENT)
Dept: FAMILY MEDICINE CLINIC | Age: 76
End: 2021-09-22
Payer: MEDICARE

## 2021-09-22 VITALS
TEMPERATURE: 97.3 F | SYSTOLIC BLOOD PRESSURE: 108 MMHG | WEIGHT: 133.2 LBS | BODY MASS INDEX: 23.6 KG/M2 | DIASTOLIC BLOOD PRESSURE: 64 MMHG | HEIGHT: 63 IN | HEART RATE: 64 BPM

## 2021-09-22 DIAGNOSIS — Z00.00 MEDICARE ANNUAL WELLNESS VISIT, INITIAL: Primary | ICD-10-CM

## 2021-09-22 DIAGNOSIS — Z00.00 ROUTINE GENERAL MEDICAL EXAMINATION AT A HEALTH CARE FACILITY: ICD-10-CM

## 2021-09-22 PROCEDURE — G0438 PPPS, INITIAL VISIT: HCPCS | Performed by: NURSE PRACTITIONER

## 2021-09-22 ASSESSMENT — PATIENT HEALTH QUESTIONNAIRE - PHQ9
2. FEELING DOWN, DEPRESSED OR HOPELESS: 0
SUM OF ALL RESPONSES TO PHQ QUESTIONS 1-9: 0
1. LITTLE INTEREST OR PLEASURE IN DOING THINGS: 0
SUM OF ALL RESPONSES TO PHQ QUESTIONS 1-9: 0
SUM OF ALL RESPONSES TO PHQ9 QUESTIONS 1 & 2: 0
SUM OF ALL RESPONSES TO PHQ QUESTIONS 1-9: 0

## 2021-09-22 ASSESSMENT — LIFESTYLE VARIABLES
HOW OFTEN DO YOU HAVE A DRINK CONTAINING ALCOHOL: 3
AUDIT-C TOTAL SCORE: 3
HOW OFTEN DO YOU HAVE SIX OR MORE DRINKS ON ONE OCCASION: 0
HOW MANY STANDARD DRINKS CONTAINING ALCOHOL DO YOU HAVE ON A TYPICAL DAY: 0

## 2021-09-22 NOTE — PROGRESS NOTES
Medicare Annual Wellness Visit  Name: Gene Leung Date: 2021   MRN: [id number] Sex: Female   Age: 67 y.o. Ethnicity: Non- / Non    : 1945 Race: White (non-)      Danna Kenny is here for Medicare AWV    Screenings for behavioral, psychosocial and functional/safety risks, and cognitive dysfunction are all negative except as indicated below. These results, as well as other patient data from the [address] form, are documented in Flowsheets linked to this Encounter. Allergies   Allergen Reactions    Ceftin [Cefuroxime] Diarrhea    Keflex [Cephalexin] Diarrhea       Prior to Visit Medications    Medication Sig Taking? Authorizing Provider   cetirizine (ZYRTEC) 10 MG tablet TAKE ONE TABLET BY MOUTH DAILY Yes PASCUAL Israel CNP   vitamin D (ERGOCALCIFEROL) 1.25 MG (71181 UT) CAPS capsule TAKE ONE CAPSULE BY MOUTH ONCE WEEKLY Yes PASCUAL Israel CNP   alendronate (FOSAMAX) 70 MG tablet TAKE 1 TABLET BY MOUTH ONCE WEEKLY ON AN EMPTY STOMACH BEFORE BREAKFAST.  REMAIN UPRIGHT FOR 30 MINUTES & TAKE WITH 8 OUNCES OF WATER Yes PASCUAL Israel CNP   allopurinol (ZYLOPRIM) 100 MG tablet TAKE ONE TABLET BY MOUTH DAILY Yes PASCUAL Israel CNP   verapamil (CALAN SR) 240 MG extended release tablet TAKE ONE TABLET BY MOUTH ONCE NIGHTLY FOR MIGRAINE HEADACHES Yes PASCUAL Israel CNP   cholestyramine (QUESTRAN) 4 g packet TAKE ONE PACKET BY MOUTH THREE TIMES A DAY WITH MEALS Yes Thais Mata MD   omeprazole (PRILOSEC) 20 MG delayed release capsule TAKE ONE CAPSULE BY MOUTH DAILY Yes PASCUAL Israel CNP   fluticasone (FLONASE) 50 MCG/ACT nasal spray SPRAY TWO SPRAYS IN EACH NOSTRIL ONCE DAILY Yes PASCUAL Isarel CNP   famotidine (PEPCID) 20 MG tablet TAKE ONE TABLET BY MOUTH TWICE A DAY Yes PASCUAL Israel CNP   simvastatin (ZOCOR) 20 MG tablet TAKE ONE TABLET BY MOUTH DAILY Yes PASCUAL Israel CNP   furosemide (LASIX) 20 MG tablet  Yes Historical Provider, MD Psyllium (METAMUCIL PO) Take by mouth Yes Historical Provider, MD   aspirin 81 MG EC tablet Take 81 mg by mouth daily  Yes Historical Provider, MD       Past Medical History:   Diagnosis Date    Allergic rhinitis     Closed tibia fracture     GERD (gastroesophageal reflux disease)     Gout     Headache(784.0)     h/o migraines    Hyperlipidemia     Osteoarthritis     DJD Lt knee    Osteoporosis     Posterior tibial tendon dysfunction     right, wears braces on both legs    Vitamin D deficient rickets     Wears glasses     Wears partial dentures     lower       Past Surgical History:   Procedure Laterality Date    APPENDECTOMY      CARPAL TUNNEL RELEASE      bilateral    CATARACT REMOVAL WITH IMPLANT [facility] 03/07/2019    [facility]    CATARACT REMOVAL WITH IMPLANT Right 04/04/2019    [facility]    CHOLECYSTECTOMY, LAPAROSCOPIC N/A 11/24/2020    CHOLECYSTECTOMY LAPAROSCOPIC ROBOTIC XI MULTIPORT performed by Dina Bernstein MD at [address] [place] [address] Rd      2007? per pt wtih polyps    ERCP N/A 10/14/2020    ERCP WITH BILE DUCT BRUSHING performed by Manuel Rasheed MD at [address] [place] [address]      left tibia    HYSTERECTOMY  4/12    INTRACAPSULAR CATARACT EXTRACTION Left 3/7/2019    EYE CATARACT EMULSIFICATION IOL IMPLANT - TOPICAL performed by Jaydon Juarez MD at [address] [place] [address] Right 4/4/2019    EYE CATARACT EMULSIFICATION IOL IMPLANT performed [facility]don [facility] MD at [facility] Left 11/06/2018    hardware removal and then TKA    KNEE ARTHROSCOPY Left 2002?     [facility] TOTAL KNEE ARTHROPLASTY Left 11/6/2018    KNEE TOTAL ARTHROPLASTY W/REMOVAL HARDWARE PLATE & SCREWS performed by Geraldo Higgins MD at [address] [facility] [address] Left     TONSILLECTOMY AND ADENOIDECTOMY      TOTAL KNEE ARTHROPLASTY Right 12/10/2019    KNEE TOTAL ARTHROPLASTY performed by Geraldo Higgins MD at [address] [facility]   WISDOM TOOTH EXTRACTION         Family History   Problem Relation Age of Onset    Arthritis Other     Thyroid Disease Other     COPD Other     Stroke Father        CareTeam (Including outside providers/suppliers regularly involved in providing care):   Patient Care Team:  Asa Estrella MD as PCP - General (Family Medicine)  Asa Estrella MD as PCP - [facility] Empaneled Provider  Shakira Lozano MD as Consulting Physician (Gastroenterology)    Wt Readings from Last 3 Encounters:   09/22/21 133 lb 3.2 oz (60.4 kg)   09/14/21 133 lb 3.2 oz (60.4 kg)   07/21/21 131 lb (59.4 kg)     Vitals:    09/22/21 0903   BP: 108/64   Pulse: 64   Temp: 97.3 °F (36.3 °C)   TempSrc: Infrared   Weight: 133 lb 3.2 oz (60.4 kg)   Height: 5' 3\" (1.6 m)     Body mass index is 23.6 kg/m². Based upon direct observation of the patient, evaluation of cognition reveals recent and remote memory intact. Patient's complete Health Risk Assessment and screening values have been reviewed and are found in Flowsheets. The following problems were reviewed today and where indicated follow up appointments were made and/or referrals ordered. Positive Risk Factor Screenings with Interventions:          General Health and ACP:  General  In general, how would you say your health is?: Very Good  In the past 7 days, have you experienced any of the following?  New or Increased Pain, New or Increased Fatigue, Loneliness, Social Isolation, Stress or Anger?: None of These  Do you get the social and emotional support that you need?: Yes  Do you have a Living Will?: (!) No  Advance Directives     Power of [facility] Will ACP-Advance Directive ACP-Power of     Not on File Not on File Not on File Not on File      General Health Risk Interventions:  · No Living Will: Advance Care Planning addressed with patient today      Safety:  Safety  Do you have working smoke detectors?: Yes  Have all throw rugs been removed or fastened?: (!) No  Do you have non-slip mats or surfaces in all bathtubs/showers?: Yes  Do all of your stairways have a railing or banister?: Yes  Are your doorways, halls and stairs free of clutter?: Yes  Do you always fasten your seatbelt when you are in a car?: Yes  Safety Interventions:  · Home safety tips provided     Personalized Preventive Plan   Current Health Maintenance Status  Immunization History   Administered Date(s) Administered    COVID-19, Mulugeta Rader PF, 30mcg/0.3mL 02/25/2021, 03/18/2021    Influenza Vaccine, unspecified formulation 10/17/2018    Influenza Virus Vaccine 11/11/2012, 10/07/2015    Influenza, Quadv, IM, PF (6 mo and older Fluzone, Flulaval, Fluarix, and 3 yrs and older Afluria) 09/30/2016, 10/12/2017    Influenza, Quadv, adjuvanted, 65 yrs +, IM, PF (Fluad) 10/02/2020    Influenza, Triv, inactivated, subunit, adjuvanted, IM (Fluad 65 yrs and older) 10/23/2019    Pneumococcal Conjugate 13-valent ([id number]) 10/12/2015    Pneumococcal Polysaccharide ([id number]) 11/18/2010        Health Maintenance   Topic Date Due    DTaP/Tdap/Td vaccine (1 - Tdap) Never done    Shingles Vaccine (1 of 2) Never done   [employer] Visit (AWV)  Never done    Flu vaccine (1) 09/01/2021    Lipid screen  06/11/2022    Potassium monitoring  06/11/2022    Creatinine monitoring  06/11/2022    DEXA (modify frequency per FRAX score)  Completed    Pneumococcal 65+ years Vaccine  Completed    COVID-19 Vaccine  Completed    Hepatitis A vaccine  Aged Out    Hepatitis B vaccine  Aged Out    Hib vaccine  Aged Out    Meningococcal (ACWY) vaccine  Aged Out    Hepatitis C screen  Discontinued     Recommendations for [facility] [facility] Due: see orders and patient instructions/AVS.  . Recommended screening schedule for the next 5-10 years is provided to the patient in written form: see Patient Instructions/AVS.    There are no diagnoses linked to this encounter.

## 2021-09-30 RX ORDER — FLUTICASONE PROPIONATE 50 MCG
SPRAY, SUSPENSION (ML) NASAL
Qty: 16 G | Refills: 1 | Status: SHIPPED | OUTPATIENT
Start: 2021-09-30 | End: 2021-12-23

## 2021-10-06 ENCOUNTER — OFFICE VISIT (OUTPATIENT)
Dept: GASTROENTEROLOGY | Age: 76
End: 2021-10-06
Payer: MEDICARE

## 2021-10-06 VITALS
HEIGHT: 63 IN | DIASTOLIC BLOOD PRESSURE: 66 MMHG | SYSTOLIC BLOOD PRESSURE: 110 MMHG | HEART RATE: 67 BPM | TEMPERATURE: 98.4 F | WEIGHT: 134.6 LBS | BODY MASS INDEX: 23.85 KG/M2 | OXYGEN SATURATION: 97 %

## 2021-10-06 DIAGNOSIS — E83.42 HYPOMAGNESEMIA: ICD-10-CM

## 2021-10-06 DIAGNOSIS — A04.72 C. DIFFICILE COLITIS: ICD-10-CM

## 2021-10-06 DIAGNOSIS — K21.9 GASTROESOPHAGEAL REFLUX DISEASE, UNSPECIFIED WHETHER ESOPHAGITIS PRESENT: Primary | ICD-10-CM

## 2021-10-06 PROCEDURE — 99214 OFFICE O/P EST MOD 30 MIN: CPT | Performed by: INTERNAL MEDICINE

## 2021-10-06 RX ORDER — BISACODYL 5 MG
TABLET, DELAYED RELEASE (ENTERIC COATED) ORAL
Qty: 4 TABLET | Refills: 0 | Status: SHIPPED | OUTPATIENT
Start: 2021-10-06 | End: 2022-11-01 | Stop reason: ALTCHOICE

## 2021-10-06 RX ORDER — POLYETHYLENE GLYCOL 3350 17 G/17G
POWDER, FOR SOLUTION ORAL
Qty: 238 G | Refills: 0 | Status: SHIPPED | OUTPATIENT
Start: 2021-10-06 | End: 2022-11-01 | Stop reason: ALTCHOICE

## 2021-10-06 ASSESSMENT — ENCOUNTER SYMPTOMS
GASTROINTESTINAL NEGATIVE: 1
RECTAL PAIN: 0
ANAL BLEEDING: 0
ABDOMINAL DISTENTION: 0
TROUBLE SWALLOWING: 0
SHORTNESS OF BREATH: 0
BLOOD IN STOOL: 0
ALLERGIC/IMMUNOLOGIC NEGATIVE: 1
COUGH: 1
NAUSEA: 0
ABDOMINAL PAIN: 0
CHOKING: 0
SORE THROAT: 0
DIARRHEA: 0
CONSTIPATION: 0
BACK PAIN: 0
VOMITING: 0
VOICE CHANGE: 0

## 2021-10-06 NOTE — PROGRESS NOTES
GI OFFICE FOLLOW UP    Cornelio Hay is a 67 y.o. female evaluated via on 10/6/2021. Consent:  She and/or health care decision maker is aware that that she may receive a bill for this telephone service, depending on her insurance coverage, and has provided verbal consent to proceed: YES      INTERVAL HISTORY:   No referring provider defined for this encounter. Chief Complaint   Patient presents with    Follow-up     Patient is here today for a 3 month f/u on C. Diff, diarrhea, and GERD       1. Gastroesophageal reflux disease, unspecified whether esophagitis present    2. Hypomagnesemia    3. C. difficile colitis      Seen in my office as a follow-up  Overall doing much better now  Patient has history significant C. difficile diarrhea  Had gallbladder surgery in the past also had a ERCP done in the past she has been gaining weight  Has history for GERD  History for colon polyps  Has not had a colonoscopy or EGD done in a long while  She wanted to wait because of the significant diarrhea in the past  She feels well and wants to have this procedure scheduled    No smoking alcohol abuse illicit drug usage denies any significant rectal bleeding melanotic stools denies any significant dysphagia nausea vomiting hematemesis            HISTORY OF PRESENT ILLNESS: Jessica Acevedo is a 67 y.o. female with a past history remarkable for , referred for evaluation of   Chief Complaint   Patient presents with    Follow-up     Patient is here today for a 3 month f/u on C. Diff, diarrhea, and GERD   . Past Medical,Family, and Social History reviewed and does contribute to the patient presenting condition. Patient's PMH/PSH,SH,PSYCH Hx, MEDs, ALLERGIES, and ROS were all reviewed and updated in the appropriate sections.     PAST MEDICAL HISTORY:  Past Medical History:   Diagnosis Date    Allergic rhinitis     Closed tibia fracture     GERD (gastroesophageal reflux disease)     Gout     Headache(784.0)     h/o migraines    Hyperlipidemia     Osteoarthritis     DJD Lt knee    Osteoporosis     Posterior tibial tendon dysfunction     right, wears braces on both legs    Vitamin D deficient rickets     Wears glasses     Wears partial dentures     lower       Past Surgical History:   Procedure Laterality Date    APPENDECTOMY      CARPAL TUNNEL RELEASE      bilateral    CATARACT REMOVAL WITH IMPLANT [facility] 03/07/2019    [facility]    CATARACT REMOVAL WITH IMPLANT Right 04/04/2019    [facility]    CHOLECYSTECTOMY, LAPAROSCOPIC N/A 11/24/2020    CHOLECYSTECTOMY LAPAROSCOPIC ROBOTIC XI MULTIPORT performed by Jennifer Bose MD at [address] [place] [address] Rd      2007? per pt wtih polyps    ERCP N/A 10/14/2020    ERCP WITH BILE DUCT BRUSHING performed by Shadi Flanagan MD at [address] [place] [address]      left tibia    HYSTERECTOMY  4/12    INTRACAPSULAR CATARACT EXTRACTION Left 3/7/2019    EYE CATARACT EMULSIFICATION IOL IMPLANT - TOPICAL performed by Nicol Caraballo MD at [address] [place] [address] Right 4/4/2019    EYE CATARACT EMULSIFICATION IOL IMPLANT performed by Nicol Caraballo MD at [facility] Left 11/06/2018    hardware removal and then TKA    KNEE ARTHROSCOPY Left 2002?     WI TOTAL KNEE ARTHROPLASTY Left 11/6/2018    KNEE TOTAL ARTHROPLASTY W/REMOVAL HARDWARE PLATE & SCREWS performed by Porsha Infante MD at [address] [place] [address] Nw Left     TONSILLECTOMY AND ADENOIDECTOMY      TOTAL KNEE ARTHROPLASTY Right 12/10/2019    KNEE TOTAL ARTHROPLASTY performed by Porsha Infante MD at [address] [facility] [place]:    Current Outpatient Medications:     fluticasone (FLONASE) 50 MCG/ACT nasal spray, SPRAY TWO SPRAYS IN EACH NOSTRIL ONCE DAILY, Disp: 16 g, Rfl: 1    cetirizine (ZYRTEC) 10 MG tablet, TAKE ONE TABLET BY MOUTH DAILY, Disp: 90 tablet, Rfl: 1    vitamin D (ERGOCALCIFEROL) 1.25 MG (11781 UT) CAPS capsule, TAKE ONE CAPSULE BY MOUTH ONCE WEEKLY, Disp: 12 capsule, Rfl: 0    alendronate (FOSAMAX) 70 MG tablet, TAKE 1 TABLET BY MOUTH ONCE WEEKLY ON AN EMPTY STOMACH BEFORE BREAKFAST. REMAIN UPRIGHT FOR 30 MINUTES & TAKE WITH 8 OUNCES OF WATER, Disp: 12 tablet, Rfl: 1    allopurinol (ZYLOPRIM) 100 MG tablet, TAKE ONE TABLET BY MOUTH DAILY, Disp: 90 tablet, Rfl: 0    verapamil (CALAN SR) 240 MG extended release tablet, TAKE ONE TABLET BY MOUTH ONCE NIGHTLY FOR MIGRAINE HEADACHES, Disp: 90 tablet, Rfl: 0    cholestyramine (QUESTRAN) 4 g packet, TAKE ONE PACKET BY MOUTH THREE TIMES A DAY WITH MEALS, Disp: 90 packet, Rfl: 2    omeprazole (PRILOSEC) 20 MG delayed release capsule, TAKE ONE CAPSULE BY MOUTH DAILY, Disp: 90 capsule, Rfl: 0    famotidine (PEPCID) 20 MG tablet, TAKE ONE TABLET BY MOUTH TWICE A DAY, Disp: 180 tablet, Rfl: 1    simvastatin (ZOCOR) 20 MG tablet, TAKE ONE TABLET BY MOUTH DAILY, Disp: 90 tablet, Rfl: 1    furosemide (LASIX) 20 MG tablet, , Disp: , Rfl:     Psyllium (METAMUCIL PO), Take by mouth, Disp: , Rfl:     aspirin 81 MG EC tablet, Take 81 mg by mouth daily , Disp: , Rfl:     ALLERGIES:   Allergies   Allergen Reactions    Ceftin [Cefuroxime] Diarrhea    Keflex [Cephalexin] Diarrhea       FAMILY HISTORY:       Problem Relation Age of Onset    Arthritis Other     Thyroid Disease Other     COPD Other     Stroke Father          SOCIAL HISTORY:   Social History     Socioeconomic History    Marital status:       Spouse name: Not on file    Number of children: Not on file    Years of education: Not on file    Highest education level: Not on file   Occupational History    Not on file   Tobacco Use    Smoking status: Former Smoker     Packs/day: 0.50     Years: 10.00     Pack years: 5.00     Types: Cigarettes     Quit date: 2/14/1987 Years since quittin.6    Smokeless tobacco: Never Used   Vaping Use    Vaping Use: Never used   Substance and Sexual Activity    Alcohol use: Not Currently     Alcohol/week: 0.0 standard drinks     Comment: rare    Drug use: No    Sexual activity: Not Currently   Other Topics Concern    Not on file   Social History Narrative    Not on file     Social Determinants of Health     Financial Resource Strain: Low Risk     Difficulty of Paying Living Expenses: Not hard at all   Food Insecurity: No Food Insecurity    Worried About Running Out of Food in the Last Year: Never true    Jerry of Food in the Last Year: Never true   Transportation Needs: No Transportation Needs    Lack of Transportation (Medical): No    Lack of Transportation (Non-Medical): No   Physical Activity:     Days of Exercise per Week:     Minutes of Exercise per Session:    Stress:     Feeling of Stress :    Social Connections:     Frequency of Communication with Friends and Family:     Frequency of Social Gatherings with Friends and Family:     Attends Jainism Services:     Active Member of Clubs or Organizations:     Attends Club or Organization Meetings:     Marital Status:    Intimate Partner Violence:     Fear of Current or Ex-Partner:     Emotionally Abused:     Physically Abused:     Sexually Abused:          REVIEW OF SYSTEMS:         Review of Systems   Constitutional: Negative for appetite change, fatigue and unexpected weight change. HENT: Positive for postnasal drip. Negative for sore throat, trouble swallowing and voice change. Eyes: Positive for visual disturbance. Respiratory: Positive for cough. Negative for choking and shortness of breath. Cardiovascular: Negative. Negative for chest pain and leg swelling. Gastrointestinal: Negative. Negative for abdominal distention, abdominal pain, anal bleeding, blood in stool, constipation, diarrhea, nausea, rectal pain and vomiting.    Endocrine: Negative. Genitourinary: Negative. Negative for difficulty urinating. Musculoskeletal: Positive for arthralgias. Negative for back pain, joint swelling and myalgias. Skin: Negative. Allergic/Immunologic: Negative. Neurological: Negative. Negative for dizziness, tremors, weakness, light-headedness, numbness and headaches. Hematological: Does not bruise/bleed easily. Psychiatric/Behavioral: Negative. Negative for sleep disturbance. The patient is not nervous/anxious. PHYSICAL EXAMINATION: Vital signs reviewed per the nursing documentation. /66   Pulse 67   Temp 98.4 °F (36.9 °C)   Ht 5' 3\" (1.6 m)   Wt 134 lb 9.6 oz (61.1 kg)   LMP 01/01/2004   SpO2 97%   BMI 23.84 kg/m²   Body mass index is 23.84 kg/m². Physical Exam  Nursing note reviewed. Constitutional:       Appearance: She is well-developed. Comments: Anxious    HENT:      Head: Normocephalic and atraumatic. Eyes:      Conjunctiva/sclera: Conjunctivae normal.      Pupils: Pupils are equal, round, and reactive to light. Cardiovascular:      Heart sounds: Normal heart sounds. Pulmonary:      Effort: Pulmonary effort is normal.      Breath sounds: Normal breath sounds. Abdominal:      General: Bowel sounds are normal.      Palpations: Abdomen is soft. Comments: NON TENDER, NON DISTENTED  LIVER SPLEEN AND HERNIAS ARE NOT  PALPABLE  BOWEL SOUNDS ARE POSITIVE      Musculoskeletal:         General: Normal range of motion. Cervical back: Normal range of motion and neck supple. Skin:     General: Skin is warm. Neurological:      Mental Status: She is alert and oriented to person, place, and time.    Psychiatric:         Behavior: Behavior normal.           LABORATORY DATA: Reviewed  Lab Results   Component Value Date    WBC 4.2 06/11/2021    HGB 11.8 (L) 06/11/2021    HCT 35.9 (L) 06/11/2021    MCV 89.3 06/11/2021     06/11/2021     06/11/2021    K 4.7 06/11/2021     (H) 06/11/2021    CO2 26 06/11/2021    BUN 20 06/11/2021    CREATININE 0.72 06/11/2021    LABPROT 6.9 11/19/2012    LABALBU 3.5 03/13/2021    BILITOT 0.88 03/13/2021    ALKPHOS 72 03/13/2021    AST 28 03/13/2021    ALT 18 03/13/2021    INR 1.0 11/23/2020         Lab Results   Component Value Date    RBC 4.02 06/11/2021    HGB 11.8 (L) 06/11/2021    MCV 89.3 06/11/2021    MCH 29.3 06/11/2021    MCHC 32.8 06/11/2021    RDW 15.0 (H) 06/11/2021    MPV 8.0 06/11/2021    BASOPCT 0 03/13/2021    LYMPHSABS 0.38 (L) 03/13/2021    MONOSABS 0.51 03/13/2021    NEUTROABS 8.63 03/13/2021    EOSABS 0.00 03/13/2021    BASOSABS 0.00 03/13/2021         DIAGNOSTIC TESTING:     VL Lower Extremity Venous Left    Result Date: 9/18/2021    [address] [place] [address] Lower Extremities DVT Study Procedure   Patient Name   Kiran Zaldivar       Date of Study           09/17/2021                 Forrest Cabrales   Date of Birth  1945  Gender                  Female   Age            67 year(s)  Race                       Room Number    OP   Corporate ID # [id number]   Patient Acct # [de-identified]   MR #           [id number]      Sonographer             Hudson Martins   Accession #    [id number]  Interpreting Physician  [address] [place] [address]   Referring                  Referring Physician     [address] [place] [address]  Nurse  Practitioner  Procedure Type of Study:   Veins: Lower Extremities DVT Study, Venous Scan Lower Left. Indications for Study:Swelling. Patient Status:Out Patient. Technical Quality:Adequate visualization. Conclusions   Summary   No evidence of superficial or deep venous thrombosis in the left lower  extremity.    Signature   ----------------------------------------------------------------  Electronically signed by Hudson Martins(Sonographer) on  09/17/2021 09:51 AM  ----------------------------------------------------------------   ----------------------------------------------------------------  Electronically signed by Norma Pink Reyes,Arthur(Interpreting  physician) on 09/18/2021 08:15 AM  ----------------------------------------------------------------  Findings:   Right Impression:                Left Impression:   The common femoral vein          The common femoral, femoral, popliteal  demonstrates normal              and tibial veins demonstrate normal  compressibility and              compressibility and augmentation. augmentation. Normal compressibility of the great                                   saphenous vein. Normal compressibility of the small                                   saphenous vein. Velocities are measured in cm/s ; Diameters are measured in cm Right Lower Extremities DVT Study Measurements Right 2D Measurements +------------------------------------+----------+---------------+----------+ ! Location                            ! Visualized! Compressibility! Thrombosis! +------------------------------------+----------+---------------+----------+ ! Common Femoral                      !Yes       ! Yes            ! None      ! +------------------------------------+----------+---------------+----------+ Right Doppler Measurements +----------------------------+------+------+-------------------------------+ ! Location                    ! Signal!Reflux! Reflux (msec)                  ! +----------------------------+------+------+-------------------------------+ ! Common Femoral              !Phasic!      !                               ! +----------------------------+------+------+-------------------------------+ Left Lower Extremities DVT Study Measurements Left 2D Measurements +------------------------------------+----------+---------------+----------+ ! Location                            ! Visualized! Compressibility! Thrombosis! +------------------------------------+----------+---------------+----------+ ! Common Femoral                      !Yes       ! Yes !Yes       !Yes            ! None      ! +------------------------------------+----------+---------------+----------+ Left Doppler Measurements +---------------------------+------+------+--------------------------------+ ! Location                   ! Signal!Reflux! Reflux (msec)                   ! +---------------------------+------+------+--------------------------------+ ! Common Femoral             !Phasic!      !                                ! +---------------------------+------+------+--------------------------------+ ! Prox Femoral               !Phasic!      !                                ! +---------------------------+------+------+--------------------------------+ ! Popliteal                  !Phasic!      !                                ! +---------------------------+------+------+--------------------------------+         Assessment  1. Gastroesophageal reflux disease, unspecified whether esophagitis present    2. Hypomagnesemia    3. C. difficile colitis        Plan    Plan EGD and Colonoscopy     The Endoscopic procedure was explained to the patient in detail  The prep and NPO were explained  All the Risks, Benefits, and Alternatives were explained  Risk of Bleeding, Perforation and Cardio Respiratory risks were explained  her questions were answered  The procedure has been scheduled with the  in the office  Patient was asked to give us a call for any questions  The patient has verbalized understanding and agreement to this plan. Pt seems to have signs and symptoms consistent with GERD, acid indigestion and heartburns. She was discussed  in detail about some possible life style and dietary modifications. She was stressed about the maintenance  of appropriate weight and effect of obesity contributing to reflux symptoms. Routine exercise was streesed. Avoidance of Caffeine, nicotine and chocolate were explained. Pt was asked to avoid spices grease and fried food. Advices were also given about avoidance of any kind of fast foods, soda pops and high energy drinks. Pt was advised to place two small block under the head end of the bed which may help with night time reflux. Was advised not to eat any thin at least 2-3 hrs before going to bed and walk especially after dinner    Pt has verbalized understanding and agreement to this plan. Pt was discussed in detail about the possible side effects of proton pump inhibiter therapy. She was explained about the possibility of calcium and magnesium malabsorption and was advised to start taking calcium supplements with Vit D. Some over the counter regimens were explained to patient. Some dietary advices were also given. She has verbalized understanding and agreement to this. Pt was advised in detail about some life style and dietary modifications. She was advised about avoidance of caffeine, nicotine and chocolate. Pt was also told to stay away from any kind of fast foods, soda pops. She was also advised to avoid lots of spices, grease and fried food etc.     Instructions were also given about trying to arrange the timing, quality and quantity of food. Instructions were given about using ample amount of fiber including dietary and supplemental fiber either metamucil, bennafiber or citrucell etc.  Pt was advised about drinking ample amount of water without any colors or chemicals. Stress was given about regular exercise. Pt has verbalized understanding and agreement to these modifications.       The patient was instructed to start taking some OTC Probiotics products   These are available over the counter at the Pharmacy stores and Grocery stores  He was explained about the beneficial effects they have in the GI track  They will help to establish the good bacterial paula and will help with the digestion and bowel movements  The patient has verbalized understanding and agreement to this plan      More than half of patient's clinic visit time was spent in counseling about lifestyle and dietary modifications  Patient's  questions were answered in this regard as well  The patient has verbalized understanding and agreement       I communicated with the patient and/or health care decision maker about   Details of this discussion including any medical advice provided:YES      I affirm this is a Patient Initiated Episode with an Established Patient who has not had a related appointment within my department in the past 7 days or scheduled within the next 24 hours. Total Time: minutes: 21-30 minutes    Note: not billable if this call serves to triage the patient into an appointment for the relevant concern      Thank you for allowing me to participate in the care of Ms. Tierra Guy. For any further questions please do not hesitate to contact me. I have reviewed and agree with the ROS entered by the MA/LPN.          Daisy Laughlin MD, [facility]  Board Certified in Gastroenterology and [address] [place] [address] Gastroenterology  Office #: [phone]

## 2021-10-13 DIAGNOSIS — K21.9 GASTROESOPHAGEAL REFLUX DISEASE WITHOUT ESOPHAGITIS: ICD-10-CM

## 2021-10-14 RX ORDER — OMEPRAZOLE 20 MG/1
CAPSULE, DELAYED RELEASE ORAL
Qty: 90 CAPSULE | Refills: 0 | Status: SHIPPED | OUTPATIENT
Start: 2021-10-14 | End: 2022-01-11

## 2021-10-27 ENCOUNTER — NURSE ONLY (OUTPATIENT)
Dept: FAMILY MEDICINE CLINIC | Age: 76
End: 2021-10-27
Payer: MEDICARE

## 2021-10-27 VITALS — BODY MASS INDEX: 23.95 KG/M2 | WEIGHT: 135.2 LBS | TEMPERATURE: 97.3 F

## 2021-10-27 DIAGNOSIS — Z23 NEEDS FLU SHOT: Primary | ICD-10-CM

## 2021-10-27 PROCEDURE — 90694 VACC AIIV4 NO PRSRV 0.5ML IM: CPT | Performed by: NURSE PRACTITIONER

## 2021-10-27 PROCEDURE — G0008 ADMIN INFLUENZA VIRUS VAC: HCPCS | Performed by: NURSE PRACTITIONER

## 2021-10-29 RX ORDER — ALLOPURINOL 100 MG/1
TABLET ORAL
Qty: 90 TABLET | Refills: 1 | Status: SHIPPED | OUTPATIENT
Start: 2021-10-29 | End: 2022-06-13

## 2021-10-29 RX ORDER — VERAPAMIL HYDROCHLORIDE 240 MG/1
TABLET, FILM COATED, EXTENDED RELEASE ORAL
Qty: 90 TABLET | Refills: 1 | Status: SHIPPED
Start: 2021-10-29 | End: 2022-02-28

## 2021-10-29 RX ORDER — CHOLESTYRAMINE 4 G/9G
POWDER, FOR SUSPENSION ORAL
Qty: 90 PACKET | Refills: 2 | Status: SHIPPED | OUTPATIENT
Start: 2021-10-29 | End: 2022-01-19 | Stop reason: SDUPTHER

## 2021-11-08 ENCOUNTER — TELEPHONE (OUTPATIENT)
Dept: GASTROENTEROLOGY | Age: 76
End: 2021-11-08

## 2021-11-15 RX ORDER — SIMVASTATIN 20 MG
TABLET ORAL
Qty: 90 TABLET | Refills: 1 | Status: SHIPPED | OUTPATIENT
Start: 2021-11-15 | End: 2022-05-16

## 2021-11-16 RX ORDER — NAPROXEN 250 MG/1
TABLET ORAL
Qty: 60 TABLET | Refills: 0 | Status: SHIPPED | OUTPATIENT
Start: 2021-11-16

## 2021-11-16 NOTE — TELEPHONE ENCOUNTER
----- Message from Blanche Lombard, APRN - CNP sent at 11/16/2021 10:47 AM EST -----  Does pt really needs naproxen?

## 2021-11-22 RX ORDER — ERGOCALCIFEROL 1.25 MG/1
CAPSULE ORAL
Qty: 12 CAPSULE | Refills: 0 | Status: SHIPPED | OUTPATIENT
Start: 2021-11-22 | End: 2022-02-28 | Stop reason: SDUPTHER

## 2021-11-29 DIAGNOSIS — R94.31 ABNORMAL EKG: Primary | ICD-10-CM

## 2021-12-02 NOTE — TELEPHONE ENCOUNTER
Rec'd baby asa clearance from Dr Manfred Mccracken. Ok to hold x 7 days. Writer called to inform pt and advise to begin holding baby aspirin today and pt notes she already started. Pt had no further questions.

## 2021-12-09 ENCOUNTER — HOSPITAL ENCOUNTER (OUTPATIENT)
Age: 76
Setting detail: OUTPATIENT SURGERY
Discharge: HOME | End: 2021-12-09
Attending: INTERNAL MEDICINE | Admitting: INTERNAL MEDICINE
Payer: MEDICARE

## 2021-12-09 ENCOUNTER — ANESTHESIA (OUTPATIENT)
Dept: OPERATING ROOM | Age: 76
End: 2021-12-09
Payer: MEDICARE

## 2021-12-09 ENCOUNTER — ANESTHESIA EVENT (OUTPATIENT)
Dept: OPERATING ROOM | Age: 76
End: 2021-12-09
Payer: MEDICARE

## 2021-12-09 VITALS — DIASTOLIC BLOOD PRESSURE: 57 MMHG | OXYGEN SATURATION: 99 % | SYSTOLIC BLOOD PRESSURE: 106 MMHG

## 2021-12-09 VITALS
HEART RATE: 62 BPM | WEIGHT: 129 LBS | DIASTOLIC BLOOD PRESSURE: 62 MMHG | BODY MASS INDEX: 22.86 KG/M2 | TEMPERATURE: 96.8 F | OXYGEN SATURATION: 98 % | RESPIRATION RATE: 18 BRPM | SYSTOLIC BLOOD PRESSURE: 121 MMHG | HEIGHT: 63 IN

## 2021-12-09 LAB
CASE NUMBER:: NORMAL
SPECIMEN DESCRIPTION: NORMAL

## 2021-12-09 PROCEDURE — 2709999900 HC NON-CHARGEABLE SUPPLY: Performed by: INTERNAL MEDICINE

## 2021-12-09 PROCEDURE — 3700000001 HC ADD 15 MINUTES (ANESTHESIA): Performed by: INTERNAL MEDICINE

## 2021-12-09 PROCEDURE — 88305 TISSUE EXAM BY PATHOLOGIST: CPT

## 2021-12-09 PROCEDURE — 88112 CYTOPATH CELL ENHANCE TECH: CPT

## 2021-12-09 PROCEDURE — 6360000002 HC RX W HCPCS: Performed by: NURSE ANESTHETIST, CERTIFIED REGISTERED

## 2021-12-09 PROCEDURE — 2580000003 HC RX 258: Performed by: NURSE ANESTHETIST, CERTIFIED REGISTERED

## 2021-12-09 PROCEDURE — 2500000003 HC RX 250 WO HCPCS: Performed by: NURSE ANESTHETIST, CERTIFIED REGISTERED

## 2021-12-09 PROCEDURE — 43239 EGD BIOPSY SINGLE/MULTIPLE: CPT | Performed by: INTERNAL MEDICINE

## 2021-12-09 PROCEDURE — 2580000003 HC RX 258: Performed by: ANESTHESIOLOGY

## 2021-12-09 PROCEDURE — 3609012400 HC EGD TRANSORAL BIOPSY SINGLE/MULTIPLE: Performed by: INTERNAL MEDICINE

## 2021-12-09 PROCEDURE — 3700000000 HC ANESTHESIA ATTENDED CARE: Performed by: INTERNAL MEDICINE

## 2021-12-09 PROCEDURE — 7100000010 HC PHASE II RECOVERY - FIRST 15 MIN: Performed by: INTERNAL MEDICINE

## 2021-12-09 PROCEDURE — 87206 SMEAR FLUORESCENT/ACID STAI: CPT

## 2021-12-09 RX ORDER — FLUCONAZOLE 100 MG/1
100 TABLET ORAL DAILY
Qty: 42 TABLET | Refills: 1 | Status: SHIPPED | OUTPATIENT
Start: 2021-12-09 | End: 2021-12-16

## 2021-12-09 RX ORDER — ONDANSETRON 2 MG/ML
4 INJECTION INTRAMUSCULAR; INTRAVENOUS
Status: DISCONTINUED | OUTPATIENT
Start: 2021-12-09 | End: 2021-12-09 | Stop reason: HOSPADM

## 2021-12-09 RX ORDER — SODIUM CHLORIDE, SODIUM LACTATE, POTASSIUM CHLORIDE, CALCIUM CHLORIDE 600; 310; 30; 20 MG/100ML; MG/100ML; MG/100ML; MG/100ML
INJECTION, SOLUTION INTRAVENOUS CONTINUOUS
Status: DISCONTINUED | OUTPATIENT
Start: 2021-12-09 | End: 2021-12-09 | Stop reason: HOSPADM

## 2021-12-09 RX ORDER — SODIUM CHLORIDE 0.9 % (FLUSH) 0.9 %
10 SYRINGE (ML) INJECTION EVERY 12 HOURS SCHEDULED
Status: DISCONTINUED | OUTPATIENT
Start: 2021-12-09 | End: 2021-12-09 | Stop reason: HOSPADM

## 2021-12-09 RX ORDER — SODIUM CHLORIDE 9 MG/ML
INJECTION, SOLUTION INTRAVENOUS CONTINUOUS
Status: DISCONTINUED | OUTPATIENT
Start: 2021-12-09 | End: 2021-12-09 | Stop reason: HOSPADM

## 2021-12-09 RX ORDER — SODIUM CHLORIDE 0.9 % (FLUSH) 0.9 %
10 SYRINGE (ML) INJECTION PRN
Status: DISCONTINUED | OUTPATIENT
Start: 2021-12-09 | End: 2021-12-09 | Stop reason: HOSPADM

## 2021-12-09 RX ORDER — LIDOCAINE HYDROCHLORIDE 10 MG/ML
1 INJECTION, SOLUTION EPIDURAL; INFILTRATION; INTRACAUDAL; PERINEURAL
Status: DISCONTINUED | OUTPATIENT
Start: 2021-12-09 | End: 2021-12-09 | Stop reason: HOSPADM

## 2021-12-09 RX ORDER — LIDOCAINE HYDROCHLORIDE 20 MG/ML
INJECTION, SOLUTION INFILTRATION; PERINEURAL PRN
Status: DISCONTINUED | OUTPATIENT
Start: 2021-12-09 | End: 2021-12-09 | Stop reason: SDUPTHER

## 2021-12-09 RX ORDER — PROPOFOL 10 MG/ML
INJECTION, EMULSION INTRAVENOUS PRN
Status: DISCONTINUED | OUTPATIENT
Start: 2021-12-09 | End: 2021-12-09 | Stop reason: SDUPTHER

## 2021-12-09 RX ORDER — SODIUM CHLORIDE, SODIUM LACTATE, POTASSIUM CHLORIDE, CALCIUM CHLORIDE 600; 310; 30; 20 MG/100ML; MG/100ML; MG/100ML; MG/100ML
INJECTION, SOLUTION INTRAVENOUS CONTINUOUS PRN
Status: DISCONTINUED | OUTPATIENT
Start: 2021-12-09 | End: 2021-12-09 | Stop reason: SDUPTHER

## 2021-12-09 RX ORDER — SODIUM CHLORIDE 9 MG/ML
25 INJECTION, SOLUTION INTRAVENOUS PRN
Status: DISCONTINUED | OUTPATIENT
Start: 2021-12-09 | End: 2021-12-09 | Stop reason: HOSPADM

## 2021-12-09 RX ADMIN — PROPOFOL 60 MG: 10 INJECTION, EMULSION INTRAVENOUS at 09:33

## 2021-12-09 RX ADMIN — SODIUM CHLORIDE, POTASSIUM CHLORIDE, SODIUM LACTATE AND CALCIUM CHLORIDE: 600; 310; 30; 20 INJECTION, SOLUTION INTRAVENOUS at 07:58

## 2021-12-09 RX ADMIN — SODIUM CHLORIDE, POTASSIUM CHLORIDE, SODIUM LACTATE AND CALCIUM CHLORIDE: 600; 310; 30; 20 INJECTION, SOLUTION INTRAVENOUS at 09:55

## 2021-12-09 RX ADMIN — SODIUM CHLORIDE, POTASSIUM CHLORIDE, SODIUM LACTATE AND CALCIUM CHLORIDE: 600; 310; 30; 20 INJECTION, SOLUTION INTRAVENOUS at 09:28

## 2021-12-09 RX ADMIN — PROPOFOL 40 MG: 10 INJECTION, EMULSION INTRAVENOUS at 09:37

## 2021-12-09 RX ADMIN — LIDOCAINE HYDROCHLORIDE 50 MG: 20 INJECTION, SOLUTION INFILTRATION; PERINEURAL at 09:33

## 2021-12-09 ASSESSMENT — PULMONARY FUNCTION TESTS
PIF_VALUE: 1

## 2021-12-09 ASSESSMENT — PAIN SCALES - GENERAL
PAINLEVEL_OUTOF10: 0

## 2021-12-09 ASSESSMENT — ENCOUNTER SYMPTOMS: SHORTNESS OF BREATH: 0

## 2021-12-09 NOTE — H&P
Interval H&P Note    Pt Name: Mayra Andrade  MRN: [id number]  YOB: 1945  Date of evaluation: 12/9/2021      [x] I have reviewed the hard copy cardiology progress note by Dr. Kacy Thompson dated 12/1/2021 labeled in short chart which meets the criteria for an Interval History and Physical note. [x] I have examined  Kisha Rock  There are no changes to the patient who is scheduled for a colorectal cancer screening, not high risk and EGD by Dr. Tay  for screening, GERD. Patient has chronic constipation. Patient has abdominal cramping. She denies bloody tarry stools, diarrhea alternating with constipation, nausea, vomiting, dysphagia, abdominal pain or unintentional weight loss. Patient followed bowel prep until watery brown clear. Previous colonoscopy 2007. No previous EGD. No FH colon cancer or polyps. Patient has cough with clear mucus. The patient denies new health changes, fever, chills, wheezing, cough, increased SOB, chest pain, open sores or wounds. Patient received cardiac clearance for scheduled procedure (refer to short chart). Denies hx diabetes. Last ASA 81mg 11/30/2021. Vital signs: /61   Pulse 63   Temp 97.1 °F (36.2 °C) (Temporal)   Resp 16   Ht 5' 3\" (1.6 m)   Wt 129 lb (58.5 kg)   LMP 01/01/2004   SpO2 99%   BMI 22.85 kg/m²     Allergies:  Ceftin [cefuroxime] and Keflex [cephalexin]    Medications:    Prior to Admission medications    Medication Sig Start Date End Date Taking?  Authorizing Provider   vitamin D (ERGOCALCIFEROL) 1.25 MG (93702 UT) CAPS capsule TAKE ONE CAPSULE BY MOUTH ONCE WEEKLY 11/22/21  Yes PASCUAL Israel CNP   naproxen (NAPROSYN) 250 MG tablet TAKE ONE TABLET BY MOUTH THREE TIMES A DAY WITH FOOD AS NEEDED FOR GOUT FLARE UP UNTIL ATTACK SUBSIDES 11/16/21  Yes PASCUAL Israel CNP   simvastatin (ZOCOR) 20 MG tablet TAKE ONE TABLET BY MOUTH DAILY 11/15/21  Yes PASCUAL Israel CNP   verapamil (CALAN SR) 240 MG extended release tablet TAKE ONE TABLET BY MOUTH ONCE NIGHTLY FOR MIGRAINE HEADACHES 10/29/21  Yes PASCUAL Michele CNP   allopurinol (ZYLOPRIM) 100 MG tablet TAKE ONE TABLET BY MOUTH DAILY 10/29/21  Yes PASCUAL Michele CNP   cholestyramine (QUESTRAN) 4 g packet TAKE ONE PACKET BY MOUTH THREE TIMES A DAY WITH MEALS 10/29/21  Yes Miriam Marcus MD   omeprazole (PRILOSEC) 20 MG delayed release capsule TAKE ONE CAPSULE BY MOUTH DAILY 10/14/21  Yes PASCUAL Michele CNP   polyethylene glycol [facility]) 17 GM/SCOOP powder Follow Instructions provided by physician's office 10/6/21  Yes Miriam Marcus MD   bisacodyl (BISACODYL) 5 MG EC tablet Take 4 tablets in the morning 10/6/21  Yes Miriam Marcus MD   fluticasone [facility]) 50 MCG/ACT nasal spray SPRAY TWO SPRAYS IN EACH NOSTRIL ONCE DAILY 9/30/21  Yes PASCUAL Michele CNP   cetirizine (ZYRTEC) 10 MG tablet TAKE ONE TABLET BY MOUTH DAILY 9/9/21  Yes PASCUAL Michele CNP   alendronate (FOSAMAX) 70 MG tablet TAKE 1 TABLET BY MOUTH ONCE WEEKLY ON AN EMPTY STOMACH BEFORE BREAKFAST. REMAIN UPRIGHT FOR 30 MINUTES & TAKE WITH 8 OUNCES OF WATER 8/16/21  Yes PASCULA Michele CNP   famotidine (PEPCID) 20 MG tablet TAKE ONE TABLET BY MOUTH TWICE A DAY 6/9/21  Yes PASCUAL Michele CNP   Psyllium (METAMUCIL PO) Take by mouth   Yes Historical Provider, MD   furosemide (LASIX) 20 MG tablet Take 20 mg by mouth as needed (for swelling)  1/31/21   Historical Provider, MD   aspirin 81 MG EC tablet Take 81 mg by mouth daily     Historical Provider, MD         This is a 67 y.o. female who is pleasant, cooperative, alert and oriented x3, in no acute distress. Heart: Heart sounds are normal.  HR 63 regular rate and rhythm without murmur, gallop or rub. Lungs: Normal respiratory effort with equal expansion, good air exchange, unlabored and clear to auscultation without wheezes or rales bilaterally   Abdomen: soft, nontender, nondistended with bowel sounds active.        Labs:  No results for input(s): HGB, HCT, WBC, MCV, PLT, NA, K, CL, CO2, BUN, CREATININE, GLUCOSE, INR, PROTIME, APTT, AST, ALT, LABALBU, HCG in the last 720 hours. No results for input(s): COVID19 in the last 720 hours.     PASCUAL Cook CNP  Electronically signed 12/9/2021 at 8:10 AM

## 2021-12-09 NOTE — ANESTHESIA POSTPROCEDURE EVALUATION
Department of Anesthesiology  Postprocedure Note    Patient: Mateo Adler  MRN: [id number]  YOB: 1945  Date of evaluation: 12/9/2021  Time:  1:42 PM     Procedure Summary     Date: 12/09/21 Room / Location: [place] [address]    Anesthesia Start: 0467 Anesthesia Stop: 1014    Procedures:       PROCEDURE CANCELED (N/A )      EGD BIOPSY (N/A ) Diagnosis: (DX SCREENING, GERD)    Surgeons: Miriam Marcus MD Responsible Provider: Nghia Curry MD    Anesthesia Type: general, MAC ASA Status: 2          Anesthesia Type: general, MAC    Karie Phase I:      Karie Phase II:      Last vitals: Reviewed and per EMR flowsheets.        Anesthesia Post Evaluation    Complications: no

## 2021-12-09 NOTE — ANESTHESIA PRE PROCEDURE
Department of Anesthesiology  Preprocedure Note       Name:  Ailyn Garcia   Age:  67 y.o.  :  1945                                          MRN:  [id number]         Date:  2021      Surgeon: Chey Dominique):  Yandel Sun MD    Procedure: Procedure(s):  COLORECTAL CANCER SCREENING, NOT HIGH RISK  EGD ESOPHAGOGASTRODUODENOSCOPY    Medications prior to admission:   Prior to Admission medications    Medication Sig Start Date End Date Taking?  Authorizing Provider   vitamin D (ERGOCALCIFEROL) 1.25 MG (23268 UT) CAPS capsule TAKE ONE CAPSULE BY MOUTH ONCE WEEKLY 21  Yes PASCUAL Corley CNP   naproxen (NAPROSYN) 250 MG tablet TAKE ONE TABLET BY MOUTH THREE TIMES A DAY WITH FOOD AS NEEDED FOR GOUT FLARE UP UNTIL ATTACK SUBSIDES 21  Yes PASCUAL Corley CNP   simvastatin (ZOCOR) 20 MG tablet TAKE ONE TABLET BY MOUTH DAILY 11/15/21  Yes PASCUAL Corley CNP   verapamil (CALAN SR) 240 MG extended release tablet TAKE ONE TABLET BY MOUTH ONCE NIGHTLY FOR MIGRAINE HEADACHES 10/29/21  Yes PASCUAL Corley CNP   allopurinol (ZYLOPRIM) 100 MG tablet TAKE ONE TABLET BY MOUTH DAILY 10/29/21  Yes PASCUAL Corley CNP   cholestyramine (QUESTRAN) 4 g packet TAKE ONE PACKET BY MOUTH THREE TIMES A DAY WITH MEALS 10/29/21  Yes Yandel Sun MD   omeprazole (PRILOSEC) 20 MG delayed release capsule TAKE ONE CAPSULE BY MOUTH DAILY 10/14/21  Yes PASCUAL Corley CNP   polyethylene glycol [facility]) 17 GM/SCOOP powder Follow Instructions provided by physician's office 10/6/21  Yes Yandel Sun MD   bisacodyl (BISACODYL) 5 MG EC tablet Take 4 tablets in the morning 10/6/21  Yes Yandel Sun MD   fluticasone (FLONASE) 50 MCG/ACT nasal spray SPRAY TWO SPRAYS IN EACH NOSTRIL ONCE DAILY 21  Yes PASCUAL Corley CNP   cetirizine (ZYRTEC) 10 MG tablet TAKE ONE TABLET BY MOUTH DAILY 21  Yes Cardenas , APRN - CNP   alendronate (FOSAMAX) 70 MG tablet TAKE 1 TABLET BY MOUTH ONCE WEEKLY ON AN EMPTY STOMACH BEFORE BREAKFAST. REMAIN UPRIGHT FOR 30 MINUTES & TAKE WITH 8 OUNCES OF WATER 8/16/21  Yes PASCUAL Ellington CNP   famotidine (PEPCID) 20 MG tablet TAKE ONE TABLET BY MOUTH TWICE A DAY 6/9/21  Yes PASCUAL Ellington CNP   Psyllium (METAMUCIL PO) Take by mouth   Yes Historical Provider, MD   furosemide (LASIX) 20 MG tablet Take 20 mg by mouth as needed (for swelling)  1/31/21   Historical Provider, MD   aspirin 81 MG EC tablet Take 81 mg by mouth daily     Historical Provider, MD       Current medications:    Current Facility-Administered Medications   Medication Dose Route Frequency Provider Last Rate Last Admin    0.9 % sodium chloride infusion   IntraVENous Continuous Katelynn Rodriguez MD        lactated ringers infusion   IntraVENous Continuous Katelynn Rodriguez MD        sodium chloride flush 0.9 % injection 10 mL  10 mL IntraVENous 2 times per day Joie Sherwood MD        sodium chloride flush 0.9 % injection 10 mL  10 mL IntraVENous PRN Katelnyn Rodriguez MD        0.9 % sodium chloride infusion  25 mL IntraVENous PRN Katelynn Rodriguez MD        lidocaine PF 1 % injection 1 mL  1 mL IntraDERmal Once PRN Joie Sherwood MD           Allergies:     Allergies   Allergen Reactions    Ceftin [Cefuroxime] Diarrhea    Keflex [Cephalexin] Diarrhea       Problem List:    Patient Active Problem List   Diagnosis Code    Allergic rhinitis J30.9    Osteoarthritis M19.90    Osteoporosis M81.0    GERD (gastroesophageal reflux disease) K21.9    Headache R51.9    Mixed hyperlipidemia E78.2    Gout M10.9    Vitamin D deficiency E55.9    Migraine G43.909    Chronic headache R51.9, G89.29    Anemia D64.9    Lymphadenopathy R59.1    Degenerative arthritis of knee, bilateral M17.0    Primary osteoarthritis of left knee M17.12    Primary osteoarthritis of right knee M17.11    Cholecystitis K81.9    Elevated LFTs R79.89    Abnormal MRI of the abdomen R93.5    Weight loss R63.4    Abdominal pain, right upper quadrant R10.11    Abdominal cramping R10.9    Diarrhea R19.7    Abdominal pain, generalized R10.84    Abdominal bloating R14.0    Irritable bowel syndrome with constipation K58.1    Status post endoscopic retrograde cholangiopancreatography D71.402    Biliary colic E30.55    C. difficile colitis A04.72    S/P cholecystectomy Z90.49    Chronic diastolic congestive heart failure (HCC) I50.32    Hypokalemia E87.6    Hypomagnesemia E83.42    Gastroesophageal reflux disease K21.9       Past Medical History:        Diagnosis Date    Allergic rhinitis     Closed tibia fracture     GERD (gastroesophageal reflux disease)     Gout     Headache(784.0)     h/o migraines    Hyperlipidemia     Osteoarthritis     DJD Lt knee    Osteoporosis     Posterior tibial tendon dysfunction     right, wears braces on both legs    Vitamin D deficient rickets     Wears glasses     Wears partial dentures     lower       Past Surgical History:        Procedure Laterality Date    APPENDECTOMY      CARPAL TUNNEL RELEASE      bilateral    CATARACT REMOVAL WITH IMPLANT [facility] 03/07/2019    [facility]    CATARACT REMOVAL WITH IMPLANT Right 04/04/2019    [facility]    CHOLECYSTECTOMY, LAPAROSCOPIC N/A 11/24/2020    CHOLECYSTECTOMY LAPAROSCOPIC ROBOTIC XI MULTIPORT performed by George Lundy MD at [address] [place] [address] Rd      2007? per pt wtih polyps    ERCP N/A 10/14/2020    ERCP WITH BILE DUCT BRUSHING performed by Danni Selby MD at [address] [place] [address]      left tibia    HYSTERECTOMY  4/12    INTRACAPSULAR CATARACT EXTRACTION Left 3/7/2019    EYE CATARACT EMULSIFICATION IOL IMPLANT - TOPICAL performed by Mark Clifford MD at [address] [facility] Right 4/4/2019    EYE CATARACT EMULSIFICATION IOL IMPLANT performed by Mark Clifford MD at [address] [facility] Left 11/06/2018    hardware removal and then TKA    KNEE ARTHROSCOPY Left 2002?     MA TOTAL KNEE ARTHROPLASTY Left 2018    KNEE TOTAL ARTHROPLASTY W/REMOVAL HARDWARE PLATE & SCREWS performed by Huyen Dozier MD at [address] [facility] [address] Left     TONSILLECTOMY AND ADENOIDECTOMY      TOTAL KNEE ARTHROPLASTY Right 12/10/2019    KNEE TOTAL ARTHROPLASTY performed by Huyen Dozier MD at [address] [facility] [address] [facility]         Social History:    Social History     Tobacco Use    Smoking status: Former Smoker     Packs/day: 0.50     Years: 10.00     Pack years: 5.00     Types: Cigarettes     Quit date: 1987     Years since quittin.8    Smokeless tobacco: Never Used   Substance Use Topics    Alcohol use: Not Currently     Alcohol/week: 0.0 standard drinks     Comment: rare                                Counseling given: Not Answered      Vital Signs (Current):   Vitals:    21 0739 21 0741   BP:  126/61   Pulse:  63   Resp:  16   Temp:  97.1 °F (36.2 °C)   TempSrc:  Temporal   SpO2:  99%   Weight: 129 lb (58.5 kg)    Height: 5' 3\" (1.6 m)                                               BP Readings from Last 3 Encounters:   21 126/61   10/06/21 110/66   21 108/64       NPO Status:                                                                                 BMI:   Wt Readings from Last 3 Encounters:   21 129 lb (58.5 kg)   10/27/21 135 lb 3.2 oz (61.3 kg)   10/06/21 134 lb 9.6 oz (61.1 kg)     Body mass index is 22.85 kg/m².     CBC:   Lab Results   Component Value Date    WBC 4.2 2021    RBC 4.02 2021    RBC 2.91 2012    HGB 11.8 2021    HCT 35.9 2021    MCV 89.3 2021    RDW 15.0 2021     2021     2012       CMP:   Lab Results   Component Value Date     2021    K 4.7 2021     2021    CO2 26 2021    BUN 20 2021    CREATININE 0.72 2021    GFRAA >60 2021    LABGLOM >60 2021    GLUCOSE 90 2021    GLUCOSE 84 2012 PROT 6.8 03/13/2021    CALCIUM 9.3 06/11/2021    BILITOT 0.88 03/13/2021    ALKPHOS 72 03/13/2021    AST 28 03/13/2021    ALT 18 03/13/2021       POC Tests: No results for input(s): POCGLU, POCNA, POCK, POCCL, POCBUN, POCHEMO, POCHCT in the last 72 hours. Coags:   Lab Results   Component Value Date    PROTIME 13.1 11/23/2020    INR 1.0 11/23/2020    APTT 26.0 10/12/2020       HCG (If Applicable): No results found for: PREGTESTUR, PREGSERUM, HCG, HCGQUANT     ABGs: No results found for: PHART, PO2ART, CZJ2ILB, DAW6UYB, BEART, R1QEUEXS     Type & Screen (If Applicable):  No results found for: LABABO, LABRH    Drug/Infectious Status (If Applicable):  No results found for: HIV, HEPCAB    COVID-19 Screening (If Applicable):   Lab Results   Component Value Date    COVID19 Not Detected 11/20/2020           Anesthesia Evaluation    Airway: Mallampati: I  TM distance: >3 FB   Neck ROM: full  Mouth opening: > = 3 FB Dental:          Pulmonary:       (-) shortness of breath                           Cardiovascular:    (+) CHF:,                   Neuro/Psych:               GI/Hepatic/Renal:             Endo/Other:                     Abdominal:             Vascular:           Other Findings:             Anesthesia Plan      general     ASA 2                                 Ashwin Hardy MD   12/9/2021

## 2021-12-10 LAB
DIRECT EXAM: ABNORMAL
Lab: ABNORMAL
SPECIMEN DESCRIPTION: ABNORMAL
SURGICAL PATHOLOGY REPORT: NORMAL
SURGICAL PATHOLOGY REPORT: NORMAL

## 2021-12-14 ENCOUNTER — TELEPHONE (OUTPATIENT)
Dept: GASTROENTEROLOGY | Age: 76
End: 2021-12-14

## 2021-12-14 NOTE — PLAN OF CARE
Problem: Pain:  Goal: Pain level will decrease  Description: Pain level will decrease  10/13/2020 1657 by Ava Hedrick RN  Outcome: Ongoing  Note: Pt medicated with pain medication prn. Assessed all pain characteristics including level, type, location, frequency, and onset. Non-pharmacologic interventions offered to pt as well. Pt states pain is tolerable at this time. Will continue to monitor. 10/13/2020 0414 by Liliana Taveras RN  Outcome: Ongoing  Note: Full pain assessment completed this shift. Pt pain adequately controlled with PRN medication and rest. Pt educated on nonpharmacologic interventions to help decrease pain. Will continue to monitor. Goal: Control of acute pain  Description: Control of acute pain  10/13/2020 0414 by Liliana Taveras RN  Outcome: Ongoing  Goal: Control of chronic pain  Description: Control of chronic pain  10/13/2020 0414 by Liliana Taveras RN  Outcome: Ongoing     Problem: Falls - Risk of:  Goal: Will remain free from falls  Description: Will remain free from falls  10/13/2020 1657 by Ava Hedrick RN  Outcome: Met This Shift  Note: No falls noted this shift. Patient ambulates independently without difficulty. Bed kept in low position. Safe environment maintained. Bedside table & call light in reach. Uses call light appropriately when needing assistance. 10/13/2020 0414 by Liliana Taveras RN  Outcome: Ongoing  Note: Pt remains free from falls this shift. Bed in lowest position with wheels locked and 2/4 siderails up. Call light and bedside table within reach. Nonskid socks on. Will continue to monitor.    Goal: Absence of physical injury  Description: Absence of physical injury  10/13/2020 0414 by Liliana Taveras RN  Outcome: Ongoing     Problem: Physical Regulation:  Goal: Will remain free from infection  Description: Will remain free from infection  10/13/2020 0414 by Liliana Taveras RN  Outcome: Ongoing psychosis

## 2021-12-14 NOTE — TELEPHONE ENCOUNTER
Patient called asking for colonoscopy results .      Patient was informed that I will be asking provider about labs tomorrow as he is already gone for the night       Left voice mail for return call / Spoke with provider and he stated that patient was given medication for infection for 21 days when medication is gone patient will neel to follow up with him

## 2021-12-15 NOTE — TELEPHONE ENCOUNTER
Staci Umana from [employer] needed clairification on patients Fluonazole / diflucan 100 mg     Script states 1 tablet for 7 days , however 42 tablets were sent with 1 refill. I returned pharmacies call and informed provider will be in this afternoon and I will call back with clairification on medication. [phone] [place]    This has been addressed 1 tablet for 7 days / corrected.  Per provider Dr. Shant Lees

## 2021-12-23 RX ORDER — FLUTICASONE PROPIONATE 50 MCG
SPRAY, SUSPENSION (ML) NASAL
Qty: 1 EACH | Refills: 1 | Status: SHIPPED | OUTPATIENT
Start: 2021-12-23 | End: 2022-03-14

## 2021-12-27 RX ORDER — FLUCONAZOLE 100 MG/1
TABLET ORAL
Qty: 7 TABLET | OUTPATIENT
Start: 2021-12-27

## 2022-01-11 DIAGNOSIS — K21.9 GASTROESOPHAGEAL REFLUX DISEASE WITHOUT ESOPHAGITIS: ICD-10-CM

## 2022-01-11 RX ORDER — OMEPRAZOLE 20 MG/1
CAPSULE, DELAYED RELEASE ORAL
Qty: 90 CAPSULE | Refills: 0 | Status: SHIPPED | OUTPATIENT
Start: 2022-01-11 | End: 2022-02-25 | Stop reason: SDUPTHER

## 2022-01-19 ENCOUNTER — OFFICE VISIT (OUTPATIENT)
Dept: GASTROENTEROLOGY | Age: 77
End: 2022-01-19
Payer: MEDICARE

## 2022-01-19 VITALS
TEMPERATURE: 97.3 F | HEART RATE: 67 BPM | WEIGHT: 134 LBS | DIASTOLIC BLOOD PRESSURE: 64 MMHG | BODY MASS INDEX: 23.74 KG/M2 | SYSTOLIC BLOOD PRESSURE: 113 MMHG

## 2022-01-19 DIAGNOSIS — K21.9 GASTROESOPHAGEAL REFLUX DISEASE, UNSPECIFIED WHETHER ESOPHAGITIS PRESENT: ICD-10-CM

## 2022-01-19 DIAGNOSIS — K58.0 IRRITABLE BOWEL SYNDROME WITH DIARRHEA: ICD-10-CM

## 2022-01-19 DIAGNOSIS — B37.81 CANDIDA ESOPHAGITIS (HCC): Primary | ICD-10-CM

## 2022-01-19 PROCEDURE — 99214 OFFICE O/P EST MOD 30 MIN: CPT | Performed by: INTERNAL MEDICINE

## 2022-01-19 RX ORDER — CHOLESTYRAMINE 4 G/9G
POWDER, FOR SUSPENSION ORAL
Qty: 90 PACKET | Refills: 2 | Status: SHIPPED | OUTPATIENT
Start: 2022-01-19 | End: 2022-06-02 | Stop reason: SDUPTHER

## 2022-01-19 ASSESSMENT — ENCOUNTER SYMPTOMS
BACK PAIN: 0
CHOKING: 0
NAUSEA: 0
ALLERGIC/IMMUNOLOGIC NEGATIVE: 1
SORE THROAT: 0
ABDOMINAL PAIN: 0
CONSTIPATION: 0
VOICE CHANGE: 0
RECTAL PAIN: 0
ANAL BLEEDING: 0
GASTROINTESTINAL NEGATIVE: 1
COUGH: 1
TROUBLE SWALLOWING: 0
DIARRHEA: 0
BLOOD IN STOOL: 0
SHORTNESS OF BREATH: 0
VOMITING: 0
ABDOMINAL DISTENTION: 0

## 2022-01-19 NOTE — PROGRESS NOTES
GI OFFICE FOLLOW UP    Sun Davies is a 67 y.o. female evaluated via on 1/19/2022. Consent:  She and/or health care decision maker is aware that that she may receive a bill for this telephone service, depending on her insurance coverage, and has provided verbal consent to proceed: YES      INTERVAL HISTORY:   No referring provider defined for this encounter. Chief Complaint   Patient presents with    Follow Up After Procedure     Patient here to follow up on her EGD and Colonoscopy. Patient states she has been feeling since her procedures        1. Candida esophagitis ([address]    2. Gastroesophageal reflux disease, unspecified whether esophagitis present    3. Irritable bowel syndrome with diarrhea       The patient is here as a follow up of her recent GI procedure. The results have been sent to you separately   The findings were explained to the patient in detail and biopsies were also discussed   with her    She had a recent upper endoscopy and was found to have Candida esophagitis Diflucan was given for 3 weeks and she is feeling much better now    Apparently was not able to drink the prep for colonoscopy she has history for chronic diarrhea Questran seems to be helping she denies having any rectal bleeding any melanotic stools no smoking alcohol abuse illicit drug use no known family history for colon cancer      Denies any dysphagia nausea vomiting hematemesis    She likes to wait for the colonoscopy at this time        HISTORY OF PRESENT ILLNESS: Veena Eaton is a 67 y.o. female with a past history remarkable for , referred for evaluation of   Chief Complaint   Patient presents with    Follow Up After Procedure     Patient here to follow up on her EGD and Colonoscopy. Patient states she has been feeling since her procedures    .     Past Medical,Family, and Social History reviewed and does contribute to the patient presenting condition. Patient's PMH/PSH,SH,PSYCH Hx, MEDs, ALLERGIES, and ROS were all reviewed and updated in the appropriate sections. PAST MEDICAL HISTORY:  Past Medical History:   Diagnosis Date    Allergic rhinitis     Closed tibia fracture     GERD (gastroesophageal reflux disease)     Gout     Headache(784.0)     h/o migraines    Hyperlipidemia     Osteoarthritis     DJD Lt knee    Osteoporosis     Posterior tibial tendon dysfunction     right, wears braces on both legs    Vitamin D deficient rickets     Wears glasses     Wears partial dentures     lower       Past Surgical History:   Procedure Laterality Date    APPENDECTOMY      CARPAL TUNNEL RELEASE      bilateral    CATARACT REMOVAL WITH IMPLANT [facility] 03/07/2019    [facility]    CATARACT REMOVAL WITH IMPLANT Right 04/04/2019    [facility]    CHOLECYSTECTOMY, LAPAROSCOPIC N/A 11/24/2020    CHOLECYSTECTOMY LAPAROSCOPIC ROBOTIC XI MULTIPORT performed by Ha [facility] at [facility]      2007? per pt wtih polyps    ERCP N/A 10/14/2020    ERCP WITH BILE DUCT BRUSHING performed by Luisa Wade MD at [address] [place] [address]      left tibia    HYSTERECTOMY  4/12    INTRACAPSULAR CATARACT EXTRACTION Left 3/7/2019    EYE CATARACT EMULSIFICATION IOL IMPLANT - TOPICAL performed by Rito Branch MD at [address] [place] [address] Right 4/4/2019    EYE CATARACT EMULSIFICATION IOL IMPLANT performed [facility]ito [facility] MD at [facility] Left 11/06/2018    hardware removal and then TKA    KNEE ARTHROSCOPY Left 2002?     MO TOTAL KNEE ARTHROPLASTY Left 11/6/2018    KNEE TOTAL ARTHROPLASTY W/REMOVAL HARDWARE PLATE & SCREWS performed by Chaya Benoit MD at [address] [place] [address] Left     TONSILLECTOMY AND ADENOIDECTOMY      TOTAL KNEE ARTHROPLASTY Right 12/10/2019    KNEE TOTAL ARTHROPLASTY performed by Tan Erwin MD at [address] N/A 12/9/2021    EGD BIOPSY performed by Adi Carty MD at [address]:    Current Outpatient Medications:     cholestyramine (QUESTRAN) 4 g packet, TAKE ONE PACKET BY MOUTH THREE TIMES A DAY WITH MEALS, Disp: 90 packet, Rfl: 2    omeprazole (PRILOSEC) 20 MG delayed release capsule, TAKE ONE CAPSULE BY MOUTH DAILY, Disp: 90 capsule, Rfl: 0    fluticasone (FLONASE) 50 MCG/ACT nasal spray, SPRAY TWO SPRAYS IN EACH NOSTRIL ONCE DAILY, Disp: 1 each, Rfl: 1    vitamin D (ERGOCALCIFEROL) 1.25 MG (30474 UT) CAPS capsule, TAKE ONE CAPSULE BY MOUTH ONCE WEEKLY, Disp: 12 capsule, Rfl: 0    naproxen (NAPROSYN) 250 MG tablet, TAKE ONE TABLET BY MOUTH THREE TIMES A DAY WITH FOOD AS NEEDED FOR GOUT FLARE UP UNTIL ATTACK SUBSIDES, Disp: 60 tablet, Rfl: 0    simvastatin (ZOCOR) 20 MG tablet, TAKE ONE TABLET BY MOUTH DAILY, Disp: 90 tablet, Rfl: 1    verapamil (CALAN SR) 240 MG extended release tablet, TAKE ONE TABLET BY MOUTH ONCE NIGHTLY FOR MIGRAINE HEADACHES, Disp: 90 tablet, Rfl: 1    allopurinol (ZYLOPRIM) 100 MG tablet, TAKE ONE TABLET BY MOUTH DAILY, Disp: 90 tablet, Rfl: 1    polyethylene glycol (GLYCOLAX) 17 GM/SCOOP powder, Follow Instructions provided by physician's office, Disp: 238 g, Rfl: 0    bisacodyl (BISACODYL) 5 MG EC tablet, Take 4 tablets in the morning, Disp: 4 tablet, Rfl: 0    cetirizine (ZYRTEC) 10 MG tablet, TAKE ONE TABLET BY MOUTH DAILY, Disp: 90 tablet, Rfl: 1    alendronate (FOSAMAX) 70 MG tablet, TAKE 1 TABLET BY MOUTH ONCE WEEKLY ON AN EMPTY STOMACH BEFORE BREAKFAST.  REMAIN UPRIGHT FOR 30 MINUTES & TAKE WITH 8 OUNCES OF WATER, Disp: 12 tablet, Rfl: 1    famotidine (PEPCID) 20 MG tablet, TAKE ONE TABLET BY MOUTH TWICE A DAY, Disp: 180 tablet, Rfl: 1    furosemide (LASIX) 20 MG tablet, Take 20 mg by mouth as needed (for swelling) , Disp: , Rfl:     Psyllium (METAMUCIL PO), Take by mouth, Disp: , Rfl:     aspirin 81 MG EC tablet, Take 81 mg by mouth daily , Disp: , Rfl:     ALLERGIES:   Allergies   Allergen Reactions    Ceftin [Cefuroxime] Diarrhea    Keflex [Cephalexin] Diarrhea       FAMILY HISTORY:       Problem Relation Age of Onset    Arthritis Other     Thyroid Disease Other     COPD Other     Stroke Father          SOCIAL HISTORY:   Social History     Socioeconomic History    Marital status:      Spouse name: Not on file    Number of children: Not on file    Years of education: Not on file    Highest education level: Not on file   Occupational History    Not on file   Tobacco Use    Smoking status: Former Smoker     Packs/day: 0.50     Years: 10.00     Pack years: 5.00     Types: Cigarettes     Quit date: 1987     Years since quittin.9    Smokeless tobacco: Never Used   Vaping Use    Vaping Use: Never used   Substance and Sexual Activity    Alcohol use: Not Currently     Alcohol/week: 0.0 standard drinks     Comment: rare    Drug use: No    Sexual activity: Not Currently   Other Topics Concern    Not on file   Social History Narrative    Not on file     Social Determinants of Health     Financial Resource Strain: Low Risk     Difficulty of Paying Living Expenses: Not hard at all   Food Insecurity: No Food Insecurity    Worried About Running Out of Food in the Last Year: Never true    Jerry of Food in the Last Year: Never true   Transportation Needs: No Transportation Needs    Lack of Transportation (Medical): No    Lack of Transportation (Non-Medical):  No   Physical Activity:     Days of Exercise per Week: Not on file    Minutes of Exercise per Session: Not on file   Stress:     Feeling of Stress : Not on file   Social Connections:     Frequency of Communication with Friends and Family: Not on file    Frequency of Social Gatherings with Friends and Family: Not on file    Attends Congregational Services: Not on file   [place] Active Member of Clubs or Organizations: Not on file    Attends Club or Organization Meetings: Not on file    Marital Status: Not on file   Intimate Partner Violence:     Fear of Current or Ex-Partner: Not on file    Emotionally Abused: Not on file    Physically Abused: Not on file    Sexually Abused: Not on file   Housing Stability:     Unable to Pay for Housing in the Last Year: Not on file    Number of [place] in the Last Year: Not on file    Unstable Housing in the Last Year: Not on file         REVIEW OF SYSTEMS:         Review of Systems   Constitutional: Negative for appetite change, fatigue and unexpected weight change. HENT: Positive for postnasal drip. Negative for sore throat, trouble swallowing and voice change. Eyes: Positive for visual disturbance. Respiratory: Positive for cough. Negative for choking and shortness of breath. Cardiovascular: Negative. Negative for chest pain and leg swelling. Gastrointestinal: Negative. Negative for abdominal distention, abdominal pain, anal bleeding, blood in stool, constipation, diarrhea, nausea, rectal pain and vomiting. Endocrine: Negative. Genitourinary: Negative. Negative for difficulty urinating. Musculoskeletal: Positive for arthralgias. Negative for back pain, joint swelling and myalgias. Skin: Negative. Allergic/Immunologic: Negative. Neurological: Negative. Negative for dizziness, tremors, weakness, light-headedness, numbness and headaches. Hematological: Does not bruise/bleed easily. Psychiatric/Behavioral: Negative. Negative for sleep disturbance. The patient is not nervous/anxious. PHYSICAL EXAMINATION: Vital signs reviewed per the nursing documentation. /64   Pulse 67   Temp 97.3 °F (36.3 °C)   Wt 134 lb (60.8 kg)   LMP 01/01/2004   BMI 23.74 kg/m²   Body mass index is 23.74 kg/m². Physical Exam  Nursing note reviewed. Constitutional:       Appearance: She is well-developed. Comments: Anxious    HENT:      Head: Normocephalic and atraumatic. Eyes:      Conjunctiva/sclera: Conjunctivae normal.      Pupils: Pupils are equal, round, and reactive to light. Cardiovascular:      Heart sounds: Normal heart sounds. Pulmonary:      Effort: Pulmonary effort is normal.      Breath sounds: Normal breath sounds. Abdominal:      General: Bowel sounds are normal.      Palpations: Abdomen is soft. Comments: NON TENDER, NON DISTENTED  LIVER SPLEEN AND HERNIAS ARE NOT  PALPABLE  BOWEL SOUNDS ARE POSITIVE      Musculoskeletal:         General: Normal range of motion. Cervical back: Normal range of motion and neck supple. Skin:     General: Skin is warm. Neurological:      Mental Status: She is alert and oriented to person, place, and time. Psychiatric:         Behavior: Behavior normal.           LABORATORY DATA: Reviewed  Lab Results   Component Value Date    WBC 4.2 06/11/2021    HGB 11.8 (L) 06/11/2021    HCT 35.9 (L) 06/11/2021    MCV 89.3 06/11/2021     06/11/2021     06/11/2021    K 4.7 06/11/2021     (H) 06/11/2021    CO2 26 06/11/2021    BUN 20 06/11/2021    CREATININE 0.72 06/11/2021    LABPROT 6.9 11/19/2012    LABALBU 3.5 03/13/2021    BILITOT 0.88 03/13/2021    ALKPHOS 72 03/13/2021    AST 28 03/13/2021    ALT 18 03/13/2021    INR 1.0 11/23/2020         Lab Results   Component Value Date    RBC 4.02 06/11/2021    HGB 11.8 (L) 06/11/2021    MCV 89.3 06/11/2021    MCH 29.3 06/11/2021    MCHC 32.8 06/11/2021    RDW 15.0 (H) 06/11/2021    MPV 8.0 06/11/2021    BASOPCT 0 03/13/2021    LYMPHSABS 0.38 (L) 03/13/2021    MONOSABS 0.51 03/13/2021    NEUTROABS 8.63 03/13/2021    EOSABS 0.00 03/13/2021    BASOSABS 0.00 03/13/2021         DIAGNOSTIC TESTING:     No results found. Assessment  1. Candida esophagitis ([facility] [address]    2. Gastroesophageal reflux disease, unspecified whether esophagitis present    3.  Irritable bowel syndrome with diarrhea colors or chemicals. Stress was given about regular exercise. Pt has verbalized understanding and agreement to these modifications. The patient was instructed to start taking some OTC Probiotics products   These are available over the counter at the Pharmacy stores and Grocery stores  He was explained about the beneficial effects they have in the GI track  They will help to establish the good bacterial paula and will help with the digestion and bowel movements  The patient has verbalized understanding and agreement to this plan    More than half of patient's clinic visit time was spent in counseling about lifestyle and dietary modifications  Patient's  questions were answered in this regard as well  The patient has verbalized understanding and agreement     I communicated with the patient and/or health care decision maker about   Details of this discussion including any medical advice provided:YES      I affirm this is a Patient Initiated Episode with an Established Patient who has not had a related appointment within my department in the past 7 days or scheduled within the next 24 hours. Total Time: minutes: 21-30 minutes    Note: not billable if this call serves to triage the patient into an appointment for the relevant concern      Thank you for allowing me to participate in the care of Ms. Ana Brooks. For any further questions please do not hesitate to contact me. I have reviewed and agree with the ROS entered by the MA/LPN.          Nilam Bedolla MD, [facility]  Board Certified in Gastroenterology and [address] [place] [address] Gastroenterology  Office #: [phone]

## 2022-01-26 ENCOUNTER — OFFICE VISIT (OUTPATIENT)
Dept: FAMILY MEDICINE CLINIC | Age: 77
End: 2022-01-26
Payer: MEDICARE

## 2022-01-26 VITALS
TEMPERATURE: 97.3 F | DIASTOLIC BLOOD PRESSURE: 62 MMHG | HEART RATE: 68 BPM | HEIGHT: 63 IN | WEIGHT: 133.8 LBS | OXYGEN SATURATION: 98 % | BODY MASS INDEX: 23.71 KG/M2 | SYSTOLIC BLOOD PRESSURE: 118 MMHG

## 2022-01-26 DIAGNOSIS — E55.9 VITAMIN D DEFICIENCY: ICD-10-CM

## 2022-01-26 DIAGNOSIS — K21.9 GASTROESOPHAGEAL REFLUX DISEASE WITHOUT ESOPHAGITIS: ICD-10-CM

## 2022-01-26 DIAGNOSIS — I10 ESSENTIAL HYPERTENSION: Primary | ICD-10-CM

## 2022-01-26 DIAGNOSIS — E78.2 MIXED HYPERLIPIDEMIA: ICD-10-CM

## 2022-01-26 PROCEDURE — 99214 OFFICE O/P EST MOD 30 MIN: CPT | Performed by: NURSE PRACTITIONER

## 2022-01-26 ASSESSMENT — PATIENT HEALTH QUESTIONNAIRE - PHQ9
SUM OF ALL RESPONSES TO PHQ QUESTIONS 1-9: 0
8. MOVING OR SPEAKING SO SLOWLY THAT OTHER PEOPLE COULD HAVE NOTICED. OR THE OPPOSITE, BEING SO FIGETY OR RESTLESS THAT YOU HAVE BEEN MOVING AROUND A LOT MORE THAN USUAL: 0
5. POOR APPETITE OR OVEREATING: 0
SUM OF ALL RESPONSES TO PHQ QUESTIONS 1-9: 0
9. THOUGHTS THAT YOU WOULD BE BETTER OFF DEAD, OR OF HURTING YOURSELF: 0
6. FEELING BAD ABOUT YOURSELF - OR THAT YOU ARE A FAILURE OR HAVE LET YOURSELF OR YOUR FAMILY DOWN: 0
7. TROUBLE CONCENTRATING ON THINGS, SUCH AS READING THE NEWSPAPER OR WATCHING TELEVISION: 0
1. LITTLE INTEREST OR PLEASURE IN DOING THINGS: 0
3. TROUBLE FALLING OR STAYING ASLEEP: 0
2. FEELING DOWN, DEPRESSED OR HOPELESS: 0
SUM OF ALL RESPONSES TO PHQ QUESTIONS 1-9: 0
SUM OF ALL RESPONSES TO PHQ9 QUESTIONS 1 & 2: 0
SUM OF ALL RESPONSES TO PHQ QUESTIONS 1-9: 0
10. IF YOU CHECKED OFF ANY PROBLEMS, HOW DIFFICULT HAVE THESE PROBLEMS MADE IT FOR YOU TO DO YOUR WORK, TAKE CARE OF THINGS AT HOME, OR GET ALONG WITH OTHER PEOPLE: 0
4. FEELING TIRED OR HAVING LITTLE ENERGY: 0

## 2022-01-26 NOTE — PROGRESS NOTES
Subjective:      Patient ID: Dejon Norton is a 67 y.o. female. Visit Information    Have you changed or started any medications since your last visit including any over-the-counter medicines, vitamins, or herbal medicines? no   Are you having any side effects from any of your medications? -  no  Have you stopped taking any of your medications? Is so, why? -  no    Have you seen any other physician or provider since your last visit? No  Have you had any other diagnostic tests since your last visit? No  Have you been seen in the emergency room and/or had an admission to a hospital since we last saw you? No  Have you had your routine dental cleaning in the past 6 months? no    Have you activated your [employer] account? If not, what are your barriers? Yes     Patient Care Team:  Bon Segal MD as PCP - General (Family Medicine)  Bon Segal MD as PCP - [facility]  Carolina Rossi MD as Consulting Physician (Gastroenterology)    Medical History Review  Past Medical, Family, and Social History reviewed and does not contribute to the patient presenting condition    Health Maintenance   Topic Date Due    DTaP/Tdap/Td vaccine (1 - Tdap) Never done    Shingles Vaccine (1 of 2) Never done    Lipid screen  06/11/2022    Potassium monitoring  06/11/2022    Creatinine monitoring  06/11/2022    Depression Screen  09/22/2022    Annual Wellness Visit (AWV)  09/23/2022    DEXA (modify frequency per FRAX score)  Completed    Flu vaccine  Completed    Pneumococcal 65+ years Vaccine  Completed    COVID-19 Vaccine  Completed    Hepatitis A vaccine  Aged Out    Hepatitis B vaccine  Aged Out    Hib vaccine  Aged Out    Meningococcal (ACWY) vaccine  Aged Out    Hepatitis C screen  Discontinued     HPI     67year old female presents with management of HTN HLD vit d deficiency and GERD with medication refills. Currently Is on dual therapy for bp and it is stable today.  Is on statin and tolerates it well. States prilosec works well for acid reflux. Denies fever chills cough sob cp or nv abd pain. Hx of gout stable with uric acid lowering agent. Review of Systems   Constitutional: Negative for chills and fever. Respiratory: Negative for shortness of breath and wheezing. Cardiovascular: Negative for chest pain and leg swelling. Gastrointestinal: Negative for abdominal pain, nausea and vomiting. Neurological: Negative for dizziness, weakness and numbness. Psychiatric/Behavioral: Negative for agitation and behavioral problems. Objective:   Physical Exam  Vitals and nursing note reviewed. Constitutional:       General: She is not in acute distress. Appearance: Normal appearance. HENT:      Nose: Nose normal.   Eyes:      Conjunctiva/sclera: Conjunctivae normal.   Cardiovascular:      Rate and Rhythm: Normal rate and regular rhythm. Heart sounds: Normal heart sounds. Pulmonary:      Effort: Pulmonary effort is normal. No respiratory distress. Breath sounds: Normal breath sounds. Abdominal:      Palpations: Abdomen is soft. Tenderness: There is no abdominal tenderness. Musculoskeletal:         General: Normal range of motion. Cervical back: Neck supple. Lymphadenopathy:      Cervical: No cervical adenopathy. Skin:     General: Skin is warm and dry. Neurological:      Mental Status: She is alert and oriented to person, place, and time. Cranial Nerves: No cranial nerve deficit. Psychiatric:         Mood and Affect: Mood normal.         Behavior: Behavior normal.         Assessment:       1. Essential hypertension    2. Mixed hyperlipidemia    3. Vitamin D deficiency    4.  Gastroesophageal reflux disease without esophagitis            Plan:      BP Readings from Last 3 Encounters:   01/26/22 118/62   01/19/22 113/64   12/09/21 121/62     /62   Pulse 68   Temp 97.3 °F (36.3 °C) (Infrared)   Ht 5' 3\" (1.6 m)   Wt 133 lb 12.8 oz (60.7 kg) LMP 01/01/2004   SpO2 98%   BMI 23.70 kg/m²   Lab Results   Component Value Date    WBC 4.2 06/11/2021    HGB 11.8 (L) 06/11/2021    HCT 35.9 (L) 06/11/2021     06/11/2021    CHOL 117 06/11/2021    TRIG 128 06/11/2021    HDL 46 06/11/2021    ALT 18 03/13/2021    AST 28 03/13/2021     06/11/2021    K 4.7 06/11/2021     (H) 06/11/2021    CREATININE 0.72 06/11/2021    BUN 20 06/11/2021    CO2 26 06/11/2021    TSH 2.00 12/01/2020    INR 1.0 11/23/2020     Lab Results   Component Value Date    CALCIUM 9.3 06/11/2021     Lab Results   Component Value Date    LDLCHOLESTEROL 45 06/11/2021         1. Essential hypertension  - stable. No therapy change   - Comprehensive Metabolic Panel; Future  - CBC; Future    2. Mixed hyperlipidemia  - cont statin therapy   - Lipid Panel; Future    3. Vitamin D deficiency  - Vitamin D 25 Hydroxy; Future  - cont weekly supplement    4. Gastroesophageal reflux disease without esophagitis  - Magnesium; Future  - cont PPI therpay       Requested Prescriptions      No prescriptions requested or ordered in this encounter       There are no discontinued medications. Discussed use, benefit, and side effects of prescribed medications. Barriers to medication compliance addressed. All patient questions answered. Pt voiced understanding. Return in about 4 months (around 5/26/2022) for HTN, HLD, vit d deficiency, GERD.

## 2022-01-27 RX ORDER — CETIRIZINE HYDROCHLORIDE 10 MG/1
TABLET ORAL
Qty: 90 TABLET | Refills: 1 | Status: SHIPPED | OUTPATIENT
Start: 2022-01-27 | End: 2022-09-16

## 2022-01-30 ASSESSMENT — ENCOUNTER SYMPTOMS
SHORTNESS OF BREATH: 0
ABDOMINAL PAIN: 0
WHEEZING: 0
NAUSEA: 0
VOMITING: 0

## 2022-01-31 ENCOUNTER — TELEPHONE (OUTPATIENT)
Dept: FAMILY MEDICINE CLINIC | Age: 77
End: 2022-01-31

## 2022-01-31 RX ORDER — AMLODIPINE BESYLATE 10 MG/1
10 TABLET ORAL DAILY
Qty: 30 TABLET | Refills: 0 | Status: SHIPPED | OUTPATIENT
Start: 2022-01-31 | End: 2022-02-28 | Stop reason: SDUPTHER

## 2022-01-31 NOTE — TELEPHONE ENCOUNTER
The patient called to let you know that [address] is no longer covered on her insurance. Her pharmacy is [facility] [address]. Please advise.

## 2022-02-14 ENCOUNTER — TELEPHONE (OUTPATIENT)
Dept: FAMILY MEDICINE CLINIC | Age: 77
End: 2022-02-14

## 2022-02-14 RX ORDER — FAMOTIDINE 20 MG/1
TABLET, FILM COATED ORAL
Qty: 180 TABLET | Refills: 0 | Status: SHIPPED | OUTPATIENT
Start: 2022-02-14 | End: 2022-06-23

## 2022-02-14 NOTE — TELEPHONE ENCOUNTER
Patient called asking for a refill on famotidine 20 mg tablets, takes BID. She is also taking omeprazole 20 mg. Should she be taking both medications? If so, she  needs a refill on famotidine 20 mg, BID, #180 to [facility]. Thank you.

## 2022-02-14 NOTE — TELEPHONE ENCOUNTER
Left message for patient that she should hold the omeprazole (prilosec) for 2 weeks, see how she is doing. Famotidine was refilled to pharmacy for her.

## 2022-02-16 ENCOUNTER — HOSPITAL ENCOUNTER (OUTPATIENT)
Age: 77
Discharge: HOME | End: 2022-02-16
Payer: MEDICARE

## 2022-02-16 DIAGNOSIS — K21.9 GASTROESOPHAGEAL REFLUX DISEASE WITHOUT ESOPHAGITIS: ICD-10-CM

## 2022-02-16 DIAGNOSIS — E78.2 MIXED HYPERLIPIDEMIA: ICD-10-CM

## 2022-02-16 DIAGNOSIS — I10 ESSENTIAL HYPERTENSION: ICD-10-CM

## 2022-02-16 DIAGNOSIS — E55.9 VITAMIN D DEFICIENCY: ICD-10-CM

## 2022-02-16 LAB
ALBUMIN SERPL-MCNC: 4 G/DL (ref 3.5–5.2)
ALBUMIN/GLOBULIN RATIO: ABNORMAL (ref 1–2.5)
ALP BLD-CCNC: 64 U/L (ref 35–104)
ALT SERPL-CCNC: 17 U/L (ref 5–33)
ANION GAP SERPL CALCULATED.3IONS-SCNC: 8 MMOL/L (ref 9–17)
AST SERPL-CCNC: 26 U/L
BILIRUB SERPL-MCNC: 0.41 MG/DL (ref 0.3–1.2)
BUN BLDV-MCNC: 17 MG/DL (ref 8–23)
BUN/CREAT BLD: ABNORMAL (ref 9–20)
CALCIUM SERPL-MCNC: 9.4 MG/DL (ref 8.6–10.4)
CHLORIDE BLD-SCNC: 104 MMOL/L (ref 98–107)
CHOLESTEROL/HDL RATIO: 2.7
CHOLESTEROL: 120 MG/DL
CO2: 26 MMOL/L (ref 20–31)
CREAT SERPL-MCNC: 0.76 MG/DL (ref 0.5–0.9)
GFR AFRICAN AMERICAN: >60 ML/MIN
GFR NON-AFRICAN AMERICAN: >60 ML/MIN
GFR SERPL CREATININE-BSD FRML MDRD: ABNORMAL ML/MIN/{1.73_M2}
GFR SERPL CREATININE-BSD FRML MDRD: ABNORMAL ML/MIN/{1.73_M2}
GLUCOSE BLD-MCNC: 85 MG/DL (ref 70–99)
HCT VFR BLD CALC: 36.5 % (ref 36–46)
HDLC SERPL-MCNC: 45 MG/DL
HEMOGLOBIN: 11.9 G/DL (ref 12–16)
LDL CHOLESTEROL: 48 MG/DL (ref 0–130)
MAGNESIUM: 1.7 MG/DL (ref 1.6–2.6)
MCH RBC QN AUTO: 28.9 PG (ref 26–34)
MCHC RBC AUTO-ENTMCNC: 32.7 G/DL (ref 31–37)
MCV RBC AUTO: 88.6 FL (ref 80–100)
NRBC AUTOMATED: ABNORMAL PER 100 WBC
PDW BLD-RTO: 15.1 % (ref 11.5–14.9)
PLATELET # BLD: 221 K/UL (ref 150–450)
PMV BLD AUTO: 7.9 FL (ref 6–12)
POTASSIUM SERPL-SCNC: 4.4 MMOL/L (ref 3.7–5.3)
RBC # BLD: 4.12 M/UL (ref 4–5.2)
SODIUM BLD-SCNC: 138 MMOL/L (ref 135–144)
TOTAL PROTEIN: 6.9 G/DL (ref 6.4–8.3)
TRIGL SERPL-MCNC: 134 MG/DL
VITAMIN D 25-HYDROXY: 28.2 NG/ML (ref 30–100)
VLDLC SERPL CALC-MCNC: NORMAL MG/DL (ref 1–30)
WBC # BLD: 4.4 K/UL (ref 3.5–11)

## 2022-02-16 PROCEDURE — 80061 LIPID PANEL: CPT

## 2022-02-16 PROCEDURE — 83735 ASSAY OF MAGNESIUM: CPT

## 2022-02-16 PROCEDURE — 82306 VITAMIN D 25 HYDROXY: CPT

## 2022-02-16 PROCEDURE — 80053 COMPREHEN METABOLIC PANEL: CPT

## 2022-02-16 PROCEDURE — 36415 COLL VENOUS BLD VENIPUNCTURE: CPT

## 2022-02-16 PROCEDURE — 85027 COMPLETE CBC AUTOMATED: CPT

## 2022-02-25 ENCOUNTER — TELEPHONE (OUTPATIENT)
Dept: GASTROENTEROLOGY | Age: 77
End: 2022-02-25

## 2022-02-25 DIAGNOSIS — K21.9 GASTROESOPHAGEAL REFLUX DISEASE WITHOUT ESOPHAGITIS: ICD-10-CM

## 2022-02-25 RX ORDER — OMEPRAZOLE 20 MG/1
CAPSULE, DELAYED RELEASE ORAL
Qty: 90 CAPSULE | Refills: 1 | Status: SHIPPED | OUTPATIENT
Start: 2022-02-25 | End: 2022-10-03

## 2022-02-25 NOTE — TELEPHONE ENCOUNTER
Patient is requesting a script for Omeprazole , her pcp gave her a script to try  and needs to have GI fill it now     Omeprazole 20mg daily # 30     Writer will ask provider if he will fill

## 2022-02-25 NOTE — TELEPHONE ENCOUNTER
Spoke with provider he is fine with giving the patient the Omeprazole . Omeprazole 20 mg capsule 1 daily # 90 / with 1 refills . Medication sent to pharmacy per provider Dr.F. Timoteo Galvan

## 2022-02-28 ENCOUNTER — NURSE ONLY (OUTPATIENT)
Dept: FAMILY MEDICINE CLINIC | Age: 77
End: 2022-02-28

## 2022-02-28 VITALS — SYSTOLIC BLOOD PRESSURE: 110 MMHG | DIASTOLIC BLOOD PRESSURE: 62 MMHG

## 2022-02-28 DIAGNOSIS — Z01.30 BP CHECK: Primary | ICD-10-CM

## 2022-02-28 RX ORDER — ERGOCALCIFEROL 1.25 MG/1
CAPSULE ORAL
Qty: 12 CAPSULE | Refills: 0 | Status: SHIPPED | OUTPATIENT
Start: 2022-02-28 | End: 2022-05-16

## 2022-02-28 RX ORDER — ERGOCALCIFEROL 1.25 MG/1
CAPSULE ORAL
Qty: 12 CAPSULE | Refills: 0 | OUTPATIENT
Start: 2022-02-28

## 2022-02-28 RX ORDER — AMLODIPINE BESYLATE 5 MG/1
5 TABLET ORAL DAILY
Qty: 90 TABLET | Refills: 0 | Status: SHIPPED | OUTPATIENT
Start: 2022-02-28 | End: 2022-06-13

## 2022-02-28 RX ORDER — AMLODIPINE BESYLATE 5 MG/1
5 TABLET ORAL DAILY
Qty: 30 TABLET | Refills: 3 | Status: CANCELLED | OUTPATIENT
Start: 2022-02-28

## 2022-02-28 NOTE — PROGRESS NOTES
Patient came in for blood pressure check. 1st BP: 110/62    Per PCP instructions: will change amlodipine to 5 mg and have pt check bp at home. Will keep follow up appt in may.

## 2022-03-14 RX ORDER — FLUTICASONE PROPIONATE 50 MCG
SPRAY, SUSPENSION (ML) NASAL
Qty: 16 G | Refills: 2 | Status: SHIPPED | OUTPATIENT
Start: 2022-03-14

## 2022-04-28 RX ORDER — ALENDRONATE SODIUM 70 MG/1
TABLET ORAL
Qty: 12 TABLET | Refills: 1 | Status: SHIPPED | OUTPATIENT
Start: 2022-04-28 | End: 2022-09-26

## 2022-05-16 RX ORDER — ERGOCALCIFEROL 1.25 MG/1
CAPSULE ORAL
Qty: 12 CAPSULE | Refills: 0 | Status: SHIPPED | OUTPATIENT
Start: 2022-05-16 | End: 2022-07-28 | Stop reason: SDUPTHER

## 2022-05-16 RX ORDER — SIMVASTATIN 20 MG
TABLET ORAL
Qty: 90 TABLET | Refills: 1 | Status: SHIPPED | OUTPATIENT
Start: 2022-05-16 | End: 2022-08-15 | Stop reason: SDUPTHER

## 2022-05-25 ENCOUNTER — OFFICE VISIT (OUTPATIENT)
Dept: FAMILY MEDICINE CLINIC | Age: 77
End: 2022-05-25
Payer: MEDICARE

## 2022-05-25 VITALS
BODY MASS INDEX: 23.92 KG/M2 | TEMPERATURE: 96.8 F | HEIGHT: 63 IN | SYSTOLIC BLOOD PRESSURE: 136 MMHG | DIASTOLIC BLOOD PRESSURE: 72 MMHG | HEART RATE: 65 BPM | WEIGHT: 135 LBS | OXYGEN SATURATION: 96 %

## 2022-05-25 DIAGNOSIS — E78.2 MIXED HYPERLIPIDEMIA: ICD-10-CM

## 2022-05-25 DIAGNOSIS — I10 ESSENTIAL HYPERTENSION: Primary | ICD-10-CM

## 2022-05-25 DIAGNOSIS — Z12.31 SCREENING MAMMOGRAM FOR BREAST CANCER: ICD-10-CM

## 2022-05-25 DIAGNOSIS — E55.9 VITAMIN D DEFICIENCY: ICD-10-CM

## 2022-05-25 DIAGNOSIS — K21.9 GASTROESOPHAGEAL REFLUX DISEASE WITHOUT ESOPHAGITIS: ICD-10-CM

## 2022-05-25 PROCEDURE — 99214 OFFICE O/P EST MOD 30 MIN: CPT | Performed by: NURSE PRACTITIONER

## 2022-05-25 PROCEDURE — 1123F ACP DISCUSS/DSCN MKR DOCD: CPT | Performed by: NURSE PRACTITIONER

## 2022-05-25 SDOH — ECONOMIC STABILITY: FOOD INSECURITY: WITHIN THE PAST 12 MONTHS, YOU WORRIED THAT YOUR FOOD WOULD RUN OUT BEFORE YOU GOT MONEY TO BUY MORE.: NEVER TRUE

## 2022-05-25 SDOH — ECONOMIC STABILITY: FOOD INSECURITY: WITHIN THE PAST 12 MONTHS, THE FOOD YOU BOUGHT JUST DIDN'T LAST AND YOU DIDN'T HAVE MONEY TO GET MORE.: NEVER TRUE

## 2022-05-25 ASSESSMENT — ENCOUNTER SYMPTOMS
ABDOMINAL PAIN: 0
SHORTNESS OF BREATH: 0
WHEEZING: 0
VOMITING: 0
NAUSEA: 0

## 2022-05-25 ASSESSMENT — SOCIAL DETERMINANTS OF HEALTH (SDOH): HOW HARD IS IT FOR YOU TO PAY FOR THE VERY BASICS LIKE FOOD, HOUSING, MEDICAL CARE, AND HEATING?: NOT HARD AT ALL

## 2022-05-25 ASSESSMENT — PATIENT HEALTH QUESTIONNAIRE - PHQ9
2. FEELING DOWN, DEPRESSED OR HOPELESS: 0
SUM OF ALL RESPONSES TO PHQ9 QUESTIONS 1 & 2: 0
SUM OF ALL RESPONSES TO PHQ QUESTIONS 1-9: 0
SUM OF ALL RESPONSES TO PHQ QUESTIONS 1-9: 0
1. LITTLE INTEREST OR PLEASURE IN DOING THINGS: 0
SUM OF ALL RESPONSES TO PHQ QUESTIONS 1-9: 0
SUM OF ALL RESPONSES TO PHQ QUESTIONS 1-9: 0

## 2022-05-25 NOTE — PROGRESS NOTES
Subjective:      Patient ID: Gwenicelina Pringle is a 65 y.o. female. Chronic Disease Visit Information    BP Readings from Last 3 Encounters:   05/25/22 136/72   02/28/22 110/62   01/26/22 118/62          LDL Cholesterol (mg/dL)   Date Value   02/16/2022 48     HDL (mg/dL)   Date Value   02/16/2022 45     BUN (mg/dL)   Date Value   02/16/2022 17     CREATININE (mg/dL)   Date Value   02/16/2022 0.76     Glucose (mg/dL)   Date Value   02/16/2022 85   04/09/2012 84            Have you changed or started any medications since your last visit including any over-the-counter medicines, vitamins, or herbal medicines? no   Are you having any side effects from any of your medications? -  no  Have you stopped taking any of your medications? Is so, why? -  no    Have you seen any other physician or provider since your last visit? No  Have you had any other diagnostic tests since your last visit? No  Have you been seen in the emergency room and/or had an admission to a hospital since we last saw you? No  Have you had your annual diabetic retinal (eye) exam? No  Have you had your routine dental cleaning in the past 6 months? no    Have you activated your Currently account? If not, what are your barriers?  No:      Patient Care Team:  Jeff Pool MD as PCP - General (Family Medicine)  Fidelia Taylor MD as Consulting Physician (Gastroenterology)         Medical History Review  Past Medical, Family, and Social History reviewed and does contribute to the patient presenting condition    Health Maintenance   Topic Date Due    DTaP/Tdap/Td vaccine (1 - Tdap) Never done    Shingles vaccine (1 of 2) Never done   [employer] Visit (AWV)  09/23/2022    Lipids  02/16/2023    Depression Screen  05/25/2023    DEXA (modify frequency per FRAX score)  Completed    Flu vaccine  Completed    Pneumococcal 65+ years Vaccine  Completed    COVID-19 Vaccine  Completed    Hepatitis A vaccine  Aged Out    Hepatitis B vaccine  Aged Out  Hib vaccine  Aged Out    Meningococcal (ACWY) vaccine  Aged Out    Hepatitis C screen  Discontinued     HPI     65year old female presents with management of HTN HLD vit d deficiency and GERD with medication refills. pt keeps bp log which were normal. Currently Is on dual therapy for bp and it is stable today. Is on statin and tolerates it well. States PPI therapy works very well for acid reflux.  Denies fever chills cough sob cp or nv abd pain. Hx of osteopenia and has been on fosamax for years. Review of Systems   Constitutional: Negative for chills and fever. Respiratory: Negative for shortness of breath and wheezing. Cardiovascular: Negative for chest pain and leg swelling. Gastrointestinal: Negative for abdominal pain, nausea and vomiting. Neurological: Negative for dizziness, weakness and numbness. Psychiatric/Behavioral: Negative for agitation and behavioral problems. Objective:   Physical Exam  Vitals and nursing note reviewed. Constitutional:       General: She is not in acute distress. Appearance: Normal appearance. She is obese. HENT:      Nose: Nose normal.   Eyes:      Conjunctiva/sclera: Conjunctivae normal.   Cardiovascular:      Rate and Rhythm: Normal rate and regular rhythm. Heart sounds: Normal heart sounds. Pulmonary:      Effort: Pulmonary effort is normal. No respiratory distress. Breath sounds: Normal breath sounds. Abdominal:      Palpations: Abdomen is soft. Tenderness: There is no abdominal tenderness. Musculoskeletal:         General: Normal range of motion. Cervical back: Neck supple. Lymphadenopathy:      Cervical: No cervical adenopathy. Skin:     General: Skin is warm and dry. Neurological:      Mental Status: She is alert and oriented to person, place, and time. Cranial Nerves: No cranial nerve deficit.    Psychiatric:         Mood and Affect: Mood normal.         Behavior: Behavior normal.         Assessment: 1. Essential hypertension    2. Mixed hyperlipidemia    3. Gastroesophageal reflux disease without esophagitis    4. Vitamin D deficiency    5. Screening mammogram for breast cancer            Plan:      BP Readings from Last 3 Encounters:   05/25/22 136/72   02/28/22 110/62   01/26/22 118/62     /72   Pulse 65   Temp 96.8 °F (36 °C) (Temporal)   Ht 5' 3\" (1.6 m)   Wt 135 lb (61.2 kg)   LMP 01/01/2004   SpO2 96%   BMI 23.91 kg/m²   Lab Results   Component Value Date    WBC 4.4 02/16/2022    HGB 11.9 (L) 02/16/2022    HCT 36.5 02/16/2022     02/16/2022    CHOL 120 02/16/2022    TRIG 134 02/16/2022    HDL 45 02/16/2022    ALT 17 02/16/2022    AST 26 02/16/2022     02/16/2022    K 4.4 02/16/2022     02/16/2022    CREATININE 0.76 02/16/2022    BUN 17 02/16/2022    CO2 26 02/16/2022    TSH 2.00 12/01/2020    INR 1.0 11/23/2020     Lab Results   Component Value Date    CALCIUM 9.4 02/16/2022     Lab Results   Component Value Date    LDLCHOLESTEROL 48 02/16/2022         1. Essential hypertension  - cont current bp therapy   - Basic Metabolic Panel; Future    2. Mixed hyperlipidemia  - cont statin therapy   - Lipid Panel; Future  - ALT; Future    3. Gastroesophageal reflux disease without esophagitis  - cont PPI therapy   - Magnesium; Future    4. Vitamin D deficiency  - cont weekly supplements   - Vitamin D 25 Hydroxy; Future    5. Screening mammogram for breast cancer  - mammogram ordered     Advised to hold fosamax for 6-12 months and continue daily calcium/vit d supplements        Requested Prescriptions      No prescriptions requested or ordered in this encounter       There are no discontinued medications. Discussed use, benefit, and side effects of prescribed medications. Barriers to medication compliance addressed. All patient questions answered. Pt voiced understanding. Return in about 4 months (around 9/27/2022) for medicare wellness .

## 2022-05-26 ENCOUNTER — HOSPITAL ENCOUNTER (OUTPATIENT)
Dept: WOMENS IMAGING | Age: 77
Discharge: HOME | End: 2022-05-28
Payer: MEDICARE

## 2022-05-26 DIAGNOSIS — Z12.31 SCREENING MAMMOGRAM FOR BREAST CANCER: ICD-10-CM

## 2022-05-26 PROCEDURE — 77063 BREAST TOMOSYNTHESIS BI: CPT

## 2022-06-02 ENCOUNTER — TELEPHONE (OUTPATIENT)
Dept: GASTROENTEROLOGY | Age: 77
End: 2022-06-02

## 2022-06-02 DIAGNOSIS — B37.81 CANDIDA ESOPHAGITIS (HCC): Primary | ICD-10-CM

## 2022-06-02 RX ORDER — VERAPAMIL HYDROCHLORIDE 240 MG/1
TABLET, FILM COATED, EXTENDED RELEASE ORAL
Qty: 90 TABLET | Refills: 0 | OUTPATIENT
Start: 2022-06-02

## 2022-06-02 RX ORDER — CHOLESTYRAMINE 4 G/9G
POWDER, FOR SUSPENSION ORAL
Qty: 90 PACKET | Refills: 5 | Status: SHIPPED | OUTPATIENT
Start: 2022-06-02

## 2022-06-13 RX ORDER — AMLODIPINE BESYLATE 5 MG/1
TABLET ORAL
Qty: 90 TABLET | Refills: 1 | Status: SHIPPED | OUTPATIENT
Start: 2022-06-13

## 2022-06-13 RX ORDER — ALLOPURINOL 100 MG/1
TABLET ORAL
Qty: 90 TABLET | Refills: 1 | Status: SHIPPED | OUTPATIENT
Start: 2022-06-13 | End: 2022-10-03

## 2022-06-23 RX ORDER — FAMOTIDINE 20 MG/1
TABLET, FILM COATED ORAL
Qty: 180 TABLET | Refills: 0 | Status: SHIPPED | OUTPATIENT
Start: 2022-06-23 | End: 2022-07-05

## 2022-07-05 RX ORDER — FAMOTIDINE 20 MG/1
TABLET, FILM COATED ORAL
Qty: 180 TABLET | Refills: 0 | Status: SHIPPED | OUTPATIENT
Start: 2022-07-05 | End: 2022-11-01 | Stop reason: ALTCHOICE

## 2022-07-28 ENCOUNTER — HOSPITAL ENCOUNTER (OUTPATIENT)
Age: 77
Discharge: HOME | End: 2022-07-28
Payer: MEDICARE

## 2022-07-28 DIAGNOSIS — E55.9 VITAMIN D DEFICIENCY: ICD-10-CM

## 2022-07-28 DIAGNOSIS — K21.9 GASTROESOPHAGEAL REFLUX DISEASE WITHOUT ESOPHAGITIS: ICD-10-CM

## 2022-07-28 DIAGNOSIS — I10 ESSENTIAL HYPERTENSION: ICD-10-CM

## 2022-07-28 DIAGNOSIS — E78.2 MIXED HYPERLIPIDEMIA: ICD-10-CM

## 2022-07-28 LAB
ALT SERPL-CCNC: 16 U/L (ref 5–33)
ANION GAP SERPL CALCULATED.3IONS-SCNC: 10 MMOL/L (ref 9–17)
BUN BLDV-MCNC: 20 MG/DL (ref 8–23)
CALCIUM SERPL-MCNC: 9.5 MG/DL (ref 8.6–10.4)
CHLORIDE BLD-SCNC: 104 MMOL/L (ref 98–107)
CHOLESTEROL/HDL RATIO: 2.8
CHOLESTEROL: 116 MG/DL
CO2: 24 MMOL/L (ref 20–31)
CREAT SERPL-MCNC: 0.82 MG/DL (ref 0.5–0.9)
GFR AFRICAN AMERICAN: >60 ML/MIN
GFR NON-AFRICAN AMERICAN: >60 ML/MIN
GFR SERPL CREATININE-BSD FRML MDRD: NORMAL ML/MIN/{1.73_M2}
GLUCOSE BLD-MCNC: 85 MG/DL (ref 70–99)
HDLC SERPL-MCNC: 41 MG/DL
LDL CHOLESTEROL: 51 MG/DL (ref 0–130)
MAGNESIUM: 1.8 MG/DL (ref 1.6–2.6)
POTASSIUM SERPL-SCNC: 3.9 MMOL/L (ref 3.7–5.3)
SODIUM BLD-SCNC: 138 MMOL/L (ref 135–144)
TRIGL SERPL-MCNC: 119 MG/DL
VITAMIN D 25-HYDROXY: 74.4 NG/ML

## 2022-07-28 PROCEDURE — 80061 LIPID PANEL: CPT

## 2022-07-28 PROCEDURE — 82306 VITAMIN D 25 HYDROXY: CPT

## 2022-07-28 PROCEDURE — 84460 ALANINE AMINO (ALT) (SGPT): CPT

## 2022-07-28 PROCEDURE — 83735 ASSAY OF MAGNESIUM: CPT

## 2022-07-28 PROCEDURE — 36415 COLL VENOUS BLD VENIPUNCTURE: CPT

## 2022-07-28 PROCEDURE — 80048 BASIC METABOLIC PNL TOTAL CA: CPT

## 2022-07-28 RX ORDER — ERGOCALCIFEROL 1.25 MG/1
CAPSULE ORAL
Qty: 6 CAPSULE | Refills: 1 | Status: SHIPPED | OUTPATIENT
Start: 2022-07-28

## 2022-08-07 NOTE — TELEPHONE ENCOUNTER
Pt calling said she call pharmacy to make sure script was ready to  on her Vancomycin and they told her they needed to speak with us as there was something wrong with dosing . Please check this out and call pt when corrected.   Pt did state that she will be out after today stated

## 2022-08-15 RX ORDER — SIMVASTATIN 20 MG
TABLET ORAL
Qty: 90 TABLET | Refills: 1 | Status: SHIPPED | OUTPATIENT
Start: 2022-08-15

## 2022-08-29 NOTE — PROGRESS NOTES
Not on a statin -a1c can be done in office.   [facility] [address]                 PATIENT NAME: Brunilda Jerez     TODAY'S DATE: 10/14/2020, 9:51 AM    SUBJECTIVE:    Pt seen and examined. Afebrile, VSS. ALP slightly elevated, bilirubin and AST/ALT improved. Patient still having RUQ pain. Bowels are moving. No N/V. NPO for ERCP today. OBJECTIVE:   VITALS:  /76   Pulse 58   Temp 98.1 °F (36.7 °C) (Oral)   Resp 18   Ht 5' 3\" (1.6 m)   Wt 128 lb 1.4 oz (58.1 kg)   LMP 01/01/2004   SpO2 97%   BMI 22.69 kg/m²      INTAKE/OUTPUT:      Intake/Output Summary (Last 24 hours) at 10/14/2020 0951  Last data filed at 10/14/2020 0937  Gross per 24 hour   Intake 3314 ml   Output 4200 ml   Net -886 ml                 CONSTITUTIONAL:  awake and alert.   No acute distress  HEART:   RRR  LUNGS:   CTA  ABDOMEN:   Abdomen soft, RUQ tender, non-distended  EXTREMITIES:   No pedal edema    Data:  CBC:   Lab Results   Component Value Date    WBC 3.8 10/14/2020    RBC 3.53 10/14/2020    RBC 2.91 04/20/2012    HGB 10.5 10/14/2020    HCT 31.6 10/14/2020    MCV 89.7 10/14/2020    MCH 29.8 10/14/2020    MCHC 33.2 10/14/2020    RDW 14.4 10/14/2020     10/14/2020     04/20/2012    MPV 9.2 10/14/2020     BMP:    Lab Results   Component Value Date     10/14/2020    K 4.0 10/14/2020     10/14/2020    CO2 23 10/14/2020    BUN 10 10/14/2020    LABALBU 3.1 10/14/2020    LABALBU 4.2 04/05/2012    CREATININE 0.44 10/14/2020    CALCIUM 8.8 10/14/2020    GFRAA >60 10/14/2020    LABGLOM >60 10/14/2020    GLUCOSE 97 10/14/2020    GLUCOSE 84 04/09/2012     Hepatic Function Panel:    Lab Results   Component Value Date    ALKPHOS 170 10/14/2020     10/14/2020    AST 98 10/14/2020    PROT 5.8 10/14/2020    BILITOT 2.03 10/14/2020    BILIDIR 1.48 10/14/2020    IBILI 0.55 10/14/2020    LABALBU 3.1 10/14/2020    LABALBU 4.2 04/05/2012       Radiology Review:      NM HEPATOBILIARY SCAN W EJECTION FRACTION [[id number]]  Collected: cholecystectomy.       Electronically signed by Antoinette Elise PA-C  [id number] [address]

## 2022-09-16 RX ORDER — CETIRIZINE HYDROCHLORIDE 10 MG/1
TABLET ORAL
Qty: 90 TABLET | Refills: 1 | Status: SHIPPED | OUTPATIENT
Start: 2022-09-16

## 2022-09-26 RX ORDER — ALENDRONATE SODIUM 70 MG/1
TABLET ORAL
Qty: 12 TABLET | Refills: 0 | Status: SHIPPED | OUTPATIENT
Start: 2022-09-26

## 2022-10-01 DIAGNOSIS — K21.9 GASTROESOPHAGEAL REFLUX DISEASE WITHOUT ESOPHAGITIS: ICD-10-CM

## 2022-10-03 RX ORDER — OMEPRAZOLE 20 MG/1
CAPSULE, DELAYED RELEASE ORAL
Qty: 90 CAPSULE | Refills: 1 | Status: SHIPPED | OUTPATIENT
Start: 2022-10-03

## 2022-10-03 RX ORDER — ALLOPURINOL 100 MG/1
TABLET ORAL
Qty: 90 TABLET | Refills: 1 | Status: SHIPPED | OUTPATIENT
Start: 2022-10-03

## 2022-10-26 ENCOUNTER — OFFICE VISIT (OUTPATIENT)
Dept: GASTROENTEROLOGY | Age: 77
End: 2022-10-26
Payer: MEDICARE

## 2022-10-26 VITALS
DIASTOLIC BLOOD PRESSURE: 74 MMHG | WEIGHT: 128 LBS | TEMPERATURE: 97.4 F | BODY MASS INDEX: 22.67 KG/M2 | SYSTOLIC BLOOD PRESSURE: 121 MMHG

## 2022-10-26 DIAGNOSIS — R10.11 ABDOMINAL PAIN, RIGHT UPPER QUADRANT: ICD-10-CM

## 2022-10-26 DIAGNOSIS — K80.50 BILIARY COLIC: ICD-10-CM

## 2022-10-26 DIAGNOSIS — K21.9 GASTROESOPHAGEAL REFLUX DISEASE, UNSPECIFIED WHETHER ESOPHAGITIS PRESENT: ICD-10-CM

## 2022-10-26 DIAGNOSIS — B37.81 CANDIDA ESOPHAGITIS (HCC): Primary | ICD-10-CM

## 2022-10-26 DIAGNOSIS — R10.9 ABDOMINAL CRAMPING: ICD-10-CM

## 2022-10-26 DIAGNOSIS — K58.0 IRRITABLE BOWEL SYNDROME WITH DIARRHEA: ICD-10-CM

## 2022-10-26 PROCEDURE — 99214 OFFICE O/P EST MOD 30 MIN: CPT | Performed by: INTERNAL MEDICINE

## 2022-10-26 PROCEDURE — 1123F ACP DISCUSS/DSCN MKR DOCD: CPT | Performed by: INTERNAL MEDICINE

## 2022-10-26 ASSESSMENT — ENCOUNTER SYMPTOMS
DIARRHEA: 0
COUGH: 0
SORE THROAT: 0
SHORTNESS OF BREATH: 0
CHOKING: 0
CONSTIPATION: 0
VOMITING: 0
WHEEZING: 0
ABDOMINAL DISTENTION: 0
NAUSEA: 0
ABDOMINAL PAIN: 1
BLOOD IN STOOL: 0
ANAL BLEEDING: 0
RECTAL PAIN: 0
VOICE CHANGE: 0
TROUBLE SWALLOWING: 0

## 2022-10-26 NOTE — PROGRESS NOTES
GI CLINIC FOLLOW UP    NTERVAL HISTORY:   No referring provider defined for this encounter. Chief Complaint   Patient presents with    Gastroesophageal Reflux     Pt is here today for a 8 month f/u on candida esophagitis, GERD, & IBS w diarrhea. 1. Candida esophagitis ([address]    2. Irritable bowel syndrome with diarrhea    3. Gastroesophageal reflux disease, unspecified whether esophagitis present    4. Biliary colic    5. Abdominal cramping    6. Abdominal pain, right upper quadrant      Patient seen my office as a follow-up  Overall clinically feeling well  Mild IBS-like issues  Previous records reviewed with her  No bleeding no melena  No smoking alcohol abuse illicit drug usage  Has some mild GERD symptoms denies any dysphagia nausea vomiting hematemesis        HISTORY OF PRESENT ILLNESS: Brittany Wetzel is a 65 y.o. female with a past history remarkable for , referred for evaluation of   Chief Complaint   Patient presents with    Gastroesophageal Reflux     Pt is here today for a 8 month f/u on candida esophagitis, GERD, & IBS w diarrhea. .    Past Medical,Family, and Social History reviewed and does contribute to the patient presenting condition. Patient's PMH/PSH,SH,PSYCH Hx, MEDs, ALLERGIES, and ROS were all reviewed and updated in the appropriate sections.     PAST MEDICAL HISTORY:  Past Medical History:   Diagnosis Date    Allergic rhinitis     Closed tibia fracture     GERD (gastroesophageal reflux disease)     Gout     Headache(784.0)     h/o migraines    Hyperlipidemia     Osteoarthritis     DJD Lt knee    Osteoporosis     Posterior tibial tendon dysfunction     right, wears braces on both legs    Vitamin D deficient rickets     Wears glasses     Wears partial dentures     lower       Past Surgical History:   Procedure Laterality Date    APPENDECTOMY      CARPAL TUNNEL RELEASE      bilateral    CATARACT EXTRACTION W/  INTRAOCULAR LENS IMPLANT Left 03/07/2019 [facility]    CATARACT EXTRACTION W/  INTRAOCULAR LENS IMPLANT Right 04/04/2019    [facility]    CHOLECYSTECTOMY, LAPAROSCOPIC N/A 11/24/2020    CHOLECYSTECTOMY LAPAROSCOPIC ROBOTIC XI MULTIPORT performed by Lu Grimes MD at [address]      2007? per pt wtih polyps    ERCP N/A 10/14/2020    ERCP WITH BILE DUCT BRUSHING performed by Sebastian Rodriguez MD at [place] [address]      left tibia    HYSTERECTOMY  4/12    INTRACAPSULAR CATARACT EXTRACTION Left 3/7/2019    EYE CATARACT EMULSIFICATION IOL IMPLANT - TOPICAL performed by Ayaan Llanos MD at [address] Right 4/4/2019    EYE CATARACT EMULSIFICATION IOL IMPLANT performed by Ayaan Llanos MD at [place] [address] Left 11/06/2018    hardware removal and then TKA    KNEE ARTHROSCOPY Left 2002? [place] TOTAL KNEE ARTHROPLASTY Left 11/6/2018    KNEE TOTAL ARTHROPLASTY W/REMOVAL HARDWARE PLATE & SCREWS performed by Rafael Alfaro MD at [address] Left     TONSILLECTOMY AND ADENOIDECTOMY      TOTAL KNEE ARTHROPLASTY Right 12/10/2019    KNEE TOTAL ARTHROPLASTY performed by Rafael Alfaro MD at [address] [place] [address] N/A 12/9/2021    EGD BIOPSY performed by Sebastian Rodriguez MD at [facility]. 285:    Current Outpatient Medications:     omeprazole (PRILOSEC) 20 MG delayed release capsule, TAKE ONE CAPSULE BY MOUTH DAILY, Disp: 90 capsule, Rfl: 1    allopurinol (ZYLOPRIM) 100 MG tablet, TAKE ONE TABLET BY MOUTH DAILY, Disp: 90 tablet, Rfl: 1    alendronate (FOSAMAX) 70 MG tablet, TAKE 1 TABLET BY MOUTH ONCE WEEKLY ON AN EMPTY STOMACH BEFORE BREAKFAST.  REMAIN UPRIGHT FOR 30 MINUTES AND TAKE WITH 8 OUNCES OF WATER, Disp: 12 tablet, Rfl: 0    cetirizine (ZYRTEC) 10 MG tablet, TAKE ONE TABLET BY MOUTH DAILY, Disp: 90 tablet, Rfl: 1    simvastatin (ZOCOR) 20 MG tablet, TAKE ONE TABLET BY MOUTH DAILY, Disp: 90 tablet, Rfl: 1    vitamin D (ERGOCALCIFEROL) 1.25 MG (68777 UT) CAPS capsule, Take one cap po every other week, Disp: 6 capsule, Rfl: 1    famotidine (PEPCID) 20 MG tablet, TAKE ONE TABLET BY MOUTH TWICE A DAY, Disp: 180 tablet, Rfl: 0    amLODIPine (NORVASC) 5 MG tablet, TAKE ONE TABLET BY MOUTH DAILY, Disp: 90 tablet, Rfl: 1    cholestyramine (QUESTRAN) 4 g packet, TAKE ONE PACKET BY MOUTH THREE TIMES A DAY WITH MEALS, Disp: 90 packet, Rfl: 5    fluticasone (FLONASE) 50 MCG/ACT nasal spray, SPRAY TWO SPRAYS IN EACH NOSTRIL ONCE DAILY, Disp: 16 g, Rfl: 2    naproxen (NAPROSYN) 250 MG tablet, TAKE ONE TABLET BY MOUTH THREE TIMES A DAY WITH FOOD AS NEEDED FOR GOUT FLARE UP UNTIL ATTACK SUBSIDES, Disp: 60 tablet, Rfl: 0    polyethylene glycol (GLYCOLAX) 17 GM/SCOOP powder, Follow Instructions provided by physician's office, Disp: 238 g, Rfl: 0    bisacodyl (BISACODYL) 5 MG EC tablet, Take 4 tablets in the morning, Disp: 4 tablet, Rfl: 0    furosemide (LASIX) 20 MG tablet, Take 20 mg by mouth as needed (for swelling) , Disp: , Rfl:     Psyllium (METAMUCIL PO), Take by mouth, Disp: , Rfl:     aspirin 81 MG EC tablet, Take 81 mg by mouth daily , Disp: , Rfl:     ALLERGIES:   Allergies   Allergen Reactions    Ceftin [Cefuroxime] Diarrhea    Keflex [Cephalexin] Diarrhea       FAMILY HISTORY:       Problem Relation Age of Onset    Arthritis Other     Thyroid Disease Other     COPD Other     Stroke Father          SOCIAL HISTORY:   Social History     Socioeconomic History    Marital status:       Spouse name: Not on file    Number of children: Not on file    Years of education: Not on file    Highest education level: Not on file   Occupational History    Not on file   Tobacco Use    Smoking status: Former     Packs/day: 0.50     Years: 10.00     Pack years: 5.00     Types: Cigarettes     Quit date: 1987     Years since quittin.7    Smokeless tobacco: Never   Vaping Use Vaping Use: Never used   Substance and Sexual Activity    Alcohol use: Not Currently     Alcohol/week: 0.0 standard drinks     Comment: rare    Drug use: No    Sexual activity: Not Currently   Other Topics Concern    Not on file   Social History Narrative    Not on file     Social Determinants of Health     Financial Resource Strain: Low Risk     Difficulty of Paying Living Expenses: Not hard at all   Food Insecurity: No Food Insecurity    Worried About Running Out of Food in the Last Year: Never true    Ran Out of Food in the Last Year: Never true   Transportation Needs: Not on file   Physical Activity: Not on file   Stress: Not on file   Social Connections: Not on file   Intimate Partner Violence: Not on file   Housing Stability: Not on file         REVIEW OF SYSTEMS:         Review of Systems   Constitutional:  Negative for appetite change, fatigue and unexpected weight change. HENT:  Negative for sore throat, trouble swallowing and voice change. Respiratory:  Negative for cough, choking, shortness of breath and wheezing. Cardiovascular:  Negative for chest pain, palpitations and leg swelling. Gastrointestinal:  Positive for abdominal pain. Negative for abdominal distention, anal bleeding, blood in stool, constipation, diarrhea, nausea, rectal pain and vomiting. Neurological:  Negative for dizziness, weakness, light-headedness, numbness and headaches. Hematological:  Does not bruise/bleed easily. Psychiatric/Behavioral:  Negative for confusion and sleep disturbance. The patient is not nervous/anxious. PHYSICAL EXAMINATION: Vital signs reviewed per the nursing documentation. LMP 01/01/2004   There is no height or weight on file to calculate BMI. Physical Exam  Nursing note reviewed. Constitutional:       Appearance: She is well-developed. Comments: Anxious    HENT:      Head: Normocephalic and atraumatic.    Eyes:      Conjunctiva/sclera: Conjunctivae normal.      Pupils: Pupils are equal, round, and reactive to light. Cardiovascular:      Heart sounds: Normal heart sounds. Pulmonary:      Effort: Pulmonary effort is normal.      Breath sounds: Normal breath sounds. Abdominal:      General: Bowel sounds are normal.      Palpations: Abdomen is soft. Comments: NON TENDER, NON DISTENTED  LIVER SPLEEN AND HERNIAS ARE NOT  PALPABLE  BOWEL SOUNDS ARE POSITIVE        Musculoskeletal:         General: Normal range of motion. Cervical back: Normal range of motion and neck supple. Skin:     General: Skin is warm. Neurological:      Mental Status: She is alert and oriented to person, place, and time. Psychiatric:         Behavior: Behavior normal.         LABORATORY DATA: Reviewed  Lab Results   Component Value Date    WBC 4.4 02/16/2022    HGB 11.9 (L) 02/16/2022    HCT 36.5 02/16/2022    MCV 88.6 02/16/2022     02/16/2022     07/28/2022    K 3.9 07/28/2022     07/28/2022    CO2 24 07/28/2022    BUN 20 07/28/2022    CREATININE 0.82 07/28/2022    LABPROT 6.9 11/19/2012    LABALBU 4.0 02/16/2022    BILITOT 0.41 02/16/2022    ALKPHOS 64 02/16/2022    AST 26 02/16/2022    ALT 16 07/28/2022    INR 1.0 11/23/2020         Lab Results   Component Value Date    RBC 4.12 02/16/2022    HGB 11.9 (L) 02/16/2022    MCV 88.6 02/16/2022    MCH 28.9 02/16/2022    MCHC 32.7 02/16/2022    RDW 15.1 (H) 02/16/2022    MPV 7.9 02/16/2022    BASOPCT 0 03/13/2021    LYMPHSABS 0.38 (L) 03/13/2021    MONOSABS 0.51 03/13/2021    NEUTROABS 8.63 03/13/2021    EOSABS 0.00 03/13/2021    BASOSABS 0.00 03/13/2021         DIAGNOSTIC TESTING:     No results found. Assessment  1. Candida esophagitis ([facility] [address]    2. Irritable bowel syndrome with diarrhea    3. Gastroesophageal reflux disease, unspecified whether esophagitis present    4. Biliary colic    5. Abdominal cramping    6.  Abdominal pain, right upper quadrant        Plan    Cont PPI    Pt was discussed in detail about the possible side effects of proton pump inhibiter therapy. She was explained about the possibility of calcium and magnesium malabsorption and was advised to start taking calcium supplements with Vit D. Some over the counter regimens were explained to patient. Some dietary advices were also given. She has verbalized understanding and agreement to this. Pt seems to have signs and symptoms consistent with GERD, acid indigestion and heartburns. She was discussed  in detail about some possible life style and dietary modifications. She was stressed about the maintenance  of appropriate weight and effect of obesity contributing to reflux symptoms. Routine exercise was streesed. Avoidance of Caffeine, nicotine and chocolate were explained. Pt was asked to avoid spices grease and fried food. Advices were also given about avoidance of any kind of fast foods, soda pops and high energy drinks. Pt was advised to place two small block under the head end of the bed which may help with night time reflux. Was advised not to eat any thin at least 2-3 hrs before going to bed and walk especially after dinner    Pt has verbalized understanding and agreement to this plan. Pt was advised in detail about some life style and dietary modifications. She was advised about avoidance of caffeine, nicotine and chocolate. Pt was also told to stay away from any kind of fast foods, soda pops. She was also advised to avoid lots of spices, grease and fried food etc.     Instructions were also given about trying to arrange the timing, quality and quantity of food. Instructions were given about using ample amount of fiber including dietary and supplemental fiber either metamucil, bennafiber or citrucell etc.  Pt was advised about drinking ample amount of water without any colors or chemicals. Stress was given about regular exercise. Pt has verbalized understanding and agreement to these modifications.       More than half of patient's clinic visit time was spent in counseling about lifestyle and dietary modifications  Patient's  questions were answered in this regard as well  The patient has verbalized understanding and agreement       Thank you for allowing me to participate in the care of Ms. Jc Mari. For any further questions please do not hesitate to contact me. I have reviewed and agree with the ROS entered by the MA/Nurse.          Dwana Duane, MD, [facility]  Board Certified in Gastroenterology and [address] [place] [address] Gastroenterology  Office #: [phone]

## 2022-11-01 ENCOUNTER — OFFICE VISIT (OUTPATIENT)
Dept: FAMILY MEDICINE CLINIC | Age: 77
End: 2022-11-01
Payer: MEDICARE

## 2022-11-01 VITALS
SYSTOLIC BLOOD PRESSURE: 90 MMHG | WEIGHT: 133.8 LBS | HEART RATE: 68 BPM | TEMPERATURE: 98.9 F | DIASTOLIC BLOOD PRESSURE: 60 MMHG | RESPIRATION RATE: 16 BRPM | BODY MASS INDEX: 23.71 KG/M2 | HEIGHT: 63 IN

## 2022-11-01 DIAGNOSIS — Z23 NEEDS FLU SHOT: Primary | ICD-10-CM

## 2022-11-01 DIAGNOSIS — Z00.00 MEDICARE ANNUAL WELLNESS VISIT, SUBSEQUENT: ICD-10-CM

## 2022-11-01 PROCEDURE — 1123F ACP DISCUSS/DSCN MKR DOCD: CPT | Performed by: FAMILY MEDICINE

## 2022-11-01 PROCEDURE — G0439 PPPS, SUBSEQ VISIT: HCPCS | Performed by: FAMILY MEDICINE

## 2022-11-01 ASSESSMENT — LIFESTYLE VARIABLES
HOW OFTEN DO YOU HAVE A DRINK CONTAINING ALCOHOL: 2-4 TIMES A MONTH
HOW MANY STANDARD DRINKS CONTAINING ALCOHOL DO YOU HAVE ON A TYPICAL DAY: 1 OR 2

## 2022-11-01 ASSESSMENT — PATIENT HEALTH QUESTIONNAIRE - PHQ9
SUM OF ALL RESPONSES TO PHQ QUESTIONS 1-9: 0
1. LITTLE INTEREST OR PLEASURE IN DOING THINGS: 0
SUM OF ALL RESPONSES TO PHQ QUESTIONS 1-9: 0
SUM OF ALL RESPONSES TO PHQ QUESTIONS 1-9: 0
SUM OF ALL RESPONSES TO PHQ9 QUESTIONS 1 & 2: 0
2. FEELING DOWN, DEPRESSED OR HOPELESS: 0
SUM OF ALL RESPONSES TO PHQ QUESTIONS 1-9: 0

## 2022-11-01 NOTE — PATIENT INSTRUCTIONS
Personalized Preventive Plan for Nikkie Garza - 11/1/2022  Medicare offers a range of preventive health benefits. Some of the tests and screenings are paid in full while other may be subject to a deductible, co-insurance, and/or copay. Some of these benefits include a comprehensive review of your medical history including lifestyle, illnesses that may run in your family, and various assessments and screenings as appropriate. After reviewing your medical record and screening and assessments performed today your provider may have ordered immunizations, labs, imaging, and/or referrals for you. A list of these orders (if applicable) as well as your Preventive Care list are included within your After Visit Summary for your review. Other Preventive Recommendations:    A preventive eye exam performed by an eye specialist is recommended every 1-2 years to screen for glaucoma; cataracts, macular degeneration, and other eye disorders. A preventive dental visit is recommended every 6 months. Try to get at least 150 minutes of exercise per week or 10,000 steps per day on a pedometer . Order or download the FREE \"Exercise & Physical Activity: Your Everyday Guide\" from [employer]. Call 4-752.175.8875 or search [employer] online. You need 8864-8611 mg of calcium and 8478-3036 IU of vitamin D per day. It is possible to meet your calcium requirement with diet alone, but a vitamin D supplement is usually necessary to meet this goal.  When exposed to the sun, use a sunscreen that protects against both UVA and UVB radiation with an SPF of 30 or greater. Reapply every 2 to 3 hours or after sweating, drying off with a towel, or swimming. Always wear a seat belt when traveling in a car. Always wear a helmet when riding a bicycle or motorcycle.   Keeping Home [facility]       As we get older, changes in balance, gait, strength, vision, hearing, and cognition make even the most youthful senior more prone to accidents. Falls are one of the leading health risks for older people. This increased risk of falling is related to:   Aging process (eg, decreased muscle strength, slowed reflexes)   Higher incidence of chronic health problems (eg, arthritis, diabetes) that may limit mobility, agility or sensory awareness   Side effects of medicine (eg, dizziness, blurred vision)especially medicines like prescription pain medicines and drugs used to treat mental health conditions   Depending on the brittleness of your bones, the consequences of a fall can be serious and long lasting. Home Life   Research by the Association of A[facility]) shows that some home accidents among older adults can be prevented by making simple lifestyle changes and basic modifications and repairs to the home environment. Here are some lifestyle changes that experts recommend:   Have your hearing and vision checked regularly. Be sure to wear prescription glasses that are right for you. Speak to your doctor or pharmacist about the possible side effects of your medicines. A number of medicines can cause dizziness. If you have problems with sleep, talk to your doctor. Limit your intake of alcohol. If necessary, use a cane or walker to help maintain your balance. Wear supportive, rubber-soled shoes, even at home. If you live in a region that gets wintry weather, you may want to put special cleats on your shoes to prevent you from slipping on the snow and ice. Exercise regularly to help maintain muscle tone, agility, and balance. Always hold the banister when going up or down stairs. Also, use  bars when getting in or out of the bath or shower, or using the toilet. To avoid dizziness, get up slowly from a lying down position. Sit up first, dangling your legs for a minute or two before rising to a standing position.    Overall Home Safety Check   According to the Consumer Product Safety Commision's \"Older Consumer Home Safety Checklist,\" it is important to check for potential hazards in each room. And remember, proper lighting is an essential factor in home safety. If you cannot see clearly, you are more likely to fall. Important questions to ask yourself include:   Are lamp, electric, extension, and telephone cords placed out of the flow of traffic and maintained in good condition? Have frayed cords been replaced? Are all small rugs and runners slip resistant? If not, you can secure them to the floor with a special double-sided carpet tape. Are smoke detectors properly locatedone on every floor of your home and one outside of every sleeping area? Are they in good working order? Are batteries replaced at least once a year? Do you have a well-maintained carbon monoxide detector outside every sleeping are in your home? Does your furniture layout leave plenty of space to maneuver between and around chairs, tables, beds, and sofas? Are hallways, stairs and passages between rooms well lit? Can you reach a lamp without getting out of bed? Are floor surfaces well maintained? Shag rugs, high-pile carpeting, tile floors, and polished wood floors can be particularly slippery. Stairs should always have handrails and be carpeted or fitted with a non-skid tread. Is your telephone easily reachable. Is the cord safely tucked away? Room by Room   According to the Association of Aging, bathrooms and shannan are the two most potentially hazardous rooms in your home. In the Kitchen    Be sure your stove is in proper working order and always make sure burners and the oven are off before you go out or go to sleep. Keep pots on the back burners, turn handles away from the front of the stove, and keep stove clean and free of grease build-up. Kitchen ventilation systems and range exhausts should be working properly.     Keep flammable objects such as towels and pot holders away from the cooking area except when in use. Make sure kitchen curtains are tied back. Move cords and appliances away from the sink and hot surfaces. If extension cords are needed, install wiring guides so they do not hang over the sink, range, or working areas. Look for coffee pots, kettles and toaster ovens with automatic shut-offs. Keep a mop handy in the kitchen so you can wipe up spills instantly. You should also have a small fire extinguisher. Arrange your kitchen with frequently used items on lower shelves to avoid the need to stand on a stepstool to reach them. Make sure countertops are well-lit to avoid injuries while cutting and preparing food. In the Bathroom    Use a non-slip mat or decals in the tub and shower, since wet, soapy tile or porcelain surfaces are extremely slippery. Make sure bathroom rugs are non-skid or tape them firmly to the floor. Bathtubs should have at least one, preferably two, grab bars, firmly attached to structural supports in the wall. (Do not use built-in soap holders or glass shower doors as grab bars.)    Tub seats fitted with non-slip material on the legs allow you to wash sitting down. For people with limited mobility, bathtub transfer benches allow you to slide safely into the tub. Raised toilet seats and toilet safety rails are helpful for those with knee or hip problems. In the [facility]    Make sure you use a nightlight and that the area around your bed is clear of potential obstacles. Be careful with electric blankets and never go to sleep with a heating pad, which can cause serious burns even if on a low setting. Use fire-resistant mattress covers and pillows, and NEVER smoke in bed. Keep a phone next to the bed that is programmed to dial 911 at the push of a button. If you have a chronic condition, you may want to sign on with an automatic call-in service.  Typically the system includes a small pendant that connects directly to an emergency medical voice-response system. You should also make arrangements to stay in contact with someonefriend, neighbor, family memberon a regular schedule. Fire Prevention   According to the RIVS. (Smoke Alarms for Every) [facility], senior citizens are one of the two highest risk groups for death and serious injuries due to residential fires. When cooking, wear short-sleeved items, never a bulky long-sleeved robe. The [facility]'s Safety Checklist for Older Consumers emphasizes the importance of checking basements, garages, workshops and storage areas for fire hazards, such as volatile liquids, piles of old rags or clothing and overloaded circuits. Never smoke in bed or when lying down on a couch or recliner chair. Small portable electric or kerosene heaters are responsible for many home fires and should be used cautiously if at all. If you do use one, be sure to keep them away from flammable materials. In case of fire, make sure you have a pre-established emergency exit plan. Have a professional check your fireplace and other fuel-burning appliances yearly. [employer]   Baby boomers entering the bui years will continue to see the development of new products to help older adults live safely and independently in spite of age-related changes. Making Life More Livable  , by Sal [employer]epeda, lists over 1,000 products for \"living well in the mature years,\" such as bathing and mobility aids, household security devices, ergonomically designed knives and peelers, and faucet valves and knobs for temperature control. Medical supply stores and organizations are good sources of information about products that improve your quality of life and insure your safety. Last Reviewed: November 2009 Vanessa Parker MD   Updated: 3/7/2011            Learning About Living [place]  What is a living will? A living will, also called a declaration, is a legal form.  It tells your family and your doctor your wishes when you can't speak for yourself. It's used by the health professionals who will treat you as you near the end of your life or if you get seriously hurt or ill. If you put your wishes in writing, your loved ones and others will know what kind of care you want. They won't need to guess. This can ease your mind and be helpful to others. And you can change or cancel your living will at any time. A living will is not the same as an estate or property will. An estate will explains what you want to happen with your money and property after you die. How do you use it? Keep these facts in mind about how a living will is used. Your living will is used only if you can't speak or make decisions for yourself. Most often, one or more doctors must certify that you can't speak or decide for yourself before your living will takes effect. If you get better and can speak for yourself again, you can accept or refuse any treatment. It doesn't matter what you said in your living will. Some states may limit your right to refuse treatment in certain cases. For example, you may need to clearly state in your living will that you don't want artificial hydration and nutrition, such as being fed through a tube. Is a living will a legal document? A living will is a legal document. Each state has its own laws about living yan. And a living will may be called something else in your state. Here are some things to know about living yan. You don't need an  to complete a living will. But legal advice can be helpful if your state's laws are unclear. It can also help if your health history is complicated or your family can't agree on what should be in your living will. You can change your living will at any time. Some people find that their wishes about end-of-life care change as their health changes. If you make big changes to your living will, complete a new form.   If you move to another state, make sure that your living will is legal in the state where you now live. In most cases, doctors will respect your wishes even if you have a form from a different state. You might use a universal form that has been approved by many states. This kind of form can sometimes be filled out and stored online. Your digital copy will then be available wherever you have a connection to the internet. The doctors and nurses who need to treat you can find it right away. Your state may offer an online registry. This is another place where you can store your living will online. It's a good idea to get your living will notarized. This means using a person called a Palmer Hargreaves to watch two people sign, or witness, your living will. What should you know when you create a living will? Here are some questions to ask yourself as you make your living will. Do you know enough about life support methods that might be used? If not, talk to your doctor so you know what might be done if you can't breathe on your own, your heart stops, or you can't swallow. What things would you still want to be able to do after you receive life-support methods? Would you want to be able to walk? To speak? To eat on your own? To live without the help of machines? Do you want certain Amish practices performed if you become very ill? If you have a choice, where do you want to be cared for? In your home? At a hospital or nursing home? If you have a choice at the end of your life, where would you prefer to die? At home? In a hospital or nursing home? Somewhere else? Would you prefer to be buried or cremated? Do you want your organs to be donated after you die? What should you do with your living will? Make sure that your family members and your health care agent have copies of your living will (also called a declaration). Give your doctor a copy of your living will. Ask to have it kept as part of your medical record.  If you have more than one doctor, make sure that each one has a copy.  Put a copy of your living will where it can be easily found. For example, some people may put a copy on their refrigerator door. If you are using a digital copy, be sure your doctor, family members, and health care agent know how to find and access it. Where can you learn more? Go to [email or web address] org and sign in to your [employer] account. Enter D001 in the [employer] box to learn more about \"Learning About Living [place]. \"     If you do not have an account, please click on the \"Sign Up Now\" link. Current as of: June 16, 2022               Content Version: 13.4  © 2006-2022 Healthwise, Incorporated. Care instructions adapted under license by [facility] ([facility]). If you have questions about a medical condition or this instruction, always ask your healthcare professional. [address] any warranty or liability for your use of this information.

## 2022-11-01 NOTE — PROGRESS NOTES
Medicare Annual Wellness Visit    Ramo Workman is here for Medicare AWV    Assessment & Plan   Needs flu shot    Recommendations for Preventive Services Due: see orders and patient instructions/AVS.  Recommended screening schedule for the next 5-10 years is provided to the patient in written form: see Patient Instructions/AVS.     No follow-ups on file. The patient received flu vaccine in September at [facility]. Will obtain record. Subjective       Patient's complete Health Risk Assessment and screening values have been reviewed and are found in Flowsheets. The following problems were reviewed today and where indicated follow up appointments were made and/or referrals ordered.     Positive Risk Factor Screenings with Interventions:             General Health and ACP:  General  In general, how would you say your health is?: Very Good  In the past 7 days, have you experienced any of the following: New or Increased Pain, New or Increased Fatigue, Loneliness, Social Isolation, Stress or Anger?: No  Do you get the social and emotional support that you need?: Yes  Do you have a Living Will?: (!) No    Advance Directives       Power of  Living Will ACP-Advance Directive ACP-Power of     Not on File Not on File Not on File Not on File        General Health Risk Interventions:  No Living Will: Advance Care Planning addressed with patient today      Safety:  Do you have working smoke detectors?: Yes  Do you have any tripping hazards - loose or unsecured carpets or rugs?: (!) Yes  Do you have any tripping hazards - clutter in doorways, halls, or stairs?: No  Do you have either shower bars, grab bars, non-slip mats or non-slip surfaces in your shower or bathtub?: Yes  Do all of your stairways have a railing or banister?: Yes  Do you always fasten your seatbelt when you are in a car?: Yes  Safety Interventions:  Home safety tips provided           Objective   Vitals:    11/01/22 1310   BP: 90/60 Pulse: 68   Resp: 16   Temp: 98.9 °F (37.2 °C)   Weight: 133 lb 12.8 oz (60.7 kg)   Height: 5' 3\" (1.6 m)      Body mass index is 23.7 kg/m². Allergies   Allergen Reactions    Ceftin [Cefuroxime] Diarrhea    Keflex [Cephalexin] Diarrhea     Prior to Visit Medications    Medication Sig Taking? Authorizing Provider   omeprazole (PRILOSEC) 20 MG delayed release capsule TAKE ONE CAPSULE BY MOUTH DAILY Yes Odilia Barker MD   allopurinol (ZYLOPRIM) 100 MG tablet TAKE ONE TABLET BY MOUTH DAILY Yes Rosy Beltran MD   alendronate (FOSAMAX) 70 MG tablet TAKE 1 TABLET BY MOUTH ONCE WEEKLY ON AN EMPTY STOMACH BEFORE BREAKFAST.  REMAIN UPRIGHT FOR 30 MINUTES AND TAKE WITH 8 OUNCES OF WATER Yes PASCUAL Thurston CNP   cetirizine (ZYRTEC) 10 MG tablet TAKE ONE TABLET BY MOUTH DAILY Yes PASCUAL Thurston CNP   simvastatin (ZOCOR) 20 MG tablet TAKE ONE TABLET BY MOUTH DAILY Yes PASCUAL Thurston CNP   vitamin D (ERGOCALCIFEROL) 1.25 MG (72850 UT) CAPS capsule Take one cap po every other week Yes PASCUAL Thurston CNP   amLODIPine (NORVASC) 5 MG tablet TAKE ONE TABLET BY MOUTH DAILY Yes PASCUAL Thurston CNP   cholestyramine (QUESTRAN) 4 g packet TAKE ONE PACKET BY MOUTH THREE TIMES A DAY WITH MEALS Yes Odilia Barker MD   Psyllium (METAMUCIL PO) Take by mouth Yes Historical Provider, MD   aspirin 81 MG EC tablet Take 81 mg by mouth daily  Yes Historical Provider, MD   fluticasone (FLONASE) 50 MCG/ACT nasal spray SPRAY TWO SPRAYS IN EACH NOSTRIL ONCE DAILY  Patient not taking: Reported on 11/1/2022  PASCUAL Thurston CNP   naproxen (NAPROSYN) 250 MG tablet TAKE ONE TABLET BY MOUTH THREE TIMES A DAY WITH FOOD AS NEEDED FOR GOUT FLARE UP UNTIL ATTACK SUBSIDES  Patient not taking: Reported on 11/1/2022  PASCUAL Thurston CNP   furosemide (LASIX) 20 MG tablet Take 20 mg by mouth as needed (for swelling)   Patient not taking: Reported on 11/1/2022  Historical Provider, MD [place] (Including outside providers/suppliers regularly involved in providing care):   Patient Care Team:  Ovi Villegas MD as PCP - General (Family Medicine)  Kerry Kiser MD as Consulting Physician (Gastroenterology)  Ita Balderas MD as Consulting Physician (Orthopedic Surgery)     Reviewed and updated this visit:  Tobacco  Allergies  Meds  Med Hx  Surg Hx  Soc Hx  Fam Hx            I, Raimundo Meza RN, 11/1/2022, performed the documented evaluation under the direct supervision of the attending physician. This encounter was performed under my, Gerald Pendleton, direct supervision, 11/1/2022.

## 2022-12-14 RX ORDER — NAPROXEN 250 MG/1
TABLET ORAL
Qty: 60 TABLET | Refills: 0 | Status: SHIPPED | OUTPATIENT
Start: 2022-12-14

## 2023-01-03 RX ORDER — AMLODIPINE BESYLATE 5 MG/1
TABLET ORAL
Qty: 90 TABLET | Refills: 1 | Status: SHIPPED | OUTPATIENT
Start: 2023-01-03

## 2023-01-09 RX ORDER — ALENDRONATE SODIUM 70 MG/1
TABLET ORAL
Qty: 12 TABLET | Refills: 1 | Status: SHIPPED | OUTPATIENT
Start: 2023-01-09

## 2023-03-16 DIAGNOSIS — B37.81 CANDIDA ESOPHAGITIS (HCC): ICD-10-CM

## 2023-03-16 RX ORDER — CETIRIZINE HYDROCHLORIDE 10 MG/1
TABLET ORAL
Qty: 90 TABLET | Refills: 1 | Status: SHIPPED | OUTPATIENT
Start: 2023-03-16

## 2023-03-17 RX ORDER — CHOLESTYRAMINE 4 G/9G
POWDER, FOR SUSPENSION ORAL
Qty: 270 PACKET | Refills: 2 | Status: SHIPPED | OUTPATIENT
Start: 2023-03-17

## 2023-03-29 DIAGNOSIS — K21.9 GASTROESOPHAGEAL REFLUX DISEASE WITHOUT ESOPHAGITIS: ICD-10-CM

## 2023-03-29 RX ORDER — SIMVASTATIN 20 MG
TABLET ORAL
Qty: 90 TABLET | Refills: 1 | Status: SHIPPED | OUTPATIENT
Start: 2023-03-29

## 2023-03-30 RX ORDER — OMEPRAZOLE 20 MG/1
CAPSULE, DELAYED RELEASE ORAL
Qty: 90 CAPSULE | Refills: 2 | Status: SHIPPED | OUTPATIENT
Start: 2023-03-30

## 2023-04-04 ENCOUNTER — OFFICE VISIT (OUTPATIENT)
Dept: FAMILY MEDICINE CLINIC | Age: 78
End: 2023-04-04

## 2023-04-04 VITALS
WEIGHT: 135.6 LBS | SYSTOLIC BLOOD PRESSURE: 104 MMHG | RESPIRATION RATE: 16 BRPM | HEART RATE: 64 BPM | DIASTOLIC BLOOD PRESSURE: 58 MMHG | BODY MASS INDEX: 24.02 KG/M2

## 2023-04-04 DIAGNOSIS — E83.42 HYPOMAGNESEMIA: ICD-10-CM

## 2023-04-04 DIAGNOSIS — E55.9 VITAMIN D DEFICIENCY: ICD-10-CM

## 2023-04-04 DIAGNOSIS — J30.9 ALLERGIC RHINITIS, UNSPECIFIED SEASONALITY, UNSPECIFIED TRIGGER: ICD-10-CM

## 2023-04-04 DIAGNOSIS — Z12.11 COLON CANCER SCREENING: ICD-10-CM

## 2023-04-04 DIAGNOSIS — E78.2 MIXED HYPERLIPIDEMIA: Primary | ICD-10-CM

## 2023-04-04 DIAGNOSIS — R09.82 POSTNASAL DRIP: ICD-10-CM

## 2023-04-04 DIAGNOSIS — M10.9 GOUT, UNSPECIFIED CAUSE, UNSPECIFIED CHRONICITY, UNSPECIFIED SITE: ICD-10-CM

## 2023-04-04 RX ORDER — ERGOCALCIFEROL 1.25 MG/1
CAPSULE ORAL
Qty: 6 CAPSULE | Refills: 1 | Status: SHIPPED | OUTPATIENT
Start: 2023-04-04

## 2023-04-04 RX ORDER — FLUTICASONE PROPIONATE 50 MCG
2 SPRAY, SUSPENSION (ML) NASAL DAILY
Qty: 16 G | Refills: 2 | Status: SHIPPED | OUTPATIENT
Start: 2023-04-04

## 2023-04-04 RX ORDER — ALLOPURINOL 100 MG/1
100 TABLET ORAL DAILY
Qty: 90 TABLET | Refills: 1 | Status: SHIPPED | OUTPATIENT
Start: 2023-04-04

## 2023-04-04 SDOH — ECONOMIC STABILITY: INCOME INSECURITY: HOW HARD IS IT FOR YOU TO PAY FOR THE VERY BASICS LIKE FOOD, HOUSING, MEDICAL CARE, AND HEATING?: NOT HARD AT ALL

## 2023-04-04 SDOH — ECONOMIC STABILITY: FOOD INSECURITY: WITHIN THE PAST 12 MONTHS, YOU WORRIED THAT YOUR FOOD WOULD RUN OUT BEFORE YOU GOT MONEY TO BUY MORE.: NEVER TRUE

## 2023-04-04 SDOH — ECONOMIC STABILITY: FOOD INSECURITY: WITHIN THE PAST 12 MONTHS, THE FOOD YOU BOUGHT JUST DIDN'T LAST AND YOU DIDN'T HAVE MONEY TO GET MORE.: NEVER TRUE

## 2023-04-04 SDOH — ECONOMIC STABILITY: HOUSING INSECURITY
IN THE LAST 12 MONTHS, WAS THERE A TIME WHEN YOU DID NOT HAVE A STEADY PLACE TO SLEEP OR SLEPT IN A SHELTER (INCLUDING NOW)?: NO

## 2023-04-04 ASSESSMENT — ENCOUNTER SYMPTOMS
BLOOD IN STOOL: 0
CHEST TIGHTNESS: 0
ABDOMINAL PAIN: 0
SHORTNESS OF BREATH: 0

## 2023-04-04 NOTE — PROGRESS NOTES
Mental Status: She is alert and oriented to person, place, and time. Psychiatric:         Mood and Affect: Mood normal.         Behavior: Behavior normal.       Assessment:       Diagnosis Orders   1. Mixed hyperlipidemia  Comprehensive Metabolic Panel    Lipid Panel      2. Gout, unspecified cause, unspecified chronicity, unspecified site  allopurinol (ZYLOPRIM) 100 MG tablet    Comprehensive Metabolic Panel    Uric Acid      3. Vitamin D deficiency  vitamin D (ERGOCALCIFEROL) 1.25 MG (23893 UT) CAPS capsule    Vitamin D 25 Hydroxy      4. Hypomagnesemia  Magnesium      5. Allergic rhinitis, unspecified seasonality, unspecified trigger  fluticasone (FLONASE) 50 MCG/ACT nasal spray      6. Postnasal drip  fluticasone (FLONASE) 50 MCG/ACT nasal spray      7. Colon cancer screening  Fecal DNA Colorectal cancer screening (Cologuard)              Plan:      Orders Placed This Encounter   Procedures    Fecal DNA Colorectal cancer screening (Cologuard)    Comprehensive Metabolic Panel     Fasting     Standing Status:   Future     Standing Expiration Date:   2024    Lipid Panel     Standing Status:   Future     Standing Expiration Date:   2024     Order Specific Question:   Is Patient Fasting?/# of Hours     Answer:    Fast 8-10 hours    Magnesium     Standing Status:   Future     Standing Expiration Date:   2024    Uric Acid     Standing Status:   Future     Standing Expiration Date:   2024    Vitamin D 25 Hydroxy     Standing Status:   Future     Standing Expiration Date:   2024      Orders Placed This Encounter   Medications    allopurinol (ZYLOPRIM) 100 MG tablet     Sig: Take 1 tablet by mouth daily     Dispense:  90 tablet     Refill:  1    vitamin D (ERGOCALCIFEROL) 1.25 MG (73694 UT) CAPS capsule     Sig: Take one cap po every other week     Dispense:  6 capsule     Refill:  1    fluticasone (FLONASE) 50 MCG/ACT nasal spray     Si sprays by Each Nostril route daily     Dispense:  16 g

## 2023-04-19 ENCOUNTER — HOSPITAL ENCOUNTER (OUTPATIENT)
Age: 78
Discharge: HOME | End: 2023-04-19
Payer: MEDICARE

## 2023-04-19 DIAGNOSIS — E83.42 HYPOMAGNESEMIA: ICD-10-CM

## 2023-04-19 DIAGNOSIS — M10.9 GOUT, UNSPECIFIED CAUSE, UNSPECIFIED CHRONICITY, UNSPECIFIED SITE: ICD-10-CM

## 2023-04-19 DIAGNOSIS — E78.2 MIXED HYPERLIPIDEMIA: ICD-10-CM

## 2023-04-19 DIAGNOSIS — E55.9 VITAMIN D DEFICIENCY: ICD-10-CM

## 2023-04-19 LAB
25(OH)D3 SERPL-MCNC: 41.1 NG/ML
ALBUMIN SERPL-MCNC: 3.9 G/DL (ref 3.5–5.2)
ALP SERPL-CCNC: 57 U/L (ref 35–104)
ALT SERPL-CCNC: 15 U/L (ref 5–33)
ANION GAP SERPL CALCULATED.3IONS-SCNC: 9 MMOL/L (ref 9–17)
AST SERPL-CCNC: 21 U/L
BILIRUB SERPL-MCNC: 0.4 MG/DL (ref 0.3–1.2)
BUN SERPL-MCNC: 14 MG/DL (ref 8–23)
CALCIUM SERPL-MCNC: 8.9 MG/DL (ref 8.6–10.4)
CHLORIDE SERPL-SCNC: 103 MMOL/L (ref 98–107)
CHOLEST SERPL-MCNC: 129 MG/DL
CHOLESTEROL/HDL RATIO: 2.7
CO2 SERPL-SCNC: 26 MMOL/L (ref 20–31)
CREAT SERPL-MCNC: 0.55 MG/DL (ref 0.5–0.9)
GFR SERPL CREATININE-BSD FRML MDRD: >60 ML/MIN/1.73M2
GLUCOSE SERPL-MCNC: 82 MG/DL (ref 70–99)
HDLC SERPL-MCNC: 48 MG/DL
LDLC SERPL CALC-MCNC: 62 MG/DL (ref 0–130)
MAGNESIUM SERPL-MCNC: 1.8 MG/DL (ref 1.6–2.6)
NONINV COLON CA DNA+OCC BLD SCRN STL QL: NEGATIVE
POTASSIUM SERPL-SCNC: 3.7 MMOL/L (ref 3.7–5.3)
PROT SERPL-MCNC: 6.7 G/DL (ref 6.4–8.3)
SODIUM SERPL-SCNC: 138 MMOL/L (ref 135–144)
TRIGL SERPL-MCNC: 95 MG/DL
URATE SERPL-MCNC: 4.2 MG/DL (ref 2.4–5.7)

## 2023-04-19 PROCEDURE — 80053 COMPREHEN METABOLIC PANEL: CPT

## 2023-04-19 PROCEDURE — 36415 COLL VENOUS BLD VENIPUNCTURE: CPT

## 2023-04-19 PROCEDURE — 82306 VITAMIN D 25 HYDROXY: CPT

## 2023-04-19 PROCEDURE — 83735 ASSAY OF MAGNESIUM: CPT

## 2023-04-19 PROCEDURE — 80061 LIPID PANEL: CPT

## 2023-04-19 PROCEDURE — 84550 ASSAY OF BLOOD/URIC ACID: CPT

## 2023-04-25 ENCOUNTER — OFFICE VISIT (OUTPATIENT)
Dept: PODIATRY | Age: 78
End: 2023-04-25
Payer: MEDICARE

## 2023-04-25 VITALS — WEIGHT: 135 LBS | HEIGHT: 63 IN | BODY MASS INDEX: 23.92 KG/M2

## 2023-04-25 DIAGNOSIS — B35.1 ONYCHOMYCOSIS OF TOENAIL: Primary | ICD-10-CM

## 2023-04-25 DIAGNOSIS — M79.675 PAIN OF TOES OF BOTH FEET: ICD-10-CM

## 2023-04-25 DIAGNOSIS — M79.674 PAIN OF TOES OF BOTH FEET: ICD-10-CM

## 2023-04-25 PROCEDURE — 1123F ACP DISCUSS/DSCN MKR DOCD: CPT | Performed by: PODIATRIST

## 2023-04-25 PROCEDURE — 99203 OFFICE O/P NEW LOW 30 MIN: CPT | Performed by: PODIATRIST

## 2023-04-25 PROCEDURE — 11721 DEBRIDE NAIL 6 OR MORE: CPT | Performed by: PODIATRIST

## 2023-04-25 ASSESSMENT — ENCOUNTER SYMPTOMS
NAUSEA: 0
DIARRHEA: 0
COLOR CHANGE: 0
SHORTNESS OF BREATH: 0
BACK PAIN: 0

## 2023-04-25 NOTE — PROGRESS NOTES
Zuleyma Pitt is a 77 y.o. female who presents to the office today with chief complaint of thick, painful nails to both feet. Chief Complaint   Patient presents with    Nail Problem     B/l nail trim/ last seen Dr. Aicha Hill 4/4/2023   Symptoms began about 1 year(s) ago. Patient denies injury to the either foot. Patient states that the toenails are painful with shoe gear and ambulation. Pain is rated 5 out of 10 at it's worst and is described as intermittent. Treatments prior to today's visit include: None. Allergies   Allergen Reactions    Ceftin [Cefuroxime] Diarrhea    Keflex [Cephalexin] Diarrhea       Past Medical History:   Diagnosis Date    Allergic rhinitis     Closed tibia fracture     GERD (gastroesophageal reflux disease)     Gout     Headache(784.0)     h/o migraines    Hyperlipidemia     Osteoarthritis     DJD Lt knee    Osteoporosis     Posterior tibial tendon dysfunction     right, wears braces on both legs    Vitamin D deficient rickets     Wears glasses     Wears partial dentures     lower       Prior to Admission medications    Medication Sig Start Date End Date Taking?  Authorizing Provider   allopurinol (ZYLOPRIM) 100 MG tablet Take 1 tablet by mouth daily 4/4/23  Yes Deja Castillo MD   vitamin D (ERGOCALCIFEROL) 1.25 MG (22650 UT) CAPS capsule Take one cap po every other week 4/4/23  Yes Deja Castillo MD   fluticasone [facility]) 50 MCG/ACT nasal spray 2 sprays by Each Nostril route daily 4/4/23  Yes Deja Castillo MD   omeprazole (PRILOSEC) 20 MG delayed release capsule TAKE ONE CAPSULE BY MOUTH DAILY 3/30/23  Yes Thompson Mendez MD   simvastatin (ZOCOR) 20 MG tablet TAKE ONE TABLET BY MOUTH DAILY 3/29/23  Yes Deja Castillo MD   cholestyramine Lactristen Arrant) 4 g packet TAKE ONE PACKET BY MOUTH THREE TIMES A DAY WITH MEALS 3/17/23  Yes Thompson Mendez MD   cetirizine (ZYRTEC) 10 MG tablet TAKE ONE TABLET BY MOUTH DAILY 3/16/23  Yes Deja Castillo MD   alendronate

## 2023-05-10 RX ORDER — NAPROXEN 250 MG/1
TABLET ORAL
Qty: 60 TABLET | Refills: 0 | Status: SHIPPED | OUTPATIENT
Start: 2023-05-10

## 2023-06-07 RX ORDER — ALENDRONATE SODIUM 70 MG/1
TABLET ORAL
Qty: 12 TABLET | Refills: 1 | Status: SHIPPED | OUTPATIENT
Start: 2023-06-07

## 2023-06-27 ENCOUNTER — OFFICE VISIT (OUTPATIENT)
Dept: PODIATRY | Age: 78
End: 2023-06-27
Payer: MEDICARE

## 2023-06-27 VITALS — BODY MASS INDEX: 23.92 KG/M2 | WEIGHT: 135 LBS | HEIGHT: 63 IN

## 2023-06-27 DIAGNOSIS — M79.674 PAIN OF TOES OF BOTH FEET: ICD-10-CM

## 2023-06-27 DIAGNOSIS — M79.675 PAIN OF TOES OF BOTH FEET: ICD-10-CM

## 2023-06-27 DIAGNOSIS — B35.1 ONYCHOMYCOSIS OF TOENAIL: Primary | ICD-10-CM

## 2023-06-27 PROCEDURE — 99999 PR OFFICE/OUTPT VISIT,PROCEDURE ONLY: CPT | Performed by: PODIATRIST

## 2023-06-27 PROCEDURE — 11721 DEBRIDE NAIL 6 OR MORE: CPT | Performed by: PODIATRIST

## 2023-06-27 ASSESSMENT — ENCOUNTER SYMPTOMS
BACK PAIN: 0
COLOR CHANGE: 0
NAUSEA: 0
DIARRHEA: 0
SHORTNESS OF BREATH: 0

## 2023-07-03 RX ORDER — AMLODIPINE BESYLATE 5 MG/1
TABLET ORAL
Qty: 90 TABLET | Refills: 1 | Status: SHIPPED | OUTPATIENT
Start: 2023-07-03

## 2023-08-21 ENCOUNTER — HOSPITAL ENCOUNTER (OUTPATIENT)
Dept: WOMENS IMAGING | Age: 78
Discharge: HOME | End: 2023-08-23
Payer: MEDICARE

## 2023-08-21 VITALS — WEIGHT: 130 LBS | BODY MASS INDEX: 23.04 KG/M2 | HEIGHT: 63 IN

## 2023-08-21 DIAGNOSIS — Z12.31 ENCOUNTER FOR SCREENING MAMMOGRAM FOR MALIGNANT NEOPLASM OF BREAST: ICD-10-CM

## 2023-08-21 PROCEDURE — 77063 BREAST TOMOSYNTHESIS BI: CPT

## 2023-08-29 ENCOUNTER — OFFICE VISIT (OUTPATIENT)
Dept: PODIATRY | Age: 78
End: 2023-08-29
Payer: MEDICARE

## 2023-08-29 VITALS — WEIGHT: 130 LBS | HEIGHT: 63 IN | BODY MASS INDEX: 23.04 KG/M2

## 2023-08-29 DIAGNOSIS — B35.1 ONYCHOMYCOSIS OF TOENAIL: Primary | ICD-10-CM

## 2023-08-29 DIAGNOSIS — M79.674 PAIN OF TOES OF BOTH FEET: ICD-10-CM

## 2023-08-29 DIAGNOSIS — M79.675 PAIN OF TOES OF BOTH FEET: ICD-10-CM

## 2023-08-29 PROCEDURE — 11721 DEBRIDE NAIL 6 OR MORE: CPT | Performed by: PODIATRIST

## 2023-08-29 PROCEDURE — 99999 PR OFFICE/OUTPT VISIT,PROCEDURE ONLY: CPT | Performed by: PODIATRIST

## 2023-08-31 ASSESSMENT — ENCOUNTER SYMPTOMS
COLOR CHANGE: 0
BACK PAIN: 0
DIARRHEA: 0
NAUSEA: 0
SHORTNESS OF BREATH: 0

## 2023-09-22 ENCOUNTER — OFFICE VISIT (OUTPATIENT)
Dept: FAMILY MEDICINE CLINIC | Age: 78
End: 2023-09-22
Payer: MEDICARE

## 2023-09-22 VITALS
HEART RATE: 72 BPM | OXYGEN SATURATION: 99 % | WEIGHT: 132 LBS | BODY MASS INDEX: 23.38 KG/M2 | RESPIRATION RATE: 20 BRPM | TEMPERATURE: 98.5 F | SYSTOLIC BLOOD PRESSURE: 104 MMHG | DIASTOLIC BLOOD PRESSURE: 70 MMHG

## 2023-09-22 DIAGNOSIS — I10 ESSENTIAL HYPERTENSION: Primary | ICD-10-CM

## 2023-09-22 DIAGNOSIS — E55.9 VITAMIN D DEFICIENCY: ICD-10-CM

## 2023-09-22 DIAGNOSIS — K40.90 LEFT INGUINAL HERNIA: Primary | ICD-10-CM

## 2023-09-22 DIAGNOSIS — K40.90 LEFT INGUINAL HERNIA: ICD-10-CM

## 2023-09-22 DIAGNOSIS — E78.2 MIXED HYPERLIPIDEMIA: ICD-10-CM

## 2023-09-22 PROCEDURE — 3078F DIAST BP <80 MM HG: CPT | Performed by: NURSE PRACTITIONER

## 2023-09-22 PROCEDURE — 1123F ACP DISCUSS/DSCN MKR DOCD: CPT | Performed by: NURSE PRACTITIONER

## 2023-09-22 PROCEDURE — 99214 OFFICE O/P EST MOD 30 MIN: CPT | Performed by: NURSE PRACTITIONER

## 2023-09-22 PROCEDURE — 3074F SYST BP LT 130 MM HG: CPT | Performed by: NURSE PRACTITIONER

## 2023-09-22 ASSESSMENT — ENCOUNTER SYMPTOMS
WHEEZING: 0
SHORTNESS OF BREATH: 0
ABDOMINAL PAIN: 0
NAUSEA: 0

## 2023-09-25 DIAGNOSIS — M10.9 GOUT, UNSPECIFIED CAUSE, UNSPECIFIED CHRONICITY, UNSPECIFIED SITE: ICD-10-CM

## 2023-09-25 RX ORDER — SIMVASTATIN 20 MG
TABLET ORAL
Qty: 90 TABLET | Refills: 1 | Status: SHIPPED | OUTPATIENT
Start: 2023-09-25

## 2023-09-25 RX ORDER — ALLOPURINOL 100 MG/1
100 TABLET ORAL DAILY
Qty: 90 TABLET | Refills: 1 | Status: SHIPPED | OUTPATIENT
Start: 2023-09-25

## 2023-09-27 ENCOUNTER — HOSPITAL ENCOUNTER (OUTPATIENT)
Age: 78
Discharge: HOME | End: 2023-09-27
Payer: MEDICARE

## 2023-09-27 DIAGNOSIS — K40.90 LEFT INGUINAL HERNIA: ICD-10-CM

## 2023-09-27 DIAGNOSIS — E78.2 MIXED HYPERLIPIDEMIA: ICD-10-CM

## 2023-09-27 DIAGNOSIS — E55.9 VITAMIN D DEFICIENCY: ICD-10-CM

## 2023-09-27 DIAGNOSIS — I10 ESSENTIAL HYPERTENSION: ICD-10-CM

## 2023-09-27 LAB
25(OH)D3 SERPL-MCNC: 41.9 NG/ML
ALBUMIN SERPL-MCNC: 3.9 G/DL (ref 3.5–5.2)
ALP SERPL-CCNC: 56 U/L (ref 35–104)
ALT SERPL-CCNC: 16 U/L (ref 5–33)
ANION GAP SERPL CALCULATED.3IONS-SCNC: 7 MMOL/L (ref 9–17)
AST SERPL-CCNC: 25 U/L
BASOPHILS # BLD: 0 K/UL (ref 0–0.2)
BASOPHILS NFR BLD: 0 % (ref 0–2)
BILIRUB SERPL-MCNC: 0.5 MG/DL (ref 0.3–1.2)
BUN SERPL-MCNC: 15 MG/DL (ref 8–23)
CALCIUM SERPL-MCNC: 9.9 MG/DL (ref 8.6–10.4)
CHLORIDE SERPL-SCNC: 103 MMOL/L (ref 98–107)
CHOLEST SERPL-MCNC: 145 MG/DL
CHOLESTEROL/HDL RATIO: 2.8
CO2 SERPL-SCNC: 28 MMOL/L (ref 20–31)
CREAT SERPL-MCNC: 0.7 MG/DL (ref 0.5–0.9)
EKG ATRIAL RATE: 63 BPM
EKG P AXIS: 67 DEGREES
EKG P-R INTERVAL: 158 MS
EKG Q-T INTERVAL: 426 MS
EKG QRS DURATION: 78 MS
EKG QTC CALCULATION (BAZETT): 435 MS
EKG R AXIS: 21 DEGREES
EKG T AXIS: 44 DEGREES
EKG VENTRICULAR RATE: 63 BPM
EOSINOPHIL # BLD: 0.18 K/UL (ref 0–0.4)
EOSINOPHILS RELATIVE PERCENT: 5 % (ref 0–4)
ERYTHROCYTE [DISTWIDTH] IN BLOOD BY AUTOMATED COUNT: 15 % (ref 11.5–14.9)
GFR SERPL CREATININE-BSD FRML MDRD: >60 ML/MIN/1.73M2
GLUCOSE SERPL-MCNC: 83 MG/DL (ref 70–99)
HCT VFR BLD AUTO: 38.1 % (ref 36–46)
HDLC SERPL-MCNC: 51 MG/DL
HGB BLD-MCNC: 12.3 G/DL (ref 12–16)
LDLC SERPL CALC-MCNC: 76 MG/DL (ref 0–130)
LYMPHOCYTES NFR BLD: 0.97 K/UL (ref 1–4.8)
LYMPHOCYTES RELATIVE PERCENT: 27 % (ref 24–44)
MCH RBC QN AUTO: 28.6 PG (ref 26–34)
MCHC RBC AUTO-ENTMCNC: 32.2 G/DL (ref 31–37)
MCV RBC AUTO: 88.8 FL (ref 80–100)
MONOCYTES NFR BLD: 0.36 K/UL (ref 0.1–1.3)
MONOCYTES NFR BLD: 10 % (ref 1–7)
MORPHOLOGY: NORMAL
NEUTROPHILS NFR BLD: 58 % (ref 36–66)
NEUTS SEG NFR BLD: 2.09 K/UL (ref 1.3–9.1)
PLATELET # BLD AUTO: 207 K/UL (ref 150–450)
PMV BLD AUTO: 8.5 FL (ref 6–12)
POTASSIUM SERPL-SCNC: 4.2 MMOL/L (ref 3.7–5.3)
PROT SERPL-MCNC: 7.3 G/DL (ref 6.4–8.3)
RBC # BLD AUTO: 4.29 M/UL (ref 4–5.2)
SODIUM SERPL-SCNC: 138 MMOL/L (ref 135–144)
TRIGL SERPL-MCNC: 91 MG/DL
WBC OTHER # BLD: 3.6 K/UL (ref 3.5–11)

## 2023-09-27 PROCEDURE — 80053 COMPREHEN METABOLIC PANEL: CPT

## 2023-09-27 PROCEDURE — 36415 COLL VENOUS BLD VENIPUNCTURE: CPT

## 2023-09-27 PROCEDURE — 82306 VITAMIN D 25 HYDROXY: CPT

## 2023-09-27 PROCEDURE — 93005 ELECTROCARDIOGRAM TRACING: CPT

## 2023-09-27 PROCEDURE — 80061 LIPID PANEL: CPT

## 2023-09-27 PROCEDURE — 85025 COMPLETE CBC W/AUTO DIFF WBC: CPT

## 2023-09-28 DIAGNOSIS — R94.31 ABNORMAL EKG: Primary | ICD-10-CM

## 2023-10-05 ENCOUNTER — OFFICE VISIT (OUTPATIENT)
Dept: FAMILY MEDICINE CLINIC | Age: 78
End: 2023-10-05
Payer: MEDICARE

## 2023-10-05 VITALS
OXYGEN SATURATION: 97 % | WEIGHT: 132 LBS | DIASTOLIC BLOOD PRESSURE: 58 MMHG | RESPIRATION RATE: 14 BRPM | BODY MASS INDEX: 23.38 KG/M2 | HEART RATE: 70 BPM | SYSTOLIC BLOOD PRESSURE: 96 MMHG

## 2023-10-05 DIAGNOSIS — E78.2 MIXED HYPERLIPIDEMIA: ICD-10-CM

## 2023-10-05 DIAGNOSIS — E55.9 VITAMIN D DEFICIENCY: ICD-10-CM

## 2023-10-05 DIAGNOSIS — I10 ESSENTIAL HYPERTENSION: Primary | ICD-10-CM

## 2023-10-05 DIAGNOSIS — K40.90 LEFT INGUINAL HERNIA: ICD-10-CM

## 2023-10-05 DIAGNOSIS — M10.9 GOUT, UNSPECIFIED CAUSE, UNSPECIFIED CHRONICITY, UNSPECIFIED SITE: ICD-10-CM

## 2023-10-05 PROCEDURE — 3074F SYST BP LT 130 MM HG: CPT | Performed by: FAMILY MEDICINE

## 2023-10-05 PROCEDURE — 3078F DIAST BP <80 MM HG: CPT | Performed by: FAMILY MEDICINE

## 2023-10-05 PROCEDURE — 99214 OFFICE O/P EST MOD 30 MIN: CPT | Performed by: FAMILY MEDICINE

## 2023-10-05 PROCEDURE — 1123F ACP DISCUSS/DSCN MKR DOCD: CPT | Performed by: FAMILY MEDICINE

## 2023-10-05 RX ORDER — ALLOPURINOL 100 MG/1
100 TABLET ORAL DAILY
Qty: 90 TABLET | Refills: 1 | Status: SHIPPED | OUTPATIENT
Start: 2023-10-05

## 2023-10-05 RX ORDER — AMLODIPINE BESYLATE 5 MG/1
5 TABLET ORAL DAILY
Qty: 90 TABLET | Refills: 1 | Status: SHIPPED | OUTPATIENT
Start: 2023-10-05

## 2023-10-05 ASSESSMENT — PATIENT HEALTH QUESTIONNAIRE - PHQ9
1. LITTLE INTEREST OR PLEASURE IN DOING THINGS: 0
SUM OF ALL RESPONSES TO PHQ QUESTIONS 1-9: 0
SUM OF ALL RESPONSES TO PHQ QUESTIONS 1-9: 0
2. FEELING DOWN, DEPRESSED OR HOPELESS: 0
SUM OF ALL RESPONSES TO PHQ QUESTIONS 1-9: 0
SUM OF ALL RESPONSES TO PHQ9 QUESTIONS 1 & 2: 0
SUM OF ALL RESPONSES TO PHQ QUESTIONS 1-9: 0

## 2023-10-05 ASSESSMENT — ENCOUNTER SYMPTOMS
ABDOMINAL PAIN: 0
CHEST TIGHTNESS: 0
SHORTNESS OF BREATH: 0
BLOOD IN STOOL: 0

## 2023-10-05 NOTE — PROGRESS NOTES
Subjective:      Patient ID: Lexi Devries is a 77 y.o. female. HPI  Visit Information    Have you changed or started any medications since your last visit including any over-the-counter medicines, vitamins, or herbal medicines? no   Are you having any side effects from any of your medications? -  no  Have you stopped taking any of your medications? Is so, why? -  no    Have you seen any other physician or provider since your last visit? Yes - Records Obtained  Have you had any other diagnostic tests since your last visit? Yes - Records Obtained  Have you been seen in the emergency room and/or had an admission to a hospital since we last saw you? No  Have you had your routine dental cleaning in the past 6 months? yes -     Have you activated your [employer] account? If not, what are your barriers? Yes     Patient Care Team:  Juan Diaz MD as PCP - General (Family Medicine)  [facility]uan Diaz MD [facility]  Yesy Sheffield MD as Consulting Physician (Gastroenterology)  Pro Rock MD as Consulting Physician (Orthopedic Surgery)    Medical History Review  Past Medical, Family, and Social History reviewed and does contribute to the patient presenting condition    Health Maintenance   Topic Date Due    DTaP/Tdap/Td vaccine (1 - Tdap) Never done    Shingles vaccine (2 of 2) 11/29/2022    COVID-19 Vaccine (6 - Pfizer series) 02/28/2023    Annual Wellness Visit (AWV)  11/02/2023    Depression Screen  09/22/2024    Lipids  09/27/2024    DEXA (modify frequency per FRAX score)  Completed    Flu vaccine  Completed    Pneumococcal 65+ years Vaccine  Completed    Hepatitis A vaccine  Aged Out    Hepatitis B vaccine  Aged Out    Hib vaccine  Aged Out    Meningococcal (ACWY) vaccine  Aged Out    Hepatitis C screen  Discontinued   Patient is a 79-year-old white female who presents for hypertension, hyperlipidemia, vitamin D deficiency, gout.   She also was recently found to have a left inguinal

## 2023-10-10 ENCOUNTER — HOSPITAL ENCOUNTER (OUTPATIENT)
Dept: PREADMISSION TESTING | Age: 78
Setting detail: OUTPATIENT SURGERY
Discharge: HOME | End: 2023-10-14
Payer: MEDICARE

## 2023-10-10 ENCOUNTER — ANESTHESIA EVENT (OUTPATIENT)
Dept: OPERATING ROOM | Age: 78
End: 2023-10-10
Payer: MEDICARE

## 2023-10-10 VITALS — BODY MASS INDEX: 23.21 KG/M2 | HEIGHT: 63 IN | WEIGHT: 131 LBS

## 2023-10-10 RX ORDER — FAMOTIDINE 20 MG/1
20 TABLET, FILM COATED ORAL 2 TIMES DAILY
COMMUNITY

## 2023-10-10 NOTE — PROGRESS NOTES
Pre-op Instructions For Out-Patient Surgery    Medication Instructions:  Please stop herbs and any supplements now (includes vitamins and minerals). Please contact your surgeon and prescribing physician for pre-op instructions for any blood thinners. Aspirin and Naproxen     If you have inhalers/aerosol treatments at home, please use them the morning of your surgery and bring the inhalers with you to the hospital.    Please take the following medications the morning of your surgery with a sip of water:    Omeprazole  (Kisha takes her Amlodipine at night )        Surgery Instructions:  After midnight before surgery:  Do not eat or drink anything, including water, mints, gum, and hard candy. You may brush your teeth without swallowing. No smoking, chewing tobacco, or street drugs. Please shower or bathe before surgery. If you were given Surgical Scrub Chlorhexidine Gluconate Liquid (CHG), please shower the night before and the morning of your surgery following the detailed instructions you received during your pre-admission visit. ( Carmie Orn will try to  )      Please do not wear any cologne, lotion, powder, deodorant, jewelry, piercings, perfume, makeup, nail polish, hair accessories, or hair spray on the day of surgery. Wear loose comfortable clothing. Leave your valuables at home but bring a payment source for any after-surgery prescriptions you plan to fill at [facility]. Bring a storage case for any glasses/contacts. An adult who is responsible for you MUST drive you home and should be with you for the first 24 hours after surgery. If having out-patient knee and foot surgeries, please arrange for planned crutches, walker, or wheelchair before arriving to the hospital.    The Day of Surgery:  Arrive at [facility] at the time directed by your surgeon and check in at the desk.      If you have a living will or

## 2023-10-11 ENCOUNTER — ANESTHESIA (OUTPATIENT)
Dept: OPERATING ROOM | Age: 78
End: 2023-10-11
Payer: MEDICARE

## 2023-10-11 ENCOUNTER — HOSPITAL ENCOUNTER (OUTPATIENT)
Age: 78
Setting detail: OUTPATIENT SURGERY
Discharge: HOME | End: 2023-10-11
Attending: SURGERY | Admitting: SURGERY
Payer: MEDICARE

## 2023-10-11 VITALS
RESPIRATION RATE: 14 BRPM | HEART RATE: 63 BPM | HEIGHT: 63 IN | BODY MASS INDEX: 23.21 KG/M2 | DIASTOLIC BLOOD PRESSURE: 69 MMHG | OXYGEN SATURATION: 96 % | SYSTOLIC BLOOD PRESSURE: 126 MMHG | TEMPERATURE: 98.4 F | WEIGHT: 131 LBS

## 2023-10-11 DIAGNOSIS — K40.90 LEFT INGUINAL HERNIA: Primary | ICD-10-CM

## 2023-10-11 PROCEDURE — 7100000011 HC PHASE II RECOVERY - ADDTL 15 MIN: Performed by: SURGERY

## 2023-10-11 PROCEDURE — 2500000003 HC RX 250 WO HCPCS: Performed by: ANESTHESIOLOGY

## 2023-10-11 PROCEDURE — 7100000000 HC PACU RECOVERY - FIRST 15 MIN: Performed by: SURGERY

## 2023-10-11 PROCEDURE — 3600000002 HC SURGERY LEVEL 2 BASE: Performed by: SURGERY

## 2023-10-11 PROCEDURE — 7100000001 HC PACU RECOVERY - ADDTL 15 MIN: Performed by: SURGERY

## 2023-10-11 PROCEDURE — 2500000003 HC RX 250 WO HCPCS: Performed by: NURSE ANESTHETIST, CERTIFIED REGISTERED

## 2023-10-11 PROCEDURE — 2720000010 HC SURG SUPPLY STERILE: Performed by: SURGERY

## 2023-10-11 PROCEDURE — C1781 MESH (IMPLANTABLE): HCPCS | Performed by: SURGERY

## 2023-10-11 PROCEDURE — 2709999900 HC NON-CHARGEABLE SUPPLY: Performed by: SURGERY

## 2023-10-11 PROCEDURE — 6360000002 HC RX W HCPCS: Performed by: SURGERY

## 2023-10-11 PROCEDURE — 6360000002 HC RX W HCPCS: Performed by: NURSE ANESTHETIST, CERTIFIED REGISTERED

## 2023-10-11 PROCEDURE — 7100000031 HC ASPR PHASE II RECOVERY - ADDTL 15 MIN: Performed by: SURGERY

## 2023-10-11 PROCEDURE — 2580000003 HC RX 258: Performed by: ANESTHESIOLOGY

## 2023-10-11 PROCEDURE — 7100000010 HC PHASE II RECOVERY - FIRST 15 MIN: Performed by: SURGERY

## 2023-10-11 PROCEDURE — 3600000012 HC SURGERY LEVEL 2 ADDTL 15MIN: Performed by: SURGERY

## 2023-10-11 PROCEDURE — 3700000000 HC ANESTHESIA ATTENDED CARE: Performed by: SURGERY

## 2023-10-11 PROCEDURE — 7100000030 HC ASPR PHASE II RECOVERY - FIRST 15 MIN: Performed by: SURGERY

## 2023-10-11 PROCEDURE — 6370000000 HC RX 637 (ALT 250 FOR IP): Performed by: ANESTHESIOLOGY

## 2023-10-11 PROCEDURE — 3700000001 HC ADD 15 MINUTES (ANESTHESIA): Performed by: SURGERY

## 2023-10-11 DEVICE — MESH HERN W1XL4IN INGUINAL POLYPR MFIL RECTANG: Type: IMPLANTABLE DEVICE | Site: INGUINAL | Status: FUNCTIONAL

## 2023-10-11 DEVICE — PLUG HERN XL W1.6XL2IN INGUINAL POLYPR REP PRESHAPED ONLAY: Type: IMPLANTABLE DEVICE | Site: INGUINAL | Status: FUNCTIONAL

## 2023-10-11 RX ORDER — CLINDAMYCIN PHOSPHATE 600 MG/50ML
600 INJECTION INTRAVENOUS ONCE
Status: DISCONTINUED | OUTPATIENT
Start: 2023-10-11 | End: 2023-10-11 | Stop reason: HOSPADM

## 2023-10-11 RX ORDER — ROCURONIUM BROMIDE 10 MG/ML
INJECTION, SOLUTION INTRAVENOUS PRN
Status: DISCONTINUED | OUTPATIENT
Start: 2023-10-11 | End: 2023-10-11 | Stop reason: SDUPTHER

## 2023-10-11 RX ORDER — ONDANSETRON 2 MG/ML
4 INJECTION INTRAMUSCULAR; INTRAVENOUS
Status: DISCONTINUED | OUTPATIENT
Start: 2023-10-11 | End: 2023-10-11 | Stop reason: HOSPADM

## 2023-10-11 RX ORDER — DEXAMETHASONE SODIUM PHOSPHATE 4 MG/ML
INJECTION, SOLUTION INTRA-ARTICULAR; INTRALESIONAL; INTRAMUSCULAR; INTRAVENOUS; SOFT TISSUE PRN
Status: DISCONTINUED | OUTPATIENT
Start: 2023-10-11 | End: 2023-10-11 | Stop reason: SDUPTHER

## 2023-10-11 RX ORDER — METOCLOPRAMIDE HYDROCHLORIDE 5 MG/ML
10 INJECTION INTRAMUSCULAR; INTRAVENOUS
Status: DISCONTINUED | OUTPATIENT
Start: 2023-10-11 | End: 2023-10-11 | Stop reason: HOSPADM

## 2023-10-11 RX ORDER — FENTANYL CITRATE 0.05 MG/ML
25 INJECTION, SOLUTION INTRAMUSCULAR; INTRAVENOUS EVERY 5 MIN PRN
Status: DISCONTINUED | OUTPATIENT
Start: 2023-10-11 | End: 2023-10-11 | Stop reason: HOSPADM

## 2023-10-11 RX ORDER — ONDANSETRON 4 MG/1
TABLET, FILM COATED ORAL
Qty: 20 TABLET | Refills: 0 | Status: SHIPPED | OUTPATIENT
Start: 2023-10-11

## 2023-10-11 RX ORDER — SODIUM CHLORIDE 0.9 % (FLUSH) 0.9 %
5-40 SYRINGE (ML) INJECTION EVERY 12 HOURS SCHEDULED
Status: DISCONTINUED | OUTPATIENT
Start: 2023-10-11 | End: 2023-10-11 | Stop reason: HOSPADM

## 2023-10-11 RX ORDER — SODIUM CHLORIDE 9 MG/ML
INJECTION, SOLUTION INTRAVENOUS PRN
Status: DISCONTINUED | OUTPATIENT
Start: 2023-10-11 | End: 2023-10-11 | Stop reason: HOSPADM

## 2023-10-11 RX ORDER — CLINDAMYCIN PHOSPHATE 150 MG/ML
INJECTION, SOLUTION INTRAVENOUS PRN
Status: DISCONTINUED | OUTPATIENT
Start: 2023-10-11 | End: 2023-10-11 | Stop reason: SDUPTHER

## 2023-10-11 RX ORDER — LIDOCAINE HYDROCHLORIDE 10 MG/ML
INJECTION, SOLUTION EPIDURAL; INFILTRATION; INTRACAUDAL; PERINEURAL PRN
Status: DISCONTINUED | OUTPATIENT
Start: 2023-10-11 | End: 2023-10-11 | Stop reason: SDUPTHER

## 2023-10-11 RX ORDER — ACETAMINOPHEN 500 MG
1000 TABLET ORAL ONCE
Status: COMPLETED | OUTPATIENT
Start: 2023-10-11 | End: 2023-10-11

## 2023-10-11 RX ORDER — DIPHENHYDRAMINE HYDROCHLORIDE 50 MG/ML
12.5 INJECTION INTRAMUSCULAR; INTRAVENOUS
Status: DISCONTINUED | OUTPATIENT
Start: 2023-10-11 | End: 2023-10-11 | Stop reason: HOSPADM

## 2023-10-11 RX ORDER — OXYCODONE HYDROCHLORIDE AND ACETAMINOPHEN 5; 325 MG/1; MG/1
1 TABLET ORAL EVERY 6 HOURS PRN
Qty: 28 TABLET | Refills: 0 | Status: SHIPPED | OUTPATIENT
Start: 2023-10-11 | End: 2023-10-18

## 2023-10-11 RX ORDER — GABAPENTIN 100 MG/1
100 CAPSULE ORAL ONCE
Status: COMPLETED | OUTPATIENT
Start: 2023-10-11 | End: 2023-10-11

## 2023-10-11 RX ORDER — ONDANSETRON 2 MG/ML
INJECTION INTRAMUSCULAR; INTRAVENOUS PRN
Status: DISCONTINUED | OUTPATIENT
Start: 2023-10-11 | End: 2023-10-11 | Stop reason: SDUPTHER

## 2023-10-11 RX ORDER — SULFAMETHOXAZOLE AND TRIMETHOPRIM 800; 160 MG/1; MG/1
1 TABLET ORAL 2 TIMES DAILY
Qty: 14 TABLET | Refills: 0 | Status: SHIPPED | OUTPATIENT
Start: 2023-10-11 | End: 2023-10-18

## 2023-10-11 RX ORDER — PROPOFOL 10 MG/ML
INJECTION, EMULSION INTRAVENOUS PRN
Status: DISCONTINUED | OUTPATIENT
Start: 2023-10-11 | End: 2023-10-11 | Stop reason: SDUPTHER

## 2023-10-11 RX ORDER — SODIUM CHLORIDE 0.9 % (FLUSH) 0.9 %
5-40 SYRINGE (ML) INJECTION PRN
Status: DISCONTINUED | OUTPATIENT
Start: 2023-10-11 | End: 2023-10-11 | Stop reason: HOSPADM

## 2023-10-11 RX ORDER — LIDOCAINE HYDROCHLORIDE 10 MG/ML
1 INJECTION, SOLUTION EPIDURAL; INFILTRATION; INTRACAUDAL; PERINEURAL
Status: DISCONTINUED | OUTPATIENT
Start: 2023-10-11 | End: 2023-10-11 | Stop reason: HOSPADM

## 2023-10-11 RX ORDER — SODIUM CHLORIDE, SODIUM LACTATE, POTASSIUM CHLORIDE, CALCIUM CHLORIDE 600; 310; 30; 20 MG/100ML; MG/100ML; MG/100ML; MG/100ML
INJECTION, SOLUTION INTRAVENOUS CONTINUOUS
Status: DISCONTINUED | OUTPATIENT
Start: 2023-10-11 | End: 2023-10-11 | Stop reason: HOSPADM

## 2023-10-11 RX ORDER — BUPIVACAINE HYDROCHLORIDE 5 MG/ML
INJECTION, SOLUTION EPIDURAL; INTRACAUDAL PRN
Status: DISCONTINUED | OUTPATIENT
Start: 2023-10-11 | End: 2023-10-11 | Stop reason: ALTCHOICE

## 2023-10-11 RX ORDER — FENTANYL CITRATE 50 UG/ML
INJECTION, SOLUTION INTRAMUSCULAR; INTRAVENOUS PRN
Status: DISCONTINUED | OUTPATIENT
Start: 2023-10-11 | End: 2023-10-11 | Stop reason: SDUPTHER

## 2023-10-11 RX ADMIN — ROCURONIUM BROMIDE 50 MG: 10 INJECTION, SOLUTION INTRAVENOUS at 15:40

## 2023-10-11 RX ADMIN — GABAPENTIN 100 MG: 100 CAPSULE ORAL at 13:17

## 2023-10-11 RX ADMIN — ACETAMINOPHEN 1000 MG: 500 TABLET ORAL at 13:17

## 2023-10-11 RX ADMIN — PHENYLEPHRINE HYDROCHLORIDE 200 MCG: 10 INJECTION INTRAVENOUS at 15:58

## 2023-10-11 RX ADMIN — SUGAMMADEX 120 MG: 100 INJECTION, SOLUTION INTRAVENOUS at 16:39

## 2023-10-11 RX ADMIN — FENTANYL CITRATE 50 MCG: 50 INJECTION, SOLUTION INTRAMUSCULAR; INTRAVENOUS at 15:40

## 2023-10-11 RX ADMIN — PROPOFOL 120 MG: 10 INJECTION, EMULSION INTRAVENOUS at 15:40

## 2023-10-11 RX ADMIN — CLINDAMYCIN PHOSPHATE 600 MG: 150 INJECTION, SOLUTION INTRAMUSCULAR; INTRAVENOUS at 15:45

## 2023-10-11 RX ADMIN — DEXAMETHASONE SODIUM PHOSPHATE 4 MG: 4 INJECTION INTRA-ARTICULAR; INTRALESIONAL; INTRAMUSCULAR; INTRAVENOUS; SOFT TISSUE at 15:40

## 2023-10-11 RX ADMIN — SODIUM CHLORIDE, POTASSIUM CHLORIDE, SODIUM LACTATE AND CALCIUM CHLORIDE: 600; 310; 30; 20 INJECTION, SOLUTION INTRAVENOUS at 13:28

## 2023-10-11 RX ADMIN — PHENYLEPHRINE HYDROCHLORIDE 100 MCG: 10 INJECTION INTRAVENOUS at 15:47

## 2023-10-11 RX ADMIN — ONDANSETRON 4 MG: 2 INJECTION INTRAMUSCULAR; INTRAVENOUS at 16:15

## 2023-10-11 RX ADMIN — LIDOCAINE HYDROCHLORIDE 20 MG: 10 INJECTION, SOLUTION EPIDURAL; INFILTRATION; INTRACAUDAL; PERINEURAL at 15:40

## 2023-10-11 ASSESSMENT — PAIN SCALES - GENERAL
PAINLEVEL_OUTOF10: 3
PAINLEVEL_OUTOF10: 1

## 2023-10-11 ASSESSMENT — PAIN DESCRIPTION - LOCATION
LOCATION: GROIN
LOCATION: GROIN

## 2023-10-11 ASSESSMENT — PAIN DESCRIPTION - PAIN TYPE
TYPE: SURGICAL PAIN
TYPE: SURGICAL PAIN

## 2023-10-11 ASSESSMENT — PAIN DESCRIPTION - ORIENTATION
ORIENTATION: LEFT
ORIENTATION: LEFT

## 2023-10-11 ASSESSMENT — ENCOUNTER SYMPTOMS
GASTROINTESTINAL NEGATIVE: 1
COUGH: 1

## 2023-10-11 ASSESSMENT — PAIN DESCRIPTION - FREQUENCY: FREQUENCY: CONTINUOUS

## 2023-10-11 ASSESSMENT — PAIN DESCRIPTION - DESCRIPTORS
DESCRIPTORS: SORE
DESCRIPTORS: ACHING

## 2023-10-11 ASSESSMENT — PAIN DESCRIPTION - ONSET: ONSET: GRADUAL

## 2023-10-11 ASSESSMENT — PAIN - FUNCTIONAL ASSESSMENT: PAIN_FUNCTIONAL_ASSESSMENT: 0-10

## 2023-10-11 NOTE — H&P
amLODIPine (NORVASC) 5 MG tablet Take 1 tablet by mouth daily 90 tablet 1    allopurinol (ZYLOPRIM) 100 MG tablet Take 1 tablet by mouth daily 90 tablet 1    simvastatin (ZOCOR) 20 MG tablet TAKE ONE TABLET BY MOUTH DAILY 90 tablet 1    cetirizine (ZYRTEC) 10 MG tablet TAKE ONE TABLET BY MOUTH DAILY 90 tablet 1    alendronate (FOSAMAX) 70 MG tablet TAKE 1 TABLET BY MOUTH ONCE WEEKLY ON AN EMPTY STOMACH BEFORE BREAKFAST. REMAIN UPRIGHT FOR 30 MINUTES AND TAKE WITH 8 OUNCES OF WATER 12 tablet 1    naproxen (NAPROSYN) 250 MG tablet TAKE ONE TABLET BY MOUTH THREE TIMES A DAY WITH FOOD AS NEEDED FOR GOUT FLARE UP UNTIL ATTACK SUBSIDES 60 tablet 0    vitamin D (ERGOCALCIFEROL) 1.25 MG (60854 UT) CAPS capsule Take one cap po every other week 6 capsule 1    fluticasone (FLONASE) 50 MCG/ACT nasal spray 2 sprays by Each Nostril route daily 16 g 2    omeprazole (PRILOSEC) 20 MG delayed release capsule TAKE ONE CAPSULE BY MOUTH DAILY 90 capsule 2    cholestyramine (QUESTRAN) 4 g packet TAKE ONE PACKET BY MOUTH THREE TIMES A DAY WITH MEALS 270 packet 2    furosemide (LASIX) 20 MG tablet Take 1 tablet by mouth as needed (for swelling)      Psyllium (METAMUCIL PO) Take by mouth      aspirin 81 MG EC tablet Take 1 tablet by mouth daily         Review of Systems   HENT: Negative. Eyes:  Positive for visual disturbance. Eye glasses   Respiratory:  Positive for cough. Cardiovascular: Negative. Gastrointestinal: Negative. Genitourinary: Negative. Musculoskeletal: Negative. Skin: Negative. Neurological: Negative. Hematological:  Bruises/bleeds easily. Psychiatric/Behavioral: Negative. GENERAL PHYSICAL EXAM     Vitals: see nursing flow sheet for vital sings     GENERAL APPEARANCE:   Alta Alejo is 77 y.o.,  female, not obese, nourished, conscious, alert. Does not appear to be distress or pain at this time.                             Physical Exam  Constitutional:       General:
60

## 2023-10-11 NOTE — OP NOTE
Patient: Karina Pham  YOB: 1945  MRN: [id number]    Date of Procedure: 10/11/2023        Preoperative diagnosis: Left inguinal hernia    Postoperative diagnosis: Left indirect inguinal hernia    Procedure: Left indirect inguinal hernia repair with extra-large mesh plug and mesh    Surgeon: Dr. Monica Bloom    Asst.: None    Anesthesia: General    Preparation: Hibiclens    EBL: Less than 25 mL    Specimen: None    Procedure: Informed consent was obtained. Site was marked and confirmed. Preoperative antibiotic was given. Patient was taken to the operating room. General anesthesia was given. Operative site was prepped and draped in usual sterile fashion. Timeout was done. Left inguinal incision was made. Incision was deepened with help of Bovie cautery. Alma's was incised. External oblique aponeurosis was incised. External ring was divided. Patient was found to have indirect defect. Round ligament was divided. Hernia sac was reduced. Internal ring was repaired using an extra-large mesh plug. Floor of the inguinal canal initially was repaired by approximating the conjoined tendon with the inguinal ligament using interrupted Ethibond sutures. Floor the inguinal canal was further reinforced using a Prolene mesh which was secured in several different locations using interrupted Ethibond sutures. Satisfactory tension-free repair was achieved. Hemostasis was confirmed. Sponge needle instrument count was found to be correct. Wound was approximated in several layers using absorbable sutures. Local anesthetic was infiltrated. Dermabond was applied. Steri-Strips were applied. Sterile dressing was applied. Patient tolerated procedure well and was transferred to the recovery room in a stable condition.  -  Recommendations: Findings discussed with the family. Postoperative course recovery restrictions follow-up were all discussed. Prescriptions called in.   Discharge instructions in the

## 2023-10-11 NOTE — DISCHARGE INSTRUCTIONS
Dr. Veronica Turner Orders for Outpatient    1.) Diet:    [x]  As tolerated  []  Special     2.) Activity:    [x]  No lifting more than five to ten pounds 4-6 weeks  [x]  May Shower tomorrow  [x]  No driving until off pain medications     3.) Dressing:    [x]  Remove top dressing in 48 hours   [x]  Leave steri strips on     [x]  Remove and change dressing sooner if soaked    4.) Medication:    [x]  Take medication as prescribed   []  Resume blood thinners in  days    [x]  Resume home medications per AVS Summary    5.) Office visit: Follow up with Dr. Mary Nagel. Call to schedule an appointment if one has not been made. 6.) Call [phone] if you have any questions or concerns. Electronically signed by Chikis Chapa MD on 10/11/2023 at 1:35 PM     [address]    In order to continue your care at home, please follow the instructions below. Anesthesia - Do not drink any alcoholic beverages or make any legal or important decisions for 24 hours. If your surgery was on an extremity or abdomen, do not drive or operate any machinery until your surgeon approves, otherwise do not drive or operate any machinery for 24 hours or as restricted by your surgeon. Pain Medications - Please do not drive, operate machinery, or drink alcoholic beverages while taking any prescribed pain medication. Diet -  Start out eating lightly (broth, soup, crackers, toast, etc.) advancing as tolerated to your usual diet. Try to avoid spicy and greasy/fatty foods for 24 hours. Drink plenty of fluids after surgery, unless you are on a fluid restriction. Avoid milk/milk product for several hours. Call your surgeon for the following: You have pain that does not get better after you take pain medicine. For an oral temperature (by mouth) is 101 degrees or higher, chills, or excessive sweating. You have increasing and progressive bleeding or drainage from surgery site.   Signs of an infection:

## 2023-10-11 NOTE — BRIEF OP NOTE
Brief Postoperative Note      Patient: Lexi Devries  YOB: 1945  MRN: [id number]    Date of Procedure: 10/11/2023    Pre-Op Diagnosis Codes:     * Unilateral inguinal hernia without obstruction or gangrene, recurrence not specified [K40.90]    Post-Op Diagnosis: Same       Procedure(s):  OPEN HERNIA INGUINAL REPAIR W/MESH    Surgeon(s):  Janny Padilla MD    Assistant:  * No surgical staff found *    Anesthesia: General    Estimated Blood Loss (mL): less than 50     Complications: None    Specimens:   * No specimens in log *    Implants:  * No implants in log *      Drains: * No LDAs found *    Findings: dictated      Electronically signed by Janny Padilla MD on 10/11/2023 at 1:29 PM

## 2023-11-02 ENCOUNTER — OFFICE VISIT (OUTPATIENT)
Dept: FAMILY MEDICINE CLINIC | Age: 78
End: 2023-11-02
Payer: MEDICARE

## 2023-11-02 VITALS
SYSTOLIC BLOOD PRESSURE: 96 MMHG | TEMPERATURE: 97.1 F | BODY MASS INDEX: 22.22 KG/M2 | HEART RATE: 84 BPM | HEIGHT: 63 IN | WEIGHT: 125.4 LBS | RESPIRATION RATE: 20 BRPM | DIASTOLIC BLOOD PRESSURE: 50 MMHG

## 2023-11-02 DIAGNOSIS — Z00.00 MEDICARE ANNUAL WELLNESS VISIT, SUBSEQUENT: Primary | ICD-10-CM

## 2023-11-02 PROCEDURE — 3074F SYST BP LT 130 MM HG: CPT | Performed by: FAMILY MEDICINE

## 2023-11-02 PROCEDURE — 1123F ACP DISCUSS/DSCN MKR DOCD: CPT | Performed by: FAMILY MEDICINE

## 2023-11-02 PROCEDURE — 3078F DIAST BP <80 MM HG: CPT | Performed by: FAMILY MEDICINE

## 2023-11-02 PROCEDURE — G0439 PPPS, SUBSEQ VISIT: HCPCS | Performed by: FAMILY MEDICINE

## 2023-11-02 ASSESSMENT — PATIENT HEALTH QUESTIONNAIRE - PHQ9
SUM OF ALL RESPONSES TO PHQ QUESTIONS 1-9: 0
2. FEELING DOWN, DEPRESSED OR HOPELESS: 0
1. LITTLE INTEREST OR PLEASURE IN DOING THINGS: 0
SUM OF ALL RESPONSES TO PHQ QUESTIONS 1-9: 0
SUM OF ALL RESPONSES TO PHQ9 QUESTIONS 1 & 2: 0

## 2023-11-20 DIAGNOSIS — E55.9 VITAMIN D DEFICIENCY: ICD-10-CM

## 2023-11-20 RX ORDER — ERGOCALCIFEROL 1.25 MG/1
CAPSULE ORAL
Qty: 6 CAPSULE | Refills: 1 | Status: SHIPPED | OUTPATIENT
Start: 2023-11-20

## 2023-11-21 ENCOUNTER — OFFICE VISIT (OUTPATIENT)
Dept: PODIATRY | Age: 78
End: 2023-11-21
Payer: MEDICARE

## 2023-11-21 VITALS — WEIGHT: 120 LBS | BODY MASS INDEX: 21.26 KG/M2 | HEIGHT: 63 IN

## 2023-11-21 DIAGNOSIS — M79.675 PAIN OF TOES OF BOTH FEET: ICD-10-CM

## 2023-11-21 DIAGNOSIS — B35.1 ONYCHOMYCOSIS OF TOENAIL: Primary | ICD-10-CM

## 2023-11-21 DIAGNOSIS — M79.674 PAIN OF TOES OF BOTH FEET: ICD-10-CM

## 2023-11-21 PROCEDURE — 11721 DEBRIDE NAIL 6 OR MORE: CPT | Performed by: PODIATRIST

## 2023-11-21 PROCEDURE — 99999 PR OFFICE/OUTPT VISIT,PROCEDURE ONLY: CPT | Performed by: PODIATRIST

## 2023-11-21 ASSESSMENT — ENCOUNTER SYMPTOMS
DIARRHEA: 0
SHORTNESS OF BREATH: 0
COLOR CHANGE: 0
NAUSEA: 0
BACK PAIN: 0

## 2023-12-13 ENCOUNTER — OFFICE VISIT (OUTPATIENT)
Dept: GASTROENTEROLOGY | Age: 78
End: 2023-12-13
Payer: MEDICARE

## 2023-12-13 VITALS
WEIGHT: 129 LBS | TEMPERATURE: 97.2 F | DIASTOLIC BLOOD PRESSURE: 74 MMHG | BODY MASS INDEX: 23.22 KG/M2 | SYSTOLIC BLOOD PRESSURE: 131 MMHG

## 2023-12-13 DIAGNOSIS — K21.9 GASTROESOPHAGEAL REFLUX DISEASE, UNSPECIFIED WHETHER ESOPHAGITIS PRESENT: ICD-10-CM

## 2023-12-13 DIAGNOSIS — Z90.49 S/P CHOLECYSTECTOMY: ICD-10-CM

## 2023-12-13 DIAGNOSIS — K58.0 IRRITABLE BOWEL SYNDROME WITH DIARRHEA: Primary | ICD-10-CM

## 2023-12-13 PROCEDURE — 99214 OFFICE O/P EST MOD 30 MIN: CPT | Performed by: INTERNAL MEDICINE

## 2023-12-13 PROCEDURE — 3075F SYST BP GE 130 - 139MM HG: CPT | Performed by: INTERNAL MEDICINE

## 2023-12-13 PROCEDURE — 3078F DIAST BP <80 MM HG: CPT | Performed by: INTERNAL MEDICINE

## 2023-12-13 PROCEDURE — 1123F ACP DISCUSS/DSCN MKR DOCD: CPT | Performed by: INTERNAL MEDICINE

## 2023-12-13 ASSESSMENT — ENCOUNTER SYMPTOMS
ABDOMINAL PAIN: 0
WHEEZING: 0
TROUBLE SWALLOWING: 0
DIARRHEA: 0
VOICE CHANGE: 0
CONSTIPATION: 0
NAUSEA: 0
SORE THROAT: 0
CHOKING: 0
ABDOMINAL DISTENTION: 0
VOMITING: 0
COUGH: 0
BLOOD IN STOOL: 0
ANAL BLEEDING: 0
RECTAL PAIN: 0
SHORTNESS OF BREATH: 0

## 2023-12-13 NOTE — PROGRESS NOTES
by Marvin Velazco MD at [address]:    Current Outpatient Medications:     vitamin D (ERGOCALCIFEROL) 1.25 MG (39580 UT) CAPS capsule, TAKE 1 CAPSULE BY MOUTH EVERY OTHER WEEK, Disp: 6 capsule, Rfl: 1    ondansetron (ZOFRAN) 4 MG tablet, Take every six hours as needed, Disp: 20 tablet, Rfl: 0    amLODIPine (NORVASC) 5 MG tablet, Take 1 tablet by mouth daily, Disp: 90 tablet, Rfl: 1    allopurinol (ZYLOPRIM) 100 MG tablet, Take 1 tablet by mouth daily, Disp: 90 tablet, Rfl: 1    simvastatin (ZOCOR) 20 MG tablet, TAKE ONE TABLET BY MOUTH DAILY, Disp: 90 tablet, Rfl: 1    cetirizine (ZYRTEC) 10 MG tablet, TAKE ONE TABLET BY MOUTH DAILY, Disp: 90 tablet, Rfl: 1    alendronate (FOSAMAX) 70 MG tablet, TAKE 1 TABLET BY MOUTH ONCE WEEKLY ON AN EMPTY STOMACH BEFORE BREAKFAST. REMAIN UPRIGHT FOR 30 MINUTES AND TAKE WITH 8 OUNCES OF WATER, Disp: 12 tablet, Rfl: 1    fluticasone (FLONASE) 50 MCG/ACT nasal spray, 2 sprays by Each Nostril route daily, Disp: 16 g, Rfl: 2    omeprazole (PRILOSEC) 20 MG delayed release capsule, TAKE ONE CAPSULE BY MOUTH DAILY, Disp: 90 capsule, Rfl: 2    cholestyramine (QUESTRAN) 4 g packet, TAKE ONE PACKET BY MOUTH THREE TIMES A DAY WITH MEALS, Disp: 270 packet, Rfl: 2    aspirin 81 MG EC tablet, Take 1 tablet by mouth daily, Disp: , Rfl:     ALLERGIES:   Allergies   Allergen Reactions    Ceftin [Cefuroxime] Diarrhea    Keflex [Cephalexin] Diarrhea       FAMILY HISTORY:       Problem Relation Age of Onset    Stroke Father     Arthritis Other     Thyroid Disease Other     COPD Other     Other Son         atrial fib         SOCIAL HISTORY:   Social History     Socioeconomic History    Marital status:       Spouse name: Not on file    Number of children: Not on file    Years of education: Not on file    Highest education level: Not on file   Occupational History    Not on file   Tobacco Use    Smoking status: Former     Packs/day: 0.50

## 2023-12-24 DIAGNOSIS — K21.9 GASTROESOPHAGEAL REFLUX DISEASE WITHOUT ESOPHAGITIS: ICD-10-CM

## 2023-12-29 RX ORDER — ALENDRONATE SODIUM 70 MG/1
TABLET ORAL
Qty: 12 TABLET | Refills: 1 | Status: SHIPPED | OUTPATIENT
Start: 2023-12-29

## 2024-01-02 RX ORDER — OMEPRAZOLE 20 MG/1
CAPSULE, DELAYED RELEASE ORAL
Qty: 90 CAPSULE | Refills: 2 | Status: SHIPPED | OUTPATIENT
Start: 2024-01-02

## 2024-01-23 ENCOUNTER — OFFICE VISIT (OUTPATIENT)
Dept: PODIATRY | Age: 79
End: 2024-01-23
Payer: MEDICARE

## 2024-01-23 VITALS — HEIGHT: 63 IN | BODY MASS INDEX: 21.79 KG/M2 | WEIGHT: 123 LBS

## 2024-01-23 DIAGNOSIS — M79.674 PAIN OF TOES OF BOTH FEET: ICD-10-CM

## 2024-01-23 DIAGNOSIS — M79.675 PAIN OF TOES OF BOTH FEET: ICD-10-CM

## 2024-01-23 DIAGNOSIS — B35.1 ONYCHOMYCOSIS OF TOENAIL: Primary | ICD-10-CM

## 2024-01-23 PROCEDURE — 11721 DEBRIDE NAIL 6 OR MORE: CPT | Performed by: PODIATRIST

## 2024-01-23 PROCEDURE — 99999 PR OFFICE/OUTPT VISIT,PROCEDURE ONLY: CPT | Performed by: PODIATRIST

## 2024-01-23 ASSESSMENT — ENCOUNTER SYMPTOMS
DIARRHEA: 0
NAUSEA: 0
SHORTNESS OF BREATH: 0
COLOR CHANGE: 0

## 2024-01-23 NOTE — PROGRESS NOTES
SUBJECTIVE: Kisha Rock is a 78 y.o. female who returns to the office with chief complaint of painful fungal toenails. Patient relates toe nails are thickened/difficult to trim as well as painful with ambulation and with shoe gear.   Chief Complaint   Patient presents with    Nail Problem     B/l nail trim, last seen Ruddy Price MD 11/02/23     Review of Systems   Constitutional:  Negative for activity change, appetite change, chills, diaphoresis, fatigue and fever.   Respiratory:  Negative for shortness of breath.    Cardiovascular:  Negative for leg swelling.   Gastrointestinal:  Negative for diarrhea and nausea.   Endocrine: Negative for cold intolerance, heat intolerance and polyuria.   Musculoskeletal:  Positive for arthralgias. Negative for back pain, gait problem, joint swelling and myalgias.   Skin:  Negative for color change, pallor, rash and wound.   Allergic/Immunologic: Negative for environmental allergies and food allergies.   Neurological:  Negative for dizziness, weakness, light-headedness and numbness.   Hematological:  Does not bruise/bleed easily.   Psychiatric/Behavioral:  Negative for behavioral problems, confusion and self-injury. The patient is not nervous/anxious.      OBJECTIVE: Clinical evaluation of patient reveals nails 1,2,3,4,5 of the right foot and nails 1,2,3,4,5 of the left foot to present with thickness, elongation, discoloration, brittleness, and subungual debris. There was pain with palpation and debridement of the toenails of the bilateral feet. No open lesions noted to either foot today.     Class A Findings (1 needed)   [] Non-traumatic amputation of foot or integral skeleton portion thereof.   [] Q7.      Class B Findings (2 needed)   1. [] Absent posterior tibial pulse   2. [] Absent dorsalis pedis pulse   3. [] Advanced trophic changes; three of the following are required:   ·         [] hair growth (decrease or absence)   ·         [] nail changes (thickening)   ·

## 2024-03-13 RX ORDER — CETIRIZINE HYDROCHLORIDE 10 MG/1
TABLET ORAL
Qty: 90 TABLET | Refills: 1 | Status: SHIPPED | OUTPATIENT
Start: 2024-03-13

## 2024-03-25 RX ORDER — SIMVASTATIN 20 MG
TABLET ORAL
Qty: 90 TABLET | Refills: 1 | Status: SHIPPED | OUTPATIENT
Start: 2024-03-25

## 2024-03-28 ENCOUNTER — OFFICE VISIT (OUTPATIENT)
Age: 79
End: 2024-03-28
Payer: MEDICARE

## 2024-03-28 VITALS — BODY MASS INDEX: 21.79 KG/M2 | WEIGHT: 123 LBS | HEIGHT: 63 IN

## 2024-03-28 DIAGNOSIS — M79.674 PAIN OF TOES OF BOTH FEET: ICD-10-CM

## 2024-03-28 DIAGNOSIS — B35.1 ONYCHOMYCOSIS OF TOENAIL: Primary | ICD-10-CM

## 2024-03-28 DIAGNOSIS — M79.675 PAIN OF TOES OF BOTH FEET: ICD-10-CM

## 2024-03-28 PROCEDURE — 11721 DEBRIDE NAIL 6 OR MORE: CPT | Performed by: PODIATRIST

## 2024-03-28 PROCEDURE — 99999 PR OFFICE/OUTPT VISIT,PROCEDURE ONLY: CPT | Performed by: PODIATRIST

## 2024-04-01 ASSESSMENT — ENCOUNTER SYMPTOMS
SHORTNESS OF BREATH: 0
BACK PAIN: 0
NAUSEA: 0
COLOR CHANGE: 0

## 2024-04-01 NOTE — PROGRESS NOTES
SUBJECTIVE: Kisha Rock is a 79 y.o. female who returns to the office with chief complaint of painful fungal toenails. Patient relates toe nails are thickened/difficult to trim as well as painful with ambulation and with shoe gear.   Chief Complaint   Patient presents with    Nail Problem     Nail trim/last saw Dr.Mark Price 11/2/2023     Review of Systems   Constitutional:  Negative for activity change, appetite change, chills, diaphoresis, fatigue and fever.   Respiratory:  Negative for shortness of breath.    Cardiovascular:  Negative for leg swelling.   Gastrointestinal:  Negative for diarrhea and nausea.   Endocrine: Negative for cold intolerance, heat intolerance and polyuria.   Musculoskeletal:  Positive for arthralgias. Negative for back pain, gait problem, joint swelling and myalgias.   Skin:  Negative for color change, pallor, rash and wound.   Allergic/Immunologic: Negative for environmental allergies and food allergies.   Neurological:  Negative for dizziness, weakness, light-headedness and numbness.   Hematological:  Does not bruise/bleed easily.   Psychiatric/Behavioral:  Negative for behavioral problems, confusion and self-injury. The patient is not nervous/anxious.      OBJECTIVE: Clinical evaluation of patient reveals nails 1,2,3,4,5 of the right foot and nails 1,2,3,4,5 of the left foot to present with thickness, elongation, discoloration, brittleness, and subungual debris. There was pain with palpation and debridement of the toenails of the bilateral feet. No open lesions noted to either foot today.     Class A Findings (1 needed)   [] Non-traumatic amputation of foot or integral skeleton portion thereof.   [] Q7.      Class B Findings (2 needed)   1. [] Absent posterior tibial pulse   2. [] Absent dorsalis pedis pulse   3. [] Advanced trophic changes; three of the following are required:   ·         [] hair growth (decrease or absence)   ·         [] nail changes (thickening)   ·         []

## 2024-04-05 ENCOUNTER — OFFICE VISIT (OUTPATIENT)
Dept: FAMILY MEDICINE CLINIC | Age: 79
End: 2024-04-05
Payer: MEDICARE

## 2024-04-05 VITALS
DIASTOLIC BLOOD PRESSURE: 58 MMHG | WEIGHT: 132.6 LBS | BODY MASS INDEX: 23.87 KG/M2 | HEART RATE: 86 BPM | SYSTOLIC BLOOD PRESSURE: 102 MMHG | OXYGEN SATURATION: 98 % | RESPIRATION RATE: 14 BRPM

## 2024-04-05 DIAGNOSIS — K21.9 GASTROESOPHAGEAL REFLUX DISEASE, UNSPECIFIED WHETHER ESOPHAGITIS PRESENT: ICD-10-CM

## 2024-04-05 DIAGNOSIS — E78.2 MIXED HYPERLIPIDEMIA: ICD-10-CM

## 2024-04-05 DIAGNOSIS — M81.0 AGE-RELATED OSTEOPOROSIS WITHOUT CURRENT PATHOLOGICAL FRACTURE: ICD-10-CM

## 2024-04-05 DIAGNOSIS — I10 ESSENTIAL HYPERTENSION: Primary | ICD-10-CM

## 2024-04-05 DIAGNOSIS — E55.9 VITAMIN D DEFICIENCY: ICD-10-CM

## 2024-04-05 DIAGNOSIS — Z79.899 CURRENT USE OF PROTON PUMP INHIBITOR: ICD-10-CM

## 2024-04-05 DIAGNOSIS — M10.9 GOUT, UNSPECIFIED CAUSE, UNSPECIFIED CHRONICITY, UNSPECIFIED SITE: ICD-10-CM

## 2024-04-05 PROCEDURE — 1123F ACP DISCUSS/DSCN MKR DOCD: CPT | Performed by: FAMILY MEDICINE

## 2024-04-05 PROCEDURE — 3078F DIAST BP <80 MM HG: CPT | Performed by: FAMILY MEDICINE

## 2024-04-05 PROCEDURE — 99214 OFFICE O/P EST MOD 30 MIN: CPT | Performed by: FAMILY MEDICINE

## 2024-04-05 PROCEDURE — 3074F SYST BP LT 130 MM HG: CPT | Performed by: FAMILY MEDICINE

## 2024-04-05 RX ORDER — MECOBALAMIN 5000 MCG
5 TABLET,DISINTEGRATING ORAL NIGHTLY
COMMUNITY

## 2024-04-05 SDOH — ECONOMIC STABILITY: FOOD INSECURITY: WITHIN THE PAST 12 MONTHS, THE FOOD YOU BOUGHT JUST DIDN'T LAST AND YOU DIDN'T HAVE MONEY TO GET MORE.: NEVER TRUE

## 2024-04-05 SDOH — ECONOMIC STABILITY: FOOD INSECURITY: WITHIN THE PAST 12 MONTHS, YOU WORRIED THAT YOUR FOOD WOULD RUN OUT BEFORE YOU GOT MONEY TO BUY MORE.: NEVER TRUE

## 2024-04-05 SDOH — ECONOMIC STABILITY: INCOME INSECURITY: HOW HARD IS IT FOR YOU TO PAY FOR THE VERY BASICS LIKE FOOD, HOUSING, MEDICAL CARE, AND HEATING?: NOT HARD AT ALL

## 2024-04-05 ASSESSMENT — ENCOUNTER SYMPTOMS
SHORTNESS OF BREATH: 0
BLOOD IN STOOL: 0
CHEST TIGHTNESS: 0
ABDOMINAL PAIN: 0

## 2024-04-05 ASSESSMENT — PATIENT HEALTH QUESTIONNAIRE - PHQ9
1. LITTLE INTEREST OR PLEASURE IN DOING THINGS: NOT AT ALL
SUM OF ALL RESPONSES TO PHQ QUESTIONS 1-9: 0
SUM OF ALL RESPONSES TO PHQ QUESTIONS 1-9: 0
2. FEELING DOWN, DEPRESSED OR HOPELESS: NOT AT ALL
SUM OF ALL RESPONSES TO PHQ9 QUESTIONS 1 & 2: 0
SUM OF ALL RESPONSES TO PHQ QUESTIONS 1-9: 0
SUM OF ALL RESPONSES TO PHQ QUESTIONS 1-9: 0

## 2024-04-05 NOTE — PROGRESS NOTES
Subjective:      Patient ID: Kisha Rock is a 79 y.o. female.    HPI  Visit Information    Have you changed or started any medications since your last visit including any over-the-counter medicines, vitamins, or herbal medicines? no   Are you having any side effects from any of your medications? -  no  Have you stopped taking any of your medications? Is so, why? -  no    Have you seen any other physician or provider since your last visit? Yes - Records Obtained  Have you had any other diagnostic tests since your last visit? Yes - Records Obtained  Have you been seen in the emergency room and/or had an admission to a hospital since we last saw you? No  Have you had your routine dental cleaning in the past 6 months? yes -     Have you activated your [employer] account? If not, what are your barriers? Yes     Patient Care Team:  Ruddy Price MD as PCP - General (Family Medicine)  [facility]uddy Price MD [facility] - [facility]  Toan Kent MD as Consulting Physician (Gastroenterology)  Dante Johnson MD as Consulting Physician (Orthopedic Surgery)  Antony Bales MD as Surgeon (General Surgery)  Bandar Jones DPM as Consulting Physician (Podiatry)  Kassie Rodriguez MD as Surgeon (Ophthalmology)  Olga Sorto APRN - CNP as Nurse Practitioner (Nurse Practitioner)    Medical History Review  Past Medical, Family, and Social History reviewed and does contribute to the patient presenting condition    Health Maintenance   Topic Date Due    DTaP/Tdap/Td vaccine (1 - Tdap) Never done    Shingles vaccine (2 of 2) 11/29/2022    Annual Wellness Visit (Medicare Advantage)  01/01/2024    Lipids  09/27/2024    Depression Screen  11/02/2024    DEXA (modify frequency per FRAX score)  Completed    Flu vaccine  Completed    Pneumococcal 65+ years Vaccine  Completed    COVID-19 Vaccine  Completed    Respiratory Syncytial Virus (RSV) Pregnant or age 60 yrs+  Completed    Hepatitis A vaccine  Aged Out

## 2024-04-15 ENCOUNTER — HOSPITAL ENCOUNTER (OUTPATIENT)
Age: 79
Discharge: HOME | End: 2024-04-15
Payer: MEDICARE

## 2024-04-15 DIAGNOSIS — E78.2 MIXED HYPERLIPIDEMIA: ICD-10-CM

## 2024-04-15 DIAGNOSIS — K21.9 GASTROESOPHAGEAL REFLUX DISEASE, UNSPECIFIED WHETHER ESOPHAGITIS PRESENT: ICD-10-CM

## 2024-04-15 DIAGNOSIS — M10.9 GOUT, UNSPECIFIED CAUSE, UNSPECIFIED CHRONICITY, UNSPECIFIED SITE: ICD-10-CM

## 2024-04-15 DIAGNOSIS — E55.9 VITAMIN D DEFICIENCY: ICD-10-CM

## 2024-04-15 DIAGNOSIS — I10 ESSENTIAL HYPERTENSION: ICD-10-CM

## 2024-04-15 DIAGNOSIS — Z79.899 CURRENT USE OF PROTON PUMP INHIBITOR: ICD-10-CM

## 2024-04-15 LAB
25(OH)D3 SERPL-MCNC: 43.1 NG/ML (ref 30–100)
ALBUMIN SERPL-MCNC: 3.8 G/DL (ref 3.5–5.2)
ALP SERPL-CCNC: 62 U/L (ref 35–104)
ALT SERPL-CCNC: 17 U/L (ref 5–33)
ANION GAP SERPL CALCULATED.3IONS-SCNC: 7 MMOL/L (ref 9–17)
AST SERPL-CCNC: 24 U/L
BILIRUB SERPL-MCNC: 0.4 MG/DL (ref 0.3–1.2)
BUN SERPL-MCNC: 14 MG/DL (ref 8–23)
CALCIUM SERPL-MCNC: 8.9 MG/DL (ref 8.6–10.4)
CHLORIDE SERPL-SCNC: 106 MMOL/L (ref 98–107)
CHOLEST SERPL-MCNC: 120 MG/DL
CHOLESTEROL/HDL RATIO: 2.6
CO2 SERPL-SCNC: 27 MMOL/L (ref 20–31)
CREAT SERPL-MCNC: 0.7 MG/DL (ref 0.5–0.9)
FOLATE SERPL-MCNC: 15.8 NG/ML (ref 4.8–24.2)
GFR SERPL CREATININE-BSD FRML MDRD: 87 ML/MIN/1.73M2
GLUCOSE SERPL-MCNC: 92 MG/DL (ref 70–99)
HDLC SERPL-MCNC: 46 MG/DL
LDLC SERPL CALC-MCNC: 51 MG/DL (ref 0–130)
MAGNESIUM SERPL-MCNC: 1.8 MG/DL (ref 1.6–2.6)
POTASSIUM SERPL-SCNC: 4.1 MMOL/L (ref 3.7–5.3)
PROT SERPL-MCNC: 7 G/DL (ref 6.4–8.3)
SODIUM SERPL-SCNC: 140 MMOL/L (ref 135–144)
TRIGL SERPL-MCNC: 114 MG/DL
URATE SERPL-MCNC: 5.1 MG/DL (ref 2.4–5.7)
VIT B12 SERPL-MCNC: 265 PG/ML (ref 232–1245)

## 2024-04-15 PROCEDURE — 84550 ASSAY OF BLOOD/URIC ACID: CPT

## 2024-04-15 PROCEDURE — 83735 ASSAY OF MAGNESIUM: CPT

## 2024-04-15 PROCEDURE — 80061 LIPID PANEL: CPT

## 2024-04-15 PROCEDURE — 80053 COMPREHEN METABOLIC PANEL: CPT

## 2024-04-15 PROCEDURE — 36415 COLL VENOUS BLD VENIPUNCTURE: CPT

## 2024-04-15 PROCEDURE — 82607 VITAMIN B-12: CPT

## 2024-04-15 PROCEDURE — 82306 VITAMIN D 25 HYDROXY: CPT

## 2024-04-15 PROCEDURE — 84630 ASSAY OF ZINC: CPT

## 2024-04-15 PROCEDURE — 82746 ASSAY OF FOLIC ACID SERUM: CPT

## 2024-04-17 LAB — ZINC SERPL-MCNC: 63.1 UG/DL (ref 60–120)

## 2024-04-18 ENCOUNTER — HOSPITAL ENCOUNTER (OUTPATIENT)
Dept: WOMENS IMAGING | Age: 79
Discharge: HOME | End: 2024-04-20
Payer: MEDICARE

## 2024-04-18 DIAGNOSIS — M81.0 AGE-RELATED OSTEOPOROSIS WITHOUT CURRENT PATHOLOGICAL FRACTURE: ICD-10-CM

## 2024-04-18 PROCEDURE — 77080 DXA BONE DENSITY AXIAL: CPT

## 2024-04-30 DIAGNOSIS — B37.81 CANDIDA ESOPHAGITIS (HCC): ICD-10-CM

## 2024-04-30 RX ORDER — CHOLESTYRAMINE 4 G/9G
POWDER, FOR SUSPENSION ORAL
Qty: 270 PACKET | Refills: 2 | Status: SHIPPED | OUTPATIENT
Start: 2024-04-30

## 2024-05-21 RX ORDER — NAPROXEN 250 MG/1
TABLET ORAL
Qty: 60 TABLET | Refills: 0 | Status: SHIPPED | OUTPATIENT
Start: 2024-05-21

## 2024-05-28 DIAGNOSIS — E55.9 VITAMIN D DEFICIENCY: ICD-10-CM

## 2024-05-28 RX ORDER — ERGOCALCIFEROL 1.25 MG/1
CAPSULE ORAL
Qty: 6 CAPSULE | Refills: 1 | Status: SHIPPED | OUTPATIENT
Start: 2024-05-28

## 2024-06-13 ENCOUNTER — OFFICE VISIT (OUTPATIENT)
Age: 79
End: 2024-06-13
Payer: MEDICARE

## 2024-06-13 VITALS — WEIGHT: 132 LBS | BODY MASS INDEX: 23.39 KG/M2 | HEIGHT: 63 IN

## 2024-06-13 DIAGNOSIS — B35.1 ONYCHOMYCOSIS OF TOENAIL: Primary | ICD-10-CM

## 2024-06-13 DIAGNOSIS — M79.675 PAIN OF TOES OF BOTH FEET: ICD-10-CM

## 2024-06-13 DIAGNOSIS — M79.674 PAIN OF TOES OF BOTH FEET: ICD-10-CM

## 2024-06-13 PROCEDURE — 11721 DEBRIDE NAIL 6 OR MORE: CPT | Performed by: PODIATRIST

## 2024-06-13 PROCEDURE — 99999 PR OFFICE/OUTPT VISIT,PROCEDURE ONLY: CPT | Performed by: PODIATRIST

## 2024-06-17 ASSESSMENT — ENCOUNTER SYMPTOMS
DIARRHEA: 0
COLOR CHANGE: 0
NAUSEA: 0
SHORTNESS OF BREATH: 0
BACK PAIN: 0

## 2024-06-17 NOTE — PROGRESS NOTES
SUBJECTIVE: Kisha Rock is a 79 y.o. female who returns to the office with chief complaint of painful fungal toenails. Patient relates toe nails are thickened/difficult to trim as well as painful with ambulation and with shoe gear.   Chief Complaint   Patient presents with    Nail Problem     B/l nail trim, last saw PCP 04/05/24     Review of Systems   Constitutional:  Negative for activity change, appetite change, chills, diaphoresis, fatigue and fever.   Respiratory:  Negative for shortness of breath.    Cardiovascular:  Negative for leg swelling.   Gastrointestinal:  Negative for diarrhea and nausea.   Endocrine: Negative for cold intolerance, heat intolerance and polyuria.   Musculoskeletal:  Positive for arthralgias. Negative for back pain, gait problem, joint swelling and myalgias.   Skin:  Negative for color change, pallor, rash and wound.   Allergic/Immunologic: Negative for environmental allergies and food allergies.   Neurological:  Negative for dizziness, weakness, light-headedness and numbness.   Hematological:  Does not bruise/bleed easily.   Psychiatric/Behavioral:  Negative for behavioral problems, confusion and self-injury. The patient is not nervous/anxious.      OBJECTIVE: Clinical evaluation of patient reveals nails 1,2,3,4,5 of the right foot and nails 1,2,3,4,5 of the left foot to present with thickness, elongation, discoloration, brittleness, and subungual debris. There was pain with palpation and debridement of the toenails of the bilateral feet. No open lesions noted to either foot today.     Class A Findings (1 needed)   [] Non-traumatic amputation of foot or integral skeleton portion thereof.   [] Q7.      Class B Findings (2 needed)   1. [] Absent posterior tibial pulse   2. [] Absent dorsalis pedis pulse   3. [] Advanced trophic changes; three of the following are required:   ·         [] hair growth (decrease or absence)   ·         [] nail changes (thickening)   ·         []

## 2024-07-09 DIAGNOSIS — I10 ESSENTIAL HYPERTENSION: ICD-10-CM

## 2024-07-09 RX ORDER — AMLODIPINE BESYLATE 5 MG/1
5 TABLET ORAL DAILY
Qty: 90 TABLET | Refills: 1 | Status: SHIPPED | OUTPATIENT
Start: 2024-07-09

## 2024-07-15 RX ORDER — ALENDRONATE SODIUM 70 MG/1
TABLET ORAL
Qty: 12 TABLET | Refills: 1 | Status: SHIPPED | OUTPATIENT
Start: 2024-07-15

## 2024-08-15 ENCOUNTER — OFFICE VISIT (OUTPATIENT)
Age: 79
End: 2024-08-15

## 2024-08-15 VITALS — BODY MASS INDEX: 23.04 KG/M2 | HEIGHT: 63 IN | WEIGHT: 130 LBS

## 2024-08-15 DIAGNOSIS — M79.675 PAIN OF TOES OF BOTH FEET: ICD-10-CM

## 2024-08-15 DIAGNOSIS — B35.1 ONYCHOMYCOSIS OF TOENAIL: Primary | ICD-10-CM

## 2024-08-15 DIAGNOSIS — M79.674 PAIN OF TOES OF BOTH FEET: ICD-10-CM

## 2024-09-06 RX ORDER — CETIRIZINE HYDROCHLORIDE 10 MG/1
TABLET ORAL
Qty: 90 TABLET | Refills: 1 | Status: SHIPPED | OUTPATIENT
Start: 2024-09-06

## 2024-09-19 ENCOUNTER — HOSPITAL ENCOUNTER (OUTPATIENT)
Dept: WOMENS IMAGING | Age: 79
Discharge: HOME | End: 2024-09-21
Payer: MEDICARE

## 2024-09-19 VITALS — HEIGHT: 63 IN | WEIGHT: 130 LBS | BODY MASS INDEX: 23.04 KG/M2

## 2024-09-19 DIAGNOSIS — K21.9 GASTROESOPHAGEAL REFLUX DISEASE WITHOUT ESOPHAGITIS: ICD-10-CM

## 2024-09-19 DIAGNOSIS — Z12.31 VISIT FOR SCREENING MAMMOGRAM: ICD-10-CM

## 2024-09-19 DIAGNOSIS — M10.9 GOUT, UNSPECIFIED CAUSE, UNSPECIFIED CHRONICITY, UNSPECIFIED SITE: ICD-10-CM

## 2024-09-19 PROCEDURE — 77063 BREAST TOMOSYNTHESIS BI: CPT

## 2024-09-19 RX ORDER — ALLOPURINOL 100 MG/1
100 TABLET ORAL DAILY
Qty: 90 TABLET | Refills: 1 | Status: SHIPPED | OUTPATIENT
Start: 2024-09-19

## 2024-09-21 NOTE — TELEPHONE ENCOUNTER
Fax received from pharmacy    Patient has also requested FAMOTODINE 20 mg 1 tab BID, last fill was 7-5-22 for a 90 day supply, it had fallen of her med list, had to look in previous meds to find Attending Attestation (For Attendings USE Only)...

## 2024-10-07 ENCOUNTER — OFFICE VISIT (OUTPATIENT)
Dept: FAMILY MEDICINE CLINIC | Age: 79
End: 2024-10-07
Payer: MEDICARE

## 2024-10-07 VITALS
HEART RATE: 80 BPM | DIASTOLIC BLOOD PRESSURE: 70 MMHG | SYSTOLIC BLOOD PRESSURE: 120 MMHG | RESPIRATION RATE: 16 BRPM | WEIGHT: 132.2 LBS | BODY MASS INDEX: 23.42 KG/M2 | TEMPERATURE: 97.1 F

## 2024-10-07 DIAGNOSIS — I10 ESSENTIAL HYPERTENSION: Primary | ICD-10-CM

## 2024-10-07 DIAGNOSIS — E83.42 HYPOMAGNESEMIA: ICD-10-CM

## 2024-10-07 DIAGNOSIS — Z79.899 CURRENT USE OF PROTON PUMP INHIBITOR: ICD-10-CM

## 2024-10-07 DIAGNOSIS — R05.3 CHRONIC COUGH: ICD-10-CM

## 2024-10-07 DIAGNOSIS — E55.9 VITAMIN D DEFICIENCY: ICD-10-CM

## 2024-10-07 DIAGNOSIS — E78.2 MIXED HYPERLIPIDEMIA: ICD-10-CM

## 2024-10-07 DIAGNOSIS — M10.9 GOUT, UNSPECIFIED CAUSE, UNSPECIFIED CHRONICITY, UNSPECIFIED SITE: ICD-10-CM

## 2024-10-07 DIAGNOSIS — R25.2 LEG CRAMPS: ICD-10-CM

## 2024-10-07 DIAGNOSIS — K21.9 GASTROESOPHAGEAL REFLUX DISEASE, UNSPECIFIED WHETHER ESOPHAGITIS PRESENT: ICD-10-CM

## 2024-10-07 PROBLEM — M21.372 FOOT DROP, LEFT: Status: ACTIVE | Noted: 2024-10-07

## 2024-10-07 PROCEDURE — 99214 OFFICE O/P EST MOD 30 MIN: CPT | Performed by: FAMILY MEDICINE

## 2024-10-07 PROCEDURE — 3078F DIAST BP <80 MM HG: CPT | Performed by: FAMILY MEDICINE

## 2024-10-07 PROCEDURE — 3074F SYST BP LT 130 MM HG: CPT | Performed by: FAMILY MEDICINE

## 2024-10-07 PROCEDURE — 1123F ACP DISCUSS/DSCN MKR DOCD: CPT | Performed by: FAMILY MEDICINE

## 2024-10-07 RX ORDER — BENZONATATE 100 MG/1
CAPSULE ORAL
Qty: 30 CAPSULE | Refills: 1 | Status: SHIPPED | OUTPATIENT
Start: 2024-10-07

## 2024-10-07 ASSESSMENT — ENCOUNTER SYMPTOMS
CHEST TIGHTNESS: 0
COUGH: 1
ABDOMINAL PAIN: 0
BLOOD IN STOOL: 0
SHORTNESS OF BREATH: 0

## 2024-10-07 NOTE — PROGRESS NOTES
[facility]     Date of Visit:  10/7/2024  Patient Name: Kisha Rock   Patient :  1945     CHIEF COMPLAINT:     Kisha Rock is a 79 y.o. female who presents today for an general visit to be evaluated for the following condition(s):  Chief Complaint   Patient presents with    Hypertension    Cholesterol Problem    Health Maintenance     Shingles vaccine and TDAP    OTHER     Leg cramps - worse at night and in left leg     Cough     Dry cough which started over a year ago       HISTORY OF PRESENT ILLNESS:     Visit Information    Have you changed or started any medications since your last visit including any over-the-counter medicines, vitamins, or herbal medicines? no   Have you stopped taking any of your medications? Is so, why? -  no  Are you having any side effects from any of your medications? - no    Have you seen any other physician or provider since your last visit?  yes -    Have you had any other diagnostic tests since your last visit?  no   Have you been seen in the emergency room and/or had an admission in a hospital since we last saw you?  no   Have you had your routine dental cleaning in the past 6 months?  yes -      Do you have an active MyChart account? If no, what is the barrier?  No:     Patient Care Team:  Ruddy Price MD as PCP - General (Family Medicine)  Ruddy Price MD as PCP - [facility] Provider  Toan Kent MD as Consulting Physician (Gastroenterology)  Dante Johnson MD as Consulting Physician (Orthopedic Surgery)  Antony Bales MD as Surgeon (General Surgery)  Bandar Jones DPM as Consulting Physician (Podiatry)  Kassie oRdriguez MD as Surgeon (Ophthalmology)  Olga Sorto APRN - CNP as Nurse Practitioner (Nurse Practitioner)    Medical History Review  Past Medical, Family, and Social History reviewed and does contribute to the patient presenting condition    Health Maintenance   Topic Date Due    DTaP/Tdap/Td vaccine (1

## 2024-10-17 ENCOUNTER — OFFICE VISIT (OUTPATIENT)
Age: 79
End: 2024-10-17
Payer: MEDICARE

## 2024-10-17 VITALS — WEIGHT: 132 LBS | BODY MASS INDEX: 23.39 KG/M2 | HEIGHT: 63 IN

## 2024-10-17 DIAGNOSIS — M79.674 PAIN OF TOES OF BOTH FEET: ICD-10-CM

## 2024-10-17 DIAGNOSIS — M79.675 PAIN OF TOES OF BOTH FEET: ICD-10-CM

## 2024-10-17 DIAGNOSIS — B35.1 ONYCHOMYCOSIS OF TOENAIL: Primary | ICD-10-CM

## 2024-10-17 PROCEDURE — 99999 PR OFFICE/OUTPT VISIT,PROCEDURE ONLY: CPT | Performed by: PODIATRIST

## 2024-10-17 PROCEDURE — 11721 DEBRIDE NAIL 6 OR MORE: CPT | Performed by: PODIATRIST

## 2024-10-23 ASSESSMENT — ENCOUNTER SYMPTOMS
BACK PAIN: 0
COLOR CHANGE: 0
DIARRHEA: 0
SHORTNESS OF BREATH: 0
NAUSEA: 0

## 2024-10-23 NOTE — PROGRESS NOTES
SUBJECTIVE: Kisha Rock is a 79 y.o. female who returns to the office with chief complaint of painful fungal toenails. Patient relates toe nails are thickened/difficult to trim as well as painful with ambulation and with shoe gear.   Chief Complaint   Patient presents with    Nail Problem     Nail trim/last saw Dr.Mark Price 10/7/2024     Review of Systems   Constitutional:  Negative for activity change, appetite change, chills, diaphoresis, fatigue and fever.   Respiratory:  Negative for shortness of breath.    Cardiovascular:  Negative for leg swelling.   Gastrointestinal:  Negative for diarrhea and nausea.   Endocrine: Negative for cold intolerance, heat intolerance and polyuria.   Musculoskeletal:  Positive for arthralgias. Negative for back pain, gait problem, joint swelling and myalgias.   Skin:  Negative for color change, pallor, rash and wound.   Allergic/Immunologic: Negative for environmental allergies and food allergies.   Neurological:  Negative for dizziness, weakness, light-headedness and numbness.   Hematological:  Does not bruise/bleed easily.   Psychiatric/Behavioral:  Negative for behavioral problems, confusion and self-injury. The patient is not nervous/anxious.      OBJECTIVE: Clinical evaluation of patient reveals nails 1,2,3,4,5 of the right foot and nails 1,2,3,4,5 of the left foot to present with thickness, elongation, discoloration, brittleness, and subungual debris. There was pain with palpation and debridement of the toenails of the bilateral feet. No open lesions noted to either foot today.     Class A Findings (1 needed)   [] Non-traumatic amputation of foot or integral skeleton portion thereof.   [] Q7.      Class B Findings (2 needed)   1. [] Absent posterior tibial pulse   2. [] Absent dorsalis pedis pulse   3. [] Advanced trophic changes; three of the following are required:   ·         [] hair growth (decrease or absence)   ·         [] nail changes (thickening)   ·         []

## 2024-10-28 ENCOUNTER — HOSPITAL ENCOUNTER (OUTPATIENT)
Age: 79
Discharge: HOME | End: 2024-10-28
Payer: MEDICARE

## 2024-10-28 ENCOUNTER — NURSE ONLY (OUTPATIENT)
Dept: FAMILY MEDICINE CLINIC | Age: 79
End: 2024-10-28
Payer: MEDICARE

## 2024-10-28 DIAGNOSIS — I10 ESSENTIAL HYPERTENSION: ICD-10-CM

## 2024-10-28 DIAGNOSIS — E53.8 B12 DEFICIENCY: Primary | ICD-10-CM

## 2024-10-28 DIAGNOSIS — E78.2 MIXED HYPERLIPIDEMIA: ICD-10-CM

## 2024-10-28 DIAGNOSIS — E83.42 HYPOMAGNESEMIA: ICD-10-CM

## 2024-10-28 DIAGNOSIS — K21.9 GASTROESOPHAGEAL REFLUX DISEASE, UNSPECIFIED WHETHER ESOPHAGITIS PRESENT: ICD-10-CM

## 2024-10-28 DIAGNOSIS — R25.2 LEG CRAMPS: ICD-10-CM

## 2024-10-28 DIAGNOSIS — Z79.899 CURRENT USE OF PROTON PUMP INHIBITOR: ICD-10-CM

## 2024-10-28 DIAGNOSIS — M10.9 GOUT, UNSPECIFIED CAUSE, UNSPECIFIED CHRONICITY, UNSPECIFIED SITE: ICD-10-CM

## 2024-10-28 DIAGNOSIS — E55.9 VITAMIN D DEFICIENCY: ICD-10-CM

## 2024-10-28 LAB
25(OH)D3 SERPL-MCNC: 37.2 NG/ML (ref 30–100)
ALBUMIN SERPL-MCNC: 3.9 G/DL (ref 3.5–5.2)
ALP SERPL-CCNC: 53 U/L (ref 35–104)
ALT SERPL-CCNC: 15 U/L (ref 10–35)
ANION GAP SERPL CALCULATED.3IONS-SCNC: 9 MMOL/L (ref 9–16)
AST SERPL-CCNC: 28 U/L (ref 10–35)
BASOPHILS # BLD: 0 K/UL (ref 0–0.2)
BASOPHILS NFR BLD: 0 % (ref 0–2)
BILIRUB SERPL-MCNC: 0.4 MG/DL (ref 0–1.2)
BUN SERPL-MCNC: 15 MG/DL (ref 8–23)
CALCIUM SERPL-MCNC: 9.6 MG/DL (ref 8.6–10.4)
CHLORIDE SERPL-SCNC: 105 MMOL/L (ref 98–107)
CHOLEST SERPL-MCNC: 143 MG/DL (ref 0–199)
CHOLESTEROL/HDL RATIO: 2.6
CK SERPL-CCNC: 84 U/L (ref 26–192)
CO2 SERPL-SCNC: 26 MMOL/L (ref 20–31)
CREAT SERPL-MCNC: 0.7 MG/DL (ref 0.7–1.2)
EOSINOPHIL # BLD: 0.15 K/UL (ref 0–0.4)
EOSINOPHILS RELATIVE PERCENT: 4 % (ref 0–4)
ERYTHROCYTE [DISTWIDTH] IN BLOOD BY AUTOMATED COUNT: 14.9 % (ref 11.5–14.9)
FOLATE SERPL-MCNC: 11.1 NG/ML (ref 4.8–24.2)
GFR, ESTIMATED: 87 ML/MIN/1.73M2
GLUCOSE SERPL-MCNC: 83 MG/DL (ref 74–99)
HCT VFR BLD AUTO: 37.1 % (ref 36–46)
HDLC SERPL-MCNC: 55 MG/DL
HGB BLD-MCNC: 12.3 G/DL (ref 12–16)
LDLC SERPL CALC-MCNC: 70 MG/DL (ref 0–100)
LYMPHOCYTES NFR BLD: 0.99 K/UL (ref 1–4.8)
LYMPHOCYTES RELATIVE PERCENT: 26 % (ref 24–44)
MAGNESIUM SERPL-MCNC: 1.9 MG/DL (ref 1.6–2.4)
MCH RBC QN AUTO: 29.6 PG (ref 26–34)
MCHC RBC AUTO-ENTMCNC: 33 G/DL (ref 31–37)
MCV RBC AUTO: 89.6 FL (ref 80–100)
MONOCYTES NFR BLD: 0.38 K/UL (ref 0.1–1.3)
MONOCYTES NFR BLD: 10 % (ref 1–7)
MORPHOLOGY: NORMAL
NEUTROPHILS NFR BLD: 60 % (ref 36–66)
NEUTS SEG NFR BLD: 2.28 K/UL (ref 1.3–9.1)
PHOSPHATE SERPL-MCNC: 3.6 MG/DL (ref 2.5–4.5)
PLATELET # BLD AUTO: 208 K/UL (ref 150–450)
PMV BLD AUTO: 9.1 FL (ref 6–12)
POTASSIUM SERPL-SCNC: 4 MMOL/L (ref 3.7–5.3)
PROT SERPL-MCNC: 7 G/DL (ref 6.6–8.7)
RBC # BLD AUTO: 4.14 M/UL (ref 4–5.2)
SODIUM SERPL-SCNC: 140 MMOL/L (ref 136–145)
TRIGL SERPL-MCNC: 90 MG/DL (ref 0–149)
TSH SERPL DL<=0.05 MIU/L-ACNC: 2.83 UIU/ML (ref 0.27–4.2)
URATE SERPL-MCNC: 4.8 MG/DL (ref 2.4–5.7)
VIT B12 SERPL-MCNC: 193 PG/ML (ref 232–1245)
WBC OTHER # BLD: 3.8 K/UL (ref 3.5–11)

## 2024-10-28 PROCEDURE — 36415 COLL VENOUS BLD VENIPUNCTURE: CPT

## 2024-10-28 PROCEDURE — 85025 COMPLETE CBC W/AUTO DIFF WBC: CPT

## 2024-10-28 PROCEDURE — 82746 ASSAY OF FOLIC ACID SERUM: CPT

## 2024-10-28 PROCEDURE — 82306 VITAMIN D 25 HYDROXY: CPT

## 2024-10-28 PROCEDURE — 84100 ASSAY OF PHOSPHORUS: CPT

## 2024-10-28 PROCEDURE — 96372 THER/PROPH/DIAG INJ SC/IM: CPT | Performed by: FAMILY MEDICINE

## 2024-10-28 PROCEDURE — 82550 ASSAY OF CK (CPK): CPT

## 2024-10-28 PROCEDURE — 84630 ASSAY OF ZINC: CPT

## 2024-10-28 PROCEDURE — 80053 COMPREHEN METABOLIC PANEL: CPT

## 2024-10-28 PROCEDURE — 83735 ASSAY OF MAGNESIUM: CPT

## 2024-10-28 PROCEDURE — 80061 LIPID PANEL: CPT

## 2024-10-28 PROCEDURE — 84550 ASSAY OF BLOOD/URIC ACID: CPT

## 2024-10-28 PROCEDURE — 84443 ASSAY THYROID STIM HORMONE: CPT

## 2024-10-28 PROCEDURE — 82607 VITAMIN B-12: CPT

## 2024-10-28 RX ORDER — CYANOCOBALAMIN 1000 UG/ML
1000 INJECTION, SOLUTION INTRAMUSCULAR; SUBCUTANEOUS ONCE
Status: COMPLETED | OUTPATIENT
Start: 2024-10-28 | End: 2024-10-28

## 2024-10-28 RX ADMIN — CYANOCOBALAMIN 1000 MCG: 1000 INJECTION, SOLUTION INTRAMUSCULAR; SUBCUTANEOUS at 13:56

## 2024-10-28 NOTE — PROGRESS NOTES
Patient here today for 1 out 4 weekly b12 injections . Given in the left deltoid with no complaints.

## 2024-10-29 ENCOUNTER — HOSPITAL ENCOUNTER (OUTPATIENT)
Dept: GENERAL RADIOLOGY | Facility: CLINIC | Age: 79
Discharge: HOME | End: 2024-10-31
Payer: MEDICARE

## 2024-10-29 ENCOUNTER — HOSPITAL ENCOUNTER (OUTPATIENT)
Facility: CLINIC | Age: 79
Discharge: HOME | End: 2024-10-31
Payer: MEDICARE

## 2024-10-29 DIAGNOSIS — R05.3 CHRONIC COUGH: ICD-10-CM

## 2024-10-29 PROCEDURE — 71046 X-RAY EXAM CHEST 2 VIEWS: CPT

## 2024-10-31 LAB — ZINC SERPL-MCNC: 74.4 UG/DL (ref 60–120)

## 2024-11-04 ENCOUNTER — OFFICE VISIT (OUTPATIENT)
Dept: FAMILY MEDICINE CLINIC | Age: 79
End: 2024-11-04

## 2024-11-04 VITALS
HEART RATE: 72 BPM | OXYGEN SATURATION: 99 % | HEIGHT: 62 IN | SYSTOLIC BLOOD PRESSURE: 112 MMHG | BODY MASS INDEX: 24.48 KG/M2 | RESPIRATION RATE: 14 BRPM | WEIGHT: 133 LBS | TEMPERATURE: 98.3 F | DIASTOLIC BLOOD PRESSURE: 70 MMHG

## 2024-11-04 DIAGNOSIS — Z00.00 MEDICARE ANNUAL WELLNESS VISIT, SUBSEQUENT: Primary | ICD-10-CM

## 2024-11-04 ASSESSMENT — PATIENT HEALTH QUESTIONNAIRE - PHQ9
SUM OF ALL RESPONSES TO PHQ QUESTIONS 1-9: 0
SUM OF ALL RESPONSES TO PHQ QUESTIONS 1-9: 0
2. FEELING DOWN, DEPRESSED OR HOPELESS: NOT AT ALL
1. LITTLE INTEREST OR PLEASURE IN DOING THINGS: NOT AT ALL
SUM OF ALL RESPONSES TO PHQ9 QUESTIONS 1 & 2: 0
SUM OF ALL RESPONSES TO PHQ QUESTIONS 1-9: 0
SUM OF ALL RESPONSES TO PHQ QUESTIONS 1-9: 0

## 2024-11-04 ASSESSMENT — LIFESTYLE VARIABLES
HOW MANY STANDARD DRINKS CONTAINING ALCOHOL DO YOU HAVE ON A TYPICAL DAY: 1 OR 2
HOW OFTEN DO YOU HAVE A DRINK CONTAINING ALCOHOL: 2-4 TIMES A MONTH

## 2024-11-04 NOTE — PROGRESS NOTES
regularly involved in providing care):   Patient Care Team:  Ruddy Price MD as PCP - General (Family Medicine)  Ruddy Price MD as PCP - Empaneled Provider  Toan Kent MD as Consulting Physician (Gastroenterology)  Dante Johnson MD as Consulting Physician (Orthopedic Surgery)  Antony Bales MD as Surgeon (General Surgery)  Bandar Jones DPM as Consulting Physician (Podiatry)  Kassie Rodriguez MD as Surgeon (Ophthalmology)  Olga Sorto APRN - CNP as Nurse Practitioner (Nurse Practitioner)      Reviewed and updated this visit:  Tobacco  Allergies  Meds  Problems  Med Hx  Surg Hx  Soc Hx  Fam Hx

## 2024-11-05 ENCOUNTER — NURSE ONLY (OUTPATIENT)
Dept: FAMILY MEDICINE CLINIC | Age: 79
End: 2024-11-05
Payer: MEDICARE

## 2024-11-05 VITALS
DIASTOLIC BLOOD PRESSURE: 64 MMHG | WEIGHT: 131.8 LBS | SYSTOLIC BLOOD PRESSURE: 102 MMHG | RESPIRATION RATE: 14 BRPM | BODY MASS INDEX: 24.11 KG/M2

## 2024-11-05 DIAGNOSIS — E53.8 B12 DEFICIENCY: Primary | ICD-10-CM

## 2024-11-05 PROCEDURE — 96372 THER/PROPH/DIAG INJ SC/IM: CPT | Performed by: FAMILY MEDICINE

## 2024-11-05 RX ORDER — CYANOCOBALAMIN 1000 UG/ML
1000 INJECTION, SOLUTION INTRAMUSCULAR; SUBCUTANEOUS ONCE
Status: COMPLETED | OUTPATIENT
Start: 2024-11-05 | End: 2024-11-05

## 2024-11-05 RX ORDER — SIMVASTATIN 20 MG
TABLET ORAL
Qty: 90 TABLET | Refills: 1 | Status: SHIPPED | OUTPATIENT
Start: 2024-11-05

## 2024-11-05 RX ADMIN — CYANOCOBALAMIN 1000 MCG: 1000 INJECTION, SOLUTION INTRAMUSCULAR; SUBCUTANEOUS at 13:40

## 2024-11-12 ENCOUNTER — NURSE ONLY (OUTPATIENT)
Dept: FAMILY MEDICINE CLINIC | Age: 79
End: 2024-11-12
Payer: MEDICARE

## 2024-11-12 VITALS
SYSTOLIC BLOOD PRESSURE: 102 MMHG | BODY MASS INDEX: 24.33 KG/M2 | WEIGHT: 133 LBS | RESPIRATION RATE: 14 BRPM | DIASTOLIC BLOOD PRESSURE: 62 MMHG | OXYGEN SATURATION: 99 % | HEART RATE: 65 BPM

## 2024-11-12 DIAGNOSIS — E53.8 B12 DEFICIENCY: Primary | ICD-10-CM

## 2024-11-12 PROCEDURE — 96372 THER/PROPH/DIAG INJ SC/IM: CPT | Performed by: FAMILY MEDICINE

## 2024-11-12 RX ORDER — CYANOCOBALAMIN 1000 UG/ML
1000 INJECTION, SOLUTION INTRAMUSCULAR; SUBCUTANEOUS ONCE
Status: COMPLETED | OUTPATIENT
Start: 2024-11-12 | End: 2024-11-12

## 2024-11-12 RX ADMIN — CYANOCOBALAMIN 1000 MCG: 1000 INJECTION, SOLUTION INTRAMUSCULAR; SUBCUTANEOUS at 14:16

## 2024-11-19 ENCOUNTER — NURSE ONLY (OUTPATIENT)
Dept: FAMILY MEDICINE CLINIC | Age: 79
End: 2024-11-19
Payer: MEDICARE

## 2024-11-19 VITALS — TEMPERATURE: 97 F | RESPIRATION RATE: 16 BRPM | HEART RATE: 64 BPM

## 2024-11-19 DIAGNOSIS — E53.8 B12 DEFICIENCY: Primary | ICD-10-CM

## 2024-11-19 PROCEDURE — 96372 THER/PROPH/DIAG INJ SC/IM: CPT | Performed by: FAMILY MEDICINE

## 2024-11-19 RX ORDER — CYANOCOBALAMIN 1000 UG/ML
1000 INJECTION, SOLUTION INTRAMUSCULAR; SUBCUTANEOUS ONCE
Status: COMPLETED | OUTPATIENT
Start: 2024-11-19 | End: 2024-11-19

## 2024-11-19 RX ADMIN — CYANOCOBALAMIN 1000 MCG: 1000 INJECTION, SOLUTION INTRAMUSCULAR; SUBCUTANEOUS at 10:07

## 2024-11-26 ENCOUNTER — OFFICE VISIT (OUTPATIENT)
Dept: FAMILY MEDICINE CLINIC | Age: 79
End: 2024-11-26
Payer: MEDICARE

## 2024-11-26 ENCOUNTER — TELEPHONE (OUTPATIENT)
Dept: FAMILY MEDICINE CLINIC | Age: 79
End: 2024-11-26

## 2024-11-26 ENCOUNTER — HOSPITAL ENCOUNTER (OUTPATIENT)
Age: 79
Setting detail: SPECIMEN
Discharge: HOME | End: 2024-11-26

## 2024-11-26 VITALS
SYSTOLIC BLOOD PRESSURE: 143 MMHG | HEART RATE: 77 BPM | DIASTOLIC BLOOD PRESSURE: 80 MMHG | TEMPERATURE: 97.4 F | OXYGEN SATURATION: 98 % | WEIGHT: 133 LBS | BODY MASS INDEX: 24.33 KG/M2

## 2024-11-26 DIAGNOSIS — N30.01 ACUTE CYSTITIS WITH HEMATURIA: Primary | ICD-10-CM

## 2024-11-26 DIAGNOSIS — R39.9 UTI SYMPTOMS: ICD-10-CM

## 2024-11-26 LAB
BILIRUBIN, POC: NORMAL
BLOOD URINE, POC: NORMAL
CLARITY, POC: NORMAL
COLOR, POC: NORMAL
GLUCOSE URINE, POC: NORMAL MG/DL
KETONES, POC: NORMAL MG/DL
LEUKOCYTE EST, POC: NORMAL
NITRITE, POC: NORMAL
PH, POC: 6
PROTEIN, POC: 100 MG/DL
SPECIFIC GRAVITY, POC: 1.02
UROBILINOGEN, POC: 0.2 MG/DL

## 2024-11-26 PROCEDURE — 99214 OFFICE O/P EST MOD 30 MIN: CPT

## 2024-11-26 PROCEDURE — 3077F SYST BP >= 140 MM HG: CPT

## 2024-11-26 PROCEDURE — 1160F RVW MEDS BY RX/DR IN RCRD: CPT

## 2024-11-26 PROCEDURE — 81002 URINALYSIS NONAUTO W/O SCOPE: CPT

## 2024-11-26 PROCEDURE — 1123F ACP DISCUSS/DSCN MKR DOCD: CPT

## 2024-11-26 PROCEDURE — 1159F MED LIST DOCD IN RCRD: CPT

## 2024-11-26 PROCEDURE — 3079F DIAST BP 80-89 MM HG: CPT

## 2024-11-26 RX ORDER — CIPROFLOXACIN 250 MG/1
250 TABLET, FILM COATED ORAL 2 TIMES DAILY
Qty: 6 TABLET | Refills: 0 | Status: SHIPPED | OUTPATIENT
Start: 2024-11-26 | End: 2024-11-29

## 2024-11-26 ASSESSMENT — ENCOUNTER SYMPTOMS
SHORTNESS OF BREATH: 0
FACIAL SWELLING: 0
ABDOMINAL PAIN: 0
CHEST TIGHTNESS: 0
EYE ITCHING: 0
NAUSEA: 0
BACK PAIN: 0
EYE PAIN: 0
VOMITING: 0

## 2024-11-26 NOTE — TELEPHONE ENCOUNTER
Patient stated she started having pain and blood in her urine overnight. She cancelled her b12 #4 shot for today and is going to the walk in to be evaluated. She will call back to reschedule her shot

## 2024-11-26 NOTE — PROGRESS NOTES
[facility], [facility]  [address]  [address]  [facility] [id number]  Dept: [phone]  Dept Fax: [phone]    Kisha Rock is a 79 y.o. female who presents to the urgent care today for her medical conditions/complaints as notedbelow.  Kisha Rock is c/o of Hematuria (Blood in urine yesterday, urinary frequency today)      HPI:     Patient presents to the Walk In Clinic for evaluation of hematuria and urinary frequency, onset yesterday    Patient Care Team:  Ruddy Price MD as PCP - General (Family Medicine)  Ruddy Price MD as PCP - Empaneled Provider  Toan Kent MD as Consulting Physician (Gastroenterology)  Dante Johnson MD as Consulting Physician (Orthopedic Surgery)  Antony Bales MD as Surgeon (General Surgery)  Bandar Jones DPM as Consulting Physician (Podiatry)  Kassie Rodriguez MD as Surgeon (Ophthalmology)  Olga Sorto APRN - CNP as Nurse Practitioner (Nurse Practitioner)      Hematuria  This is a new problem. The current episode started yesterday. The problem has been rapidly improving since onset. She describes the hematuria as gross hematuria. The hematuria occurs throughout her entire urinary stream. She reports no clotting in her urine stream. She is experiencing no pain. She describes her urine color as tea-colored. Irritative symptoms include frequency and urgency. Obstructive symptoms include incomplete emptying and a slower stream. Obstructive symptoms do not include dribbling, an intermittent stream, straining or a weak stream. Associated symptoms include hesitancy. Pertinent negatives include no abdominal pain, bladder pain, bone pain, chills, dysuria, facial swelling, fever, flank pain, genital pain, inability to urinate, nausea, urinary retention, vomiting or weight loss. There is no history of recent infection. Risk factors include aspirin.       Past Medical

## 2024-11-28 LAB
MICROORGANISM SPEC CULT: ABNORMAL
SERVICE CMNT-IMP: ABNORMAL
SPECIMEN DESCRIPTION: ABNORMAL

## 2024-12-04 ENCOUNTER — NURSE ONLY (OUTPATIENT)
Dept: FAMILY MEDICINE CLINIC | Age: 79
End: 2024-12-04
Payer: MEDICARE

## 2024-12-04 VITALS
HEART RATE: 69 BPM | SYSTOLIC BLOOD PRESSURE: 102 MMHG | OXYGEN SATURATION: 99 % | WEIGHT: 131.4 LBS | BODY MASS INDEX: 24.03 KG/M2 | DIASTOLIC BLOOD PRESSURE: 64 MMHG

## 2024-12-04 DIAGNOSIS — E53.8 B12 DEFICIENCY: Primary | ICD-10-CM

## 2024-12-04 PROCEDURE — 96372 THER/PROPH/DIAG INJ SC/IM: CPT | Performed by: NURSE PRACTITIONER

## 2024-12-04 RX ORDER — CYANOCOBALAMIN 1000 UG/ML
1000 INJECTION, SOLUTION INTRAMUSCULAR; SUBCUTANEOUS ONCE
Status: COMPLETED | OUTPATIENT
Start: 2024-12-04 | End: 2024-12-04

## 2024-12-04 RX ADMIN — CYANOCOBALAMIN 1000 MCG: 1000 INJECTION, SOLUTION INTRAMUSCULAR; SUBCUTANEOUS at 10:42

## 2024-12-19 ENCOUNTER — OFFICE VISIT (OUTPATIENT)
Age: 79
End: 2024-12-19
Payer: MEDICARE

## 2024-12-19 VITALS — HEIGHT: 62 IN | BODY MASS INDEX: 23.92 KG/M2 | WEIGHT: 130 LBS

## 2024-12-19 DIAGNOSIS — M79.675 PAIN OF TOES OF BOTH FEET: ICD-10-CM

## 2024-12-19 DIAGNOSIS — B35.1 ONYCHOMYCOSIS OF TOENAIL: Primary | ICD-10-CM

## 2024-12-19 DIAGNOSIS — M79.674 PAIN OF TOES OF BOTH FEET: ICD-10-CM

## 2024-12-19 PROCEDURE — 11721 DEBRIDE NAIL 6 OR MORE: CPT | Performed by: PODIATRIST

## 2024-12-19 PROCEDURE — 99999 PR OFFICE/OUTPT VISIT,PROCEDURE ONLY: CPT | Performed by: PODIATRIST

## 2024-12-26 ASSESSMENT — ENCOUNTER SYMPTOMS
NAUSEA: 0
BACK PAIN: 0
DIARRHEA: 0
SHORTNESS OF BREATH: 0
COLOR CHANGE: 0

## 2024-12-26 NOTE — PROGRESS NOTES
SUBJECTIVE: Kisha Rock is a 79 y.o. female who returns to the office with chief complaint of painful fungal toenails. Patient relates toe nails are thickened/difficult to trim as well as painful with ambulation and with shoe gear.   Chief Complaint   Patient presents with    Nail Problem     B/l nail trim,last seen Ruddy Price MD 11/26/24     Review of Systems   Constitutional:  Negative for activity change, appetite change, chills, diaphoresis, fatigue and fever.   Respiratory:  Negative for shortness of breath.    Cardiovascular:  Negative for leg swelling.   Gastrointestinal:  Negative for diarrhea and nausea.   Endocrine: Negative for cold intolerance, heat intolerance and polyuria.   Musculoskeletal:  Positive for arthralgias. Negative for back pain, gait problem, joint swelling and myalgias.   Skin:  Negative for color change, pallor, rash and wound.   Allergic/Immunologic: Negative for environmental allergies and food allergies.   Neurological:  Negative for dizziness, weakness, light-headedness and numbness.   Hematological:  Does not bruise/bleed easily.   Psychiatric/Behavioral:  Negative for behavioral problems, confusion and self-injury. The patient is not nervous/anxious.      OBJECTIVE: Clinical evaluation of patient reveals nails 1,2,3,4,5 of the right foot and nails 1,2,3,4,5 of the left foot to present with thickness, elongation, discoloration, brittleness, and subungual debris. There was pain with palpation and debridement of the toenails of the bilateral feet. No open lesions noted to either foot today.     Class A Findings (1 needed)   [] Non-traumatic amputation of foot or integral skeleton portion thereof.   [] Q7.      Class B Findings (2 needed)   1. [] Absent posterior tibial pulse   2. [] Absent dorsalis pedis pulse   3. [] Advanced trophic changes; three of the following are required:   ·         [] hair growth (decrease or absence)   ·         [] nail changes (thickening)   ·

## 2025-01-06 ENCOUNTER — NURSE ONLY (OUTPATIENT)
Dept: FAMILY MEDICINE CLINIC | Age: 80
End: 2025-01-06
Payer: MEDICARE

## 2025-01-06 DIAGNOSIS — E53.8 B12 DEFICIENCY: Primary | ICD-10-CM

## 2025-01-06 PROCEDURE — 96372 THER/PROPH/DIAG INJ SC/IM: CPT | Performed by: NURSE PRACTITIONER

## 2025-01-06 RX ORDER — CYANOCOBALAMIN 1000 UG/ML
1000 INJECTION, SOLUTION INTRAMUSCULAR; SUBCUTANEOUS ONCE
Status: COMPLETED | OUTPATIENT
Start: 2025-01-06 | End: 2025-01-06

## 2025-01-06 RX ADMIN — CYANOCOBALAMIN 1000 MCG: 1000 INJECTION, SOLUTION INTRAMUSCULAR; SUBCUTANEOUS at 16:19

## 2025-01-10 DIAGNOSIS — I10 ESSENTIAL HYPERTENSION: ICD-10-CM

## 2025-01-10 DIAGNOSIS — E55.9 VITAMIN D DEFICIENCY: ICD-10-CM

## 2025-01-10 RX ORDER — AMLODIPINE BESYLATE 5 MG/1
5 TABLET ORAL DAILY
Qty: 90 TABLET | Refills: 1 | Status: SHIPPED | OUTPATIENT
Start: 2025-01-10

## 2025-01-10 RX ORDER — ERGOCALCIFEROL 1.25 MG/1
CAPSULE, LIQUID FILLED ORAL
Qty: 6 CAPSULE | Refills: 1 | Status: SHIPPED | OUTPATIENT
Start: 2025-01-10

## 2025-01-17 RX ORDER — ALENDRONATE SODIUM 70 MG/1
TABLET ORAL
Qty: 12 TABLET | Refills: 1 | Status: SHIPPED | OUTPATIENT
Start: 2025-01-17

## 2025-02-07 ENCOUNTER — NURSE ONLY (OUTPATIENT)
Dept: FAMILY MEDICINE CLINIC | Age: 80
End: 2025-02-07
Payer: MEDICARE

## 2025-02-07 VITALS — TEMPERATURE: 97.2 F | RESPIRATION RATE: 16 BRPM | HEART RATE: 68 BPM

## 2025-02-07 DIAGNOSIS — E53.8 B12 DEFICIENCY: Primary | ICD-10-CM

## 2025-02-07 PROCEDURE — 96372 THER/PROPH/DIAG INJ SC/IM: CPT | Performed by: FAMILY MEDICINE

## 2025-02-07 RX ORDER — CYANOCOBALAMIN 1000 UG/ML
1000 INJECTION, SOLUTION INTRAMUSCULAR; SUBCUTANEOUS ONCE
Status: COMPLETED | OUTPATIENT
Start: 2025-02-07 | End: 2025-02-07

## 2025-02-07 RX ADMIN — CYANOCOBALAMIN 1000 MCG: 1000 INJECTION, SOLUTION INTRAMUSCULAR; SUBCUTANEOUS at 13:49

## 2025-02-10 NOTE — TELEPHONE ENCOUNTER
Patient states she has been coughing and coughing, light yellow coming up now. Wants to know if you could send something along with [employer] for her to nprogress. Please advise. You may receive a survey regarding the care you received during your visit.  Your input is valuable to us.  We encourage you to complete and return your survey.  We hope you will choose us in the future for your healthcare needs.        9 Ways to Cut Back on Drinking  Maybe you've found yourself drinking more alcohol than you'd prefer. If you want to cut back, here are some ideas to try.    Think before you drink.  Do you really want a drink, or is it just a habit? If you're used to having a drink at a certain time, try doing something else then.     Look for substitutes.  Find some no-alcohol drinks that you enjoy, like flavored seltzer water, tea with honey, or tonic with a slice of lime. Or try alcohol-free beer or \"virgin\" cocktails (without the alcohol).     Drink more water.  Use water to quench your thirst. Drink a glass of water before you have any alcohol. Have another glass along with every drink or between drinks.     Shrink your drink.  For example, have a bottle of beer instead of a pint. Use a smaller glass for wine. Choose drinks with lower alcohol content (ABV%). Or use less liquor and more mixer in cocktails.     Slow down.  It's easy to drink quickly and without thinking about it. Pay attention, and make each drink last longer.     Do the math.  Total up how much you spend on alcohol each month. How much is that a year? If you cut back, what could you do with the money you save?     Take a break.  Choose a day or two each week when you won't drink at all. Notice how you feel on those days, physically and emotionally. How did you sleep? Do you feel better? Over time, add more break days.     Count calories.  Would you like to lose some weight? For some people that's a good motivator for cutting back. Figure out how many calories are in each drink. How many does that add up to in a day? In a week? In a month?     Practice saying no.  Be ready when someone offers you a drink. Try: \"Thanks, I've had

## 2025-02-14 RX ORDER — CETIRIZINE HYDROCHLORIDE 10 MG/1
TABLET ORAL
Qty: 90 TABLET | Refills: 1 | Status: SHIPPED | OUTPATIENT
Start: 2025-02-14

## 2025-03-07 ENCOUNTER — NURSE ONLY (OUTPATIENT)
Dept: FAMILY MEDICINE CLINIC | Age: 80
End: 2025-03-07
Payer: MEDICARE

## 2025-03-07 DIAGNOSIS — E53.8 B12 DEFICIENCY: Primary | ICD-10-CM

## 2025-03-07 PROCEDURE — 96372 THER/PROPH/DIAG INJ SC/IM: CPT | Performed by: FAMILY MEDICINE

## 2025-03-07 RX ORDER — CYANOCOBALAMIN 1000 UG/ML
1000 INJECTION, SOLUTION INTRAMUSCULAR; SUBCUTANEOUS ONCE
Status: COMPLETED | OUTPATIENT
Start: 2025-03-07 | End: 2025-03-07

## 2025-03-07 RX ADMIN — CYANOCOBALAMIN 1000 MCG: 1000 INJECTION, SOLUTION INTRAMUSCULAR; SUBCUTANEOUS at 14:18

## 2025-03-24 NOTE — TELEPHONE ENCOUNTER
Dr Kurtis Glover office called to see if premed for dental work is still needed.  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

## 2025-03-26 RX ORDER — AMOXICILLIN 500 MG/1
TABLET, FILM COATED ORAL
Qty: 40 TABLET | Refills: 0 | Status: SHIPPED | OUTPATIENT
Start: 2025-03-26

## 2025-04-02 DIAGNOSIS — M10.9 GOUT, UNSPECIFIED CAUSE, UNSPECIFIED CHRONICITY, UNSPECIFIED SITE: ICD-10-CM

## 2025-04-02 RX ORDER — ALLOPURINOL 100 MG/1
100 TABLET ORAL DAILY
Qty: 90 TABLET | Refills: 1 | Status: SHIPPED | OUTPATIENT
Start: 2025-04-02

## 2025-04-07 ENCOUNTER — OFFICE VISIT (OUTPATIENT)
Dept: FAMILY MEDICINE CLINIC | Age: 80
End: 2025-04-07
Payer: MEDICARE

## 2025-04-07 VITALS
RESPIRATION RATE: 12 BRPM | SYSTOLIC BLOOD PRESSURE: 108 MMHG | OXYGEN SATURATION: 98 % | BODY MASS INDEX: 23.85 KG/M2 | WEIGHT: 130.4 LBS | HEART RATE: 61 BPM | DIASTOLIC BLOOD PRESSURE: 64 MMHG

## 2025-04-07 DIAGNOSIS — I10 ESSENTIAL HYPERTENSION: Primary | ICD-10-CM

## 2025-04-07 DIAGNOSIS — E53.8 B12 DEFICIENCY: ICD-10-CM

## 2025-04-07 DIAGNOSIS — M10.9 GOUT, UNSPECIFIED CAUSE, UNSPECIFIED CHRONICITY, UNSPECIFIED SITE: ICD-10-CM

## 2025-04-07 DIAGNOSIS — Z79.899 CURRENT USE OF PROTON PUMP INHIBITOR: ICD-10-CM

## 2025-04-07 DIAGNOSIS — K21.9 GASTROESOPHAGEAL REFLUX DISEASE, UNSPECIFIED WHETHER ESOPHAGITIS PRESENT: ICD-10-CM

## 2025-04-07 DIAGNOSIS — E78.2 MIXED HYPERLIPIDEMIA: ICD-10-CM

## 2025-04-07 DIAGNOSIS — E55.9 VITAMIN D DEFICIENCY: ICD-10-CM

## 2025-04-07 PROCEDURE — 1159F MED LIST DOCD IN RCRD: CPT | Performed by: FAMILY MEDICINE

## 2025-04-07 PROCEDURE — 3078F DIAST BP <80 MM HG: CPT | Performed by: FAMILY MEDICINE

## 2025-04-07 PROCEDURE — 99214 OFFICE O/P EST MOD 30 MIN: CPT | Performed by: FAMILY MEDICINE

## 2025-04-07 PROCEDURE — 96372 THER/PROPH/DIAG INJ SC/IM: CPT | Performed by: FAMILY MEDICINE

## 2025-04-07 PROCEDURE — 1160F RVW MEDS BY RX/DR IN RCRD: CPT | Performed by: FAMILY MEDICINE

## 2025-04-07 PROCEDURE — 3074F SYST BP LT 130 MM HG: CPT | Performed by: FAMILY MEDICINE

## 2025-04-07 PROCEDURE — 1123F ACP DISCUSS/DSCN MKR DOCD: CPT | Performed by: FAMILY MEDICINE

## 2025-04-07 RX ORDER — CYANOCOBALAMIN 1000 UG/ML
1000 INJECTION, SOLUTION INTRAMUSCULAR; SUBCUTANEOUS ONCE
Status: COMPLETED | OUTPATIENT
Start: 2025-04-07 | End: 2025-04-07

## 2025-04-07 RX ADMIN — CYANOCOBALAMIN 1000 MCG: 1000 INJECTION, SOLUTION INTRAMUSCULAR; SUBCUTANEOUS at 13:21

## 2025-04-07 SDOH — ECONOMIC STABILITY: FOOD INSECURITY: WITHIN THE PAST 12 MONTHS, YOU WORRIED THAT YOUR FOOD WOULD RUN OUT BEFORE YOU GOT MONEY TO BUY MORE.: NEVER TRUE

## 2025-04-07 SDOH — ECONOMIC STABILITY: FOOD INSECURITY: WITHIN THE PAST 12 MONTHS, THE FOOD YOU BOUGHT JUST DIDN'T LAST AND YOU DIDN'T HAVE MONEY TO GET MORE.: NEVER TRUE

## 2025-04-07 ASSESSMENT — ENCOUNTER SYMPTOMS
SHORTNESS OF BREATH: 0
ABDOMINAL PAIN: 0
CHEST TIGHTNESS: 0
BLOOD IN STOOL: 0

## 2025-04-07 ASSESSMENT — PATIENT HEALTH QUESTIONNAIRE - PHQ9
SUM OF ALL RESPONSES TO PHQ QUESTIONS 1-9: 0
1. LITTLE INTEREST OR PLEASURE IN DOING THINGS: NOT AT ALL
2. FEELING DOWN, DEPRESSED OR HOPELESS: NOT AT ALL

## 2025-04-07 NOTE — PROGRESS NOTES
Future     Expected Date:   4/7/2025     Expiration Date:   4/7/2026    Vitamin D 25 Hydroxy     Standing Status:   Future     Expected Date:   4/7/2025     Expiration Date:   4/7/2026        Orders Placed This Encounter   Medications    cyanocobalamin injection 1,000 mcg        Return in about 6 months (around 10/7/2025).    Electronically signed by Ruddy Price MD on 4/7/2025 at 1:21 PM

## 2025-05-01 ENCOUNTER — HOSPITAL ENCOUNTER (OUTPATIENT)
Age: 80
Setting detail: SPECIMEN
Discharge: HOME | End: 2025-05-01

## 2025-05-01 DIAGNOSIS — K21.9 GASTROESOPHAGEAL REFLUX DISEASE, UNSPECIFIED WHETHER ESOPHAGITIS PRESENT: ICD-10-CM

## 2025-05-01 DIAGNOSIS — I10 ESSENTIAL HYPERTENSION: ICD-10-CM

## 2025-05-01 DIAGNOSIS — E53.8 B12 DEFICIENCY: ICD-10-CM

## 2025-05-01 DIAGNOSIS — M10.9 GOUT, UNSPECIFIED CAUSE, UNSPECIFIED CHRONICITY, UNSPECIFIED SITE: ICD-10-CM

## 2025-05-01 DIAGNOSIS — E55.9 VITAMIN D DEFICIENCY: ICD-10-CM

## 2025-05-01 DIAGNOSIS — E78.2 MIXED HYPERLIPIDEMIA: ICD-10-CM

## 2025-05-01 DIAGNOSIS — Z79.899 CURRENT USE OF PROTON PUMP INHIBITOR: ICD-10-CM

## 2025-05-01 LAB
25(OH)D3 SERPL-MCNC: 46.8 NG/ML (ref 30–100)
ALT SERPL-CCNC: 25 U/L (ref 10–35)
ANION GAP SERPL CALCULATED.3IONS-SCNC: 9 MMOL/L (ref 9–16)
AST SERPL-CCNC: 37 U/L (ref 10–35)
BUN SERPL-MCNC: 16 MG/DL (ref 8–23)
CALCIUM SERPL-MCNC: 9.8 MG/DL (ref 8.6–10.4)
CHLORIDE SERPL-SCNC: 103 MMOL/L (ref 98–107)
CHOLEST SERPL-MCNC: 145 MG/DL (ref 0–199)
CHOLESTEROL/HDL RATIO: 2.8
CO2 SERPL-SCNC: 26 MMOL/L (ref 20–31)
CREAT SERPL-MCNC: 0.7 MG/DL (ref 0.6–0.9)
FOLATE SERPL-MCNC: 38.3 NG/ML (ref 4.8–24.2)
GFR, ESTIMATED: 87 ML/MIN/1.73M2
GLUCOSE SERPL-MCNC: 78 MG/DL (ref 74–99)
HDLC SERPL-MCNC: 52 MG/DL
LDLC SERPL CALC-MCNC: 70 MG/DL (ref 0–100)
MAGNESIUM SERPL-MCNC: 1.9 MG/DL (ref 1.6–2.4)
POTASSIUM SERPL-SCNC: 4.4 MMOL/L (ref 3.7–5.3)
SODIUM SERPL-SCNC: 138 MMOL/L (ref 136–145)
TRIGL SERPL-MCNC: 117 MG/DL
URATE SERPL-MCNC: 5.6 MG/DL (ref 2.4–5.7)
VIT B12 SERPL-MCNC: 1163 PG/ML (ref 232–1245)
VLDLC SERPL CALC-MCNC: 23 MG/DL (ref 1–30)

## 2025-05-03 LAB — ZINC SERPL-MCNC: 65.2 UG/DL (ref 60–120)

## 2025-05-07 ENCOUNTER — CLINICAL SUPPORT (OUTPATIENT)
Dept: FAMILY MEDICINE CLINIC | Age: 80
End: 2025-05-07
Payer: MEDICARE

## 2025-05-07 DIAGNOSIS — E53.8 B12 DEFICIENCY: Primary | ICD-10-CM

## 2025-05-07 PROCEDURE — 96372 THER/PROPH/DIAG INJ SC/IM: CPT | Performed by: NURSE PRACTITIONER

## 2025-05-07 RX ORDER — CYANOCOBALAMIN 1000 UG/ML
1000 INJECTION, SOLUTION INTRAMUSCULAR; SUBCUTANEOUS ONCE
Status: COMPLETED | OUTPATIENT
Start: 2025-05-07 | End: 2025-05-07

## 2025-05-07 RX ADMIN — CYANOCOBALAMIN 1000 MCG: 1000 INJECTION, SOLUTION INTRAMUSCULAR; SUBCUTANEOUS at 13:29

## 2025-05-19 RX ORDER — SIMVASTATIN 20 MG
20 TABLET ORAL DAILY
Qty: 90 TABLET | Refills: 1 | Status: SHIPPED | OUTPATIENT
Start: 2025-05-19

## 2025-05-20 RX ORDER — NAPROXEN 250 MG/1
TABLET ORAL
Qty: 60 TABLET | Refills: 0 | Status: SHIPPED | OUTPATIENT
Start: 2025-05-20

## 2025-06-09 ENCOUNTER — TELEPHONE (OUTPATIENT)
Dept: FAMILY MEDICINE CLINIC | Age: 80
End: 2025-06-09

## 2025-06-09 DIAGNOSIS — R05.3 CHRONIC COUGH: ICD-10-CM

## 2025-06-09 RX ORDER — BENZONATATE 100 MG/1
CAPSULE ORAL
Qty: 30 CAPSULE | Refills: 1 | Status: SHIPPED | OUTPATIENT
Start: 2025-06-09

## 2025-06-09 NOTE — TELEPHONE ENCOUNTER
PATIENT IS WONDERING IF SHE COULD GET A REFILL ON HER TESSALON PERLES FOR COUGH THAT SHE IS HAVING.  HER PHARMACY IS [facility].  PLEASE ADVISE.

## 2025-06-12 ENCOUNTER — TELEPHONE (OUTPATIENT)
Dept: FAMILY MEDICINE CLINIC | Age: 80
End: 2025-06-12

## 2025-06-12 DIAGNOSIS — J30.9 ALLERGIC RHINITIS, UNSPECIFIED SEASONALITY, UNSPECIFIED TRIGGER: Primary | ICD-10-CM

## 2025-06-12 RX ORDER — IPRATROPIUM BROMIDE 21 UG/1
2 SPRAY, METERED NASAL EVERY 12 HOURS
Qty: 30 ML | Refills: 3 | Status: SHIPPED | OUTPATIENT
Start: 2025-06-12

## 2025-06-12 RX ORDER — FEXOFENADINE HCL 180 MG/1
180 TABLET ORAL DAILY
Qty: 30 TABLET | Refills: 3 | Status: SHIPPED | OUTPATIENT
Start: 2025-06-12

## 2025-06-12 NOTE — TELEPHONE ENCOUNTER
Patient is to discontinue Zyrtec.  Started on Allegra 180 mg daily and Atrovent nasal spray 2 sprays into each nostril twice daily.  Patient is to continue on Tessalon Perles as needed for cough.

## 2025-06-12 NOTE — TELEPHONE ENCOUNTER
Th patient was contacted and the message regarding new prescriptions was relayed to them. The patient voiced understanding.

## 2025-06-12 NOTE — TELEPHONE ENCOUNTER
The patient called stating that she is still coughing, over a week now. Phlegm is mostly clear but sometimes a light yellow. She does have sinus drainage, clear, with PND - \"a tickle in the back of my throat\".. No facial pain or pressure, not ear pain, no fever. Asking what else can be prescribed for her.  Pharmacy - [facility] [address] [place]

## 2025-06-16 ENCOUNTER — CLINICAL SUPPORT (OUTPATIENT)
Dept: FAMILY MEDICINE CLINIC | Age: 80
End: 2025-06-16
Payer: MEDICARE

## 2025-06-16 DIAGNOSIS — E53.8 B12 DEFICIENCY: Primary | ICD-10-CM

## 2025-06-16 PROCEDURE — 96372 THER/PROPH/DIAG INJ SC/IM: CPT | Performed by: FAMILY MEDICINE

## 2025-06-16 RX ORDER — GUAIFENESIN/DEXTROMETHORPHAN 100-10MG/5
5 SYRUP ORAL 3 TIMES DAILY PRN
COMMUNITY

## 2025-06-16 RX ORDER — CYANOCOBALAMIN 1000 UG/ML
1000 INJECTION, SOLUTION INTRAMUSCULAR; SUBCUTANEOUS ONCE
Status: COMPLETED | OUTPATIENT
Start: 2025-06-16 | End: 2025-06-16

## 2025-06-16 RX ADMIN — CYANOCOBALAMIN 1000 MCG: 1000 INJECTION, SOLUTION INTRAMUSCULAR; SUBCUTANEOUS at 09:32

## 2025-06-16 ASSESSMENT — PATIENT HEALTH QUESTIONNAIRE - PHQ9
2. FEELING DOWN, DEPRESSED OR HOPELESS: NOT AT ALL
SUM OF ALL RESPONSES TO PHQ QUESTIONS 1-9: 0
SUM OF ALL RESPONSES TO PHQ QUESTIONS 1-9: 0
1. LITTLE INTEREST OR PLEASURE IN DOING THINGS: NOT AT ALL
SUM OF ALL RESPONSES TO PHQ QUESTIONS 1-9: 0
SUM OF ALL RESPONSES TO PHQ QUESTIONS 1-9: 0

## 2025-06-16 NOTE — PROGRESS NOTES
The patient is here today for a monthly Vitamin B12 injection, right deltoid. The patient tolerated it well.

## 2025-07-05 DIAGNOSIS — I10 ESSENTIAL HYPERTENSION: ICD-10-CM

## 2025-07-07 RX ORDER — ALENDRONATE SODIUM 70 MG/1
TABLET ORAL
Qty: 12 TABLET | Refills: 1 | Status: SHIPPED | OUTPATIENT
Start: 2025-07-07

## 2025-07-07 RX ORDER — AMLODIPINE BESYLATE 5 MG/1
5 TABLET ORAL DAILY
Qty: 90 TABLET | Refills: 1 | Status: SHIPPED | OUTPATIENT
Start: 2025-07-07

## 2025-07-17 DIAGNOSIS — K21.9 GASTROESOPHAGEAL REFLUX DISEASE WITHOUT ESOPHAGITIS: ICD-10-CM

## 2025-07-17 RX ORDER — OMEPRAZOLE 20 MG/1
20 CAPSULE, DELAYED RELEASE ORAL DAILY
Qty: 90 CAPSULE | Refills: 2 | Status: SHIPPED | OUTPATIENT
Start: 2025-07-17

## 2025-07-25 ENCOUNTER — OFFICE VISIT (OUTPATIENT)
Dept: FAMILY MEDICINE CLINIC | Age: 80
End: 2025-07-25
Payer: MEDICARE

## 2025-07-25 VITALS
HEART RATE: 93 BPM | WEIGHT: 126 LBS | RESPIRATION RATE: 16 BRPM | OXYGEN SATURATION: 99 % | BODY MASS INDEX: 23.05 KG/M2

## 2025-07-25 DIAGNOSIS — E55.9 VITAMIN D DEFICIENCY: ICD-10-CM

## 2025-07-25 DIAGNOSIS — M10.9 GOUT, UNSPECIFIED CAUSE, UNSPECIFIED CHRONICITY, UNSPECIFIED SITE: ICD-10-CM

## 2025-07-25 DIAGNOSIS — I10 ESSENTIAL HYPERTENSION: Primary | ICD-10-CM

## 2025-07-25 DIAGNOSIS — Z79.899 CURRENT USE OF PROTON PUMP INHIBITOR: ICD-10-CM

## 2025-07-25 DIAGNOSIS — E53.8 VITAMIN B12 DEFICIENCY: ICD-10-CM

## 2025-07-25 DIAGNOSIS — K21.9 GASTROESOPHAGEAL REFLUX DISEASE, UNSPECIFIED WHETHER ESOPHAGITIS PRESENT: ICD-10-CM

## 2025-07-25 DIAGNOSIS — E78.2 MIXED HYPERLIPIDEMIA: ICD-10-CM

## 2025-07-25 DIAGNOSIS — L23.7 CONTACT DERMATITIS DUE TO POISON IVY: ICD-10-CM

## 2025-07-25 PROCEDURE — 99214 OFFICE O/P EST MOD 30 MIN: CPT | Performed by: FAMILY MEDICINE

## 2025-07-25 PROCEDURE — 1159F MED LIST DOCD IN RCRD: CPT | Performed by: FAMILY MEDICINE

## 2025-07-25 PROCEDURE — 1160F RVW MEDS BY RX/DR IN RCRD: CPT | Performed by: FAMILY MEDICINE

## 2025-07-25 PROCEDURE — 1123F ACP DISCUSS/DSCN MKR DOCD: CPT | Performed by: FAMILY MEDICINE

## 2025-07-25 RX ORDER — METHYLPREDNISOLONE 4 MG/1
TABLET ORAL
Qty: 1 KIT | Refills: 0 | Status: SHIPPED | OUTPATIENT
Start: 2025-07-25

## 2025-07-25 RX ORDER — HYDROXYZINE HYDROCHLORIDE 25 MG/1
25 TABLET, FILM COATED ORAL EVERY 8 HOURS PRN
Qty: 30 TABLET | Refills: 0 | Status: SHIPPED | OUTPATIENT
Start: 2025-07-25 | End: 2025-08-04

## 2025-07-25 ASSESSMENT — ENCOUNTER SYMPTOMS
BLOOD IN STOOL: 0
SHORTNESS OF BREATH: 0
ABDOMINAL PAIN: 0
CHEST TIGHTNESS: 0

## 2025-07-25 NOTE — PROGRESS NOTES
Last Year: Never true     Ran Out of Food in the Last Year: Never true   Transportation Needs: No Transportation Needs (4/7/2025)    PRAPARE - Transportation     Lack of Transportation (Medical): No     Lack of Transportation (Non-Medical): No   Physical Activity: Sufficiently Active (11/4/2024)    Exercise Vital Sign     Days of Exercise per Week: 6 days     Minutes of Exercise per Session: 30 min   Housing Stability: Low Risk  (4/7/2025)    Housing Stability Vital Sign     Unable to Pay for Housing in the Last Year: No     Number of Times Moved in the Last Year: 0     Homeless in the Last Year: No        Family History   Problem Relation Age of Onset    Stroke Father     Arthritis Other     Thyroid Disease Other     COPD Other     Other Son         atrial fib        PHYSICAL EXAM:     Pulse 93   Resp 16   Wt 57.2 kg (126 lb)   LMP 01/01/2004   SpO2 99%   BMI 23.05 kg/m²    Physical Exam  Vitals and nursing note reviewed.   Constitutional:       General: She is not in acute distress.     Appearance: She is well-developed.   HENT:      Head: Normocephalic and atraumatic.      Right Ear: Tympanic membrane, ear canal and external ear normal.      Left Ear: Tympanic membrane, ear canal and external ear normal.      Nose: Nose normal.      Mouth/Throat:      Mouth: Mucous membranes are moist.      Pharynx: Oropharynx is clear.   Eyes:      General: No scleral icterus.        Right eye: No discharge.         Left eye: No discharge.      Conjunctiva/sclera: Conjunctivae normal.   Cardiovascular:      Rate and Rhythm: Normal rate and regular rhythm.      Heart sounds: Normal heart sounds.   Pulmonary:      Effort: Pulmonary effort is normal. No respiratory distress.      Breath sounds: Normal breath sounds. No wheezing.   Abdominal:      General: There is no distension.      Palpations: Abdomen is soft.      Tenderness: There is no abdominal tenderness.   Musculoskeletal:      Cervical back: Neck supple.   Skin:

## 2025-08-03 DIAGNOSIS — E55.9 VITAMIN D DEFICIENCY: ICD-10-CM

## 2025-08-04 RX ORDER — ERGOCALCIFEROL 1.25 MG/1
CAPSULE, LIQUID FILLED ORAL
Qty: 6 CAPSULE | Refills: 1 | Status: SHIPPED | OUTPATIENT
Start: 2025-08-04

## 2025-08-05 ENCOUNTER — CLINICAL SUPPORT (OUTPATIENT)
Dept: FAMILY MEDICINE CLINIC | Age: 80
End: 2025-08-05
Payer: MEDICARE

## 2025-08-05 VITALS
BODY MASS INDEX: 23.05 KG/M2 | RESPIRATION RATE: 16 BRPM | WEIGHT: 126 LBS | TEMPERATURE: 98.4 F | OXYGEN SATURATION: 97 %

## 2025-08-05 DIAGNOSIS — E53.8 VITAMIN B12 DEFICIENCY: Primary | ICD-10-CM

## 2025-08-05 PROCEDURE — 96372 THER/PROPH/DIAG INJ SC/IM: CPT | Performed by: FAMILY MEDICINE

## 2025-08-05 RX ORDER — CYANOCOBALAMIN 1000 UG/ML
1000 INJECTION, SOLUTION INTRAMUSCULAR; SUBCUTANEOUS ONCE
Status: COMPLETED | OUTPATIENT
Start: 2025-08-05 | End: 2025-08-05

## 2025-08-05 RX ADMIN — CYANOCOBALAMIN 1000 MCG: 1000 INJECTION, SOLUTION INTRAMUSCULAR; SUBCUTANEOUS at 09:13

## 2025-08-05 ASSESSMENT — PATIENT HEALTH QUESTIONNAIRE - PHQ9
1. LITTLE INTEREST OR PLEASURE IN DOING THINGS: NOT AT ALL
2. FEELING DOWN, DEPRESSED OR HOPELESS: NOT AT ALL
SUM OF ALL RESPONSES TO PHQ QUESTIONS 1-9: 0

## (undated) DEVICE — PREP SOL PVP IODINE 4%  4 OZ/BTL

## (undated) DEVICE — BANDAGE,SELF ADHRNT,COFLEX,4"X5YD,STRL: Brand: COLABEL

## (undated) DEVICE — LIQUIBAND RAPID ADHESIVE 36/CS 0.8ML: Brand: MEDLINE

## (undated) DEVICE — TOWEL,OR,DSP,ST,BLUE,STD,6/PK,12PK/CS: Brand: MEDLINE

## (undated) DEVICE — Z INACTIVE USE 2653177 SPONGE GZ W2XL2IN NONWOVEN 4 PLY FASTER WICKING ABIL AVANT

## (undated) DEVICE — TUBING, SUCTION, 1/4" X 12', STRAIGHT: Brand: MEDLINE

## (undated) DEVICE — SUTURE STRATAFIX SYMMETRIC PDS + SZ 1 L18IN ABSRB VLT L48MM SXPP1A400

## (undated) DEVICE — CUFF REPROCESS BP TOURN SING BLDR 2 PORT 4X24IN

## (undated) DEVICE — SURGICAL PROCEDURE PACK LT EYE CUST ST CHARLES STRL LF DISP

## (undated) DEVICE — SEALER LAP SM L18.8CM OPN JAW HAND/FOOT SWCH FORCETRIAD

## (undated) DEVICE — 1840 FOAM BLOCK NEEDLE COUNTER: Brand: DEVON

## (undated) DEVICE — ZIMMER® STERILE DISPOSABLE TOURNIQUET CUFF WITH PLC, DUAL PORT, SINGLE BLADDER, 30 IN. (76 CM)

## (undated) DEVICE — FORCEPS BX L240CM WRK CHN 2.8MM STD CAP W/ NDL MIC MESH

## (undated) DEVICE — KIT SEP W/ BLD DRAW TB SYR NDL TRNQT PD

## (undated) DEVICE — GLOVE SURG SZ 8 L12IN FNGR THK13MIL BRN LTX SYN POLYMER W

## (undated) DEVICE — Z DUP USE 2257490 ADHESIVE SKIN CLSRE 036ML TPCL 2CTL CNCRLTE HIGH VSCSTY DRMB

## (undated) DEVICE — DUAL CUT SAGITTAL BLADE

## (undated) DEVICE — COVER,MAYO STAND,XL,STERILE: Brand: MEDLINE

## (undated) DEVICE — SOLUTION IV IRRIG WATER 1000ML POUR BRL 2F7114

## (undated) DEVICE — CUP MED 1OZ CLR POLYPR FEED GRAD W/O LID

## (undated) DEVICE — LABEL MED EYE STRL

## (undated) DEVICE — GLOVE ORTHO 7 1/2   MSG9475

## (undated) DEVICE — GLOVE ORANGE PI 8   MSG9080

## (undated) DEVICE — SUTURE VCRL + SZ 3-0 L36IN ABSRB UD L36MM CT-1 1/2 CIR VCP944H

## (undated) DEVICE — STERILE PATIENT PROTECTIVE PAD FOR IMP® KNEE POSITIONERS & COHESIVE WRAP (10 / CASE): Brand: DE MAYO KNEE POSITIONER®

## (undated) DEVICE — SOLUTION IRRIGATION BAL SALT SOLUTION 500 ML STRL BSS

## (undated) DEVICE — SUTURE VCRL SZ 0 L36IN ABSRB UD CT-1 L36MM 1/2 CIR TAPR PNT VCP946H

## (undated) DEVICE — GAUZE,SPONGE,4"X4",16PLY,XRAY,STRL,LF: Brand: MEDLINE

## (undated) DEVICE — TROCARS: Brand: KII® BALLOON BLUNT TIP SYSTEM

## (undated) DEVICE — BLOCK BITE 60FR RUBBER ADLT DENTAL

## (undated) DEVICE — COOLER THER 20-31IN L CRYO COMB W/ PD KNEE TB GRAV FLO

## (undated) DEVICE — CANNULA SEAL

## (undated) DEVICE — RETRIEVAL BALLOON CATHETER: Brand: EXTRACTOR™ PRO RX

## (undated) DEVICE — DRESSING GZ W3XL16IN CELOS ACETT OIL EMUL N ADH

## (undated) DEVICE — SOLUTION IRRIG 1000ML 0.9% SOD CHL USP POUR PLAS BTL

## (undated) DEVICE — 4-PORT MANIFOLD: Brand: NEPTUNE 2

## (undated) DEVICE — GOWN,AURORA,NONREINFORCED,LARGE: Brand: MEDLINE

## (undated) DEVICE — GAUZE,SPONGE,4"X4",16PLY,STRL,LF,10/TRAY: Brand: MEDLINE

## (undated) DEVICE — 450 ML BOTTLE OF 0.05% CHLORHEXIDINE GLUCONATE IN 99.95% STERILE WATER FOR IRRIGATION, USP AND APPLICATOR.: Brand: IRRISEPT ANTIMICROBIAL WOUND LAVAGE

## (undated) DEVICE — Device

## (undated) DEVICE — Z DISCONTINUED BY MEDLINE USE 2711682 TRAY SKIN PREP DRY W/ PREM GLV

## (undated) DEVICE — SUTURE VCRL + SZ 2-0 L27IN ABSRB WHT SH 1/2 CIR TAPERCUT VCP417H

## (undated) DEVICE — PREMIUM DRY TRAY LF: Brand: MEDLINE INDUSTRIES, INC.

## (undated) DEVICE — SOLUTION IV IRRIG POUR BRL 0.9% SODIUM CHL 2F7124

## (undated) DEVICE — SUTURE ETHBND SZ 2-0 L18IN NONABSORBABLE GRN L26MM CT-2 1/2 CX26D

## (undated) DEVICE — Z INACTIVE USE 2660664 SOLUTION IRRIG 3000ML 0.9% SOD CHL USP UROMATIC PLAS CONT

## (undated) DEVICE — 2108 SERIES SAGITTAL BLADE FLARED, GROUND  (29.0 X 1.32 X 84.0MM)

## (undated) DEVICE — BLANKET WRM W29.9XL79.1IN UP BODY FORC AIR MISTRAL-AIR

## (undated) DEVICE — WIREGUIDED CYTOLOGY BRUSH: Brand: RX CYTOLOGY BRUSH

## (undated) DEVICE — SOLUTION IRRIG 1000ML STRL H2O USP PLAS POUR BTL

## (undated) DEVICE — STRAP POS MP 30X3 IN HK LOOP CLOSURE FOAM DISP

## (undated) DEVICE — SUTURE STRATAFIX SPRL MCRYL + SZ 2 0 L27IN ABSRB UD W NDL SXMP1B419

## (undated) DEVICE — CLOSURE SKIN FLX NONINVASIVE PRELOC TECHNOLOGY FOR 24IN

## (undated) DEVICE — DRAPE,REIN 53X77,STERILE: Brand: MEDLINE

## (undated) DEVICE — ST. CHARLES BASIC SET UP: Brand: MEDLINE INDUSTRIES, INC.

## (undated) DEVICE — GLOVE SURG SZ 75 STD WHT LTX SYN POLYMER BEAD REINF ANTI RL

## (undated) DEVICE — ELECTRODE ES L3IN S STL BLDE INSUL DISP VALLEYLAB EDGE

## (undated) DEVICE — Z DUP USE 2428662 DRESSING POSTOP AG PRISMASEAL 3.5X14IN

## (undated) DEVICE — 3M™ WARMING BLANKET, UPPER BODY, 10 PER CASE, 42268: Brand: BAIR HUGGER™

## (undated) DEVICE — CEMENT MIXING SYSTEM WITH FEMORAL BREAKWAY NOZZLE: Brand: REVOLUTION

## (undated) DEVICE — GLOVE SURG 8 11.7IN BEAD CUF LIGHT BRN SENSICARE LTX FREE

## (undated) DEVICE — KIT AUTOTRNS APPL AERO 2 SET SYR 2 TIP FOR PLT SEP SYS GPS

## (undated) DEVICE — DUP USE 310781 BLADE SAW SAG 29MMX83.7MMX1.32MM

## (undated) DEVICE — TOTAL TRAY, DB, 100% SILI FOLEY, 16FR 10: Brand: MEDLINE

## (undated) DEVICE — ADAPTER TBNG LUER STUB 15 GA INTMED

## (undated) DEVICE — SUTURE MCRYL + SZ 4-0 L27IN ABSRB UD L19MM PS-2 3/8 CIR MCP426H

## (undated) DEVICE — COVER,TABLE,60X90,STERILE: Brand: MEDLINE

## (undated) DEVICE — CONMED DISPOSABLE GASTROINTESTINAL CYTOLOGY BRUSH, STRAIGHT HANDLE, 2.5 MM X 160 CM: Brand: CONMED

## (undated) DEVICE — BITEBLOCK 54FR W/ DENT RIM BLOX

## (undated) DEVICE — TOWEL,OR,DSP,ST,WHITE,DLX,2/PK,40PK/CS: Brand: MEDLINE

## (undated) DEVICE — CO2 CANNULA,SUPERSOFT, ADLT,7'O2,7'CO2: Brand: MEDLINE

## (undated) DEVICE — BLADELESS OBTURATOR: Brand: WECK VISTA

## (undated) DEVICE — SET HNDPC W COAX BNE CLN TIP SUCT TB BTTRY PWR DISPOSABLE

## (undated) DEVICE — SHIELD EYE PLAS CATRCT PT CARE

## (undated) DEVICE — Z INACTIVE USE 2641839 CLIP INT M L POLYMER LOK LIG HEM O LOK

## (undated) DEVICE — COVER,TABLE,HEAVY DUTY,50"X90",STRL: Brand: MEDLINE

## (undated) DEVICE — MASTISOL ADHESIVE LIQ 2/3ML

## (undated) DEVICE — ARM DRAPE

## (undated) DEVICE — SYRINGE MED 3ML CLR PLAS STD N CTRL LUERLOCK TIP DISP

## (undated) DEVICE — PAD,NON-ADHERENT,3X8,STERILE,LF,1/PK: Brand: MEDLINE

## (undated) DEVICE — ENDO KIT W/SYRINGE: Brand: MEDLINE INDUSTRIES, INC.

## (undated) DEVICE — SYSTEM BX CAP BILI RAP EXCHG CAP LOK DEV COMPATIBLE W/ OLY

## (undated) DEVICE — COVER,MAYO STAND,STERILE: Brand: MEDLINE

## (undated) DEVICE — Z DISCONTINUED PER MEDLINE USE 2741943 DRESSING AQUACEL 10 IN ALG W9XL25CM SIL CVR WTRPRF VIR BACT BARR ANTIMIC

## (undated) DEVICE — Device: Brand: DEFENDO VALVE AND CONNECTOR KIT

## (undated) DEVICE — GLOVE SURG SZ 85 STD WHT LTX SYN POLYMER BEAD REINF ANTI RL

## (undated) DEVICE — JELLY,LUBE,STERILE,FLIP TOP,TUBE,2-OZ: Brand: MEDLINE

## (undated) DEVICE — BASIN EMSIS 700ML GRAPHITE PLAS KID SHP GRAD

## (undated) DEVICE — MEDICINE CUP, GRADUATED, STER: Brand: MEDLINE

## (undated) DEVICE — DRESSING TRNSPAR W5XL4.5IN FLM SHT SEMIPERMEABLE WIND

## (undated) DEVICE — SYRINGE MED 50ML LUERLOCK TIP

## (undated) DEVICE — SHEET, T, LAPAROTOMY, STERILE: Brand: MEDLINE

## (undated) DEVICE — TROCAR: Brand: KII FIOS FIRST ENTRY

## (undated) DEVICE — SPHINCTEROTOME: Brand: DREAMTOME™ RX 44

## (undated) DEVICE — SOLUTION SCRB 4OZ 4% CHG H2O AIDED FOR PREOPERATIVE SKIN

## (undated) DEVICE — STRL PENROSE DRAIN 18" X 1/4": Brand: CARDINAL HEALTH

## (undated) DEVICE — ZIPPERED TOGA, 2X LARGE: Brand: FLYTE, SURGICOOL

## (undated) DEVICE — ST CHARLES GEN LAPAROSCOPY PK: Brand: MEDLINE INDUSTRIES, INC.

## (undated) DEVICE — SUTURE VCRL + SZ 2 L27IN ABSRB UD L40MM CP 1/2 CIR REV CUT VCP195H

## (undated) DEVICE — SUTURE PDS II SZ 0 L27IN ABSRB VLT UR-6 L26MM 1/2 CIR D7185

## (undated) DEVICE — MERCY HEALTH ST CHARLES: Brand: MEDLINE INDUSTRIES, INC.

## (undated) DEVICE — SYRINGE MED 10ML LUERLOCK TIP W/O SFTY DISP